# Patient Record
Sex: FEMALE | Race: WHITE | NOT HISPANIC OR LATINO | Employment: OTHER | URBAN - METROPOLITAN AREA
[De-identification: names, ages, dates, MRNs, and addresses within clinical notes are randomized per-mention and may not be internally consistent; named-entity substitution may affect disease eponyms.]

---

## 2017-01-02 ENCOUNTER — ANTICOAGULATION THERAPY VISIT (OUTPATIENT)
Dept: ANTICOAGULATION | Facility: CLINIC | Age: 77
End: 2017-01-02
Payer: COMMERCIAL

## 2017-01-02 ENCOUNTER — ALLIED HEALTH/NURSE VISIT (OUTPATIENT)
Dept: FAMILY MEDICINE | Facility: CLINIC | Age: 77
End: 2017-01-02
Payer: COMMERCIAL

## 2017-01-02 VITALS — SYSTOLIC BLOOD PRESSURE: 118 MMHG | RESPIRATION RATE: 14 BRPM | DIASTOLIC BLOOD PRESSURE: 66 MMHG | HEART RATE: 76 BPM

## 2017-01-02 DIAGNOSIS — Z01.30 ENCOUNTER FOR BLOOD PRESSURE EXAMINATION: Primary | ICD-10-CM

## 2017-01-02 DIAGNOSIS — Z79.01 LONG TERM CURRENT USE OF ANTICOAGULANT THERAPY: Primary | ICD-10-CM

## 2017-01-02 LAB — INR POINT OF CARE: 2.5 (ref 0.86–1.14)

## 2017-01-02 PROCEDURE — 36416 COLLJ CAPILLARY BLOOD SPEC: CPT

## 2017-01-02 PROCEDURE — 99207 ZZC NO CHARGE NURSE ONLY: CPT

## 2017-01-02 PROCEDURE — 85610 PROTHROMBIN TIME: CPT | Mod: QW

## 2017-01-02 NOTE — PROGRESS NOTES
ANTICOAGULATION FOLLOW-UP CLINIC VISIT    Patient Name:  Nayely Kay  Date:  1/2/2017  Contact Type:  Face to Face    SUBJECTIVE:        OBJECTIVE    INR PROTIME   Date Value Ref Range Status   01/02/2017 2.5* 0.86 - 1.14 Final       ASSESSMENT / PLAN  INR assessment THER    Recheck INR In: 4 WEEKS    INR Location Clinic      Anticoagulation Summary as of 1/2/2017     INR goal 2.0-3.0   Selected INR 2.5 (1/2/2017)   Maintenance plan 3.75 mg (2.5 mg x 1.5) on Mon; 2.5 mg (2.5 mg x 1) all other days   Full instructions 3.75 mg on Mon; 2.5 mg all other days   Weekly total 18.75 mg   No change documented Giselle Santillan RN   Plan last modified Yasmeen Anthony RN (3/9/2015)   Next INR check 1/30/2017   Priority INR   Target end date Indefinite    Indications   Atrial fibrillation (H) [I48.91]  Long term current use of anticoagulant therapy [Z79.01]         Anticoagulation Episode Summary     INR check location     Preferred lab     Send INR reminders to [facility] CLINIC POOL    Comments * only wants dosing card. Pt uses 5mg tablets.        Anticoagulation Care Providers     Provider Role Specialty Phone number    Ulysses Baker MD [facility] [phone]            See the Encounter Report to view Anticoagulation Flowsheet and Dosing Calendar (Go to Encounters tab in chart review, and find the Anticoagulation Therapy Visit)    Giselle Santillan, SEKOU

## 2017-01-02 NOTE — PROGRESS NOTES
Patient here for her monthly BP check after her anticoagulation appointment  Nicolette Granger RN

## 2017-01-06 ENCOUNTER — TELEPHONE (OUTPATIENT)
Dept: FAMILY MEDICINE | Facility: CLINIC | Age: 77
End: 2017-01-06

## 2017-01-06 NOTE — TELEPHONE ENCOUNTER
Reason for Call:  Other note    Detailed comments: Patient lives in an apartment with a garage that has a door that is very heavy and she just cannot lift the door any more.  If she has a note from her doctor the manager will install a garage . Please mail note to her if approved.    Phone Number Patient can be reached at: Home number on file [phone] (home)    Best Time: any    Can we leave a detailed message on this number? YES    Call taken on 1/6/2017 at 7:58 AM by Abigail Hayden

## 2017-01-06 NOTE — TELEPHONE ENCOUNTER
Would you be willing to write a letter for patient regarding the garage door?    Routing to provider.  Zulema LAWTON RN

## 2017-01-06 NOTE — Clinical Note
[facility]  [address] [address]  Phone: [phone]    January 6, 2017      Nayely Kay  [address] [address]      Dear Ms. Kay    For medical reasons you should have a garage  on your garage door.     Sincerely,    / Ulysses Baker MD

## 2017-01-06 NOTE — TELEPHONE ENCOUNTER
Pt has asked that the note be mailed to her.  Await  to mail to pt.  I notified pt.  Diane Mederos RN

## 2017-01-30 ENCOUNTER — ALLIED HEALTH/NURSE VISIT (OUTPATIENT)
Dept: FAMILY MEDICINE | Facility: CLINIC | Age: 77
End: 2017-01-30
Payer: COMMERCIAL

## 2017-01-30 ENCOUNTER — ANTICOAGULATION THERAPY VISIT (OUTPATIENT)
Dept: ANTICOAGULATION | Facility: CLINIC | Age: 77
End: 2017-01-30
Payer: COMMERCIAL

## 2017-01-30 VITALS — RESPIRATION RATE: 14 BRPM | HEART RATE: 64 BPM | DIASTOLIC BLOOD PRESSURE: 74 MMHG | SYSTOLIC BLOOD PRESSURE: 118 MMHG

## 2017-01-30 DIAGNOSIS — Z79.01 LONG TERM CURRENT USE OF ANTICOAGULANT THERAPY: Primary | ICD-10-CM

## 2017-01-30 DIAGNOSIS — Z01.30 BLOOD PRESSURE CHECK: Primary | ICD-10-CM

## 2017-01-30 DIAGNOSIS — I48.91 ATRIAL FIBRILLATION (H): ICD-10-CM

## 2017-01-30 LAB — INR POINT OF CARE: 1.7 (ref 0.86–1.14)

## 2017-01-30 PROCEDURE — 85610 PROTHROMBIN TIME: CPT | Mod: QW

## 2017-01-30 PROCEDURE — 99207 ZZC NO CHARGE NURSE ONLY: CPT

## 2017-01-30 PROCEDURE — 36416 COLLJ CAPILLARY BLOOD SPEC: CPT

## 2017-01-30 NOTE — PROGRESS NOTES
ANTICOAGULATION FOLLOW-UP CLINIC VISIT    Patient Name:  Nayely Kay  Date:  1/30/2017  Contact Type:  Face to Face    SUBJECTIVE:     Patient Findings     Positives Diet Changes (had more salad in the past few days, some at home then went out for dinner last night and had a salad with her meal, something she rarely gets to do), No Problem Findings           OBJECTIVE    INR PROTIME   Date Value Ref Range Status   01/30/2017 1.7* 0.86 - 1.14 Final       ASSESSMENT / PLAN  INR assessment SUB    Recheck INR In: 4 WEEKS    INR Location Clinic      Anticoagulation Summary as of 1/30/2017     INR goal 2.0-3.0   Selected INR 1.7! (1/30/2017)   Maintenance plan 3.75 mg (2.5 mg x 1.5) on Mon; 2.5 mg (2.5 mg x 1) all other days   Full instructions 1/30: 5 mg; Otherwise 3.75 mg on Mon; 2.5 mg all other days   Weekly total 18.75 mg   Plan last modified Yasmeen Anthony RN (3/9/2015)   Next INR check 2/27/2017   Priority INR   Target end date Indefinite    Indications   Atrial fibrillation (H) [I48.91]  Long term current use of anticoagulant therapy [Z79.01]         Anticoagulation Episode Summary     INR check location     Preferred lab     Send INR reminders to [facility] POOL    Comments * only wants dosing card. Pt uses 5mg tablets.        Anticoagulation Care Providers     Provider Role Specialty Phone number    Ulysses Baker MD [facility] [phone]            See the Encounter Report to view Anticoagulation Flowsheet and Dosing Calendar (Go to Encounters tab in chart review, and find the Anticoagulation Therapy Visit)    Giselle Santillan RN

## 2017-01-30 NOTE — PROGRESS NOTES
S-(situation): patient is here for INR check and always gets her blood pressure rafaela    B-(background): history hypertension    A-(assessment): blood pressure today is 118/74    R-(recommendations): see you next month Nayely Granger RN

## 2017-02-27 ENCOUNTER — ALLIED HEALTH/NURSE VISIT (OUTPATIENT)
Dept: FAMILY MEDICINE | Facility: CLINIC | Age: 77
End: 2017-02-27
Payer: COMMERCIAL

## 2017-02-27 ENCOUNTER — ANTICOAGULATION THERAPY VISIT (OUTPATIENT)
Dept: ANTICOAGULATION | Facility: CLINIC | Age: 77
End: 2017-02-27
Payer: COMMERCIAL

## 2017-02-27 VITALS — DIASTOLIC BLOOD PRESSURE: 60 MMHG | HEART RATE: 68 BPM | SYSTOLIC BLOOD PRESSURE: 108 MMHG | RESPIRATION RATE: 14 BRPM

## 2017-02-27 DIAGNOSIS — Z01.30 ENCOUNTER FOR BLOOD PRESSURE EXAMINATION: Primary | ICD-10-CM

## 2017-02-27 DIAGNOSIS — Z79.01 LONG TERM CURRENT USE OF ANTICOAGULANT THERAPY: ICD-10-CM

## 2017-02-27 LAB — INR POINT OF CARE: 1.8 (ref 0.86–1.14)

## 2017-02-27 PROCEDURE — 99207 ZZC NO CHARGE NURSE ONLY: CPT

## 2017-02-27 PROCEDURE — 36416 COLLJ CAPILLARY BLOOD SPEC: CPT

## 2017-02-27 PROCEDURE — 85610 PROTHROMBIN TIME: CPT | Mod: QW

## 2017-02-27 RX ORDER — WARFARIN SODIUM 5 MG/1
TABLET ORAL
Qty: 90 TABLET | Refills: 3 | COMMUNITY
Start: 2017-02-27 | End: 2017-05-16

## 2017-02-27 NOTE — PROGRESS NOTES
S-(situation): patient is here for a blood pressure check    B-(background): gets blood pressure checked when here for the anticoagulation clinic.    States is taking medications as directed.  Blood pressure has been going down last few months or so.  States feeling fatigued and little energy lately.    Nayely does not seem like her self today.    A-(assessment): blood pressure 108/80    R-(recommendations): advised to see her doctor and go over medications and discuss fatigue.  Nicolette Granger RN

## 2017-02-27 NOTE — MR AVS SNAPSHOT
Nayely Kay   2/27/2017 2:30 PM   Anticoagulation Therapy Visit    Description:  76 year old female   Provider:  NB ANTI CAROLAG   Department:  Nb Anticoag           INR as of 2/27/2017     Today's INR 1.8!      Anticoagulation Summary as of 2/27/2017     INR goal 2.0-3.0   Today's INR 1.8!   Full instructions 5 mg on Mon; 2.5 mg all other days   Next INR check 3/27/2017    Indications   Atrial fibrillation (H) [I48.91]  Long term current use of anticoagulant therapy [Z79.01]         Description     Increase dose to 5mg every Monday and 2.5mg all other days.  Recheck INR in 4 weeks.      February 2017 Details    Sun Mon Tue Wed Thu Fri Sat        1               2               3               4                 5               6               7               8               9               10               11                 12               13               14               15               16               17               18                 19               20               21               22               23               24               25                 26               27      5 mg   See details      28      2.5 mg              Date Details   02/27 This INR check               How to take your warfarin dose     To take:  2.5 mg Take 0.5 of a 5 mg tablet.    To take:  5 mg Take 1 of the 5 mg tablets.           March 2017 Details    Sun Mon Tue Wed Thu Fri Sat        1      2.5 mg         2      2.5 mg         3      2.5 mg         4      2.5 mg           5      2.5 mg         6      5 mg         7      2.5 mg         8      2.5 mg         9      2.5 mg         10      2.5 mg         11      2.5 mg           12      2.5 mg         13      5 mg         14      2.5 mg         15      2.5 mg         16      2.5 mg         17      2.5 mg         18      2.5 mg           19      2.5 mg         20      5 mg         21      2.5 mg         22      2.5 mg         23      2.5 mg         24      2.5 mg         25       2.5 mg           26      2.5 mg         27            28               29               30               31                 Date Details   No additional details    Date of next INR:  3/27/2017         How to take your warfarin dose     To take:  2.5 mg Take 0.5 of a 5 mg tablet.    To take:  5 mg Take 1 of the 5 mg tablets.

## 2017-02-27 NOTE — PROGRESS NOTES
ANTICOAGULATION FOLLOW-UP CLINIC VISIT    Patient Name:  Nayely Kay  Date:  2/27/2017  Contact Type:  Face to Face    SUBJECTIVE:     Patient Findings     Positives Unexplained INR or factor level change           OBJECTIVE    INR Protime   Date Value Ref Range Status   02/27/2017 1.8 (A) 0.86 - 1.14 Final       ASSESSMENT / PLAN  INR assessment SUB increase maintenance dose 6.7%   Recheck INR In: 4 WEEKS    INR Location Clinic      Anticoagulation Summary as of 2/27/2017     INR goal 2.0-3.0   Today's INR 1.8!   Maintenance plan 5 mg (5 mg x 1) on Mon; 2.5 mg (5 mg x 0.5) all other days   Full instructions 5 mg on Mon; 2.5 mg all other days   Weekly total 20 mg   Plan last modified Giselle Santillan RN (2/27/2017)   Next INR check 3/27/2017   Priority INR   Target end date Indefinite    Indications   Atrial fibrillation (H) [I48.91]  Long term current use of anticoagulant therapy [Z79.01]         Anticoagulation Episode Summary     INR check location     Preferred lab     Send INR reminders to [facility] CLINIC POOL    Comments * only wants dosing card. Pt uses 5mg tablets.        Anticoagulation Care Providers     Provider Role Specialty Phone number    Ulysses Baker MD [facility] [phone]            See the Encounter Report to view Anticoagulation Flowsheet and Dosing Calendar (Go to Encounters tab in chart review, and find the Anticoagulation Therapy Visit)    Giselle Santillan RN

## 2017-02-27 NOTE — MR AVS SNAPSHOT
After Visit Summary   2/27/2017    Nayely Kay    MRN: [id number]           Patient Information     Date Of Birth          1940        Visit Information        Provider Department      2/27/2017 2:45 PM [place] NB RN [facility]        Today's Diagnoses     Encounter for blood pressure examination    -  1       Follow-ups after your visit        Your next 10 appointments already scheduled     Mar 03, 2017  2:00 PM CST   SHORT with Oswaldo Cedillo MD   [facility]    [address] [place] [id number] [phone]            Mar 27, 2017  2:30 PM CDT   Anticoagulation Visit with NB ANTI COAG   [facility]    [address] [place] [id number] [phone]            Mar 27, 2017  2:45 PM CDT   Nurse Only with [place] NB RN   [facility]    [address] [place] [id number] [phone]              Who to contact     If you have questions or need follow up information about today's clinic visit or your schedule please contact [facility] directly at [phone].  Normal or non-critical lab and imaging results will be communicated to you by MyChart, letter or phone within 4 business days after the clinic has received the results. If you do not hear from us within 7 days, please contact the clinic through MyChart or phone. If you have a critical or abnormal lab result, we will notify you by phone as soon as possible.  Submit refill requests through [employer] or call your pharmacy and they will forward the refill request to us. Please allow 3 business days for your refill to be completed.          Additional Information About Your Visit        [facility] Information     [employer] lets you send messages to your doctor, view your test results, renew your prescriptions, schedule appointments  "and more. To sign up, go to [email or web address] . Click on \"Log in\" on the left side of the screen, which will take you to the Welcome page. Then click on \"Sign up Now\" on the right side of the page.     You will be asked to enter the access code listed below, as well as some personal information. Please follow the directions to create your username and password.     Your access code is: [email or web address]  Expires: 2017  3:18 PM     Your access code will  in 90 days. If you need help or a new code, please call your [place] clinic or [phone].        Care EveryWhere ID     This is your Care EveryWhere ID. This could be used by other organizations to access your [place] medical records  [id number]        Your Vitals Were     Pulse Respirations                68 14           Blood Pressure from Last 3 Encounters:   17 108/60   17 118/74   17 118/66    Weight from Last 3 Encounters:   16 120 lb (54.4 kg)   10/26/16 120 lb (54.4 kg)   16 122 lb 9.6 oz (55.6 kg)              Today, you had the following     No orders found for display         Today's Medication Changes          These changes are accurate as of: 17  3:18 PM.  If you have any questions, ask your nurse or doctor.               These medicines have changed or have updated prescriptions.        Dose/Directions    warfarin 5 MG tablet   Commonly known as:  COUMADIN   This may have changed:  additional instructions   Used for:  Long term current use of anticoagulant therapy        Take 5mg by mouth Monday and 2.5mg all other days or as directed by Anticoagulation Clinic   Quantity:  90 tablet   Refills:  3                Primary Care Provider Office Phone # Fax #    Ulysses Baker [phone]       [facility] [address] [address]        Thank you!     Thank you for choosing [facility]  for your care. Our goal is always to provide you with " excellent care. Hearing back from our patients is one way we can continue to improve our services. Please take a few minutes to complete the written survey that you may receive in the mail after your visit with us. Thank you!             Your Updated Medication List - Protect others around you: Learn how to safely use, store and throw away your medicines at www.disposemymeds.org.          This list is accurate as of: 2/27/17  3:18 PM.  Always use your most recent med list.                   Brand Name Dispense Instructions for use    ALLEGRA 60 MG tablet   Generic drug:  fexofenadine      Take 1 tablet (60 mg) by mouth daily as needed for allergies       ascorbic acid 250 MG Chew chewable tablet    vitamin C     Take 1 tablet by mouth daily.       ASPIRIN NOT PRESCRIBED    INTENTIONAL    0    due to coumadin       calcium carb 1250 mg (500 mg [place])/vitamin D 200 units 500-200 MG-UNIT per tablet    OSCAL with D     Take 1 tablet by mouth 2 times daily (with meals).       iron 325 (65 FE) MG tablet      Take  by mouth. Taking two per week.       metoprolol 50 MG tablet    LOPRESSOR    93 tablet    75mg twice daily       multivitamin per tablet      Take 1 tablet by mouth daily.       triamterene-hydrochlorothiazide 37.5-25 MG per tablet    MAXZIDE-25    62 tablet    Take 1 tablet by mouth 2 times daily       warfarin 5 MG tablet    COUMADIN    90 tablet    Take 5mg by mouth Monday and 2.5mg all other days or as directed by Anticoagulation Clinic

## 2017-03-03 ENCOUNTER — OFFICE VISIT (OUTPATIENT)
Dept: FAMILY MEDICINE | Facility: CLINIC | Age: 77
End: 2017-03-03
Payer: COMMERCIAL

## 2017-03-03 VITALS
WEIGHT: 123 LBS | DIASTOLIC BLOOD PRESSURE: 60 MMHG | BODY MASS INDEX: 24.15 KG/M2 | HEIGHT: 60 IN | TEMPERATURE: 97.2 F | HEART RATE: 70 BPM | SYSTOLIC BLOOD PRESSURE: 110 MMHG

## 2017-03-03 DIAGNOSIS — L98.9 SKIN LESION: ICD-10-CM

## 2017-03-03 DIAGNOSIS — I10 ESSENTIAL HYPERTENSION WITH GOAL BLOOD PRESSURE LESS THAN 140/90: Primary | ICD-10-CM

## 2017-03-03 PROCEDURE — 99213 OFFICE O/P EST LOW 20 MIN: CPT | Performed by: FAMILY MEDICINE

## 2017-03-03 RX ORDER — TRIAMTERENE/HYDROCHLOROTHIAZID 37.5-25 MG
TABLET ORAL
Qty: 30 TABLET | Refills: 11
Start: 2017-03-03 | End: 2017-05-16

## 2017-03-03 RX ORDER — PREDNISONE 5 MG/1
2.5 TABLET ORAL DAILY PRN
COMMUNITY
End: 2017-05-04

## 2017-03-03 ASSESSMENT — ANXIETY QUESTIONNAIRES
6. BECOMING EASILY ANNOYED OR IRRITABLE: NOT AT ALL
GAD7 TOTAL SCORE: 4
2. NOT BEING ABLE TO STOP OR CONTROL WORRYING: SEVERAL DAYS
5. BEING SO RESTLESS THAT IT IS HARD TO SIT STILL: NOT AT ALL
3. WORRYING TOO MUCH ABOUT DIFFERENT THINGS: SEVERAL DAYS
7. FEELING AFRAID AS IF SOMETHING AWFUL MIGHT HAPPEN: NOT AT ALL
1. FEELING NERVOUS, ANXIOUS, OR ON EDGE: SEVERAL DAYS

## 2017-03-03 ASSESSMENT — PATIENT HEALTH QUESTIONNAIRE - PHQ9: 5. POOR APPETITE OR OVEREATING: SEVERAL DAYS

## 2017-03-03 ASSESSMENT — PAIN SCALES - GENERAL: PAINLEVEL: NO PAIN (0)

## 2017-03-03 NOTE — PROGRESS NOTES
SUBJECTIVE:                                                    Nayely Kay is a 76 year old female who presents to clinic today for the following health issues:  Chief Complaint   Patient presents with     Hypertension     ? if her blood pressures are running too low.     Fatigue     Increase tiredness over the last 2-3 months.        Hypertension Follow-up      Outpatient blood pressures are being checked at clinic.  Results are Last bp was a little low- 108/60 and has felt more fatigues lately.    Low Salt Diet: no added salt   In the past 8 months, BP has been 104-138/60-74  She has been on Macide 37.5-25 twice daily and metoprolol 75mg twice daily.    Some fatigue lately.   NO orthostatic symptoms.       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     Patient Active Problem List   Diagnosis     Hypertension, goal below 140/90     Headache     Atrial fibrillation (H)     Polymyalgia rheumatica (H)     Iron deficiency anemia     HYPERLIPIDEMIA LDL GOAL <130     Osteopenia     Family history of breast cancer     Eczema     Advanced directives, counseling/discussion     AK (actinic keratosis)     Ganglion cyst     Long term current use of anticoagulant therapy      ROS:General: No change in weight, sleep or appetite.  No fever or chills.  A little more tired.   Resp: POSITIVE for:, dyspnea on exertion, mild carrrying groceries up stairs.   CV: No chest pains or palpitations  GI: No nausea, vomiting,  heartburn, abdominal pain, diarrhea, constipation or change in bowel habits.  Takes some fiber every day.   : No urinary frequency or dysuria, bladder or kidney problems  Musculoskeletal: POSITIVE  for:, polymyalgia rheumatica pains.  Can get pains in back, hips or knees at times.   Psychiatric:  No problems with anxiety, depression or mental health    OBJECTIVE:Blood pressure 110/60, pulse 70, temperature 97.2  F (36.2  C), height 5' (1.524 m), weight 123 lb (55.8 kg). BMI=Body mass index is 24.02 kg/(m^2).  GENERAL APPEARANCE ADULT: Alert, no acute distress, healthy appearance  NECK: No adenopathy,masses or thyromegaly  RESP: lungs clear to auscultation   CV: normal rate, regular rhythm, no murmur or gallop  ABDOMEN: soft, no organomegaly, masses or tenderness  MS: extremities normal, no peripheral edema     ASSESSMENT:   (I10) Essential hypertension with goal blood pressure less than 140/90  Comment: BP has been OK.  potassium has been a little low.   Plan: triamterene-hydrochlorothiazide (MAXZIDE-25)         37.5-25 MG per tablet        Cut back a little on triamterene/hydrochlorothiazide to one pill daily.  You prefer to take 1/2 pill twice daily which is oK.   Continue the metoprolol at 75mg (1 and 1/2 pills) twice daily.   Monitor BP periodically.  This can be done at home, in clinic, at our pharmacy, at a store or by neighbor or relative with a blood pressure cuff.  You should be sitting and relaxed for several minutes when taking blood pressure.   Goal BP (most readings) should be less than 140/90    Normal blood pressure is 120/80.    If your blood pressure is consistently at or above the goal, some changes should be made with lifestyle or medication to keep blood pressure under good control.    High blood pressure over time can lead to eye and kidney damage and increase risk for heart attacks and strokes.   Let us know if readings are often high, so we can help get your blood pressure under good control.     If you have increased swelling or blood pressure increases, you could go back to previous blood pressure medication dose.     Schedule an appointment with Dermatology to check skin spots on nose and alongside left nose.

## 2017-03-03 NOTE — PATIENT INSTRUCTIONS
ASSESSMENT:   (I10) Essential hypertension with goal blood pressure less than 140/90  Comment: BP has been OK.  potassium has been a little low.   Plan: triamterene-hydrochlorothiazide (MAXZIDE-25)         37.5-25 MG per tablet        Cut back a little on triamterene/hydrochlorothiazide to one pill daily.  You prefer to take 1/2 pill twice daily which is oK.   Continue the metoprolol at 75mg (1 and 1/2 pills) twice daily.   Monitor BP periodically.  This can be done at home, in clinic, at our pharmacy, at a store or by neighbor or relative with a blood pressure cuff.  You should be sitting and relaxed for several minutes when taking blood pressure.   Goal BP (most readings) should be less than 140/90    Normal blood pressure is 120/80.    If your blood pressure is consistently at or above the goal, some changes should be made with lifestyle or medication to keep blood pressure under good control.    High blood pressure over time can lead to eye and kidney damage and increase risk for heart attacks and strokes.   Let us know if readings are often high, so we can help get your blood pressure under good control.     If you have increased swelling or blood pressure increases, you could go back to previous blood pressure medication dose.     Schedule an appointment with Dermatology to check skin spots on nose and alongside left nose.

## 2017-03-03 NOTE — MR AVS SNAPSHOT
After Visit Summary   3/3/2017    Nayely Kay    MRN: [id number]           Patient Information     Date Of Birth          1940        Visit Information        Provider Department      3/3/2017 2:00 PM Oswaldo Cedillo MD [facility]        Today's Diagnoses     Essential hypertension with goal blood pressure less than 140/90    -  1    Skin lesion          Care Instructions    ASSESSMENT:   (I10) Essential hypertension with goal blood pressure less than 140/90  Comment: BP has been OK.  potassium has been a little low.   Plan: triamterene-hydrochlorothiazide (MAXZIDE-25)         37.5-25 MG per tablet        Cut back a little on triamterene/hydrochlorothiazide to one pill daily.  You prefer to take 1/2 pill twice daily which is oK.   Continue the metoprolol at 75mg (1 and 1/2 pills) twice daily.   Monitor BP periodically.  This can be done at home, in clinic, at our pharmacy, at a store or by neighbor or relative with a blood pressure cuff.  You should be sitting and relaxed for several minutes when taking blood pressure.   Goal BP (most readings) should be less than 140/90    Normal blood pressure is 120/80.    If your blood pressure is consistently at or above the goal, some changes should be made with lifestyle or medication to keep blood pressure under good control.    High blood pressure over time can lead to eye and kidney damage and increase risk for heart attacks and strokes.   Let us know if readings are often high, so we can help get your blood pressure under good control.     If you have increased swelling or blood pressure increases, you could go back to previous blood pressure medication dose.     Schedule an appointment with Dermatology to check skin spots on nose and alongside left nose.         Follow-ups after your visit        Additional Services     DERMATOLOGY REFERRAL       Your provider has referred you to: [facility]: [facility]  [phone]   [email or web address]    Possible AK or skin cancer nose and along left nose.     Please be aware that coverage of these services is subject to the terms and limitations of your health insurance plan.  Call member services at your health plan with any benefit or coverage questions.      Please bring the following with you to your appointment:    (1) Any X-Rays, CTs or MRIs which have been performed.  Contact the facility where they were done to arrange for  prior to your scheduled appointment.    (2) List of current medications  (3) This referral request   (4) Any documents/labs given to you for this referral                  Your next 10 appointments already scheduled     Mar 27, 2017  2:30 PM CDT   Anticoagulation Visit with NB ANTI COAG   [facility] ([facility])    [address] [place] [address] [phone]            Mar 27, 2017  2:45 PM CDT   Nurse Only with [place] NB RN   [facility] ([facility])    [address] [place] [address] [phone]              Who to contact     If you have questions or need follow up information about today's clinic visit or your schedule please contact [facility] directly at [phone].  Normal or non-critical lab and imaging results will be communicated to you by MyChart, letter or phone within 4 business days after the clinic has received the results. If you do not hear from us within 7 days, please contact the clinic through MyChart or phone. If you have a critical or abnormal lab result, we will notify you by phone as soon as possible.  Submit refill requests through [employer] or call your pharmacy and they will forward the refill request to us. Please allow 3 business days for your refill to be completed.          Additional Information About Your Visit        [employer] Information     [employer] lets you send  "messages to your doctor, view your test results, renew your prescriptions, schedule appointments and more. To sign up, go to [email or web address] . Click on \"Log in\" on the left side of the screen, which will take you to the Welcome page. Then click on \"Sign up Now\" on the right side of the page.     You will be asked to enter the access code listed below, as well as some personal information. Please follow the directions to create your username and password.     Your access code is: [email or web address]  Expires: 2017  3:18 PM     Your access code will  in 90 days. If you need help or a new code, please call your [facility] clinic or [phone].        Care EveryWhere ID     This is your [facility] EveryWhere ID. This could be used by other organizations to access your [facility] medical records  [id number]        Your Vitals Were     Pulse Temperature Height BMI (Body Mass Index)          70 97.2  F (36.2  C) 5' (1.524 m) 24.02 kg/m2         Blood Pressure from Last 3 Encounters:   17 110/60   17 108/60   17 118/74    Weight from Last 3 Encounters:   17 123 lb (55.8 kg)   16 120 lb (54.4 kg)   10/26/16 120 lb (54.4 kg)              We Performed the Following     DERMATOLOGY REFERRAL          Today's Medication Changes          These changes are accurate as of: 3/3/17  3:07 PM.  If you have any questions, ask your nurse or doctor.               These medicines have changed or have updated prescriptions.        Dose/Directions    triamterene-hydrochlorothiazide 37.5-25 MG per tablet   Commonly known as:  MAXZIDE-25   This may have changed:    - how much to take  - how to take this  - when to take this  - additional instructions   Used for:  Essential hypertension with goal blood pressure less than 140/90   Changed by:  Oswaldo Cedillo MD        Take 1/2 pill twice daily   Quantity:  30 tablet   Refills:  11            Where to get your medicines      Some of these will need a paper " prescription and others can be bought over the counter.  Ask your nurse if you have questions.     You don't need a prescription for these medications     triamterene-hydrochlorothiazide 37.5-25 MG per tablet                Primary Care Provider Office Phone # Fax #    Ulysses Baker [phone]       [facility] [address] [facility] [address]        Thank you!     Thank you for choosing [facility]  for your care. Our goal is always to provide you with excellent care. Hearing back from our patients is one way we can continue to improve our services. Please take a few minutes to complete the written survey that you may receive in the mail after your visit with us. Thank you!             Your Updated Medication List - Protect others around you: Learn how to safely use, store and throw away your medicines at www.disposemymeds.org.          This list is accurate as of: 3/3/17  3:07 PM.  Always use your most recent med list.                   Brand Name Dispense Instructions for use    ascorbic acid 250 MG Chew chewable tablet    vitamin C     Take 1 tablet by mouth daily.       ASPIRIN NOT PRESCRIBED    INTENTIONAL    0    due to coumadin       calcium carb 1250 mg (500 mg [place])/vitamin D 200 units 500-200 MG-UNIT per tablet    OSCAL with D     Take 1 tablet by mouth 2 times daily (with meals).       iron 325 (65 FE) MG tablet      Take  by mouth. Taking two per week.       metoprolol 50 MG tablet    LOPRESSOR    93 tablet    75mg twice daily       multivitamin per tablet      Take 1 tablet by mouth daily.       predniSONE 5 MG tablet    DELTASONE     Take 2.5 mg by mouth daily as needed States takes Prednisone 2.5 mg x 4 days in a row and then skips a day. [facility] has been on this schedule for at least 2 months.       triamterene-hydrochlorothiazide 37.5-25 MG per tablet    MAXZIDE-25    30 tablet    Take 1/2 pill twice daily       warfarin 5 MG tablet     COUMADIN    90 tablet    Take 5mg by mouth Monday and 2.5mg all other days or as directed by Anticoagulation Clinic

## 2017-03-04 ASSESSMENT — ANXIETY QUESTIONNAIRES: GAD7 TOTAL SCORE: 4

## 2017-03-04 ASSESSMENT — PATIENT HEALTH QUESTIONNAIRE - PHQ9: SUM OF ALL RESPONSES TO PHQ QUESTIONS 1-9: 6

## 2017-03-20 ENCOUNTER — ANTICOAGULATION THERAPY VISIT (OUTPATIENT)
Dept: ANTICOAGULATION | Facility: CLINIC | Age: 77
End: 2017-03-20
Payer: COMMERCIAL

## 2017-03-20 ENCOUNTER — ALLIED HEALTH/NURSE VISIT (OUTPATIENT)
Dept: FAMILY MEDICINE | Facility: CLINIC | Age: 77
End: 2017-03-20
Payer: COMMERCIAL

## 2017-03-20 VITALS — HEART RATE: 68 BPM | SYSTOLIC BLOOD PRESSURE: 132 MMHG | DIASTOLIC BLOOD PRESSURE: 64 MMHG | RESPIRATION RATE: 14 BRPM

## 2017-03-20 DIAGNOSIS — Z79.01 LONG TERM CURRENT USE OF ANTICOAGULANT THERAPY: ICD-10-CM

## 2017-03-20 DIAGNOSIS — Z01.30 ENCOUNTER FOR BLOOD PRESSURE EXAMINATION: Primary | ICD-10-CM

## 2017-03-20 LAB — INR POINT OF CARE: 1.9 (ref 0.86–1.14)

## 2017-03-20 PROCEDURE — 99207 ZZC NO CHARGE NURSE ONLY: CPT

## 2017-03-20 PROCEDURE — 36416 COLLJ CAPILLARY BLOOD SPEC: CPT

## 2017-03-20 PROCEDURE — 85610 PROTHROMBIN TIME: CPT | Mod: QW

## 2017-03-20 NOTE — MR AVS SNAPSHOT
After Visit Summary   3/20/2017    Nayely Kay    MRN: [id number]           Patient Information     Date Of Birth          1940        Visit Information        Provider Department      3/20/2017 2:45 PM [place] NB RN [facility]        Today's Diagnoses     Encounter for blood pressure examination    -  1       Follow-ups after your visit        Your next 10 appointments already scheduled     Apr 03, 2017 10:30 AM CDT   New Visit with Jareth Pedroza MD   [facility] ([facility]    [address] [facility] [address]   [phone]            Apr 17, 2017  2:30 PM CDT   Anticoagulation Visit with NB ANTI COAG   [facility] ([facility])    [address] [place] [address] [phone]            Apr 17, 2017  2:45 PM CDT   Nurse Only with [place] NB RN   [facility] ([facility])    [address] [place] [address] [phone]              Who to contact     If you have questions or need follow up information about today's clinic visit or your schedule please contact [facility] directly at [phone].  Normal or non-critical lab and imaging results will be communicated to you by [employer], letter or phone within 4 business days after the clinic has received the results. If you do not hear from us within 7 days, please contact the clinic through [employer] or phone. If you have a critical or abnormal lab result, we will notify you by phone as soon as possible.  Submit refill requests through [employer] or call your pharmacy and they will forward the refill request to us. Please allow 3 business days for your refill to be completed.          Additional Information About Your Visit        [employer] Information     [employer] lets you send messages to your doctor, view your test results, renew your prescriptions, schedule appointments  "and more. To sign up, go to [email or web address] . Click on \"Log in\" on the left side of the screen, which will take you to the Welcome page. Then click on \"Sign up Now\" on the right side of the page.     You will be asked to enter the access code listed below, as well as some personal information. Please follow the directions to create your username and password.     Your access code is: [email or web address]  Expires: 2017  4:18 PM     Your access code will  in 90 days. If you need help or a new code, please call your [facility] clinic or [phone].        Care EveryWhere ID     This is your Care EveryWhere ID. This could be used by other organizations to access your [facility] medical records  [id number]        Your Vitals Were     Pulse Respirations                68 14           Blood Pressure from Last 3 Encounters:   17 132/64   17 110/60   17 108/60    Weight from Last 3 Encounters:   17 123 lb (55.8 kg)   16 120 lb (54.4 kg)   10/26/16 120 lb (54.4 kg)              Today, you had the following     No orders found for display       Primary Care Provider Office Phone # Fax #    Ulysses Baker [phone]       [facility] [address] [facility] [address]        Thank you!     Thank you for choosing [facility]  for your care. Our goal is always to provide you with excellent care. Hearing back from our patients is one way we can continue to improve our services. Please take a few minutes to complete the written survey that you may receive in the mail after your visit with us. Thank you!             Your Updated Medication List - Protect others around you: Learn how to safely use, store and throw away your medicines at www.disposemymeds.org.          This list is accurate as of: 3/20/17  3:17 PM.  Always use your most recent med list.                   Brand Name Dispense Instructions for use    ascorbic acid 250 MG " Chew chewable tablet    vitamin C     Take 1 tablet by mouth daily.       ASPIRIN NOT PRESCRIBED    INTENTIONAL    0    due to coumadin       calcium carb 1250 mg (500 mg [place])/vitamin D 200 units 500-200 MG-UNIT per tablet    OSCAL with D     Take 1 tablet by mouth 2 times daily (with meals).       iron 325 (65 FE) MG tablet      Take  by mouth. Taking two per week.       metoprolol 50 MG tablet    LOPRESSOR    93 tablet    75mg twice daily       multivitamin per tablet      Take 1 tablet by mouth daily.       predniSONE 5 MG tablet    DELTASONE     Take 2.5 mg by mouth daily as needed States takes Prednisone 2.5 mg x 4 days in a row and then skips a day. [facility] has been on this schedule for at least 2 months.       triamterene-hydrochlorothiazide 37.5-25 MG per tablet    MAXZIDE-25    30 tablet    Take 1/2 pill twice daily       warfarin 5 MG tablet    COUMADIN    90 tablet    Take 5mg by mouth Monday and 2.5mg all other days or as directed by Anticoagulation Clinic

## 2017-03-20 NOTE — PROGRESS NOTES
ANTICOAGULATION FOLLOW-UP CLINIC VISIT    Patient Name:  Nayely Kay  Date:  3/20/2017  Contact Type:  Face to Face    SUBJECTIVE:     Patient Findings     Positives Change in medications (triamterene/hydrochlorothiazide dose decreased), Other complaints (pt reports her ankles are somewhat swollen since the decrease in dose)    Comments Pt will have BP check today.           OBJECTIVE    INR Protime   Date Value Ref Range Status   03/20/2017 1.9 (A) 0.86 - 1.14 Final       ASSESSMENT / PLAN  INR assessment THER    Recheck INR In: 4 WEEKS    INR Location Clinic      Anticoagulation Summary as of 3/20/2017     INR goal 2.0-3.0   Today's INR 1.9!   Maintenance plan 5 mg (5 mg x 1) on Mon; 2.5 mg (5 mg x 0.5) all other days   Full instructions 5 mg on Mon; 2.5 mg all other days   Weekly total 20 mg   No change documented Giselle Santillan RN   Plan last modified Giselle Santillan RN (2/27/2017)   Next INR check 4/17/2017   Priority INR   Target end date Indefinite    Indications   Atrial fibrillation (H) [I48.91]  Long term current use of anticoagulant therapy [Z79.01]         Anticoagulation Episode Summary     INR check location     Preferred lab     Send INR reminders to [facility] CLINIC POOL    Comments * only wants dosing card. Pt uses 5mg tablets.        Anticoagulation Care Providers     Provider Role Specialty Phone number    Ulysses Baker MD [facility] [phone]            See the Encounter Report to view Anticoagulation Flowsheet and Dosing Calendar (Go to Encounters tab in chart review, and find the Anticoagulation Therapy Visit)      Giselle Santillan RN

## 2017-03-20 NOTE — PROGRESS NOTES
S-(situation): patient here for her monthly blood pressure check    B-(background): gets INR checked monthly.  Feels good.  States has some ankle swelling, which I do not see today    A-(assessment): /80 today    R-(recommendations): return as needed  Nicolette Granger RN

## 2017-03-20 NOTE — MR AVS SNAPSHOT
Nayely Kay   3/20/2017 2:30 PM   Anticoagulation Therapy Visit    Description:  77 year old female   Provider:  NB ANTI COAG   Department:  Nb Anticoag           INR as of 3/20/2017     Today's INR 1.9!      Anticoagulation Summary as of 3/20/2017     INR goal 2.0-3.0   Today's INR 1.9!   Full instructions 5 mg on Mon; 2.5 mg all other days   Next INR check 4/17/2017    Indications   Atrial fibrillation (H) [I48.91]  Long term current use of anticoagulant therapy [Z79.01]         Description     No change, recheck INR in 4 weeks.      March 2017 Details    Sun Mon Tue Wed Thu Fri Sat        1               2               3               4                 5               6               7               8               9               10               11                 12               13               14               15               16               17               18                 19               20      5 mg   See details      21      2.5 mg         22      2.5 mg         23      2.5 mg         24      2.5 mg         25      2.5 mg           26      2.5 mg         27      5 mg         28      2.5 mg         29      2.5 mg         30      2.5 mg         31      2.5 mg           Date Details   03/20 This INR check               How to take your warfarin dose     To take:  2.5 mg Take 0.5 of a 5 mg tablet.    To take:  5 mg Take 1 of the 5 mg tablets.           April 2017 Details    Sun Mon Tue Wed Thu Fri Sat           1      2.5 mg           2      2.5 mg         3      5 mg         4      2.5 mg         5      2.5 mg         6      2.5 mg         7      2.5 mg         8      2.5 mg           9      2.5 mg         10      5 mg         11      2.5 mg         12      2.5 mg         13      2.5 mg         14      2.5 mg         15      2.5 mg           16      2.5 mg         17            18               19               20               21               22                 23               24                25               26               27               28               29                 30                      Date Details   No additional details    Date of next INR:  4/17/2017         How to take your warfarin dose     To take:  2.5 mg Take 0.5 of a 5 mg tablet.    To take:  5 mg Take 1 of the 5 mg tablets.

## 2017-04-03 ENCOUNTER — OFFICE VISIT (OUTPATIENT)
Dept: DERMATOLOGY | Facility: CLINIC | Age: 77
End: 2017-04-03
Payer: COMMERCIAL

## 2017-04-03 ENCOUNTER — TELEPHONE (OUTPATIENT)
Dept: FAMILY MEDICINE | Facility: CLINIC | Age: 77
End: 2017-04-03

## 2017-04-03 ENCOUNTER — TELEPHONE (OUTPATIENT)
Dept: DERMATOLOGY | Facility: CLINIC | Age: 77
End: 2017-04-03

## 2017-04-03 VITALS — HEART RATE: 65 BPM | SYSTOLIC BLOOD PRESSURE: 154 MMHG | DIASTOLIC BLOOD PRESSURE: 83 MMHG | OXYGEN SATURATION: 97 %

## 2017-04-03 DIAGNOSIS — D04.39 SQUAMOUS CELL CARCINOMA IN SITU OF SKIN OF NOSE: Primary | ICD-10-CM

## 2017-04-03 DIAGNOSIS — L82.1 SK (SEBORRHEIC KERATOSIS): ICD-10-CM

## 2017-04-03 DIAGNOSIS — D04.39 SQUAMOUS CELL CARCINOMA IN SITU OF SKIN OF CHEEK: ICD-10-CM

## 2017-04-03 DIAGNOSIS — L81.4 LENTIGO: ICD-10-CM

## 2017-04-03 PROCEDURE — 99203 OFFICE O/P NEW LOW 30 MIN: CPT | Mod: 25 | Performed by: DERMATOLOGY

## 2017-04-03 PROCEDURE — 88331 PATH CONSLTJ SURG 1 BLK 1SPC: CPT | Performed by: DERMATOLOGY

## 2017-04-03 PROCEDURE — 11100 HC BIOPSY SKIN/SUBQ/MUC MEM, SINGLE LESION: CPT | Performed by: DERMATOLOGY

## 2017-04-03 PROCEDURE — 11101 HC BIOPSY SKIN/SUBQ/MUC MEM, EACH ADDTL LESION: CPT | Performed by: DERMATOLOGY

## 2017-04-03 NOTE — NURSING NOTE
Chief Complaint   Patient presents with     Derm Problem     spot on nose       Initial /87  Pulse 65  SpO2 97% Estimated body mass index is 24.02 kg/(m^2) as calculated from the following:    Height as of 3/3/17: 1.524 m (5').    Weight as of 3/3/17: 55.8 kg (123 lb).  BP completed using cuff size: inge Villalobos LPN

## 2017-04-03 NOTE — TELEPHONE ENCOUNTER
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

## 2017-04-03 NOTE — PROGRESS NOTES
Nayely Kay is a 77 year old year old female patient here today in consultation for spot on nose and cheek by Dr. Cedillo.  Patient states this has been present for years.  Location nose and cheek.  Patient reports the following symptoms:  cruting and bleeding .  Patient reports the following previous treatments cryo.  Patient reports the following modifying factors none.  Associated symptoms: none.  Patient has no other skin complaints today.  Remainder of the HPI, Meds, PMH, Allergies, FH, and SH was reviewed in chart.      Past Medical History:   Diagnosis Date     Atrial fibrillation (H)      Migraine, unspecified, without mention of intractable migraine without mention of status migrainosus     Migraine HA     Unspecified essential hypertension      Unspecified tinnitus        Past Surgical History:   Procedure Laterality Date     COLONOSCOPY  2009     FLEXIBLE SIGMOIDOSCOPY  9/2004     SURGICAL HISTORY OF -       tubal     SURGICAL HISTORY OF -       oral teeth        Family History   Problem Relation Age of Onset     HEART DISEASE Mother      Valvular disease- age 55.     CEREBROVASCULAR DISEASE Father      age 75.     Neurologic Disorder Daughter      MS     Circulatory Brother      joe hanna-cleaned out     Breast Cancer Maternal Aunt        Social History     Social History     Marital status:      Spouse name: N/A     Number of children: N/A     Years of education: N/A     Occupational History     Not on file.     Social History Main Topics     Smoking status: Never Smoker     Smokeless tobacco: Never Used     Alcohol use No     Drug use: No     Sexual activity: Not Currently     Other Topics Concern     Parent/Sibling W/ Cabg, Mi Or Angioplasty Before 65f 55m? No     Social History Narrative       Outpatient Encounter Prescriptions as of 4/3/2017   Medication Sig Dispense Refill     predniSONE (DELTASONE) 5 MG tablet Take 2.5 mg by mouth daily as needed States takes Prednisone 2.5 mg x 4  days in a row and then skips a day. [facility] has been on this schedule for at least 2 months.       triamterene-hydrochlorothiazide (MAXZIDE-25) 37.5-25 MG per tablet Take 1/2 pill twice daily 30 tablet 11     warfarin (COUMADIN) 5 MG tablet Take 5mg by mouth Monday and 2.5mg all other days or as directed by Anticoagulation Clinic 90 tablet 3     metoprolol (LOPRESSOR) 50 MG tablet 75mg twice daily 93 tablet 11     Ferrous Sulfate (IRON) 325 (65 FE) MG tablet Take  by mouth. Taking two per week.       Multiple Vitamin (MULTIVITAMIN) per tablet Take 1 tablet by mouth daily.       ascorbic acid (VITAMIN C) 250 MG CHEW Take 1 tablet by mouth daily.  0     calcium carb 1250 mg, 500 mg [place],/vitamin D 200 units (OSCAL WITH D) 500-200 MG-UNIT per tablet Take 1 tablet by mouth 2 times daily (with meals).       ASPIRIN NOT PRESCRIBED (INTENTIONAL) due to coumadin 0 0     No facility-administered encounter medications on file as of 4/3/2017.              Review Of Systems  Skin: As above  Eyes: negative  Ears/Nose/Throat: negative  Respiratory: No shortness of breath, dyspnea on exertion, cough, or hemoptysis  Cardiovascular: negative  Gastrointestinal: negative  Genitourinary: negative  Musculoskeletal: negative  Neurologic: negative  Psychiatric: negative  Hematologic/Lymphatic/Immunologic: negative  Endocrine: negative      O:   NAD, WDWN, Alert & Oriented, Mood & Affect wnl, Vitals stable   Here today alone   /83  Pulse 65  SpO2 97%   General appearance melo ii   Vitals stable   Alert, oriented and in no acute distress      Following lymph nodes palpated: Occipital, Cervical, Supraclavicular no lad   Seborrheic keratosis l   L medial cheek 1cm ill-defined crusted red scaly papule    Nasal bridge 2.1cm crusted red plaque         The remainder of expanded problem focused exam was unremarkable; the following areas were examined:  scalp/hair, conjunctiva/lids, face, neck, lips/teeth, chest, digits/nails, RUE,  SHOSHANA.      Eyes: Conjunctivae/lids:Normal     ENT: Lips, buccal mucosa, tongue: normal    MSK:Normal    Cardiovascular: peripheral edema none    Pulm: Breathing Normal    Lymph Nodes: No Head and Neck Lymphadenopathy     Neuro/Psych: Orientation:Normal; Mood/Affect:Normal      MICRO:     L medial cheek:There is hyperkeratosis & parakeratosis of the epidermis, with full thickness epidermal involvement by atypical keratinocytes with rare pale vacuolated cells.  Unremarkable dermis.   Nasal bridge :There is hyperkeratosis & parakeratosis of the epidermis, with full thickness epidermal involvement by atypical keratinocytes with rare pale vacuolated cells.  Unremarkable dermis.    A/P:  1. Seborrheic keratosis, lentigo  2. R/o squamous cell carcinoma   TANGENTIAL BIOPSY IN HOUSE:  After consent, anesthesia with LEC and prep, tangential excision performed and dx above confirmed with frozen section histology.  No complications and routine wound care.  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  I explained that treatment of these lesions would be purely cosmetic and not medically neccessary.  I discussed with patient different removal options including excision, cautery and /or laser.      Nature and genetics of benign skin lesions dicussed with patient.  Signs and Symptoms of skin cancer discussed with patient.  Patient encouraged to perform monthly skin exams.  UV precautions reviewed with patient.  Skin care regimen reviewed with patient: Eliminate harsh soaps, i.e. Dial, zest, irsih spring; Mild soaps such as Cetaphil or Dove sensitive skin, avoid hot or cold showers, aggressive use of emollients including vanicream, cetaphil or cerave discussed with patient.    Risks of non-melanoma skin cancer discussed with  patient   Return to clinic next appt

## 2017-04-03 NOTE — TELEPHONE ENCOUNTER
Reason for Call:  Other call back    Detailed comments: She was told to cut her BP meds in half.  Now her BP is running high.  She is wondering if she can increase her meds. But not the full dose.  Please advise.    Phone Number Patient can be reached at: Home number on file [phone] (home)    Best Time: any    Can we leave a detailed message on this number? YES    Call taken on 4/3/2017 at 2:31 PM by Marianna Carmona

## 2017-04-03 NOTE — TELEPHONE ENCOUNTER
Patient notified. Patient verbalized understanding. Scheduled for MOHS Surgery. Mohs brochure/Pre-op letter sent.   Esme Escobar RN

## 2017-04-03 NOTE — LETTER
[facility]  [address] [facility] [address]  Phone: [phone]    April 3, 2017    Nayely Kay  [address] [address]              Dear Ms. Kay,    You are scheduled for Mohs Surgery on:     Tuesday April 11 th at 8:00 am.   Tuesday April 18 th at 7:45 am.     Please check in at Dermatology Clinic.   (2nd Floor, last  Clinic on right up staircase or elevator -past OB/GYN clinic)    You don't need to arrive more than 5-10 minutes prior to your appointment time.     Be sure to eat a good breakfast and bathe and wash your hair prior to Surgery.    If you are taking any anti-coagulants that are prescribed by your Doctor (such as Coumadin/warfarin, Plavix, Aspirin, Ibuprofen), please continue taking them.     However, If you are taking anti-coagulants over the counter without  a Doctor's order for a Medical condition, please discontinue them 10 days prior to Surgery.      Please wear loose comfortable clothing as it could possibly be 4-6 hours until your surgery is completed depending upon how many layers of tissue need to be removed.     Wi-fi access is available.     Sincerely,      Jareth Pedroza MD/ Esme Escobar RN

## 2017-04-03 NOTE — TELEPHONE ENCOUNTER
Nayely will come into clinic tomorrow and I will check her blood pressure.  Nicolette Granger RN

## 2017-04-03 NOTE — MR AVS SNAPSHOT
After Visit Summary   4/3/2017    Nayely Kay    MRN: [id number]           Patient Information     Date Of Birth          1940        Visit Information        Provider Department      4/3/2017 10:30 AM Jareth Pedroza MD [facility]        Care Instructions          Wound Care Instructions     FOR SUPERFICIAL WOUNDS     [facility] [phone]    [facility] [phone]                       AFTER 24 HOURS YOU SHOULD REMOVE THE BANDAGE AND BEGIN DAILY DRESSING CHANGES AS FOLLOWS:     1) Remove Dressing.     2) Clean and dry the area with tap water using a Q-tip or sterile gauze pad.     3) Apply Vaseline, Aquaphor, Polysporin ointment or Bacitracin ointment over entire wound.  Do NOT use Neosporin ointment.     4) Cover the wound with a band-aid, or a sterile non-stick gauze pad and micropore paper tape      REPEAT THESE INSTRUCTIONS AT LEAST ONCE A DAY UNTIL THE WOUND HAS COMPLETELY HEALED.    It is an old wives tale that a wound heals better when it is exposed to air and allowed to dry out. The wound will heal faster with a better cosmetic result if it is kept moist with ointment and covered with a bandage.    **Do not let the wound dry out.**      Supplies Needed:      *Cotton tipped applicators (Q-tips)    *Polysporin Ointment or Bacitracin Ointment (NOT NEOSPORIN)    *Band-aids or non-stick gauze pads and micropore paper tape.      PATIENT INFORMATION:    During the healing process you will notice a number of changes. All wounds develop a small halo of redness surrounding the wound.  This means healing is occurring. Severe itching with extensive redness usually indicates sensitivity to the ointment or bandage tape used to dress the wound.  You should call our office if this develops.      Swelling  and/or discoloration around your surgical site is common, particularly when performed around the eye.    All wounds normally drain.  The larger  the wound the more drainage there will be.  After 7-10 days, you will notice the wound beginning to shrink and new skin will begin to grow.  The wound is healed when you can see skin has formed over the entire area.  A healed wound has a healthy, shiny look to the surface and is red to dark pink in color to normalize.  Wounds may take approximately 4-6 weeks to heal.  Larger wounds may take 6-8 weeks.  After the wound is healed you may discontinue dressing changes.    You may experience a sensation of tightness as your wound heals. This is normal and will gradually subside.    Your healed wound may be sensitive to temperature changes. This sensitivity improves with time, but if you re having a lot of discomfort, try to avoid temperature extremes.    Patients frequently experience itching after their wound appears to have healed because of the continue healing under the skin.  Plain Vaseline will help relieve the itching.        POSSIBLE COMPLICATIONS    BLEEDIN. Leave the bandage in place.  2. Use tightly rolled up gauze or a cloth to apply direct pressure over the bandage for 30  minutes.  3. Reapply pressure for an additional 30 minutes if necessary  4. Use additional gauze and tape to maintain pressure once the bleeding has stopped.          Follow-ups after your visit        Your next 10 appointments already scheduled     2017  2:30 PM CDT   Anticoagulation Visit with NB ANTI COAG   [facility]    [address] [place] [address] [phone]            2017  2:45 PM CDT   Nurse Only with [place] NB RN   [facility]    [address] [place] [address] [phone]              Who to contact     If you have questions or need follow up information about today's clinic visit or your schedule please contact [facility] directly at [phone].  Normal or  "non-critical lab and imaging results will be communicated to you by MyChart, letter or phone within 4 business days after the clinic has received the results. If you do not hear from us within 7 days, please contact the clinic through [employer] or phone. If you have a critical or abnormal lab result, we will notify you by phone as soon as possible.  Submit refill requests through [employer] or call your pharmacy and they will forward the refill request to us. Please allow 3 business days for your refill to be completed.          Additional Information About Your Visit        [employer] Information     [employer] lets you send messages to your doctor, view your test results, renew your prescriptions, schedule appointments and more. To sign up, go to [email or web address] . Click on \"Log in\" on the left side of the screen, which will take you to the Welcome page. Then click on \"Sign up Now\" on the right side of the page.     You will be asked to enter the access code listed below, as well as some personal information. Please follow the directions to create your username and password.     Your access code is: [email or web address]  Expires: 2017  4:18 PM     Your access code will  in 90 days. If you need help or a new code, please call your [place] clinic or [phone].        Care EveryWhere ID     This is your Care EveryWhere ID. This could be used by other organizations to access your [place] medical records  [id number]        Your Vitals Were     Pulse Pulse Oximetry                65 97%           Blood Pressure from Last 3 Encounters:   17 164/87   17 132/64   17 110/60    Weight from Last 3 Encounters:   17 55.8 kg (123 lb)   16 54.4 kg (120 lb)   10/26/16 54.4 kg (120 lb)              Today, you had the following     No orders found for display       Primary Care Provider Office Phone # Fax #    Ulysses Baker [phone]       [facility] [address] [facility] [address]        Thank you!     Thank you for choosing [facility]  for your care. Our goal is always to provide you with excellent care. Hearing back from our patients is one way we can continue to improve our services. Please take a few minutes to complete the written survey that you may receive in the mail after your visit with us. Thank you!             Your Updated Medication List - Protect others around you: Learn how to safely use, store and throw away your medicines at www.disposemymeds.org.          This list is accurate as of: 4/3/17 10:41 AM.  Always use your most recent med list.                   Brand Name Dispense Instructions for use    ascorbic acid 250 MG Chew chewable tablet    vitamin C     Take 1 tablet by mouth daily.       ASPIRIN NOT PRESCRIBED    INTENTIONAL    0    due to coumadin       calcium carb 1250 mg (500 mg [facility])/vitamin D 200 units 500-200 MG-UNIT per tablet    OSCAL with D     Take 1 tablet by mouth 2 times daily (with meals).       iron 325 (65 FE) MG tablet      Take  by mouth. Taking two per week.       metoprolol 50 MG tablet    LOPRESSOR    93 tablet    75mg twice daily       multivitamin per tablet      Take 1 tablet by mouth daily.       predniSONE 5 MG tablet    DELTASONE     Take 2.5 mg by mouth daily as needed States takes Prednisone 2.5 mg x 4 days in a row and then skips a day. [facility] has been on this schedule for at least 2 months.       triamterene-hydrochlorothiazide 37.5-25 MG per tablet    MAXZIDE-25    30 tablet    Take 1/2 pill twice daily       warfarin 5 MG tablet    COUMADIN    90 tablet    Take 5mg by mouth Monday and 2.5mg all other days or as directed by Anticoagulation Clinic

## 2017-04-04 ENCOUNTER — ALLIED HEALTH/NURSE VISIT (OUTPATIENT)
Dept: FAMILY MEDICINE | Facility: CLINIC | Age: 77
End: 2017-04-04
Payer: COMMERCIAL

## 2017-04-04 VITALS — DIASTOLIC BLOOD PRESSURE: 80 MMHG | HEART RATE: 80 BPM | RESPIRATION RATE: 14 BRPM | SYSTOLIC BLOOD PRESSURE: 142 MMHG

## 2017-04-04 DIAGNOSIS — I10 ESSENTIAL HYPERTENSION: ICD-10-CM

## 2017-04-04 PROCEDURE — 99207 ZZC NO CHARGE NURSE ONLY: CPT

## 2017-04-04 RX ORDER — METOPROLOL TARTRATE 50 MG
75 TABLET ORAL 2 TIMES DAILY
Qty: 93 TABLET | Refills: 11 | COMMUNITY
Start: 2017-04-04 | End: 2017-05-16

## 2017-04-04 NOTE — MR AVS SNAPSHOT
After Visit Summary   4/4/2017    Nayely Kay    MRN: [id number]           Patient Information     Date Of Birth          1940        Visit Information        Provider Department      4/4/2017 1:30 PM [place] NB RN [facility]        Today's Diagnoses     Essential hypertension           Follow-ups after your visit        Your next 10 appointments already scheduled     Apr 11, 2017  8:00 AM CDT   MOHS with Jareth Pedroza MD   [facility] ([facility]    [address] [facility] [address]   [phone]            Apr 17, 2017  2:30 PM CDT   Anticoagulation Visit with NB ANTI COAG   [facility] ([facility]    [address] [place] [address]   [phone]            Apr 17, 2017  2:45 PM CDT   Nurse Only with [place] NB RN   [facility] ([facility])    [address] [place] [address]   [phone]            Apr 18, 2017  7:45 AM CDT   MOHS with Jareth Pedroza MD   [facility] ([facility])    [address] [address]   [phone]              Who to contact     If you have questions or need follow up information about today's clinic visit or your schedule please contact [facility] directly at [phone].  Normal or non-critical lab and imaging results will be communicated to you by MyChart, letter or phone within 4 business days after the clinic has received the results. If you do not hear from us within 7 days, please contact the clinic through MyChart or phone. If you have a critical or abnormal lab result, we will notify you by phone as soon as possible.  Submit refill requests through [employer] or call your pharmacy and they will forward the refill request to us. Please allow 3 business days for your refill to be completed.          Additional Information  "About Your Visit        [employer] Information     [employer] lets you send messages to your doctor, view your test results, renew your prescriptions, schedule appointments and more. To sign up, go to [email or web address] . Click on \"Log in\" on the left side of the screen, which will take you to the Welcome page. Then click on \"Sign up Now\" on the right side of the page.     You will be asked to enter the access code listed below, as well as some personal information. Please follow the directions to create your username and password.     Your access code is: [email or web address]  Expires: 2017  4:18 PM     Your access code will  in 90 days. If you need help or a new code, please call your [place] clinic or [phone].        Care EveryWhere ID     This is your Care EveryWhere ID. This could be used by other organizations to access your [place] medical records  [id number]        Your Vitals Were     Pulse Respirations                80 14           Blood Pressure from Last 3 Encounters:   17 142/80   17 154/83   17 132/64    Weight from Last 3 Encounters:   17 123 lb (55.8 kg)   16 120 lb (54.4 kg)   10/26/16 120 lb (54.4 kg)              Today, you had the following     No orders found for display         Today's Medication Changes          These changes are accurate as of: 17  2:35 PM.  If you have any questions, ask your nurse or doctor.               These medicines have changed or have updated prescriptions.        Dose/Directions    metoprolol 50 MG tablet   Commonly known as:  LOPRESSOR   This may have changed:    - how much to take  - how to take this  - when to take this  - additional instructions   Used for:  Essential hypertension        Dose:  75 mg   Take 1.5 tablets (75 mg) by mouth 2 times daily   Quantity:  93 tablet   Refills:  11                Primary Care Provider Office Phone # Fax #    Ulysses Baker [phone]       [facility]" [facility] [address] [facility] [address]        Thank you!     Thank you for choosing [facility]  for your care. Our goal is always to provide you with excellent care. Hearing back from our patients is one way we can continue to improve our services. Please take a few minutes to complete the written survey that you may receive in the mail after your visit with us. Thank you!             Your Updated Medication List - Protect others around you: Learn how to safely use, store and throw away your medicines at www.disposemymeds.org.          This list is accurate as of: 4/4/17  2:35 PM.  Always use your most recent med list.                   Brand Name Dispense Instructions for use    ascorbic acid 250 MG Chew chewable tablet    vitamin C     Take 1 tablet by mouth daily.       ASPIRIN NOT PRESCRIBED    INTENTIONAL    0    due to coumadin       calcium carb 1250 mg (500 mg [place])/vitamin D 200 units 500-200 MG-UNIT per tablet    OSCAL with D     Take 1 tablet by mouth 2 times daily (with meals).       iron 325 (65 FE) MG tablet      Take  by mouth. Taking two per week.       metoprolol 50 MG tablet    LOPRESSOR    93 tablet    Take 1.5 tablets (75 mg) by mouth 2 times daily       multivitamin per tablet      Take 1 tablet by mouth daily.       predniSONE 5 MG tablet    DELTASONE     Take 2.5 mg by mouth daily as needed States takes Prednisone 2.5 mg x 4 days in a row and then skips a day. [facility] has been on this schedule for at least 2 months.       triamterene-hydrochlorothiazide 37.5-25 MG per tablet    MAXZIDE-25    30 tablet    Take 1/2 pill twice daily       warfarin 5 MG tablet    COUMADIN    90 tablet    Take 5mg by mouth Monday and 2.5mg all other days or as directed by Anticoagulation Clinic

## 2017-04-04 NOTE — PROGRESS NOTES
S-(situation): blood pressure was high when at the derm clinic.  She is here for a recheck    B-(background): first blood pressure 154/62 and recheck is 142/70.  She feels good.  Expresses some concern about getting MOHS done on nose.    A-(assessment): blood pressure stable    R-(recommendations): will continue to monitor her blood pressure.  I did her dressing change on her nose and face.    Nicolette Granger RN

## 2017-04-06 ENCOUNTER — TELEPHONE (OUTPATIENT)
Dept: DERMATOLOGY | Facility: CLINIC | Age: 77
End: 2017-04-06

## 2017-04-06 NOTE — TELEPHONE ENCOUNTER
Reason for Call:  Other     Detailed comments: Pt needs codes for surgery - Skin cancer (nose) / Need codes to check on Medicare coverage - [employer]. Would like to hear back today if possible.     Phone Number Patient can be reached at: Home number on file [phone] (home)    Best Time: Any    Can we leave a detailed message on this number? YES    Call taken on 4/6/2017 at 2:19 PM by Denise Behrendt

## 2017-04-06 NOTE — TELEPHONE ENCOUNTER
Spoke to patient and given codes for Mohs surgery.     CPT Codes given: 89456 1st layer, 54692 2nd layer or additional layers, 05093 suturing wound.    Patient verbalized understanding. Esme Escobar RN

## 2017-04-10 ENCOUNTER — ANTICOAGULATION THERAPY VISIT (OUTPATIENT)
Dept: ANTICOAGULATION | Facility: CLINIC | Age: 77
End: 2017-04-10
Payer: COMMERCIAL

## 2017-04-10 ENCOUNTER — ALLIED HEALTH/NURSE VISIT (OUTPATIENT)
Dept: FAMILY MEDICINE | Facility: CLINIC | Age: 77
End: 2017-04-10
Payer: COMMERCIAL

## 2017-04-10 VITALS — RESPIRATION RATE: 14 BRPM | SYSTOLIC BLOOD PRESSURE: 136 MMHG | DIASTOLIC BLOOD PRESSURE: 72 MMHG | HEART RATE: 76 BPM

## 2017-04-10 DIAGNOSIS — Z79.01 LONG TERM CURRENT USE OF ANTICOAGULANT THERAPY: ICD-10-CM

## 2017-04-10 DIAGNOSIS — Z01.30 BLOOD PRESSURE CHECK: Primary | ICD-10-CM

## 2017-04-10 DIAGNOSIS — I48.91 ATRIAL FIBRILLATION (H): ICD-10-CM

## 2017-04-10 LAB — INR POINT OF CARE: 1.8 (ref 0.86–1.14)

## 2017-04-10 PROCEDURE — 36416 COLLJ CAPILLARY BLOOD SPEC: CPT

## 2017-04-10 PROCEDURE — 85610 PROTHROMBIN TIME: CPT | Mod: QW

## 2017-04-10 PROCEDURE — 99207 ZZC NO CHARGE NURSE ONLY: CPT

## 2017-04-10 NOTE — PROGRESS NOTES
ANTICOAGULATION FOLLOW-UP CLINIC VISIT    Patient Name:  Nayely Kay  Date:  4/10/2017  Contact Type:  Face to Face    SUBJECTIVE:     Patient Findings     Comments Patient having nose lesion removed tomorrow           OBJECTIVE    INR Protime   Date Value Ref Range Status   04/10/2017 1.8 (A) 0.86 - 1.14 Final       ASSESSMENT / PLAN  INR assessment THER    Recheck INR In: 3 WEEKS    INR Location Clinic      Anticoagulation Summary as of 4/10/2017     INR goal 2.0-3.0   Today's INR 1.8!   Maintenance plan 5 mg (5 mg x 1) on Mon; 2.5 mg (5 mg x 0.5) all other days   Full instructions 5 mg on Mon; 2.5 mg all other days   Weekly total 20 mg   Plan last modified Giselle Santillan RN (2/27/2017)   Next INR check 5/1/2017   Priority INR   Target end date Indefinite    Indications   Atrial fibrillation (H) [I48.91]  Long term current use of anticoagulant therapy [Z79.01]         Anticoagulation Episode Summary     INR check location     Preferred lab     Send INR reminders to [facility] CLINIC POOL    Comments * only wants dosing card. Pt uses 5mg tablets.        Anticoagulation Care Providers     Provider Role Specialty Phone number    Ulysses Baker MD [facility] [phone]            See the Encounter Report to view Anticoagulation Flowsheet and Dosing Calendar (Go to Encounters tab in chart review, and find the Anticoagulation Therapy Visit)        Oly Mcwilliams RN

## 2017-04-10 NOTE — PROGRESS NOTES
Patient here for a blood pressure check after her INR appointment  Blood pressure stable  Nicolette Granger RN

## 2017-04-10 NOTE — MR AVS SNAPSHOT
After Visit Summary   4/10/2017    Nayely Kay    MRN: [id number]           Patient Information     Date Of Birth          1940        Visit Information        Provider Department      4/10/2017 2:45 PM [place] NB RN [facility]        Today's Diagnoses     Blood pressure check    -  1       Follow-ups after your visit        Your next 10 appointments already scheduled     Apr 11, 2017  8:00 AM CDT   [facility]    [address] [facility] [address]   [phone]            Apr 18, 2017  7:45 AM CDT   MOHS with Jareth Pedroza MD   [facility]    [address] [facility] [address]   [phone]            May 01, 2017  2:30 PM CDT   Anticoagulation Visit with NB ANTI COAG   [facility] ([facility])    [address] [place] [address] [phone]            May 01, 2017  2:45 PM CDT   Nurse Only with [place] NB RN   [facility] ([facility])    [address] [place] [address] [phone]              Who to contact     If you have questions or need follow up information about today's clinic visit or your schedule please contact [facility] directly at [phone].  Normal or non-critical lab and imaging results will be communicated to you by MyChart, letter or phone within 4 business days after the clinic has received the results. If you do not hear from us within 7 days, please contact the clinic through MyChart or phone. If you have a critical or abnormal lab result, we will notify you by phone as soon as possible.  Submit refill requests through [employer] or call your pharmacy and they will forward the refill request to us. Please allow 3 business days for your refill to be completed.          Additional  "Information About Your Visit        MyChart Information     [employer] lets you send messages to your doctor, view your test results, renew your prescriptions, schedule appointments and more. To sign up, go to [email or web address] . Click on \"Log in\" on the left side of the screen, which will take you to the Welcome page. Then click on \"Sign up Now\" on the right side of the page.     You will be asked to enter the access code listed below, as well as some personal information. Please follow the directions to create your username and password.     Your access code is: [email or web address]  Expires: 2017  4:18 PM     Your access code will  in 90 days. If you need help or a new code, please call your [place] clinic or [phone].        Care EveryWhere ID     This is your Care EveryWhere ID. This could be used by other organizations to access your [place] medical records  [id number]        Your Vitals Were     Pulse Respirations                76 14           Blood Pressure from Last 3 Encounters:   04/10/17 136/72   17 142/80   17 154/83    Weight from Last 3 Encounters:   17 123 lb (55.8 kg)   16 120 lb (54.4 kg)   10/26/16 120 lb (54.4 kg)              Today, you had the following     No orders found for display       Primary Care Provider Office Phone # Fax #    Ulysses Baker [phone]       [facility] [address] [facility] [address]        Thank you!     Thank you for choosing [facility]  for your care. Our goal is always to provide you with excellent care. Hearing back from our patients is one way we can continue to improve our services. Please take a few minutes to complete the written survey that you may receive in the mail after your visit with us. Thank you!             Your Updated Medication List - Protect others around you: Learn how to safely use, store and throw away your medicines at www.disposemymeds.org. "          This list is accurate as of: 4/10/17  3:27 PM.  Always use your most recent med list.                   Brand Name Dispense Instructions for use    ascorbic acid 250 MG Chew chewable tablet    vitamin C     Take 1 tablet by mouth daily.       ASPIRIN NOT PRESCRIBED    INTENTIONAL    0    due to coumadin       calcium carb 1250 mg (500 mg [place])/vitamin D 200 units 500-200 MG-UNIT per tablet    OSCAL with D     Take 1 tablet by mouth 2 times daily (with meals).       iron 325 (65 FE) MG tablet      Take  by mouth. Taking two per week.       metoprolol 50 MG tablet    LOPRESSOR    93 tablet    Take 1.5 tablets (75 mg) by mouth 2 times daily       multivitamin per tablet      Take 1 tablet by mouth daily.       predniSONE 5 MG tablet    DELTASONE     Take 2.5 mg by mouth daily as needed States takes Prednisone 2.5 mg x 4 days in a row and then skips a day. [facility] has been on this schedule for at least 2 months.       triamterene-hydrochlorothiazide 37.5-25 MG per tablet    MAXZIDE-25    30 tablet    Take 1/2 pill twice daily       warfarin 5 MG tablet    COUMADIN    90 tablet    Take 5mg by mouth Monday and 2.5mg all other days or as directed by Anticoagulation Clinic

## 2017-04-10 NOTE — MR AVS SNAPSHOT
Nayely Kay   4/10/2017 2:30 PM   Anticoagulation Therapy Visit    Description:  77 year old female   Provider:  NB ANTI COAG   Department:  [facility] Antico[facility]           INR as of 4/10/2017     Today's INR 1.8!      Anticoagulation Summary as of 4/10/2017     INR goal 2.0-3.0   Today's INR 1.8!   Full instructions 5 mg on Mon; 2.5 mg all other days   Next INR check 5/1/2017    Indications   Atrial fibrillation (H) [I48.91]  Long term current use of anticoagulant therapy [Z79.01]         Description     Warfarin dose: 5mg M and 2.5mg the rest of the days of the week.        Your next Anticoagulation Clinic appointment(s)     May 01, 2017  2:30 PM CDT   [facility]agulation Visit with NB ANTI COAG   [facility]    [address] [place] [address]   [phone]              April 2017 Details    Sun Mon Tue Wed Thu Fri Sat           1                 2               3               4               5               6               7               8                 9               10      5 mg   See details      11      2.5 mg         12      2.5 mg         13      2.5 mg         14      2.5 mg         15      2.5 mg           16      2.5 mg         17      5 mg         18      2.5 mg         19      2.5 mg         20      2.5 mg         21      2.5 mg         22      2.5 mg           23      2.5 mg         24      5 mg         25      2.5 mg         26      2.5 mg         27      2.5 mg         28      2.5 mg         29      2.5 mg           30      2.5 mg                Date Details   04/10 This INR check               How to take your warfarin dose     To take:  2.5 mg Take 0.5 of a 5 mg tablet.    To take:  5 mg Take 1 of the 5 mg tablets.           May 2017 Details    Sun Mon Tue Wed Thu Fri Sat      1            2               3               4               5               6                 7               8               9               10                11               12               13                 14               15               16               17               18               19               20                 21               22               23               24               25               26               27                 28               29               30               31                   Date Details   No additional details    Date of next INR:  5/1/2017         How to take your warfarin dose     To take:  5 mg Take 1 of the 5 mg tablets.

## 2017-04-11 ENCOUNTER — OFFICE VISIT (OUTPATIENT)
Dept: DERMATOLOGY | Facility: CLINIC | Age: 77
End: 2017-04-11
Payer: COMMERCIAL

## 2017-04-11 VITALS — OXYGEN SATURATION: 99 % | HEART RATE: 74 BPM | DIASTOLIC BLOOD PRESSURE: 83 MMHG | SYSTOLIC BLOOD PRESSURE: 132 MMHG

## 2017-04-11 DIAGNOSIS — D04.39 SQUAMOUS CELL CARCINOMA IN SITU OF SKIN OF CHEEK: Primary | ICD-10-CM

## 2017-04-11 PROCEDURE — 17312 MOHS ADDL STAGE: CPT | Performed by: DERMATOLOGY

## 2017-04-11 PROCEDURE — 13132 CMPLX RPR F/C/C/M/N/AX/G/H/F: CPT | Performed by: DERMATOLOGY

## 2017-04-11 PROCEDURE — 17311 MOHS 1 STAGE H/N/HF/G: CPT | Performed by: DERMATOLOGY

## 2017-04-11 NOTE — PATIENT INSTRUCTIONS
Sutured Wound Care     [facility]: [phone]    [facility]: [phone]          ? No strenuous activity for 48 hours. Resume moderate activity in 48 hours. No heavy exercising until you are seen for follow up in one week.     ? Take Tylenol as needed for discomfort.                         ? Do not drink alcoholic beverages for 48 hours.     ? Keep the pressure bandage in place for 24 hours. If the bandage becomes blood tinged or loose, reinforce it with gauze and tape.        (Refer to the reverse side of this page for management of bleeding).    ? Remove pressure bandage in 24 hours     ? Leave the flat bandage in place until your follow up appointment.    ? Keep the bandage dry. Wash around it carefully.    ? If the tape becomes soiled or starts to come off, reinforce it with additional paper tape.    ? Do not smoke for 3 weeks; smoking is detrimental to wound healing.    ? It is normal to have swelling and bruising around the surgical site. The bruising will fade in approximately 10-14 days. Elevate the area to reduce swelling.    ? Numbness, itchiness and sensitivity to temperature changes can occur after surgery and may take up to 18 months to normalize.      POSSIBLE COMPLICATIONS    BLEEDIN. Leave the bandage in place.  2. Use tightly rolled up gauze or a cloth to apply direct pressure over the bandage for 20   minutes.  3. Reapply pressure for an additional 20 minutes if necessary  4. Call the office or go to the nearest emergency room if pressure fails to stop the bleeding.  5. Use additional gauze and tape to maintain pressure once the bleeding has stopped.        PAIN:    1. Post operative pain should slowly get better, never worse.  2. A severe increase in pain may indicate a problem. Call the office if this occurs.    In case of emergency phone:Dr Pedroza [phone]

## 2017-04-11 NOTE — MR AVS SNAPSHOT
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 No strenuous activity for 48 hours. Resume moderate activity in 48 hours. No heavy exercising until you are seen for follow up in one week.     ? Take Tylenol as needed for discomfort.                         ? Do not drink alcoholic beverages for 48 hours.     ? Keep the pressure bandage in place for 24 hours. If the bandage becomes blood tinged or loose, reinforce it with gauze and tape.        (Refer to the reverse side of this page for management of bleeding).    ? Remove pressure bandage in 24 hours     ? Leave the flat bandage in place until your follow up appointment.    ? Keep the bandage dry. Wash around it carefully.    ? If the tape becomes soiled or starts to come off, reinforce it with additional paper tape.    ? Do not smoke for 3 weeks; smoking is detrimental to wound healing.    ? It is normal to have swelling and bruising around the surgical site. The bruising will fade in approximately 10-14 days. Elevate the area to reduce swelling.    ? Numbness, itchiness and sensitivity to temperature changes can occur after surgery and may take up to 18 months to normalize.      POSSIBLE COMPLICATIONS    BLEEDIN. Leave the bandage in place.  2. Use tightly rolled up gauze or a cloth to apply direct pressure over the bandage for 20   minutes.  3. Reapply pressure for an additional 20 minutes if necessary  4. Call the office or go to the nearest emergency room if pressure fails to stop the bleeding.  5. Use  additional gauze and tape to maintain pressure once the bleeding has stopped.        PAIN:    1. Post operative pain should slowly get better, never worse.  2. A severe increase in pain may indicate a problem. Call the office if this occurs.    In case of emergency phone:Dr Pedroza [phone]            Follow-ups after your visit        Your next 10 appointments already scheduled     Apr 18, 2017  7:45 AM CDT   MOHS with Jareth Pedroza MD   [facility] ([facility])    [address] [facility] [id number] [phone]            May 01, 2017  2:30 PM CDT   Anticoagulation Visit with NB ANTI COAG   [facility]    [address] [place] [id number] [phone]            May 01, 2017  2:45 PM CDT   Nurse Only with JUWAN SHIN RN   [facility] ([facility]    [address] [place] [address] [phone]              Who to contact     If you have questions or need follow up information about today's clinic visit or your schedule please contact [facility] directly at [phone].  Normal or non-critical lab and imaging results will be communicated to you by [employer], letter or phone within 4 business days after the clinic has received the results. If you do not hear from us within 7 days, please contact the clinic through [employer] or phone. If you have a critical or abnormal lab result, we will notify you by phone as soon as possible.  Submit refill requests through [employer] or call your pharmacy and they will forward the refill request to us. Please allow 3 business days for your refill to be completed.          Additional Information About Your Visit        [employer] Information     [employer] lets you send messages to your doctor, view your test results, renew your prescriptions, schedule appointments and more. To sign up, go to  [email or web address] . Click on \"Log in\" on the left side of the screen, which will take you to the Welcome page. Then click on \"Sign up Now\" on the right side of the page.     You will be asked to enter the access code listed below, as well as some personal information. Please follow the directions to create your username and password.     Your access code is: [email or web address]  Expires: 2017  4:18 PM     Your access code will  in 90 days. If you need help or a new code, please call your [place] clinic or [phone].        Care EveryWhere ID     This is your Care EveryWhere ID. This could be used by other organizations to access your [place] medical records  [id number]        Your Vitals Were     Pulse Pulse Oximetry                74 99%           Blood Pressure from Last 3 Encounters:   17 132/83   04/10/17 136/72   17 142/80    Weight from Last 3 Encounters:   17 55.8 kg (123 lb)   16 54.4 kg (120 lb)   10/26/16 54.4 kg (120 lb)              Today, you had the following     No orders found for display       Primary Care Provider Office Phone # Fax #    Ulysses Baker [phone]       [facility] [address] [facility] [address]        Thank you!     Thank you for choosing [facility]  for your care. Our goal is always to provide you with excellent care. Hearing back from our patients is one way we can continue to improve our services. Please take a few minutes to complete the written survey that you may receive in the mail after your visit with us. Thank you!             Your Updated Medication List - Protect others around you: Learn how to safely use, store and throw away your medicines at www.disposemymeds.org.          This list is accurate as of: 17 10:26 AM.  Always use your most recent med list.                   Brand Name Dispense Instructions for use    ascorbic acid 250 MG Chew chewable tablet    vitamin " C     Take 1 tablet by mouth daily.       ASPIRIN NOT PRESCRIBED    INTENTIONAL    0    due to coumadin       calcium carb 1250 mg (500 mg [place])/vitamin D 200 units 500-200 MG-UNIT per tablet    OSCAL with D     Take 1 tablet by mouth 2 times daily (with meals).       iron 325 (65 FE) MG tablet      Take  by mouth. Taking two per week.       metoprolol 50 MG tablet    LOPRESSOR    93 tablet    Take 1.5 tablets (75 mg) by mouth 2 times daily       multivitamin per tablet      Take 1 tablet by mouth daily.       predniSONE 5 MG tablet    DELTASONE     Take 2.5 mg by mouth daily as needed States takes Prednisone 2.5 mg x 4 days in a row and then skips a day. \[facility] [place] [facility]\"" has been on this schedule for at least 2 months.       triamterene-hydrochlorothiazide 37.5-25 MG per tablet    MAXZIDE-25    30 tablet    Take 1/2 pill twice daily       warfarin 5 MG tablet    COUMADIN    90 tablet    Take 5mg by mouth Monday and 2.5mg all other days or as directed by Anticoagulation Clinic

## 2017-04-11 NOTE — PROGRESS NOTES
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 This has been treated with cryo. Nose healing well.  Patient reports the following modifying factors none.  Associated symptoms: none.  Patient has no other skin complaints today.  Remainder of the HPI, Meds, PMH, Allergies, FH, and SH was reviewed in chart.    Pertinent Hx:   Non-melanoma skin cancer   Past Medical History:   Diagnosis Date     Atrial fibrillation (H)      Migraine, unspecified, without mention of intractable migraine without mention of status migrainosus     Migraine HA     Unspecified essential hypertension      Unspecified tinnitus        Past Surgical History:   Procedure Laterality Date     COLONOSCOPY  2009     FLEXIBLE SIGMOIDOSCOPY  9/2004     SURGICAL HISTORY OF -       tubal     SURGICAL HISTORY OF -       oral teeth        Family History   Problem Relation Age of Onset     HEART DISEASE Mother      Valvular disease- age 55.     CEREBROVASCULAR DISEASE Father      age 75.     Neurologic Disorder Daughter      MS     Circulatory Brother      joe hanna-cleaned out     Breast Cancer Maternal Aunt        Social History     Social History     Marital status:      Spouse name: N/A     Number of children: N/A     Years of education: N/A     Occupational History     Not on file.     Social History Main Topics     Smoking status: Never Smoker     Smokeless tobacco: Never Used     Alcohol use No     Drug use: No     Sexual activity: Not Currently     Other Topics Concern     Parent/Sibling W/ Cabg, Mi Or Angioplasty Before 65f 55m? No     Social History Narrative       Outpatient Encounter Prescriptions as of 4/11/2017   Medication Sig Dispense Refill     metoprolol (LOPRESSOR) 50 MG tablet Take 1.5 tablets (75 mg) by mouth 2 times daily 93 tablet 11     predniSONE (DELTASONE) 5 MG tablet Take 2.5 mg by mouth daily as needed States  takes Prednisone 2.5 mg x 4 days in a row and then skips a day. States has been on this schedule for at least 2 months.       triamterene-hydrochlorothiazide (MAXZIDE-25) 37.5-25 MG per tablet Take 1/2 pill twice daily 30 tablet 11     warfarin (COUMADIN) 5 MG tablet Take 5mg by mouth Monday and 2.5mg all other days or as directed by Anticoagulation Clinic 90 tablet 3     Ferrous Sulfate (IRON) 325 (65 FE) MG tablet Take  by mouth. Taking two per week.       Multiple Vitamin (MULTIVITAMIN) per tablet Take 1 tablet by mouth daily.       ascorbic acid (VITAMIN C) 250 MG CHEW Take 1 tablet by mouth daily.  0     calcium carb 1250 mg, 500 mg [place],/vitamin D 200 units (OSCAL WITH D) 500-200 MG-UNIT per tablet Take 1 tablet by mouth 2 times daily (with meals).       ASPIRIN NOT PRESCRIBED (INTENTIONAL) due to coumadin 0 0     No facility-administered encounter medications on file as of 4/11/2017.              Review Of Systems  Skin: As above  Eyes: negative  Ears/Nose/Throat: negative  Respiratory: No shortness of breath, dyspnea on exertion, cough, or hemoptysis  Cardiovascular: negative  Gastrointestinal: negative  Genitourinary: negative  Musculoskeletal: negative  Neurologic: negative  Psychiatric: negative  Hematologic/Lymphatic/Immunologic: negative  Endocrine: negative      O:   NAD, WDWN, Alert & Oriented, Mood & Affect wnl, Vitals stable   Here today alone   /83  Pulse 74  SpO2 99%   General appearance normal   Vitals stable   Alert, oriented and in no acute distress      Following lymph nodes palpated: Occipital, Cervical, Supraclavicular no lad   L medial cheek/lower lid 1cm red ill-defined scaly papule         Eyes: Conjunctivae/lids:Normal     ENT: Lips, buccal mucosa, tongue: normal    MSK:Normal    Cardiovascular: peripheral edema none    Pulm: Breathing Normal    Lymph Nodes: No Head and Neck Lymphadenopathy     Neuro/Psych: Orientation:Normal; Mood/Affect:Normal      A/P:  1. L medial cheek  squamous cell carcinoma in situ  MOHS:   Ill-defined margins    After PGACAC discussed with patient, decision for Mohs surgery was made. Indication for Mohs was Ill-defined margins. Patient confirmed the site with Dr. Pedroza.  After anesthesia with LEC, the tumor was excised using standard Mohs technique in 2 stages(s).  CLEAR MARGINS OBTAINED and Final defect size was 1.6 cm.       REPAIR COMPLEX: Because of the tightness of the surrounding skin and Because of the size and full thickness nature of the defect, a complex closure was planned. After LEC anesthesia and prep, Burow's triangles were excised in the relaxed skin tension lines. The wound edges were widely undermined by dissection in the subcutaneous plane until adequate tissue mobility was obtained. Hemostasis was obtained. The wound edges were closed in a layered fashion using Vicryl and Fast Absorbing Plain Gut sutures. Postoperative length was 4.4 cm.   EBL minimal; complications none; wound care routine.  The patient was discharged in good condition and will return in one week for wound evaluation.    BENIGN LESIONS DISCUSSED WITH PATIENT:  I discussed the specifics of tumor, prognosis, and genetics of benign lesions.  I explained that treatment of these lesions would be purely cosmetic and not medically neccessary.  I discussed with patient different removal options including excision, cautery and /or laser.      Nature and genetics of benign skin lesions dicussed with patient.  Signs and Symptoms of skin cancer discussed with patient.  Patient encouraged to perform monthly skin exams.  UV precautions reviewed with patient.  Skin care regimen reviewed with patient: Eliminate harsh soaps, i.e. Dial, zest, irsih spring; Mild soaps such as Cetaphil or Dove sensitive skin, avoid hot or cold showers, aggressive use of emollients including vanicream, cetaphil or cerave discussed with patient.    Risks of non-melanoma skin cancer discussed with patient    Return to clinic 6 months

## 2017-04-11 NOTE — NURSING NOTE
Chief Complaint   Patient presents with     Derm Problem     Mohs       Initial /83  Pulse 74  SpO2 99% Estimated body mass index is 24.02 kg/(m^2) as calculated from the following:    Height as of 3/3/17: 1.524 m (5').    Weight as of 3/3/17: 55.8 kg (123 lb).  BP completed using cuff size: inge Villalobos LPN

## 2017-04-12 ENCOUNTER — TELEPHONE (OUTPATIENT)
Dept: NURSING | Facility: CLINIC | Age: 77
End: 2017-04-12

## 2017-04-12 NOTE — TELEPHONE ENCOUNTER
"Call Type: Triage Call    Presenting Problem: \"I had skin cancer surgery yesterday, and my  eyelid is very red and the skin on the side of my nose.\" Pt. says  that it was not like this yesterday, and is concerned. RN then  checked EPIC and read pt's Sutured wound care instructions, to pt.  Pt. voiced understanding. Pt. says that she is still concerned about  the redness and wants to speak to Dr. Pedroza. Dr. Pedroza-[facility]-was then paged, to call pt., at: [phone], at:  2447, via smart web.  Triage Note:  Guideline Title: Postoperative Problems ; Postoperative Wound Care  Recommended Disposition: See Provider within 4 hours  Original Inclination: Wanted to speak with a nurse  Override Disposition:  Intended Action: Call PCP/HCP  Physician Contacted: No  Questions or concerns about postoperative wound ?  YES  Unconscious now ? NO  Severe breathing problems ? NO  Wound separation AND internal organs protrude through wound or surgical incision  (evisceration) ? NO  New or worsening signs and symptoms that may indicate shock ? NO  Coughing up large amount of obvious blood (not blood-streaked sputum) ? NO  New seizure now or within last 6 hours ? NO  Continuous or heavy bleeding from operative site and NOT controlled with 10  minutes of steady pressure ? NO  Passing red, black or tarry material from rectum AND onset of new signs and  symptoms of hypovolemia ? NO  Vomiting red, bloody or coffee-ground material, more than streaks of blood or  scant amount (not following nosebleed within past day) ? NO  New onset severe pain and pale, discolored or cool below the surgical site  compared to the other extremity ? NO  Chest discomfort associated with shortness of breath, sweating, odd heartbeats or  different heart rate, nausea, vomiting, lightheadedness, or fainting lasting 5 or  more minutes now or within the last hour ? NO  Chest pain spreading to the shoulders, neck, jaw, in one or both arms, stomach " or  back lasting 5 or more minutes now or within the last hour. Pain is NOT  associated with taking a deep breath or a productive cough, movement, or touch to  a localized area. ? NO  Signs and symptoms of anaphylaxis ? NO  Pressure, fullness, squeezing sensation or pain anywhere in the chest lasting 5 or  more minutes now or within the last hour. Pain is NOT associated with taking a  deep breath or a productive cough, movement, or touch to a localized area on the  chest. ? NO  Sudden onset of focal neurological changes (difficulty speaking, numbness,  weakness, paralysis, loss of coordination, or change in vision such as double  vision or loss of visual field) ? NO  Sudden onset of shortness of breath, chest pain and cough with blood tinged sputum  ? NO  New onset OR worsening bleeding from incision requiring pressure to control ? NO  Wound separation AND internal organs protrude through wound or surgical incision  (evisceration) ? NO  New or worsening signs and symptoms that may indicate shock ? NO  Continuous or heavy bleeding from operative site and NOT controlled with 10  minutes of steady pressure ? NO  Separation of wound edges without protrusion of tissue (dehiscence) ? NO  Medical device (pump, infusion catheter) damaged or not functioning as expected  (leaking, not infusing, swelling at insertion site) ? NO  More bleeding from site of instrumentation or procedure OR bleeding lasting longer  than defined in provider specified discharge information ? NO  Physician Instructions:  Care Advice: Another adult should drive.  Call provider if symptoms worsen or new symptoms develop.  CAUTIONS  SYMPTOM / CONDITION MANAGEMENT  List, or take, all current prescription(s), nonprescription or alternative  medication(s) to provider for evaluation.  Do not use NSAID's, aspirin or aspirin-containing medicines after any  surgery or procedure without the permission of provider.  Post-Op Dressing Change:   - Follow provider  directions for dressing change  and incision care.   - Wash hands with warm water and soap for at least 15  seconds or use a hand  before and after touching the wound area.  - Remove the dressing and dispose in a plastic bag  throw out with regular trash.  - Gently wash the wound with warm water and  soap  do not vigorously rub or scrub the wound. Avoid the use of alcohol,  hydrogen peroxide or iodine unless directed to do so. These products may  irritate the new tissue and delay healing. Rinse with clean water and using  a soft towel, pat the skin dry.  - Check the wound every day for any signs  of infection (increased redness, swelling, warmth, tenderness,  yellowish-white drainage or a bad odor).  - Do not use topical powder,  sprays, lotions or ointments on your wound unless approved by your  provider.  After the first week post-op, your provider may tell you to  apply Vitamin E oil to incision line to promote healing and help minimize  scarring.  - Apply a new dressing as instructed.  Some incisions may not  need a dressing.  Apply a gauze pad to incision if clothing irritates the  area.  Call provider immediately if temperature is 101.5 F (38.6 C) or greater, or  any temperature elevation if patient is immunocompromised (such as  diabetes, HIV/AIDS, chemotherapy, organ transplant, or chronic steroid  use).  Analgesic/Antipyretic Advice - Acetaminophen: Consider acetaminophen as  directed on label or by pharmacist/provider for pain or fever. PRECAUTIONS:  - Use if there is no history of liver disease, alcoholism, or intake of  three or more alcohol drinks per day - Only if approved by provider during  pregnancy or when breastfeeding - Do not exceed recommended dose or  frequency. Do not take more than 3000 milligrams (mg) in 24 hours. Do not  take this medicine for more than 10 days unless recommended by your  provider. - During pregnancy, acetaminophen should not be taken more than 3  consecutive days  without telling provider - To make sure you don't take too  much, check other medicines you take to see if they also contain  acetaminophen.

## 2017-04-13 ENCOUNTER — TELEPHONE (OUTPATIENT)
Dept: DERMATOLOGY | Facility: CLINIC | Age: 77
End: 2017-04-13

## 2017-04-13 NOTE — TELEPHONE ENCOUNTER
Reason for Call:  Patient is calling in states that she is trying to remove pressure bandage however the whole bandage seems to be coming off and she is unable to separate them    Detailed comments: please advise    Phone Number Patient can be reached at: Home number on file [phone] (home)    Best Time: any    Can we leave a detailed message on this number? YES    Call taken on 4/13/2017 at 4:00 PM by Aleta Arguello

## 2017-04-13 NOTE — TELEPHONE ENCOUNTER
"Spoke to patient.     \"I called Dr Pedroza last night hayley I had so much redness and bruising and he told me to leave the pressure bandage on until today... Apparently the white tape is stuck to the brown paper tape...\"     \"I am just not sure how much of this is supposed to come off and it is really hard to cover it..\"     She will stop into Dermatology clinic tomorrow to have bandage looked at. Esme Escobar RN    "

## 2017-04-14 ENCOUNTER — ALLIED HEALTH/NURSE VISIT (OUTPATIENT)
Dept: DERMATOLOGY | Facility: CLINIC | Age: 77
End: 2017-04-14
Payer: COMMERCIAL

## 2017-04-14 DIAGNOSIS — Z48.01 ENCOUNTER FOR CHANGE OR REMOVAL OF SURGICAL WOUND DRESSING: Primary | ICD-10-CM

## 2017-04-14 PROCEDURE — 99207 ZZC NO CHARGE NURSE ONLY: CPT

## 2017-04-14 NOTE — NURSING NOTE
"Patient here for bandage removal, she was having problems getting the pressure bandage off herself. Pressure bandage removed and new \"tan\" tape put over her steri-strips. Pt will be back Tuesday for another Moh's procedure and she will have the bandaged changed.    Advised to watch for signs/sx of infection; spreading redness, drainage, odor, fever. Call or report promptly to clinic. Pt given written instructions and informed to rtc as needed. Patient verbalized understanding.     Dania Be LPN      "

## 2017-04-14 NOTE — MR AVS SNAPSHOT
After Visit Summary   4/14/2017    Nayely Kay    MRN: [id number]           Patient Information     Date Of Birth          1940        Visit Information        Provider Department      4/14/2017 9:00 AM Nurse, Eve Artis [facility]        Today's Diagnoses     Encounter for change or removal of surgical wound dressing    -  1       Follow-ups after your visit        Your next 10 appointments already scheduled     Apr 14, 2017  9:00 AM CDT   Nurse Only with Wy Steff Nurse   [facility] ([facility])    [address] [address]   [phone]            Apr 18, 2017  7:45 AM CDT   MOHS with Jareth Pedroza MD   [facility] ([facility])    [address] [address]   [phone]            May 01, 2017  2:30 PM CDT   [facility]agulation [facility] with NB ANTI COAG   [facility]    [address] [place] [id number] [phone]            May 01, 2017  2:45 PM CDT   Nurse Only with [place] NB RN   [facility]    [address] [place] [address]   [phone]              Who to contact     If you have questions or need follow up information about today's clinic visit or your schedule please contact [facility] directly at [phone].  Normal or non-critical lab and imaging results will be communicated to you by MyChart, letter or phone within 4 business days after the clinic has received the results. If you do not hear from us within 7 days, please contact the clinic through MyChart or phone. If you have a critical or abnormal lab result, we will notify you by phone as soon as possible.  Submit refill requests through [employer] or call your pharmacy and they will forward the refill request to us. Please allow 3 business days for your refill to be completed.  "         Additional Information About Your Visit        MyChart Information     [employer] lets you send messages to your doctor, view your test results, renew your prescriptions, schedule appointments and more. To sign up, go to [email or web address] . Click on \"Log in\" on the left side of the screen, which will take you to the Welcome page. Then click on \"Sign up Now\" on the right side of the page.     You will be asked to enter the access code listed below, as well as some personal information. Please follow the directions to create your username and password.     Your access code is: [email or web address]  Expires: 2017  4:18 PM     Your access code will  in 90 days. If you need help or a new code, please call your [place] clinic or [phone].        Care EveryWhere ID     This is your Care EveryWhere ID. This could be used by other organizations to access your [place] medical records  [id number]         Blood Pressure from Last 3 Encounters:   17 132/83   04/10/17 136/72   17 142/80    Weight from Last 3 Encounters:   17 55.8 kg (123 lb)   16 54.4 kg (120 lb)   10/26/16 54.4 kg (120 lb)              Today, you had the following     No orders found for display       Primary Care Provider Office Phone # Fax #    Ulysses Baker [phone]       [facility] [address] [facility] [address] [address]        Thank you!     Thank you for choosing [facility]  for your care. Our goal is always to provide you with excellent care. Hearing back from our patients is one way we can continue to improve our services. Please take a few minutes to complete the written survey that you may receive in the mail after your visit with us. Thank you!             Your Updated Medication List - Protect others around you: Learn how to safely use, store and throw away your medicines at www.disposemymeds.org.          This list is accurate as of: 17  8:19 AM. "  Always use your most recent med list.                   Brand Name Dispense Instructions for use    ascorbic acid 250 MG Chew chewable tablet    vitamin C     Take 1 tablet by mouth daily.       ASPIRIN NOT PRESCRIBED    INTENTIONAL    0    due to coumadin       calcium carb 1250 mg (500 mg [place])/vitamin D 200 units 500-200 MG-UNIT per tablet    OSCAL with D     Take 1 tablet by mouth 2 times daily (with meals).       iron 325 (65 FE) MG tablet      Take  by mouth. Taking two per week.       metoprolol 50 MG tablet    LOPRESSOR    93 tablet    Take 1.5 tablets (75 mg) by mouth 2 times daily       multivitamin per tablet      Take 1 tablet by mouth daily.       predniSONE 5 MG tablet    DELTASONE     Take 2.5 mg by mouth daily as needed States takes Prednisone 2.5 mg x 4 days in a row and then skips a day. [facility] has been on this schedule for at least 2 months.       triamterene-hydrochlorothiazide 37.5-25 MG per tablet    MAXZIDE-25    30 tablet    Take 1/2 pill twice daily       warfarin 5 MG tablet    COUMADIN    90 tablet    Take 5mg by mouth Monday and 2.5mg all other days or as directed by Anticoagulation Clinic

## 2017-04-17 ENCOUNTER — ALLIED HEALTH/NURSE VISIT (OUTPATIENT)
Dept: FAMILY MEDICINE | Facility: CLINIC | Age: 77
End: 2017-04-17
Payer: COMMERCIAL

## 2017-04-17 VITALS — SYSTOLIC BLOOD PRESSURE: 132 MMHG | DIASTOLIC BLOOD PRESSURE: 82 MMHG | HEART RATE: 80 BPM

## 2017-04-17 DIAGNOSIS — I10 HYPERTENSION, GOAL BELOW 140/90: Primary | ICD-10-CM

## 2017-04-17 PROCEDURE — 99207 ZZC NO CHARGE NURSE ONLY: CPT

## 2017-04-17 NOTE — MR AVS SNAPSHOT
After Visit Summary   4/17/2017    Nayely Kay    MRN: [id number]           Patient Information     Date Of Birth          1940        Visit Information        Provider Department      4/17/2017 1:45 PM [place] NB RN [facility]        Today's Diagnoses     Hypertension, goal below 140/90    -  1       Follow-ups after your visit        Your next 10 appointments already scheduled     Apr 18, 2017  7:45 AM CDT   MOHS with Jareth Pedroza MD   [facility]    [address] [facility] [address]   [phone]            May 01, 2017  2:30 PM CDT   Anticoagulation Visit with NB ANTI COAG   [facility] ([facility])    [address] [place] [address] [phone]            May 01, 2017  2:45 PM CDT   Nurse Only with [place] NB RN   [facility] ([facility])    [address] [place] [id number] [phone]              Who to contact     If you have questions or need follow up information about today's clinic visit or your schedule please contact [facility] directly at [phone].  Normal or non-critical lab and imaging results will be communicated to you by MyChart, letter or phone within 4 business days after the clinic has received the results. If you do not hear from us within 7 days, please contact the clinic through [employer] or phone. If you have a critical or abnormal lab result, we will notify you by phone as soon as possible.  Submit refill requests through [employer] or call your pharmacy and they will forward the refill request to us. Please allow 3 business days for your refill to be completed.          Additional Information About Your Visit        [employer] Information     [employer] lets you send messages to your doctor, view your test results, renew your prescriptions, schedule appointments and more. To  "sign up, go to [email or web address] . Click on \"Log in\" on the left side of the screen, which will take you to the Welcome page. Then click on \"Sign up Now\" on the right side of the page.     You will be asked to enter the access code listed below, as well as some personal information. Please follow the directions to create your username and password.     Your access code is: [email or web address]  Expires: 2017  4:18 PM     Your access code will  in 90 days. If you need help or a new code, please call your [place] clinic or [phone].        Care EveryWhere ID     This is your Care EveryWhere ID. This could be used by other organizations to access your [place] medical records  [id number]        Your Vitals Were     Pulse                   80            Blood Pressure from Last 3 Encounters:   17 132/82   17 132/83   04/10/17 136/72    Weight from Last 3 Encounters:   17 123 lb (55.8 kg)   16 120 lb (54.4 kg)   10/26/16 120 lb (54.4 kg)              Today, you had the following     No orders found for display       Primary Care Provider Office Phone # Fax #    Ulysses Baker [phone]       [facility] [address] [facility] [address] [address]        Thank you!     Thank you for choosing [facility]  for your care. Our goal is always to provide you with excellent care. Hearing back from our patients is one way we can continue to improve our services. Please take a few minutes to complete the written survey that you may receive in the mail after your visit with us. Thank you!             Your Updated Medication List - Protect others around you: Learn how to safely use, store and throw away your medicines at www.disposemymeds.org.          This list is accurate as of: 17  1:55 PM.  Always use your most recent med list.                   Brand Name Dispense Instructions for use    ascorbic acid 250 MG Chew chewable tablet    " vitamin C     Take 1 tablet by mouth daily.       ASPIRIN NOT PRESCRIBED    INTENTIONAL    0    due to coumadin       calcium carb 1250 mg (500 mg [place])/vitamin D 200 units 500-200 MG-UNIT per tablet    OSCAL with D     Take 1 tablet by mouth 2 times daily (with meals).       iron 325 (65 FE) MG tablet      Take  by mouth. Taking two per week.       metoprolol 50 MG tablet    LOPRESSOR    93 tablet    Take 1.5 tablets (75 mg) by mouth 2 times daily       multivitamin per tablet      Take 1 tablet by mouth daily.       predniSONE 5 MG tablet    DELTASONE     Take 2.5 mg by mouth daily as needed States takes Prednisone 2.5 mg x 4 days in a row and then skips a day. [facility] has been on this schedule for at least 2 months.       triamterene-hydrochlorothiazide 37.5-25 MG per tablet    MAXZIDE-25    30 tablet    Take 1/2 pill twice daily       warfarin 5 MG tablet    COUMADIN    90 tablet    Take 5mg by mouth Monday and 2.5mg all other days or as directed by Anticoagulation Clinic

## 2017-04-17 NOTE — NURSING NOTE
Nayely Kay is a 77 year old female who comes in today for a Blood Pressure check because of ongoing blood pressure monitoring.        Vitals as recorded, a regular cuff was used.    Patient is taking medication as prescribed  Patient is tolerating medications well.  Patient is not monitoring Blood Pressure at home    Current complaints: none    Disposition: follow-up as indicated by MD/AP

## 2017-04-18 ENCOUNTER — OFFICE VISIT (OUTPATIENT)
Dept: DERMATOLOGY | Facility: CLINIC | Age: 77
End: 2017-04-18
Payer: COMMERCIAL

## 2017-04-18 VITALS — OXYGEN SATURATION: 98 % | HEART RATE: 63 BPM | DIASTOLIC BLOOD PRESSURE: 90 MMHG | SYSTOLIC BLOOD PRESSURE: 152 MMHG

## 2017-04-18 DIAGNOSIS — D04.39 SQUAMOUS CELL CARCINOMA IN SITU OF SKIN OF NOSE: Primary | ICD-10-CM

## 2017-04-18 PROCEDURE — 14061 TIS TRNFR E/N/E/L10.1-30SQCM: CPT | Performed by: DERMATOLOGY

## 2017-04-18 PROCEDURE — 17311 MOHS 1 STAGE H/N/HF/G: CPT | Performed by: DERMATOLOGY

## 2017-04-18 NOTE — PATIENT INSTRUCTIONS
Sutured Wound Care     [facility]: [phone]    [facility]: [phone]          ? No strenuous activity for 48 hours. Resume moderate activity in 48 hours. No heavy exercising until you are seen for follow up in one week.     ? Take Tylenol as needed for discomfort.                         ? Do not drink alcoholic beverages for 48 hours.     ? Keep the pressure bandage in place for 24 hours. If the bandage becomes blood tinged or loose, reinforce it with gauze and tape.        (Refer to the reverse side of this page for management of bleeding).    ? Remove pressure bandage in 24 hours     ? Leave the flat bandage in place until your follow up appointment.    ? Keep the bandage dry. Wash around it carefully.    ? If the tape becomes soiled or starts to come off, reinforce it with additional paper tape.    ? Do not smoke for 3 weeks; smoking is detrimental to wound healing.    ? It is normal to have swelling and bruising around the surgical site. The bruising will fade in approximately 10-14 days. Elevate the area to reduce swelling.    ? Numbness, itchiness and sensitivity to temperature changes can occur after surgery and may take up to 18 months to normalize.      POSSIBLE COMPLICATIONS    BLEEDIN. Leave the bandage in place.  2. Use tightly rolled up gauze or a cloth to apply direct pressure over the bandage for 20   minutes.  3. Reapply pressure for an additional 20 minutes if necessary  4. Call the office or go to the nearest emergency room if pressure fails to stop the bleeding.  5. Use additional gauze and tape to maintain pressure once the bleeding has stopped.        PAIN:    1. Post operative pain should slowly get better, never worse.  2. A severe increase in pain may indicate a problem. Call the office if this occurs.    In case of emergency phone:Dr Pedroza [phone]

## 2017-04-18 NOTE — PROGRESS NOTES
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  Treated with cryo.  Patient has no other skin complaints today.  Remainder of the HPI, Meds, PMH, Allergies, FH, and SH was reviewed in chart.    Pertinent Hx:   Non-melanoma skin cancer   Past Medical History:   Diagnosis Date     Atrial fibrillation (H)      Migraine, unspecified, without mention of intractable migraine without mention of status migrainosus     Migraine HA     Squamous cell carcinoma (H)      Unspecified essential hypertension      Unspecified tinnitus        Past Surgical History:   Procedure Laterality Date     COLONOSCOPY  2009     FLEXIBLE SIGMOIDOSCOPY  9/2004     SURGICAL HISTORY OF -       tubal     SURGICAL HISTORY OF -       oral teeth        Family History   Problem Relation Age of Onset     HEART DISEASE Mother      Valvular disease- age 55.     CEREBROVASCULAR DISEASE Father      age 75.     Neurologic Disorder Daughter      MS Maciel Brother      joe hanna-cleaned out     Breast Cancer Maternal Aunt        Social History     Social History     Marital status:      Spouse name: N/A     Number of children: N/A     Years of education: N/A     Occupational History     Not on file.     Social History Main Topics     Smoking status: Never Smoker     Smokeless tobacco: Never Used     Alcohol use No     Drug use: No     Sexual activity: Not Currently     Other Topics Concern     Parent/Sibling W/ Cabg, Mi Or Angioplasty Before 65f 55m? No     Social History Narrative       Outpatient Encounter Prescriptions as of 4/18/2017   Medication Sig Dispense Refill     metoprolol (LOPRESSOR) 50 MG tablet Take 1.5 tablets (75 mg) by mouth 2 times daily 93 tablet 11     predniSONE (DELTASONE) 5 MG tablet Take 2.5 mg by mouth daily as needed States takes Prednisone 2.5 mg x 4 days in a row and then skips a day. [facility] has been on this  schedule for at least 2 months.       triamterene-hydrochlorothiazide (MAXZIDE-25) 37.5-25 MG per tablet Take 1/2 pill twice daily 30 tablet 11     warfarin (COUMADIN) 5 MG tablet Take 5mg by mouth Monday and 2.5mg all other days or as directed by Anticoagulation Clinic 90 tablet 3     Ferrous Sulfate (IRON) 325 (65 FE) MG tablet Take  by mouth. Taking two per week.       Multiple Vitamin (MULTIVITAMIN) per tablet Take 1 tablet by mouth daily.       ascorbic acid (VITAMIN C) 250 MG CHEW Take 1 tablet by mouth daily.  0     calcium carb 1250 mg, 500 mg [place],/vitamin D 200 units (OSCAL WITH D) 500-200 MG-UNIT per tablet Take 1 tablet by mouth 2 times daily (with meals).       ASPIRIN NOT PRESCRIBED (INTENTIONAL) due to coumadin 0 0     No facility-administered encounter medications on file as of 4/18/2017.              Review Of Systems  Skin: As above  Eyes: negative  Ears/Nose/Throat: negative  Respiratory: No shortness of breath, dyspnea on exertion, cough, or hemoptysis  Cardiovascular: negative  Gastrointestinal: negative  Genitourinary: negative  Musculoskeletal: negative  Neurologic: negative  Psychiatric: negative  Hematologic/Lymphatic/Immunologic: negative  Endocrine: negative      O:   NAD, WDWN, Alert & Oriented, Mood & Affect wnl, Vitals stable   Here today alone   /90  Pulse 63  SpO2 98%   General appearance normal   Vitals stable   Alert, oriented and in no acute distress      Following lymph nodes palpated: Occipital, Cervical, Supraclavicular no lad   Nasalbridge 2.1cm red scaly plaque       Eyes: Conjunctivae/lids:Normal     ENT: Lips, buccal mucosa, tongue: normal    MSK:Normal    Cardiovascular: peripheral edema none    Pulm: Breathing Normal    Lymph Nodes: No Head and Neck Lymphadenopathy     Neuro/Psych: Orientation:Normal; Mood/Affect:Normal      A/P:  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 Indication for Mohs was Recurrent and Location. Patient confirmed the site with Dr. Pedroza.  After anesthesia with LEC, the tumor was excised using standard Mohs technique in 1 stages(s).  CLEAR MARGINS OBTAINED and Final defect size was 2.6 cm.       REPAIR SLAF ON NOSE:  Because of size and full thickness nature of the defect, and to maintain form and function of the nose, and the proximity to the nasal tip and ala, a bilateral advancement flap was carefully planned. After anesthesia and prep, Burow's triangles were excised above and below the defect and two laterally based flaps were created by undermining in the subcutaneous plane and skeletonizing the nasal skin.  After hemostasis, the flaps were advanced into the defect with a single tension reduction stitch at the level of the distal nasal bone. The flap edges were then sutured into place in a layered fashion using Vicryl and Fast Absorbing Plain Gut sutures. Postoperative size was 4.2 x 3.3 cm.  EBL minimal; complications none; wound care routine.  The patient was discharged in good condition and will return in one week for wound evaluation.    BENIGN LESIONS DISCUSSED WITH PATIENT:  I discussed the specifics of tumor, prognosis, and genetics of benign lesions.  I explained that treatment of these lesions would be purely cosmetic and not medically neccessary.  I discussed with patient different removal options including excision, cautery and /or laser.      Nature and genetics of benign skin lesions dicussed with patient.  Signs and Symptoms of skin cancer discussed with patient.  Patient encouraged to perform monthly skin exams.  UV precautions reviewed with patient.  Skin care regimen reviewed with patient: Eliminate harsh soaps, i.e. Dial, zest, irsih spring; Mild soaps such as Cetaphil or Dove sensitive skin, avoid hot or cold showers, aggressive use of emollients including vanicream, cetaphil or cerave discussed with patient.     Risks of non-melanoma skin cancer discussed with patient   Return to clinic 6 months

## 2017-04-18 NOTE — NURSING NOTE
Initial /90  Pulse 63  SpO2 98% Estimated body mass index is 24.02 kg/(m^2) as calculated from the following:    Height as of 3/3/17: 1.524 m (5').    Weight as of 3/3/17: 55.8 kg (123 lb). .

## 2017-04-18 NOTE — MR AVS SNAPSHOT
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 No strenuous activity for 48 hours. Resume moderate activity in 48 hours. No heavy exercising until you are seen for follow up in one week.     ? Take Tylenol as needed for discomfort.                         ? Do not drink alcoholic beverages for 48 hours.     ? Keep the pressure bandage in place for 24 hours. If the bandage becomes blood tinged or loose, reinforce it with gauze and tape.        (Refer to the reverse side of this page for management of bleeding).    ? Remove pressure bandage in 24 hours     ? Leave the flat bandage in place until your follow up appointment.    ? Keep the bandage dry. Wash around it carefully.    ? If the tape becomes soiled or starts to come off, reinforce it with additional paper tape.    ? Do not smoke for 3 weeks; smoking is detrimental to wound healing.    ? It is normal to have swelling and bruising around the surgical site. The bruising will fade in approximately 10-14 days. Elevate the area to reduce swelling.    ? Numbness, itchiness and sensitivity to temperature changes can occur after surgery and may take up to 18 months to normalize.      POSSIBLE COMPLICATIONS    BLEEDIN. Leave the bandage in place.  2. Use tightly rolled up gauze or a cloth to apply direct pressure over the bandage for 20   minutes.  3. Reapply pressure for an additional 20 minutes if necessary  4. Call the office or go to the nearest emergency room if pressure fails to stop the bleeding.  5. Use  "additional gauze and tape to maintain pressure once the bleeding has stopped.        PAIN:    1. Post operative pain should slowly get better, never worse.  2. A severe increase in pain may indicate a problem. Call the office if this occurs.    In case of emergency phone:Dr Pedroza [phone]            Follow-ups after your visit        Your next 10 appointments already scheduled     May 01, 2017  2:30 PM CDT   Anticoagulation Visit with NB ANTI COAG   [facility] ([facility])    [address] [place] [address] [phone]            May 01, 2017  2:45 PM CDT   Nurse Only with [place] NB RN   [facility] ([facility])    [address] [place] [address] [phone]              Who to contact     If you have questions or need follow up information about today's clinic visit or your schedule please contact [facility] directly at [phone].  Normal or non-critical lab and imaging results will be communicated to you by TeeBeeDeehart, letter or phone within 4 business days after the clinic has received the results. If you do not hear from us within 7 days, please contact the clinic through Rennoviat or phone. If you have a critical or abnormal lab result, we will notify you by phone as soon as possible.  Submit refill requests through Warwick Warp or call your pharmacy and they will forward the refill request to us. Please allow 3 business days for your refill to be completed.          Additional Information About Your Visit        Warwick Warp Information     Warwick Warp lets you send messages to your doctor, view your test results, renew your prescriptions, schedule appointments and more. To sign up, go to [email or web address] . Click on \"Log in\" on the left side of the screen, which will take you to the Welcome page. Then click on \"Sign up Now\" on the right side of the page.     You will be asked to enter the access " code listed below, as well as some personal information. Please follow the directions to create your username and password.     Your access code is: [email or web address]  Expires: 2017  4:18 PM     Your access code will  in 90 days. If you need help or a new code, please call your [facility] clinic or [phone].        Care EveryWhere ID     This is your Care EveryWhere ID. This could be used by other organizations to access your [facility] medical records  [id number]        Your Vitals Were     Pulse Pulse Oximetry                63 98%           Blood Pressure from Last 3 Encounters:   17 152/90   17 132/82   17 132/83    Weight from Last 3 Encounters:   17 55.8 kg (123 lb)   16 54.4 kg (120 lb)   10/26/16 54.4 kg (120 lb)              We Performed the Following     ADJ TISSUE XFER LID/NOS/EAR/LIP 10.1-30 CM     MOHS HEAD/NCK/HND/FT/GEN 1ST STAGE UP T0 5 BLOCKS        Primary Care Provider Office Phone # Fax #    Ulysses Baker [phone]       [facility] [address] [facility] [address]        Thank you!     Thank you for choosing [facility]  for your care. Our goal is always to provide you with excellent care. Hearing back from our patients is one way we can continue to improve our services. Please take a few minutes to complete the written survey that you may receive in the mail after your visit with us. Thank you!             Your Updated Medication List - Protect others around you: Learn how to safely use, store and throw away your medicines at www.disposemymeds.org.          This list is accurate as of: 17 10:34 AM.  Always use your most recent med list.                   Brand Name Dispense Instructions for use    ascorbic acid 250 MG Chew chewable tablet    vitamin C     Take 1 tablet by mouth daily.       ASPIRIN NOT PRESCRIBED    INTENTIONAL    0    due to coumadin       calcium carb 1250 mg (500 mg  [place])/vitamin D 200 units 500-200 MG-UNIT per tablet    OSCAL with D     Take 1 tablet by mouth 2 times daily (with meals).       iron 325 (65 FE) MG tablet      Take  by mouth. Taking two per week.       metoprolol 50 MG tablet    LOPRESSOR    93 tablet    Take 1.5 tablets (75 mg) by mouth 2 times daily       multivitamin per tablet      Take 1 tablet by mouth daily.       predniSONE 5 MG tablet    DELTASONE     Take 2.5 mg by mouth daily as needed States takes Prednisone 2.5 mg x 4 days in a row and then skips a day. [facility] has been on this schedule for at least 2 months.       triamterene-hydrochlorothiazide 37.5-25 MG per tablet    MAXZIDE-25    30 tablet    Take 1/2 pill twice daily       warfarin 5 MG tablet    COUMADIN    90 tablet    Take 5mg by mouth Monday and 2.5mg all other days or as directed by Anticoagulation Clinic

## 2017-04-20 ENCOUNTER — ALLIED HEALTH/NURSE VISIT (OUTPATIENT)
Dept: FAMILY MEDICINE | Facility: CLINIC | Age: 77
End: 2017-04-20
Payer: COMMERCIAL

## 2017-04-20 DIAGNOSIS — L98.9 SKIN LESION: Primary | ICD-10-CM

## 2017-04-20 PROCEDURE — 99207 ZZC NO CHARGE NURSE ONLY: CPT

## 2017-04-20 NOTE — MR AVS SNAPSHOT
After Visit Summary   4/20/2017    Nayely Kay    MRN: [id number]           Patient Information     Date Of Birth          1940        Visit Information        Provider Department      4/20/2017 11:00 AM [place] NB RN [facility]        Today's Diagnoses     Skin lesion    -  1       Follow-ups after your visit        Your next 10 appointments already scheduled     Apr 25, 2017  1:45 PM CDT   Nurse Only with [place] Derm Nurse   [facility]    [address]  [facility] [address]   [phone]            May 01, 2017  2:30 PM CDT   Anticoagulation Visit with NB ANTI COAG   [facility] ([facility]    [address] [place] [address] [phone]            May 01, 2017  2:45 PM CDT   Nurse Only with [place] NB RN   [facility] ([facility])    [address] [place] [address] [phone]              Who to contact     If you have questions or need follow up information about today's clinic visit or your schedule please contact [facility] directly at [phone].  Normal or non-critical lab and imaging results will be communicated to you by MyChart, letter or phone within 4 business days after the clinic has received the results. If you do not hear from us within 7 days, please contact the clinic through MyChart or phone. If you have a critical or abnormal lab result, we will notify you by phone as soon as possible.  Submit refill requests through Binary Thumb or call your pharmacy and they will forward the refill request to us. Please allow 3 business days for your refill to be completed.          Additional Information About Your Visit        EvoTronixhart Information     Binary Thumb lets you send messages to your doctor, view your test results, renew your prescriptions, schedule appointments and more. To sign up, go to  "[email or web address] . Click on \"Log in\" on the left side of the screen, which will take you to the Welcome page. Then click on \"Sign up Now\" on the right side of the page.     You will be asked to enter the access code listed below, as well as some personal information. Please follow the directions to create your username and password.     Your access code is: [email or web address]  Expires: 2017  4:18 PM     Your access code will  in 90 days. If you need help or a new code, please call your [facility] clinic or [phone].        Care EveryWhere ID     This is your Care EveryWhere ID. This could be used by other organizations to access your [facility] medical records  [id number]         Blood Pressure from Last 3 Encounters:   17 152/90   17 132/82   17 132/83    Weight from Last 3 Encounters:   17 123 lb (55.8 kg)   16 120 lb (54.4 kg)   10/26/16 120 lb (54.4 kg)              Today, you had the following     No orders found for display       Primary Care Provider Office Phone # Fax #    Ulysses Baker [phone]       [facility] [address] [facility] [address]        Thank you!     Thank you for choosing [facility]  for your care. Our goal is always to provide you with excellent care. Hearing back from our patients is one way we can continue to improve our services. Please take a few minutes to complete the written survey that you may receive in the mail after your visit with us. Thank you!             Your Updated Medication List - Protect others around you: Learn how to safely use, store and throw away your medicines at www.disposemymeds.org.          This list is accurate as of: 17 11:59 PM.  Always use your most recent med list.                   Brand Name Dispense Instructions for use    ascorbic acid 250 MG Chew chewable tablet    vitamin C     Take 1 tablet by mouth daily.       ASPIRIN NOT PRESCRIBED    " INTENTIONAL    0    due to coumadin       calcium carb 1250 mg (500 mg [place])/vitamin D 200 units 500-200 MG-UNIT per tablet    OSCAL with D     Take 1 tablet by mouth 2 times daily (with meals).       iron 325 (65 FE) MG tablet      Take  by mouth. Taking two per week.       metoprolol 50 MG tablet    LOPRESSOR    93 tablet    Take 1.5 tablets (75 mg) by mouth 2 times daily       multivitamin per tablet      Take 1 tablet by mouth daily.       predniSONE 5 MG tablet    DELTASONE     Take 2.5 mg by mouth daily as needed States takes Prednisone 2.5 mg x 4 days in a row and then skips a day. [facility] has been on this schedule for at least 2 months.       triamterene-hydrochlorothiazide 37.5-25 MG per tablet    MAXZIDE-25    30 tablet    Take 1/2 pill twice daily       warfarin 5 MG tablet    COUMADIN    90 tablet    Take 5mg by mouth Monday and 2.5mg all other days or as directed by Anticoagulation Clinic

## 2017-04-21 NOTE — PROGRESS NOTES
Patient came into clinic today to have her pressure bandage taken off.  Patient did not feel comfortable taking the bandage off herself.  The pressure bandage was taken off with no difficulty.  The tan bandages were in place and remained intact.  Patient tolerated the procedure well.    Bobbi MARTINES RN

## 2017-04-25 ENCOUNTER — ALLIED HEALTH/NURSE VISIT (OUTPATIENT)
Dept: DERMATOLOGY | Facility: CLINIC | Age: 77
End: 2017-04-25
Payer: COMMERCIAL

## 2017-04-25 DIAGNOSIS — Z48.01 ENCOUNTER FOR CHANGE OR REMOVAL OF SURGICAL WOUND DRESSING: Primary | ICD-10-CM

## 2017-04-25 PROCEDURE — 99207 ZZC NO CHARGE NURSE ONLY: CPT

## 2017-04-25 NOTE — PROGRESS NOTES
Pt returned to clinic for post surgery 1 week follow up bandage change. Pt has no complaints, denies pain. Bandage removed from nose, area cleansed with normal saline. Site is healing and wound edges approximating well. Reapplied new steri strips and paper tape.    Advised to watch for signs/sx of infection; spreading redness, drainage, odor, fever. Call or report promptly to clinic. Pt given written instructions and informed to rtc as needed. Patient verbalized understanding.

## 2017-04-25 NOTE — MR AVS SNAPSHOT
After Visit Summary   4/25/2017    Nayely Kay    MRN: [id number]           Patient Information     Date Of Birth          1940        Visit Information        Provider Department      4/25/2017 1:45 PM [facility]        Care Instructions    WOUND CARE INSTRUCTIONS  for  ONE WEEK AFTER SURGERY          1) Leave flat bandage on your skin for one week after today s bandage change.  2) In one week when you remove the bandage, you may resume your regular skin care routine, including washing with mild soap and water, applying moisturizer, make-up and sunscreen.    3) If there are any open or bleeding areas at the incision/graft site you should begin to cover the area with a bandage daily as follows:    1) Clean and dry the area with plain tap water using a Q-tip or sterile gauze pad.  2) Apply Polysporin or Bacitracin ointment to the open area.  3) Cover the wound with a band-aid or a sterile non-stick gauze pad and micropore paper tape.             *Once the bandages are removed, the scar will be red and firm (especially in the lip/chin area). This is normal and will fade in time. It might take 6-12 months for this to happen.     *Massaging the area will help the scar soften and fade quicker. Begin to massage the area one month after the bandages have been removed. To massage apply pressure directly and firmly over the scar with the fingertips and move in a circular motion. Massage the area for a few minutes several times a day. Continue to massage the site for several months.    *Approximately 6-8 weeks after surgery it is not uncommon to see the formation of  tender pimple-like  bump along the scar. This is normal. As the scar continues to mature and the stitches underneath the skin begin to dissolve, this might occur. Do not pick or squeeze, this will resolve on it s own. Should one break open producing a small amount of drainage, apply Polysporin or Bacitracin  ointment a few times a day until the wound is completely healed.    *Numbness in the surgical area is expected. It might take 12-18 months for the feeling to return to normal. During this time sensations of itchiness, tingling and occasional sharp pains might be noted. These feelings are normal and will subside once the nerves have completely healed.         IN CASE OF EMERGENCY: Dr Pedroza [phone]     If you were seen in [place] call: [phone]    If you were seen in [place] call: [phone]            Follow-ups after your visit        Your next 10 appointments already scheduled     May 01, 2017  2:30 PM CDT   Anticoagulation Visit with NB ANTI COAG   [facility]    [address] [place] [address] [phone]            May 01, 2017  2:45 PM CDT   Nurse Only with FL NB RN   [facility] ([facility])    [address] [place] [address] [phone]              Who to contact     If you have questions or need follow up information about today's clinic visit or your schedule please contact [facility] directly at [phone].  Normal or non-critical lab and imaging results will be communicated to you by IPWirelesshart, letter or phone within 4 business days after the clinic has received the results. If you do not hear from us within 7 days, please contact the clinic through IPWirelesshart or phone. If you have a critical or abnormal lab result, we will notify you by phone as soon as possible.  Submit refill requests through Radionomy or call your pharmacy and they will forward the refill request to us. Please allow 3 business days for your refill to be completed.          Additional Information About Your Visit        Radionomy Information     Radionomy lets you send messages to your doctor, view your test results, renew your prescriptions, schedule appointments and more. To sign up, go to  "[email or web address] . Click on \"Log in\" on the left side of the screen, which will take you to the Welcome page. Then click on \"Sign up Now\" on the right side of the page.     You will be asked to enter the access code listed below, as well as some personal information. Please follow the directions to create your username and password.     Your access code is: [email or web address]  Expires: 2017  4:18 PM     Your access code will  in 90 days. If you need help or a new code, please call your [facility] clinic or [phone].        Care EveryWhere ID     This is your Care EveryWhere ID. This could be used by other organizations to access your [facility] medical records  [id number]         Blood Pressure from Last 3 Encounters:   17 152/90   17 132/82   17 132/83    Weight from Last 3 Encounters:   17 55.8 kg (123 lb)   16 54.4 kg (120 lb)   10/26/16 54.4 kg (120 lb)              Today, you had the following     No orders found for display       Primary Care Provider Office Phone # Fax #    Ulysses Baker [phone]       [facility] [address] [facility] [address]        Thank you!     Thank you for choosing [facility]  for your care. Our goal is always to provide you with excellent care. Hearing back from our patients is one way we can continue to improve our services. Please take a few minutes to complete the written survey that you may receive in the mail after your visit with us. Thank you!             Your Updated Medication List - Protect others around you: Learn how to safely use, store and throw away your medicines at www.disposemymeds.org.          This list is accurate as of: 17  1:52 PM.  Always use your most recent med list.                   Brand Name Dispense Instructions for use    ascorbic acid 250 MG Chew chewable tablet    vitamin C     Take 1 tablet by mouth daily.       ASPIRIN NOT PRESCRIBED    " INTENTIONAL    0    due to coumadin       calcium carb 1250 mg (500 mg [place])/vitamin D 200 units 500-200 MG-UNIT per tablet    OSCAL with D     Take 1 tablet by mouth 2 times daily (with meals).       iron 325 (65 FE) MG tablet      Take  by mouth. Taking two per week.       metoprolol 50 MG tablet    LOPRESSOR    93 tablet    Take 1.5 tablets (75 mg) by mouth 2 times daily       multivitamin per tablet      Take 1 tablet by mouth daily.       predniSONE 5 MG tablet    DELTASONE     Take 2.5 mg by mouth daily as needed States takes Prednisone 2.5 mg x 4 days in a row and then skips a day. [facility] has been on this schedule for at least 2 months.       triamterene-hydrochlorothiazide 37.5-25 MG per tablet    MAXZIDE-25    30 tablet    Take 1/2 pill twice daily       warfarin 5 MG tablet    COUMADIN    90 tablet    Take 5mg by mouth Monday and 2.5mg all other days or as directed by Anticoagulation Clinic

## 2017-04-25 NOTE — PATIENT INSTRUCTIONS
WOUND CARE INSTRUCTIONS  for  ONE WEEK AFTER SURGERY          1) Leave flat bandage on your skin for one week after today s bandage change.  2) In one week when you remove the bandage, you may resume your regular skin care routine, including washing with mild soap and water, applying moisturizer, make-up and sunscreen.    3) If there are any open or bleeding areas at the incision/graft site you should begin to cover the area with a bandage daily as follows:    1) Clean and dry the area with plain tap water using a Q-tip or sterile gauze pad.  2) Apply Polysporin or Bacitracin ointment to the open area.  3) Cover the wound with a band-aid or a sterile non-stick gauze pad and micropore paper tape.             *Once the bandages are removed, the scar will be red and firm (especially in the lip/chin area). This is normal and will fade in time. It might take 6-12 months for this to happen.     *Massaging the area will help the scar soften and fade quicker. Begin to massage the area one month after the bandages have been removed. To massage apply pressure directly and firmly over the scar with the fingertips and move in a circular motion. Massage the area for a few minutes several times a day. Continue to massage the site for several months.    *Approximately 6-8 weeks after surgery it is not uncommon to see the formation of  tender pimple-like  bump along the scar. This is normal. As the scar continues to mature and the stitches underneath the skin begin to dissolve, this might occur. Do not pick or squeeze, this will resolve on it s own. Should one break open producing a small amount of drainage, apply Polysporin or Bacitracin ointment a few times a day until the wound is completely healed.    *Numbness in the surgical area is expected. It might take 12-18 months for the feeling to return to normal. During this time sensations of itchiness, tingling and occasional sharp pains might be noted. These feelings are normal  and will subside once the nerves have completely healed.         IN CASE OF EMERGENCY: Dr Pedroza [phone]     If you were seen in [place] call: [phone]    If you were seen in [place] call: [phone]

## 2017-05-01 ENCOUNTER — ANTICOAGULATION THERAPY VISIT (OUTPATIENT)
Dept: ANTICOAGULATION | Facility: CLINIC | Age: 77
End: 2017-05-01
Payer: COMMERCIAL

## 2017-05-01 ENCOUNTER — ALLIED HEALTH/NURSE VISIT (OUTPATIENT)
Dept: FAMILY MEDICINE | Facility: CLINIC | Age: 77
End: 2017-05-01
Payer: COMMERCIAL

## 2017-05-01 VITALS — DIASTOLIC BLOOD PRESSURE: 70 MMHG | SYSTOLIC BLOOD PRESSURE: 136 MMHG | RESPIRATION RATE: 14 BRPM | HEART RATE: 76 BPM

## 2017-05-01 DIAGNOSIS — Z01.30 BLOOD PRESSURE CHECK: Primary | ICD-10-CM

## 2017-05-01 DIAGNOSIS — Z79.01 LONG TERM CURRENT USE OF ANTICOAGULANT THERAPY: ICD-10-CM

## 2017-05-01 LAB — INR POINT OF CARE: 2.1 (ref 0.86–1.14)

## 2017-05-01 PROCEDURE — 36416 COLLJ CAPILLARY BLOOD SPEC: CPT

## 2017-05-01 PROCEDURE — 85610 PROTHROMBIN TIME: CPT | Mod: QW

## 2017-05-01 PROCEDURE — 99207 ZZC NO CHARGE NURSE ONLY: CPT

## 2017-05-01 NOTE — PROGRESS NOTES
S-(situation): patient is here for a blood pressure check    B-(background): taking medications as directed.  Feels fine.  Some stress she says with family stuff and nose surgery.  Nayely usually has blood pressure checked when finished seeing the INR nurse.    A-(assessment): blood pressure today is 136/70    R-(recommendations): return to clinic as needed  Nicolette Granger RN

## 2017-05-01 NOTE — PROGRESS NOTES
ANTICOAGULATION FOLLOW-UP CLINIC VISIT    Patient Name:  Nayely Kay  Date:  5/1/2017  Contact Type:  Face to Face    SUBJECTIVE:     Patient Findings     Positives No Problem Findings    Comments 4/11 and 4/18 pt had Moh's surgery done.           OBJECTIVE    INR Protime   Date Value Ref Range Status   05/01/2017 2.1 (A) 0.86 - 1.14 Final       ASSESSMENT / PLAN  INR assessment THER    Recheck INR In: 3 WEEKS due to holiday   INR Location Clinic      Anticoagulation Summary as of 5/1/2017     INR goal 2.0-3.0   Today's INR 2.1   Maintenance plan 5 mg (5 mg x 1) on Mon; 2.5 mg (5 mg x 0.5) all other days   Full instructions 5 mg on Mon; 2.5 mg all other days   Weekly total 20 mg   No change documented Giselle Santillan RN   Plan last modified Giselle Santillan RN (2/27/2017)   Next INR check 5/22/2017   Priority INR   Target end date Indefinite    Indications   Atrial fibrillation (H) [I48.91]  Long term current use of anticoagulant therapy [Z79.01]         Anticoagulation Episode Summary     INR check location     Preferred lab     Send INR reminders to [facility] CLINIC POOL    Comments * only wants dosing card. Pt uses 5mg tablets.        Anticoagulation Care Providers     Provider Role Specialty Phone number    Ulysses Baker MD [facility] [phone]            See the Encounter Report to view Anticoagulation Flowsheet and Dosing Calendar (Go to Encounters tab in chart review, and find the Anticoagulation Therapy Visit)    Giselle Santillan RN

## 2017-05-01 NOTE — MR AVS SNAPSHOT
Nayely Kay   5/1/2017 2:30 PM   Anticoagulation Therapy Visit    Description:  77 year old female   Provider:  NB ANTI COAG   Department:  [facility] Anticoag           INR as of 5/1/2017     Today's INR 2.1      Anticoagulation Summary as of 5/1/2017     INR goal 2.0-3.0   Today's INR 2.1   Full instructions 5 mg on Mon; 2.5 mg all other days   Next INR check 5/22/2017    Indications   Atrial fibrillation (H) [I48.91]  Long term current use of anticoagulant therapy [Z79.01]         Description     No change, recheck INR in three weeks.      May 2017 Details    Sun Mon Tue Wed Thu Fri Sat      1      5 mg   See details      2      2.5 mg         3      2.5 mg         4      2.5 mg         5      2.5 mg         6      2.5 mg           7      2.5 mg         8      5 mg         9      2.5 mg         10      2.5 mg         11      2.5 mg         12      2.5 mg         13      2.5 mg           14      2.5 mg         15      5 mg         16      2.5 mg         17      2.5 mg         18      2.5 mg         19      2.5 mg         20      2.5 mg           21      2.5 mg         22            23               24               25               26               27                 28               29               30               31                   Date Details   05/01 This INR check       Date of next INR:  5/22/2017         How to take your warfarin dose     To take:  2.5 mg Take 0.5 of a 5 mg tablet.    To take:  5 mg Take 1 of the 5 mg tablets.

## 2017-05-01 NOTE — MR AVS SNAPSHOT
"              After Visit Summary   5/1/2017    Nayely Kay    MRN: [id number]           Patient Information     Date Of Birth          1940        Visit Information        Provider Department      5/1/2017 2:45 PM [place] NB RN [facility]        Today's Diagnoses     Blood pressure check    -  1       Follow-ups after your visit        Your next 10 appointments already scheduled     May 04, 2017  8:20 AM CDT   SHORT with Ulysses Baker MD   [facility]    [address] [facility] [address] [phone]            May 22, 2017  2:30 PM CDT   Anticoagulation Visit with NB ANTI COAG   [facility]    [address] [place] [address] [phone]              Who to contact     If you have questions or need follow up information about today's clinic visit or your schedule please contact [facility] directly at [phone].  Normal or non-critical lab and imaging results will be communicated to you by [employer], letter or phone within 4 business days after the clinic has received the results. If you do not hear from us within 7 days, please contact the clinic through [employer] or phone. If you have a critical or abnormal lab result, we will notify you by phone as soon as possible.  Submit refill requests through [employer] or call your pharmacy and they will forward the refill request to us. Please allow 3 business days for your refill to be completed.          Additional Information About Your Visit        Advanced Brain Monitoring[employer] Information     [employer] lets you send messages to your doctor, view your test results, renew your prescriptions, schedule appointments and more. To sign up, go to [email or web address] . Click on \"Log in\" on the left side of the screen, which will take you to the Welcome page. Then click on \"Sign up Now\" on the right side of the page.     You will be " asked to enter the access code listed below, as well as some personal information. Please follow the directions to create your username and password.     Your access code is: [email or web address]  Expires: 2017  4:18 PM     Your access code will  in 90 days. If you need help or a new code, please call your [place] clinic or [phone].        Care EveryWhere ID     This is your Care EveryWhere ID. This could be used by other organizations to access your [place] medical records  [id number]        Your Vitals Were     Pulse Respirations                76 14           Blood Pressure from Last 3 Encounters:   17 136/70   17 152/90   17 132/82    Weight from Last 3 Encounters:   17 123 lb (55.8 kg)   16 120 lb (54.4 kg)   10/26/16 120 lb (54.4 kg)              Today, you had the following     No orders found for display       Primary Care Provider Office Phone # Fax #    Ulysses Sarabjit Baker [phone]       [facility] [address] [facility] [address]        Thank you!     Thank you for choosing [facility]  for your care. Our goal is always to provide you with excellent care. Hearing back from our patients is one way we can continue to improve our services. Please take a few minutes to complete the written survey that you may receive in the mail after your visit with us. Thank you!             Your Updated Medication List - Protect others around you: Learn how to safely use, store and throw away your medicines at www.disposemymeds.org.          This list is accurate as of: 17  2:52 PM.  Always use your most recent med list.                   Brand Name Dispense Instructions for use    ascorbic acid 250 MG Chew chewable tablet    vitamin C     Take 1 tablet by mouth daily.       ASPIRIN NOT PRESCRIBED    INTENTIONAL    0    due to coumadin       calcium carb 1250 mg (500 mg [place])/vitamin D 200 units 500-200 MG-UNIT per tablet     OSCAL with D     Take 1 tablet by mouth 2 times daily (with meals).       iron 325 (65 FE) MG tablet      Take  by mouth. Taking two per week.       metoprolol 50 MG tablet    LOPRESSOR    93 tablet    Take 1.5 tablets (75 mg) by mouth 2 times daily       multivitamin per tablet      Take 1 tablet by mouth daily.       predniSONE 5 MG tablet    DELTASONE     Take 2.5 mg by mouth daily as needed States takes Prednisone 2.5 mg x 4 days in a row and then skips a day. \[facility] [place] [facility]\"" has been on this schedule for at least 2 months.       triamterene-hydrochlorothiazide 37.5-25 MG per tablet    MAXZIDE-25    30 tablet    Take 1/2 pill twice daily       warfarin 5 MG tablet    COUMADIN    90 tablet    Take 5mg by mouth Monday and 2.5mg all other days or as directed by Anticoagulation Clinic

## 2017-05-04 ENCOUNTER — OFFICE VISIT (OUTPATIENT)
Dept: FAMILY MEDICINE | Facility: CLINIC | Age: 77
End: 2017-05-04
Payer: COMMERCIAL

## 2017-05-04 VITALS
HEIGHT: 60 IN | OXYGEN SATURATION: 99 % | SYSTOLIC BLOOD PRESSURE: 146 MMHG | TEMPERATURE: 97.7 F | WEIGHT: 124 LBS | BODY MASS INDEX: 24.35 KG/M2 | HEART RATE: 67 BPM | DIASTOLIC BLOOD PRESSURE: 76 MMHG

## 2017-05-04 DIAGNOSIS — Z12.31 ENCOUNTER FOR SCREENING MAMMOGRAM FOR BREAST CANCER: ICD-10-CM

## 2017-05-04 DIAGNOSIS — M35.3 POLYMYALGIA RHEUMATICA (H): ICD-10-CM

## 2017-05-04 DIAGNOSIS — Z12.12 SCREENING FOR MALIGNANT NEOPLASM OF THE RECTUM: Primary | ICD-10-CM

## 2017-05-04 DIAGNOSIS — I10 ESSENTIAL HYPERTENSION: ICD-10-CM

## 2017-05-04 DIAGNOSIS — Z23 NEED FOR VACCINATION: ICD-10-CM

## 2017-05-04 PROCEDURE — 90471 IMMUNIZATION ADMIN: CPT | Performed by: FAMILY MEDICINE

## 2017-05-04 PROCEDURE — 99214 OFFICE O/P EST MOD 30 MIN: CPT | Mod: 25 | Performed by: FAMILY MEDICINE

## 2017-05-04 PROCEDURE — 90715 TDAP VACCINE 7 YRS/> IM: CPT | Performed by: FAMILY MEDICINE

## 2017-05-04 RX ORDER — PREDNISONE 1 MG/1
TABLET ORAL
Qty: 100 TABLET | Refills: 3 | Status: SHIPPED | OUTPATIENT
Start: 2017-05-04 | End: 2017-10-16

## 2017-05-04 NOTE — PATIENT INSTRUCTIONS
Thank you for choosing [facility].  You may be receiving a survey in the mail from [facility] regarding your visit today.  Please take a few minutes to complete and return the survey to let us know how we are doing.      If you have questions or concerns, please contact us via Giferent or you can contact your care team at [phone].    Our Clinic hours are:  Monday 6:40 am  to 7:00 pm  Tuesday -Friday 6:40 am to 5:00 pm    The [place] outpatient lab hours are:  Monday - Friday 6:10 am to 4:45 pm  Saturdays 7:00 am to 11:00 am  Appointments are required, call [phone]    If you have clinical questions after hours or would like to schedule an appointment,  call the clinic at [phone].    (M35.3) Polymyalgia rheumatica (H)  Comment:   Plan: predniSONE (DELTASONE) 1 MG tablet        We discussed the dose of the Prednisone taken for treatment of the Polymyalgia Rheumatica symptoms. You are going between 5 and 2.5 and 0 mg. These steps may be too large and the variability of the symptoms may require smaller dosing steps. So we will give the 1 mg size Prednisone pills. Start at 3 mg daily and if feeling well for 2 weeks, then try to reduce the dose to 2 mg daily. Find the lowest effective dose and take this with non spicy food. Take no aspirin or advil or aleve on this med. Tylenol is OK to add to Prednisone. The dose needed may vary as the condition could change over time.     (I10) Essential hypertension  Comment:   Plan: monitor and record the BP readings and the goal is under 130/80. Use the meds and the non drug therapies. Refill and recheck annually if doing well.     (Z12.12) Screening for malignant neoplasm of the rectum    Comment:   Plan: Fecal colorectal cancer screen (FIT)        This was given with instructions and she will do this and mail it. We will call notify of the results.

## 2017-05-04 NOTE — MR AVS SNAPSHOT
After Visit Summary   5/4/2017    Nayely Kay    MRN: [id number]           Patient Information     Date Of Birth          1940        Visit Information        Provider Department      5/4/2017 8:20 AM Ulysses Baker MD [facility]        Today's Diagnoses     Screening for malignant neoplasm of the rectum    -  1    Polymyalgia rheumatica (H)        Essential hypertension          Care Instructions          Thank you for choosing [facility].  You may be receiving a survey in the mail from [facility] regarding your visit today.  Please take a few minutes to complete and return the survey to let us know how we are doing.      If you have questions or concerns, please contact us via Lasso or you can contact your care team at [phone].    Our Clinic hours are:  Monday 6:40 am  to 7:00 pm  Tuesday -Friday 6:40 am to 5:00 pm    The [place] outpatient lab hours are:  Monday - Friday 6:10 am to 4:45 pm  Saturdays 7:00 am to 11:00 am  Appointments are required, call [phone]    If you have clinical questions after hours or would like to schedule an appointment,  call the clinic at [phone].    (M35.3) Polymyalgia rheumatica (H)  Comment:   Plan: predniSONE (DELTASONE) 1 MG tablet        We discussed the dose of the Prednisone taken for treatment of the Polymyalgia Rheumatica symptoms. You are going between 5 and 2.5 and 0 mg. These steps may be too large and the variability of the symptoms may require smaller dosing steps. So we will give the 1 mg size Prednisone pills. Start at 3 mg daily and if feeling well for 2 weeks, then try to reduce the dose to 2 mg daily. Find the lowest effective dose and take this with non spicy food. Take no aspirin or advil or aleve on this med. Tylenol is OK to add to Prednisone. The dose needed may vary as the condition could change over time.     (I10) Essential hypertension  Comment:   Plan: monitor and record the BP readings  and the goal is under 130/80. Use the meds and the non drug therapies. Refill and recheck annually if doing well.     (Z12.12) Screening for malignant neoplasm of the rectum    Comment:   Plan: Fecal colorectal cancer screen (FIT)        This was given with instructions and she will do this and mail it. We will call notify of the results.         Follow-ups after your visit        Your next 10 appointments already scheduled     May 22, 2017  2:30 PM CDT   Anticoagulation Visit with NB ANTI COAG   [facility]    [address] [place] [address]   [phone]            May 22, 2017  2:45 PM CDT   Nurse Only with [place] NB RN   [facility] ([facility])    [address] [place] [address]   [phone]              Future tests that were ordered for you today     Open Future Orders        Priority Expected Expires Ordered    Fecal colorectal cancer screen (FIT) Routine 5/25/2017 7/27/2017 5/4/2017            Who to contact     If you have questions or need follow up information about today's clinic visit or your schedule please contact [facility] directly at [phone].  Normal or non-critical lab and imaging results will be communicated to you by [email or web address], letter or phone within 4 business days after the clinic has received the results. If you do not hear from us within 7 days, please contact the clinic through [email or web address] or phone. If you have a critical or abnormal lab result, we will notify you by phone as soon as possible.  Submit refill requests through enercast or call your pharmacy and they will forward the refill request to us. Please allow 3 business days for your refill to be completed.          Additional Information About Your Visit        enercast Information     [employer]ast lets you send messages to your doctor, view your test results, renew your prescriptions, schedule appointments and  "more. To sign up, go to [email or web address] . Click on \"Log in\" on the left side of the screen, which will take you to the Welcome page. Then click on \"Sign up Now\" on the right side of the page.     You will be asked to enter the access code listed below, as well as some personal information. Please follow the directions to create your username and password.     Your access code is: [email or web address]  Expires: 2017  4:18 PM     Your access code will  in 90 days. If you need help or a new code, please call your [place] clinic or [phone].        Care EveryWhere ID     This is your Care EveryWhere ID. This could be used by other organizations to access your [place] medical records  [id number]        Your Vitals Were     Pulse Temperature Height Pulse Oximetry BMI (Body Mass Index)       67 97.7  F (36.5  C) (Tympanic) 5' (1.524 m) 99% 24.22 kg/m2        Blood Pressure from Last 3 Encounters:   17 146/76   17 136/70   17 152/90    Weight from Last 3 Encounters:   17 124 lb (56.2 kg)   17 123 lb (55.8 kg)   16 120 lb (54.4 kg)                 Today's Medication Changes          These changes are accurate as of: 17  9:03 AM.  If you have any questions, ask your nurse or doctor.               These medicines have changed or have updated prescriptions.        Dose/Directions    predniSONE 1 MG tablet   Commonly known as:  DELTASONE   This may have changed:    - medication strength  - how much to take  - how to take this  - when to take this  - reasons to take this  - additional instructions   Used for:  Polymyalgia rheumatica (H)   Changed by:  Ulysses Baker MD        Start at 3 mg daily and adjust as directed.   Quantity:  100 tablet   Refills:  3            Where to get your medicines      These medications were sent to [id number] - [place], [place] - [address]  [address] [facility] ([place], [place]) [address]    Hours:  Closed 10-16-08 business to " [facility] Phone:  [phone]     predniSONE 1 MG tablet                Primary Care Provider Office Phone # Fax #    Ulysses Baker [phone]       [facility] [address] [facility] [address]        Thank you!     Thank you for choosing [facility]  for your care. Our goal is always to provide you with excellent care. Hearing back from our patients is one way we can continue to improve our services. Please take a few minutes to complete the written survey that you may receive in the mail after your visit with us. Thank you!             Your Updated Medication List - Protect others around you: Learn how to safely use, store and throw away your medicines at www.disposemymeds.org.          This list is accurate as of: 5/4/17  9:03 AM.  Always use your most recent med list.                   Brand Name Dispense Instructions for use    ascorbic acid 250 MG Chew chewable tablet    vitamin C     Take 1 tablet by mouth daily.       ASPIRIN NOT PRESCRIBED    INTENTIONAL    0    due to coumadin       calcium carb 1250 mg (500 mg [place])/vitamin D 200 units 500-200 MG-UNIT per tablet    OSCAL with D     Take 1 tablet by mouth 2 times daily (with meals).       iron 325 (65 FE) MG tablet      Take  by mouth. Taking two per week.       metoprolol 50 MG tablet    LOPRESSOR    93 tablet    Take 1.5 tablets (75 mg) by mouth 2 times daily       multivitamin per tablet      Take 1 tablet by mouth daily.       NEW MED      Fiber supplement daily.       predniSONE 1 MG tablet    DELTASONE    100 tablet    Start at 3 mg daily and adjust as directed.       triamterene-hydrochlorothiazide 37.5-25 MG per tablet    MAXZIDE-25    30 tablet    Take 1/2 pill twice daily       warfarin 5 MG tablet    COUMADIN    90 tablet    Take 5mg by mouth Monday and 2.5mg all other days or as directed by Anticoagulation Clinic

## 2017-05-04 NOTE — NURSING NOTE
Chief Complaint   Patient presents with     Hypertension     Recheck on blood pressure.       Initial /76  Pulse 67  Temp 97.7  F (36.5  C) (Tympanic)  Ht 5' (1.524 m)  Wt 124 lb (56.2 kg)  SpO2 99%  BMI 24.22 kg/m2 Estimated body mass index is 24.22 kg/(m^2) as calculated from the following:    Height as of this encounter: 5' (1.524 m).    Weight as of this encounter: 124 lb (56.2 kg).  Medication Reconciliation: complete

## 2017-05-04 NOTE — PROGRESS NOTES
SUBJECTIVE:                                                    Nayely Kay is a 77 year old female who presents to clinic today for the following health issues:      Hypertension Follow-up    The Triamterene-HCTZ was decreased to 1/2 twice daily.  They felt her blood pressure was getting lower at the [facility].  She has noticed more swelling in the ankles with the decrease.      Outpatient blood pressures are being checked at clinic visits.  Results are 128/62, 118/74, 118/66, 108/60, 132/64, 136/72.. Her average BP is 125/68.     Low Salt Diet: low salt     Medication Followup of Prednisone    Taking Medication as prescribed for Polymyalgia rheumatica: yes, will take for 4 days and skip a day.  Has been trying to get off of the medication.  When she doesn't take it she will notice her Polymyalgia symptoms more.    Side Effects:  None    Medication Helping Symptoms:  yes     ADACEL:  Discuss if the Adacel injection is needed.  She is not due for her tetanus injection until 9-2020.  She is wanting to know if the Adacel is necessary.       Amount of exercise or physical activity: She is still working with cleaning houses.  Walking.    Problems taking medications regularly: No    Medication side effects: none    Diet: low salt      Current Outpatient Prescriptions:      NEW MED, Fiber supplement daily., Disp: , Rfl:      metoprolol (LOPRESSOR) 50 MG tablet, Take 1.5 tablets (75 mg) by mouth 2 times daily, Disp: 93 tablet, Rfl: 11     predniSONE (DELTASONE) 5 MG tablet, Take 2.5 mg by mouth daily as needed States takes Prednisone 2.5 mg x 4 days in a row and then skips a day. States has been on this schedule for at least 2 months., Disp: , Rfl:      triamterene-hydrochlorothiazide (MAXZIDE-25) 37.5-25 MG per tablet, Take 1/2 pill twice daily, Disp: 30 tablet, Rfl: 11     warfarin (COUMADIN) 5 MG tablet, Take 5mg by mouth Monday and 2.5mg all other days or as directed by Anticoagulation Clinic, Disp: 90  tablet, Rfl: 3     Ferrous Sulfate (IRON) 325 (65 FE) MG tablet, Take  by mouth. Taking two per week., Disp: , Rfl:      Multiple Vitamin (MULTIVITAMIN) per tablet, Take 1 tablet by mouth daily., Disp: , Rfl:      ascorbic acid (VITAMIN C) 250 MG CHEW, Take 1 tablet by mouth daily., Disp: , Rfl: 0     calcium carb 1250 mg, 500 mg [place],/vitamin D 200 units (OSCAL WITH D) 500-200 MG-UNIT per tablet, Take 1 tablet by mouth 2 times daily (with meals)., Disp: , Rfl:      ASPIRIN NOT PRESCRIBED (INTENTIONAL), due to coumadin, Disp: 0, Rfl: 0    Patient Active Problem List   Diagnosis     Hypertension, goal below 140/90     Headache     Atrial fibrillation (H)     Polymyalgia rheumatica (H)     Iron deficiency anemia     HYPERLIPIDEMIA LDL GOAL <130     Osteopenia     Family history of breast cancer     Eczema     Advanced directives, counseling/discussion     AK (actinic keratosis)     Ganglion cyst     Long term current use of anticoagulant therapy       Blood pressure 146/76, pulse 67, temperature 97.7  F (36.5  C), temperature source Tympanic, height 5' (1.524 m), weight 124 lb (56.2 kg), SpO2 99 %.    Exam:  GENERAL APPEARANCE: healthy, alert and no distress  EYES: EOMI,  PERRL  NECK: no adenopathy, no asymmetry, masses, or scars and thyroid normal to palpation  RESP: lungs clear to auscultation - no rales, rhonchi or wheezes  CV: regular rates and rhythm  SKIN: she has bandages on the nose.   PSYCH: mentation appears normal and affect normal/bright      (M35.3) Polymyalgia rheumatica (H)  Comment:   Plan: predniSONE (DELTASONE) 1 MG tablet        We discussed the dose of the Prednisone taken for treatment of the Polymyalgia Rheumatica symptoms. You are going between 5 and 2.5 and 0 mg. These steps may be too large and the variability of the symptoms may require smaller dosing steps. So we will give the 1 mg size Prednisone pills. Start at 3 mg daily and if feeling well for 2 weeks, then try to reduce the dose to 2 mg  daily. Find the lowest effective dose and take this with non spicy food. Take no aspirin or advil or aleve on this med. Tylenol is OK to add to Prednisone. The dose needed may vary as the condition could change over time.     (I10) Essential hypertension  Comment:   Plan: monitor and record the BP readings and the goal is under 130/80. Use the meds and the non drug therapies. Refill and recheck annually if doing well.     (Z12.12) Screening for malignant neoplasm of the rectum    Comment:   Plan: Fecal colorectal cancer screen (FIT)        This was given with instructions and she will do this and mail it. We will call notify of the results.       Ulysses Baker

## 2017-05-16 DIAGNOSIS — Z79.01 LONG TERM CURRENT USE OF ANTICOAGULANT THERAPY: ICD-10-CM

## 2017-05-16 DIAGNOSIS — I10 ESSENTIAL HYPERTENSION WITH GOAL BLOOD PRESSURE LESS THAN 140/90: ICD-10-CM

## 2017-05-16 DIAGNOSIS — I10 ESSENTIAL HYPERTENSION: ICD-10-CM

## 2017-05-16 NOTE — TELEPHONE ENCOUNTER
Warfarin    Last Written Prescription Date: 02/27/2017  Last Fill Qty: 90, # refills: 3  Last Office Visit with [facility], [facility] or [facility] prescribing provider: 05/04/2017  Next 5 appointments (look out 90 days)     May 22, 2017  2:45 PM CDT   Nurse Only with [place] NB RN   [facility]    [address] [place] [address]   [phone]                   Date and Result of Last PT/INR:   Lab Results   Component Value Date    INR 2.1 05/01/2017    INR 1.8 04/10/2017    INR 2.52 06/03/2010    INR 1.40 10/08/2009          Maxzide      Last Written Prescription Date: 03/03/2017  Last Fill Quantity: 30, # refills: 11  Last Office Visit with [facility], [facility] or [facility] prescribing provider: 05/04/2017  Next 5 appointments (look out 90 days)     May 22, 2017  2:45 PM CDT   Nurse Only with [place] NB RN   [facility]    [address] [place] [address]   [phone]                   Potassium   Date Value Ref Range Status   10/18/2016 3.3 (L) 3.4 - 5.3 mmol/L Final     Creatinine   Date Value Ref Range Status   10/18/2016 0.97 0.52 - 1.04 mg/dL Final     BP Readings from Last 3 Encounters:   05/04/17 146/76   05/01/17 136/70   04/18/17 152/90       Metoprol      Last Written Prescription Date: 04/04/2017  Last Fill Quantity: 93, # refills: 11    Last Office Visit with [facility], [facility] or [facility] prescribing provider:  05/04/2017   Future Office Visit:    Next 5 appointments (look out 90 days)     May 22, 2017  2:45 PM CDT   Nurse Only with [place] NB RN   [facility]    [address] [place] [address]   [phone]                    BP Readings from Last 3 Encounters:   05/04/17 146/76   05/01/17 136/70   04/18/17 152/90

## 2017-05-16 NOTE — TELEPHONE ENCOUNTER
Routing refill request to provider for review/approval because:  Medication is reported/historical    Gunjan Serrano RN

## 2017-05-17 RX ORDER — METOPROLOL TARTRATE 50 MG
75 TABLET ORAL 2 TIMES DAILY
Qty: 93 TABLET | Refills: 11 | Status: SHIPPED | OUTPATIENT
Start: 2017-05-17 | End: 2018-03-14

## 2017-05-17 RX ORDER — WARFARIN SODIUM 5 MG/1
TABLET ORAL
Qty: 90 TABLET | Refills: 3 | Status: SHIPPED | OUTPATIENT
Start: 2017-05-17 | End: 2018-06-15

## 2017-05-17 RX ORDER — TRIAMTERENE/HYDROCHLOROTHIAZID 37.5-25 MG
TABLET ORAL
Qty: 30 TABLET | Refills: 11 | Status: SHIPPED | OUTPATIENT
Start: 2017-05-17 | End: 2018-03-14

## 2017-05-22 ENCOUNTER — ALLIED HEALTH/NURSE VISIT (OUTPATIENT)
Dept: FAMILY MEDICINE | Facility: CLINIC | Age: 77
End: 2017-05-22
Payer: COMMERCIAL

## 2017-05-22 ENCOUNTER — ANTICOAGULATION THERAPY VISIT (OUTPATIENT)
Dept: ANTICOAGULATION | Facility: CLINIC | Age: 77
End: 2017-05-22
Payer: COMMERCIAL

## 2017-05-22 VITALS — SYSTOLIC BLOOD PRESSURE: 118 MMHG | RESPIRATION RATE: 14 BRPM | DIASTOLIC BLOOD PRESSURE: 60 MMHG | HEART RATE: 68 BPM

## 2017-05-22 DIAGNOSIS — Z01.30 BLOOD PRESSURE CHECK: Primary | ICD-10-CM

## 2017-05-22 DIAGNOSIS — Z79.01 LONG TERM CURRENT USE OF ANTICOAGULANT THERAPY: ICD-10-CM

## 2017-05-22 LAB — INR POINT OF CARE: 2.2 (ref 0.86–1.14)

## 2017-05-22 PROCEDURE — 36416 COLLJ CAPILLARY BLOOD SPEC: CPT

## 2017-05-22 PROCEDURE — 85610 PROTHROMBIN TIME: CPT | Mod: QW

## 2017-05-22 PROCEDURE — 99207 ZZC NO CHARGE NURSE ONLY: CPT

## 2017-05-22 NOTE — PROGRESS NOTES
ANTICOAGULATION FOLLOW-UP CLINIC VISIT    Patient Name:  Nayely Kay  Date:  5/22/2017  Contact Type:  Face to Face    SUBJECTIVE:     Patient Findings     Positives No Problem Findings           OBJECTIVE    INR Protime   Date Value Ref Range Status   05/22/2017 2.2 (A) 0.86 - 1.14 Final       ASSESSMENT / PLAN  INR assessment THER    Recheck INR In: 4 WEEKS    INR Location Clinic      Anticoagulation Summary as of 5/22/2017     INR goal 2.0-3.0   Today's INR 2.2   Maintenance plan 5 mg (5 mg x 1) on Mon; 2.5 mg (5 mg x 0.5) all other days   Full instructions 5 mg on Mon; 2.5 mg all other days   Weekly total 20 mg   No change documented Giselle Santillan RN   Plan last modified Giselle Santillan RN (2/27/2017)   Next INR check 6/19/2017   Priority INR   Target end date Indefinite    Indications   Atrial fibrillation (H) [I48.91]  Long term current use of anticoagulant therapy [Z79.01]         Anticoagulation Episode Summary     INR check location     Preferred lab     Send INR reminders to [facility] CLINIC POOL    Comments * only wants dosing card. Pt uses 5mg tablets.        Anticoagulation Care Providers     Provider Role Specialty Phone number    Ulysses Baker MD [facility] [phone]            See the Encounter Report to view Anticoagulation Flowsheet and Dosing Calendar (Go to Encounters tab in chart review, and find the Anticoagulation Therapy Visit)    Giselle Santillan RN

## 2017-05-22 NOTE — MR AVS SNAPSHOT
Nayely WEST [place]huong   5/22/2017 2:30 PM   Anticoagulation Therapy Visit    Description:  77 year old female   Provider:  NB ANTI COAG   Department:  Nb Anticoag           INR as of 5/22/2017     Today's INR 2.2      Anticoagulation Summary as of 5/22/2017     INR goal 2.0-3.0   Today's INR 2.2   Full instructions 5 mg on Mon; 2.5 mg all other days   Next INR check 6/19/2017    Indications   Atrial fibrillation (H) [I48.91]  Long term current use of anticoagulant therapy [Z79.01]         Description     No change, recheck INR in 4 weeks.      May 2017 Details    Sun Mon Tue Wed Thu Fri Sat      1               2               3               4               5               6                 7               8               9               10               11               12               13                 14               15               16               17               18               19               20                 21               22      5 mg   See details      23      2.5 mg         24      2.5 mg         25      2.5 mg         26      2.5 mg         27      2.5 mg           28      2.5 mg         29      5 mg         30      2.5 mg         31      2.5 mg             Date Details   05/22 This INR check               How to take your warfarin dose     To take:  2.5 mg Take 0.5 of a 5 mg tablet.    To take:  5 mg Take 1 of the 5 mg tablets.           June 2017 Details    Sun Mon Tue Wed Thu Fri Sat         1      2.5 mg         2      2.5 mg         3      2.5 mg           4      2.5 mg         5      5 mg         6      2.5 mg         7      2.5 mg         8      2.5 mg         9      2.5 mg         10      2.5 mg           11      2.5 mg         12      5 mg         13      2.5 mg         14      2.5 mg         15      2.5 mg         16      2.5 mg         17      2.5 mg           18      2.5 mg         19            20               21               22               23               24                  25               26               27               28               29               30                 Date Details   No additional details    Date of next INR:  6/19/2017         How to take your warfarin dose     To take:  2.5 mg Take 0.5 of a 5 mg tablet.    To take:  5 mg Take 1 of the 5 mg tablets.

## 2017-05-22 NOTE — MR AVS SNAPSHOT
After Visit Summary   5/22/2017    Nayely Kay    MRN: [id number]           Patient Information     Date Of Birth          1940        Visit Information        Provider Department      5/22/2017 2:45 PM [place] NB RN [facility]        Today's Diagnoses     Blood pressure check    -  1       Follow-ups after your visit        Your next 10 appointments already scheduled     Jun 07, 2017  8:45 AM CDT   MA SCREENING DIGITAL BILATERAL with WYMA2   [facility] ([facility])    [address] [facility] [address] [phone]           Do not use any powder, lotion or deodorant under your arms or on your breast. If you do, we will ask you to remove it before your exam.  Wear comfortable, two-piece clothing.  If you have any allergies, tell your care team.  Bring any previous mammograms from other facilities or have them mailed to the breast center.            Jun 19, 2017  2:30 PM CDT   [facility]ticoagulation Visit with NB ANTI COAG   [facility]    [address] [place] [address] [phone]            Jun 19, 2017  2:45 PM CDT   Nurse Only with [place] NB RN   [facility]    [address] [place] [address] [phone]              Who to contact     If you have questions or need follow up information about today's clinic visit or your schedule please contact [facility] directly at [phone].  Normal or non-critical lab and imaging results will be communicated to you by MyChart, letter or phone within 4 business days after the clinic has received the results. If you do not hear from us within 7 days, please contact the clinic through [employer] or phone. If you have a critical or abnormal lab result, we will notify you by phone as soon as possible.  Submit refill requests through [email or web address] or call your pharmacy  "and they will forward the refill request to us. Please allow 3 business days for your refill to be completed.          Additional Information About Your Visit        MyChart Information     [employer] lets you send messages to your doctor, view your test results, renew your prescriptions, schedule appointments and more. To sign up, go to [email or web address] . Click on \"Log in\" on the left side of the screen, which will take you to the Welcome page. Then click on \"Sign up Now\" on the right side of the page.     You will be asked to enter the access code listed below, as well as some personal information. Please follow the directions to create your username and password.     Your access code is: [email or web address]  Expires: 2017  4:18 PM     Your access code will  in 90 days. If you need help or a new code, please call your [place] clinic or [phone].        Care EveryWhere ID     This is your Care EveryWhere ID. This could be used by other organizations to access your [place] medical records  [id number]        Your Vitals Were     Pulse Respirations                68 14           Blood Pressure from Last 3 Encounters:   17 118/60   17 146/76   17 136/70    Weight from Last 3 Encounters:   17 124 lb (56.2 kg)   17 123 lb (55.8 kg)   16 120 lb (54.4 kg)              Today, you had the following     No orders found for display       Primary Care Provider Office Phone # Fax #    Ulysses Baker [phone]       [facility] [address] [facility] [address]        Thank you!     Thank you for choosing [facility]  for your care. Our goal is always to provide you with excellent care. Hearing back from our patients is one way we can continue to improve our services. Please take a few minutes to complete the written survey that you may receive in the mail after your visit with us. Thank you!             Your Updated " Medication List - Protect others around you: Learn how to safely use, store and throw away your medicines at www.disposemymeds.org.          This list is accurate as of: 5/22/17  2:52 PM.  Always use your most recent med list.                   Brand Name Dispense Instructions for use    ascorbic acid 250 MG Chew chewable tablet    vitamin C     Take 1 tablet by mouth daily.       ASPIRIN NOT PRESCRIBED    INTENTIONAL    0    due to coumadin       calcium carb 1250 mg (500 mg [place])/vitamin D 200 units 500-200 MG-UNIT per tablet    OSCAL with D     Take 1 tablet by mouth 2 times daily (with meals).       iron 325 (65 FE) MG tablet      Take  by mouth. Taking two per week.       metoprolol 50 MG tablet    LOPRESSOR    93 tablet    Take 1.5 tablets (75 mg) by mouth 2 times daily       multivitamin per tablet      Take 1 tablet by mouth daily.       NEW MED      Fiber supplement daily.       predniSONE 1 MG tablet    DELTASONE    100 tablet    Start at 3 mg daily and adjust as directed.       triamterene-hydrochlorothiazide 37.5-25 MG per tablet    MAXZIDE-25    30 tablet    Take 1/2 pill twice daily       warfarin 5 MG tablet    COUMADIN    90 tablet    Take 5mg by mouth Monday and 2.5mg all other days or as directed by Anticoagulation Clinic

## 2017-05-22 NOTE — PROGRESS NOTES
Nayely Kay is a 77 year old female who comes in today for a Blood Pressure check because of ongoing blood pressure monitoring.      Vitals as recorded, a regular cuff was used.    Patient is taking medication as prescribed  Patient is tolerating medications well.  Patient is not monitoring Blood Pressure at home.      Current complaints: none    Disposition: patient to continue with the same medication  Nicolette Granger RN

## 2017-06-19 ENCOUNTER — ANTICOAGULATION THERAPY VISIT (OUTPATIENT)
Dept: ANTICOAGULATION | Facility: CLINIC | Age: 77
End: 2017-06-19
Payer: COMMERCIAL

## 2017-06-19 ENCOUNTER — ALLIED HEALTH/NURSE VISIT (OUTPATIENT)
Dept: FAMILY MEDICINE | Facility: CLINIC | Age: 77
End: 2017-06-19
Payer: COMMERCIAL

## 2017-06-19 VITALS — DIASTOLIC BLOOD PRESSURE: 68 MMHG | HEART RATE: 72 BPM | SYSTOLIC BLOOD PRESSURE: 134 MMHG

## 2017-06-19 DIAGNOSIS — I48.91 ATRIAL FIBRILLATION (H): ICD-10-CM

## 2017-06-19 DIAGNOSIS — Z79.01 LONG TERM CURRENT USE OF ANTICOAGULANT THERAPY: ICD-10-CM

## 2017-06-19 DIAGNOSIS — Z01.30 BLOOD PRESSURE CHECK: Primary | ICD-10-CM

## 2017-06-19 LAB — INR POINT OF CARE: 2.4 (ref 0.86–1.14)

## 2017-06-19 PROCEDURE — 99207 ZZC NO CHARGE NURSE ONLY: CPT

## 2017-06-19 PROCEDURE — 85610 PROTHROMBIN TIME: CPT | Mod: QW

## 2017-06-19 PROCEDURE — 36416 COLLJ CAPILLARY BLOOD SPEC: CPT

## 2017-06-19 NOTE — PROGRESS NOTES
ANTICOAGULATION FOLLOW-UP CLINIC VISIT    Patient Name:  Nayely Kay  Date:  6/19/2017  Contact Type:  Face to Face    SUBJECTIVE:     Patient Findings     Positives No Problem Findings           OBJECTIVE    INR Protime   Date Value Ref Range Status   06/19/2017 2.4 (A) 0.86 - 1.14 Final       ASSESSMENT / PLAN  No question data found.  Anticoagulation Summary as of 6/19/2017     INR goal 2.0-3.0   Today's INR 2.4   Maintenance plan 5 mg (5 mg x 1) on Mon; 2.5 mg (5 mg x 0.5) all other days   Full instructions 5 mg on Mon; 2.5 mg all other days   Weekly total 20 mg   Plan last modified Giselle Santillan RN (2/27/2017)   Next INR check 7/17/2017   Priority INR   Target end date Indefinite    Indications   Atrial fibrillation (H) [I48.91]  Long term current use of anticoagulant therapy [Z79.01]         Anticoagulation Episode Summary     INR check location     Preferred lab     Send INR reminders to [facility] CLINIC POOL    Comments * only wants dosing card. Pt uses 5mg tablets.        Anticoagulation Care Providers     Provider Role Specialty Phone number    Ulysses Baker MD [facility] [phone]            See the Encounter Report to view Anticoagulation Flowsheet and Dosing Calendar (Go to Encounters tab in chart review, and find the Anticoagulation Therapy Visit)        Oly Mcwilliams RN

## 2017-06-19 NOTE — MR AVS SNAPSHOT
Nayely Kay   6/19/2017 2:30 PM   Anticoagulation Therapy Visit    Description:  77 year old female   Provider:  NB ANTI NAY   Department:  Nb Anticoag           INR as of 6/19/2017     Today's INR 2.4      Anticoagulation Summary as of 6/19/2017     INR goal 2.0-3.0   Today's INR 2.4   Full instructions 5 mg on Mon; 2.5 mg all other days   Next INR check 7/17/2017    Indications   Atrial fibrillation (H) [I48.91]  Long term current use of anticoagulant therapy [Z79.01]         Description     Warfarin dose 5mg Mon and 2.5mg the rest of the days of the week.        Your next Anticoagulation Clinic appointment(s)     Jul 17, 2017  3:15 PM CDT   Anticoagulation Visit with NB ANTI COAG   [facility] ([facility]    [address] [place] [address]   [phone]              June 2017 Details    Sun Mon Tue Wed Thu Fri Sat         1               2               3                 4               5               6               7               8               9               10                 11               12               13               14               15               16               17                 18               19      5 mg   See details      20      2.5 mg         21      2.5 mg         22      2.5 mg         23      2.5 mg         24      2.5 mg           25      2.5 mg         26      5 mg         27      2.5 mg         28      2.5 mg         29      2.5 mg         30      2.5 mg           Date Details   06/19 This INR check               How to take your warfarin dose     To take:  2.5 mg Take 0.5 of a 5 mg tablet.    To take:  5 mg Take 1 of the 5 mg tablets.           July 2017 Details    Sun Mon Tue Wed Thu Fri Sat           1      2.5 mg           2      2.5 mg         3      5 mg         4      2.5 mg         5      2.5 mg         6      2.5 mg         7      2.5 mg         8      2.5 mg           9      2.5 mg         10      5 mg          11      2.5 mg         12      2.5 mg         13      2.5 mg         14      2.5 mg         15      2.5 mg           16      2.5 mg         17            18               19               20               21               22                 23               24               25               26               27               28               29                 30               31                     Date Details   No additional details    Date of next INR:  7/17/2017         How to take your warfarin dose     To take:  2.5 mg Take 0.5 of a 5 mg tablet.    To take:  5 mg Take 1 of the 5 mg tablets.

## 2017-06-24 PROCEDURE — G0328 FECAL BLOOD SCRN IMMUNOASSAY: HCPCS | Performed by: FAMILY MEDICINE

## 2017-06-27 DIAGNOSIS — Z12.12 SCREENING FOR MALIGNANT NEOPLASM OF THE RECTUM: ICD-10-CM

## 2017-06-27 LAB — HEMOCCULT STL QL IA: POSITIVE

## 2017-06-28 ENCOUNTER — TELEPHONE (OUTPATIENT)
Dept: FAMILY MEDICINE | Facility: CLINIC | Age: 77
End: 2017-06-28

## 2017-06-29 ENCOUNTER — HOSPITAL ENCOUNTER (OUTPATIENT)
Dept: MAMMOGRAPHY | Facility: CLINIC | Age: 77
Discharge: HOME | End: 2017-06-29
Attending: FAMILY MEDICINE | Admitting: FAMILY MEDICINE
Payer: MEDICARE

## 2017-06-29 DIAGNOSIS — Z12.31 ENCOUNTER FOR SCREENING MAMMOGRAM FOR BREAST CANCER: ICD-10-CM

## 2017-06-29 PROCEDURE — G0202 SCR MAMMO BI INCL CAD: HCPCS

## 2017-06-30 ENCOUNTER — TELEPHONE (OUTPATIENT)
Dept: FAMILY MEDICINE | Facility: CLINIC | Age: 77
End: 2017-06-30

## 2017-06-30 NOTE — TELEPHONE ENCOUNTER
I called pt back.  She has already scheduled the colonoscopy, screening, for 7/13/17 with Dr Brush.    Prep instructions are being sent to pt in the mail; Cecilia talked to pt yesterday.  Pt will stop warfarin 5 days prior as advised.  I routed pt anticoagulation clinic to arrange INR the day before procedure.  Pt knows to restart warfarin later the same day as her scope.    Pt withdraws her question about needing the order changed for insurance purposes.  She says she believes she meant to say she needed an order for colonoscopy so that it will be covered by insurance.    I called SDS regarding positive FIT test.  Pt does not have any symptoms.  Does colonoscopy need to be changed to diagnostic since she has positive FIT?  Informed no, since pt is asymptomatic, and only FIT positive, can still be ordered as screening.    Diane Mederos RN

## 2017-06-30 NOTE — TELEPHONE ENCOUNTER
Reason for Call: Request for an order or referral:    Order or referral being requested: Pt has a + FIT test and wants to schedule a colonoscopy and she was told that the order needs to be changed in order for her insurance to cover the test.      Date needed: as soon as possible    Has the patient been seen by the PCP for this problem? YES    Additional comments:     Phone number Patient can be reached at:  Home number on file [phone] (home)    Best Time:  any    Can we leave a detailed message on this number?  YES    Call taken on 6/30/2017 at 9:04 AM by Stephy Santoro

## 2017-07-10 ENCOUNTER — ANESTHESIA EVENT (OUTPATIENT)
Dept: GASTROENTEROLOGY | Facility: CLINIC | Age: 77
End: 2017-07-10
Payer: MEDICARE

## 2017-07-10 ASSESSMENT — ENCOUNTER SYMPTOMS: DYSRHYTHMIAS: 1

## 2017-07-10 NOTE — ANESTHESIA PREPROCEDURE EVALUATION
Anesthesia Evaluation     .             ROS/MED HX    ENT/Pulmonary:       Neurologic:     (+)migraines, other neuro tinnitus     Cardiovascular:     (+) hypertension----. Taking blood thinners : . . . :. dysrhythmias a-fib, . Previous cardiac testing Echodate:6/2006results:Order     REGULAR ECHO ([place]) (33011.004) (Order [id number])      Exam Information     Exam Date Exam Time Accession # Results     6/28/06 12:00 AM [id number] Jorge Carrizalesia Interactive Report and InfoRx     View the interactive report     Component Results     Component    TRANSCRIPTION RESULTS    REFERRING PHYSICIAN:  MD Aida Naqvi MD       INDICATIONS:  Nayely Kay is here for assessment of atrial   fibrillation.      M-MODE:  (Normal values in centimeters) TAPE:  1412.      LA 4.4 (1.9-4.0) lVSd 1.1 (<1.2)   Ao 3.1 (2.0-3.7) LVPWd 0.5 (<1.2)   LVed 5.2 (3.5-5.7) Rved   LVes 3.4 (variable)   Height:  61 inches Weight:  127 pounds      RESULTS:     1. Technically adequate study.    2. Left and right atrium are mild to moderately dilated.  Aortic root   normal in size.    3. Left ventricle is normal in size with normal wall thickness on 3D   imaging.  Left ventricular systolic function is normal at 55-60%.     4. Right ventricle is normal in size with normal motion.    5. Mitral valve is mildly thickened.  Mild mitral regurgitation   noted.   6. Aortic valve is tricuspid and opens well.   7. Trace tricuspid regurgitation noted.  Right ventricular systolic   pressure by TR jet is normal at 25 plus estimated right atrial   pressure.    8. IVC was normal in size and collapsed well with respiration.    9. Pulmonic valve is grossly normal.   10. Interatrial septum was not aneurysmal.  A small left/right shunt   is noted across the entire atrial septum on color Doppler suggesting   a small patent foramen ovale.     11. Pulse wave Doppler of mitral inflow  reveals normal septal pattern   consistent with normal diastolic function.    12. No pericardial effusion seen.    13. Patient appears to be in sinus rhythm.       CONCLUSION:    1. Biatrial enlargement, mild to moderate.    2. Normal left and right ventricular systolic function.    3. Evidence of small patent foramen ovale with left/right shunt on   color Doppler.    4. Compared to previous study done in , there is no appreciable   change, except patient appears to be in sinus rhythm at this time.      SHELBIE MAURICIO MD             D: 2006   T: 2006   MT: celena      Name:     IZA SIMONS   MRN:      [id number]        Order #:      [id number]   :      1940           Visit Date:   2006   Sex:      F                    Age:          66          Document: [id number]       cc: Aida Crowe MD, FACC       Ulysses Baker MD     date: results:ECG reviewed date:2010 results:Afib, non-specific st and t wave abnormality, may be digitalis effect date: results:          METS/Exercise Tolerance:     Hematologic:     (+) Anemia, -      Musculoskeletal:   (+) arthritis, , , other musculoskeletal- osteopenia, polymyalgia rheumatica      GI/Hepatic:         Renal/Genitourinary:         Endo:         Psychiatric:         Infectious Disease:         Malignancy:   (+) Malignancy History of Skin          Other: Comment: AK, eczema                     Physical Exam  Normal systems: cardiovascular, pulmonary and dental    Airway   Mallampati: II  TM distance: >3 FB  Neck ROM: full    Dental     Cardiovascular       Pulmonary                     Anesthesia Plan      History & Physical Review  History and physical reviewed and following examination; no interval change.    ASA Status:  2 .    NPO Status:  > 6 hours    Plan for MAC          Postoperative Care      Consents  Anesthetic plan, risks, benefits and alternatives discussed with:  Patient..                          .

## 2017-07-13 ENCOUNTER — ANESTHESIA (OUTPATIENT)
Dept: GASTROENTEROLOGY | Facility: CLINIC | Age: 77
End: 2017-07-13
Payer: MEDICARE

## 2017-07-13 ENCOUNTER — HOSPITAL ENCOUNTER (OUTPATIENT)
Facility: CLINIC | Age: 77
Discharge: HOME | End: 2017-07-13
Attending: SURGERY | Admitting: SURGERY
Payer: MEDICARE

## 2017-07-13 ENCOUNTER — SURGERY (OUTPATIENT)
Age: 77
End: 2017-07-13

## 2017-07-13 VITALS
RESPIRATION RATE: 16 BRPM | OXYGEN SATURATION: 100 % | TEMPERATURE: 97.8 F | HEART RATE: 64 BPM | WEIGHT: 123 LBS | HEIGHT: 61 IN | BODY MASS INDEX: 23.22 KG/M2 | SYSTOLIC BLOOD PRESSURE: 151 MMHG | DIASTOLIC BLOOD PRESSURE: 67 MMHG

## 2017-07-13 LAB
COLONOSCOPY: NORMAL
INR PPP: 1.38 (ref 0.86–1.14)

## 2017-07-13 PROCEDURE — 25000128 H RX IP 250 OP 636: Performed by: SURGERY

## 2017-07-13 PROCEDURE — 85610 PROTHROMBIN TIME: CPT | Performed by: NURSE ANESTHETIST, CERTIFIED REGISTERED

## 2017-07-13 PROCEDURE — 25000125 ZZHC RX 250: Performed by: SURGERY

## 2017-07-13 PROCEDURE — 37000008 ZZH ANESTHESIA TECHNICAL FEE, 1ST 30 MIN: Performed by: SURGERY

## 2017-07-13 PROCEDURE — 88305 TISSUE EXAM BY PATHOLOGIST: CPT | Mod: 26 | Performed by: SURGERY

## 2017-07-13 PROCEDURE — 45384 COLONOSCOPY W/LESION REMOVAL: CPT | Performed by: SURGERY

## 2017-07-13 PROCEDURE — 88305 TISSUE EXAM BY PATHOLOGIST: CPT | Performed by: SURGERY

## 2017-07-13 PROCEDURE — 25000125 ZZHC RX 250: Performed by: NURSE ANESTHETIST, CERTIFIED REGISTERED

## 2017-07-13 RX ORDER — LIDOCAINE 40 MG/G
CREAM TOPICAL
Status: DISCONTINUED | OUTPATIENT
Start: 2017-07-13 | End: 2017-07-13 | Stop reason: HOSPADM

## 2017-07-13 RX ORDER — PROPOFOL 10 MG/ML
INJECTION, EMULSION INTRAVENOUS PRN
Status: DISCONTINUED | OUTPATIENT
Start: 2017-07-13 | End: 2017-07-13

## 2017-07-13 RX ORDER — PROPOFOL 10 MG/ML
INJECTION, EMULSION INTRAVENOUS CONTINUOUS PRN
Status: DISCONTINUED | OUTPATIENT
Start: 2017-07-13 | End: 2017-07-13

## 2017-07-13 RX ORDER — SODIUM CHLORIDE, SODIUM LACTATE, POTASSIUM CHLORIDE, CALCIUM CHLORIDE 600; 310; 30; 20 MG/100ML; MG/100ML; MG/100ML; MG/100ML
INJECTION, SOLUTION INTRAVENOUS CONTINUOUS
Status: DISCONTINUED | OUTPATIENT
Start: 2017-07-13 | End: 2017-07-13 | Stop reason: HOSPADM

## 2017-07-13 RX ORDER — ONDANSETRON 2 MG/ML
4 INJECTION INTRAMUSCULAR; INTRAVENOUS
Status: DISCONTINUED | OUTPATIENT
Start: 2017-07-13 | End: 2017-07-13 | Stop reason: HOSPADM

## 2017-07-13 RX ADMIN — PROPOFOL 50 MG: 10 INJECTION, EMULSION INTRAVENOUS at 08:29

## 2017-07-13 RX ADMIN — PROPOFOL 50 MG: 10 INJECTION, EMULSION INTRAVENOUS at 08:30

## 2017-07-13 RX ADMIN — SODIUM CHLORIDE, POTASSIUM CHLORIDE, SODIUM LACTATE AND CALCIUM CHLORIDE: 600; 310; 30; 20 INJECTION, SOLUTION INTRAVENOUS at 07:52

## 2017-07-13 RX ADMIN — PROPOFOL 125 MCG/KG/MIN: 10 INJECTION, EMULSION INTRAVENOUS at 08:30

## 2017-07-13 RX ADMIN — LIDOCAINE HYDROCHLORIDE 1 ML: 10 INJECTION, SOLUTION EPIDURAL; INFILTRATION; INTRACAUDAL; PERINEURAL at 07:52

## 2017-07-13 NOTE — ANESTHESIA CARE TRANSFER NOTE
Patient: Nayely Kay    Procedure(s):  Colonoscopy - Wound Class: II-Clean Contaminated    Diagnosis: screening for malignant neoplasm of the rectum  Diagnosis Additional Information: No value filed.    Anesthesia Type:   MAC     Note:    Patient transferred to:Phase II        Vitals: (Last set prior to Anesthesia Care Transfer)    CRNA VITALS  7/13/2017 0815 - 7/13/2017 0845      7/13/2017             Pulse: 71    Ht Rate: 74    SpO2: 100 %                Electronically Signed By: Dennis Hanley CRNA, APRN CRNA  July 13, 2017  8:45 AM

## 2017-07-13 NOTE — H&P
"77 year old year old female here for colonoscopy for screening.    Patient Active Problem List   Diagnosis     Hypertension, goal below 140/90     Headache     Atrial fibrillation (H)     Polymyalgia rheumatica (H)     Iron deficiency anemia     HYPERLIPIDEMIA LDL GOAL <130     Osteopenia     Family history of breast cancer     Eczema     Advanced directives, counseling/discussion     AK (actinic keratosis)     Ganglion cyst     Long term current use of anticoagulant therapy     Essential hypertension       Past Medical History:   Diagnosis Date     Atrial fibrillation (H)      Migraine, unspecified, without mention of intractable migraine without mention of status migrainosus     Migraine HA     Squamous cell carcinoma      Unspecified essential hypertension      Unspecified tinnitus        Past Surgical History:   Procedure Laterality Date     COLONOSCOPY  2009     FLEXIBLE SIGMOIDOSCOPY  9/2004     SURGICAL HISTORY OF -       tubal     SURGICAL HISTORY OF -       oral teeth       [unfilled]    No current outpatient prescriptions on file.       Allergies   Allergen Reactions     Cardizem Cd Rash       Pt reports that she has never smoked. She has never used smokeless tobacco. She reports that she does not drink alcohol or use illicit drugs.    Exam:  BP (!) 183/91 (BP Location: Right arm)  Pulse 64  Temp 97.8  F (36.6  C) (Oral)  Resp 16  Ht 1.549 m (5' 1\")  Wt 55.8 kg (123 lb)  SpO2 100%  BMI 23.24 kg/m2    Awake, Alert OX3  Lungs - CTA bilaterally  CV - RRR, no murmurs, distal pulses intact  Abd - soft, non-distended, non-tender, +BS  Extr - No cyanosis or edema    A/P 77 year old year old female in need of colonoscopy for screening. Risks, benefits, alternatives, and complications were discussed including the possibility of perforation and the patient agreed to proceed    Bravo Brush MD     "

## 2017-07-13 NOTE — ANESTHESIA POSTPROCEDURE EVALUATION
Patient: Nayely Kay    Procedure(s):  Colonoscopy - Wound Class: II-Clean Contaminated    Diagnosis:screening for malignant neoplasm of the rectum  Diagnosis Additional Information: No value filed.    Anesthesia Type:  MAC    Note:  Anesthesia Post Evaluation    Patient location during evaluation: Bedside  Patient participation: Able to fully participate in evaluation  Level of consciousness: awake and alert  Pain management: adequate  Airway patency: patent  Cardiovascular status: acceptable  Respiratory status: acceptable  Hydration status: acceptable  PONV: none     Anesthetic complications: None          Last vitals:  Vitals:    07/13/17 0725   BP: (!) 183/91   Pulse: 64   Resp: 16   Temp: 36.6  C (97.8  F)   SpO2: 100%         Electronically Signed By: Dennis Hanley CRNA, APRN CRNA  July 13, 2017  8:46 AM

## 2017-07-17 ENCOUNTER — ALLIED HEALTH/NURSE VISIT (OUTPATIENT)
Dept: FAMILY MEDICINE | Facility: CLINIC | Age: 77
End: 2017-07-17
Payer: COMMERCIAL

## 2017-07-17 ENCOUNTER — ANTICOAGULATION THERAPY VISIT (OUTPATIENT)
Dept: ANTICOAGULATION | Facility: CLINIC | Age: 77
End: 2017-07-17
Payer: COMMERCIAL

## 2017-07-17 VITALS — DIASTOLIC BLOOD PRESSURE: 64 MMHG | SYSTOLIC BLOOD PRESSURE: 130 MMHG | HEART RATE: 66 BPM

## 2017-07-17 DIAGNOSIS — I10 HYPERTENSION, GOAL BELOW 140/90: Primary | ICD-10-CM

## 2017-07-17 DIAGNOSIS — Z79.01 LONG TERM CURRENT USE OF ANTICOAGULANT THERAPY: ICD-10-CM

## 2017-07-17 LAB
COPATH REPORT: NORMAL
INR POINT OF CARE: 1.7 (ref 0.86–1.14)

## 2017-07-17 PROCEDURE — 99207 ZZC NO CHARGE NURSE ONLY: CPT

## 2017-07-17 PROCEDURE — 36416 COLLJ CAPILLARY BLOOD SPEC: CPT

## 2017-07-17 PROCEDURE — 85610 PROTHROMBIN TIME: CPT | Mod: QW

## 2017-07-17 NOTE — PROGRESS NOTES
ANTICOAGULATION FOLLOW-UP CLINIC VISIT    Patient Name:  Nayely Kay  Date:  7/17/2017  Contact Type:  Face to Face    SUBJECTIVE:     Patient Findings     Positives Dental/Other procedures (Colonoscopy 7/13/17), Bruising (lower right arm ), Intentional hold of therapy (7/8 - 7/12)    Comments Pt independently took 3/4 of a 5mg tablet, 7/13, 7/14 and 7/15.           OBJECTIVE    INR Protime   Date Value Ref Range Status   07/17/2017 1.7 (A) 0.86 - 1.14 Final       ASSESSMENT / PLAN  INR assessment SUB intentional hold   Recheck INR In: 4 WEEKS    INR Location Clinic      Anticoagulation Summary as of 7/17/2017     INR goal 2.0-3.0   Today's INR 1.7!   Maintenance plan 5 mg (5 mg x 1) on Mon; 2.5 mg (5 mg x 0.5) all other days   Full instructions 5 mg on Mon; 2.5 mg all other days   Weekly total 20 mg   No change documented Giselle Santillan RN   Plan last modified Giselle Santillan RN (2/27/2017)   Next INR check 8/14/2017   Priority INR   Target end date Indefinite    Indications   Atrial fibrillation (H) [I48.91]  Long term current use of anticoagulant therapy [Z79.01]         Anticoagulation Episode Summary     INR check location     Preferred lab     Send INR reminders to [facility] CLINIC POOL    Comments * only wants dosing card. Pt uses 5mg tablets.        Anticoagulation Care Providers     Provider Role Specialty Phone number    Ulysses Baker MD [facility] [phone]            See the Encounter Report to view Anticoagulation Flowsheet and Dosing Calendar (Go to Encounters tab in chart review, and find the Anticoagulation Therapy Visit)    Giselle Santillan RN

## 2017-07-17 NOTE — MR AVS SNAPSHOT
Nayely Kay   7/17/2017 3:15 PM   Anticoagulation Therapy Visit    Description:  77 year old female   Provider:  NB ANTI COAG   Department:  Nb Anticoag           INR as of 7/17/2017     Today's INR 1.7!      Anticoagulation Summary as of 7/17/2017     INR goal 2.0-3.0   Today's INR 1.7!   Full instructions 5 mg on Mon; 2.5 mg all other days   Next INR check 8/14/2017    Indications   Atrial fibrillation (H) [I48.91]  Long term current use of anticoagulant therapy [Z79.01]         Description     No change, recheck INR in 4 weeks      July 2017 Details    Sun Mon Tue Wed Thu Fri Sat           1                 2               3               4               5               6               7               8                 9               10               11               12               13               14               15                 16               17      5 mg   See details      18      2.5 mg         19      2.5 mg         20      2.5 mg         21      2.5 mg         22      2.5 mg           23      2.5 mg         24      5 mg         25      2.5 mg         26      2.5 mg         27      2.5 mg         28      2.5 mg         29      2.5 mg           30      2.5 mg         31      5 mg               Date Details   07/17 This INR check               How to take your warfarin dose     To take:  2.5 mg Take 0.5 of a 5 mg tablet.    To take:  5 mg Take 1 of the 5 mg tablets.           August 2017 Details    Sun Mon Tue Wed Thu Fri Sat       1      2.5 mg         2      2.5 mg         3      2.5 mg         4      2.5 mg         5      2.5 mg           6      2.5 mg         7      5 mg         8      2.5 mg         9      2.5 mg         10      2.5 mg         11      2.5 mg         12      2.5 mg           13      2.5 mg         14            15               16               17               18               19                 20               21               22               23               24                25               26                 27               28               29               30               31                  Date Details   No additional details    Date of next INR:  8/14/2017         How to take your warfarin dose     To take:  2.5 mg Take 0.5 of a 5 mg tablet.    To take:  5 mg Take 1 of the 5 mg tablets.

## 2017-08-03 ENCOUNTER — TELEPHONE (OUTPATIENT)
Dept: FAMILY MEDICINE | Facility: CLINIC | Age: 77
End: 2017-08-03

## 2017-08-03 NOTE — TELEPHONE ENCOUNTER
Dr. Baker,    S-(situation): leg swelling at the ankles both legs    B-(background): triamterene 1/2 pill twice a day, metoprolol 75 mg twice a day.  Does not check her BP at home but once a month with her INR.  + ankle swelling bilat.  Per the patient some times she has some SOA.  Per the  Patient she is on her feet a lot, not one to sit down a lot as she is always on the go.  When she sleeps at night is the only elevation she gets.  Per the patient she still has swelling in the morning.  Patient is watching her salt intake.  Patient is wearing compression stockings every day.    A-(assessment): ankle edema    R-(recommendations): Patient would like to increase her triamterene to 1 1/2 tabs daily.  Stephanie ODELL RN

## 2017-08-03 NOTE — TELEPHONE ENCOUNTER
Reason for Call:  Other increase medication by 1/2 a tab    Detailed comments: pt calling to let us know that Dr Mamadou changed this medication because they thought it was affecting her blood pressure, but now she said that her ankles are swelling. She was wondering if she could go up a 1/2 a tab to see if that helps.    Phone Number Patient can be reached at: Home number on file [phone] (home)    Best Time: any    Can we leave a detailed message on this number? YES    Call taken on 8/3/2017 at 1:46 PM by Luecro Nobles

## 2017-08-03 NOTE — TELEPHONE ENCOUNTER
I would have to see her to evaluate this. It is too complex to do by phone.   There is the issue of total body fluid, and local issues in the legs.   She could be having CHF if there is dyspnea.   Bottom line is she needs an appt for evaluation.   Ulysses Baker

## 2017-08-17 ENCOUNTER — ANTICOAGULATION THERAPY VISIT (OUTPATIENT)
Dept: ANTICOAGULATION | Facility: CLINIC | Age: 77
End: 2017-08-17
Payer: COMMERCIAL

## 2017-08-17 ENCOUNTER — ALLIED HEALTH/NURSE VISIT (OUTPATIENT)
Dept: FAMILY MEDICINE | Facility: CLINIC | Age: 77
End: 2017-08-17
Payer: COMMERCIAL

## 2017-08-17 VITALS — DIASTOLIC BLOOD PRESSURE: 68 MMHG | HEART RATE: 70 BPM | SYSTOLIC BLOOD PRESSURE: 130 MMHG

## 2017-08-17 DIAGNOSIS — I48.91 ATRIAL FIBRILLATION (H): ICD-10-CM

## 2017-08-17 DIAGNOSIS — Z79.01 LONG TERM CURRENT USE OF ANTICOAGULANT THERAPY: ICD-10-CM

## 2017-08-17 DIAGNOSIS — Z01.30 BLOOD PRESSURE CHECK: Primary | ICD-10-CM

## 2017-08-17 LAB — INR POINT OF CARE: 3.1 (ref 0.86–1.14)

## 2017-08-17 PROCEDURE — 99207 ZZC NO CHARGE NURSE ONLY: CPT

## 2017-08-17 PROCEDURE — 85610 PROTHROMBIN TIME: CPT | Mod: QW

## 2017-08-17 PROCEDURE — 36416 COLLJ CAPILLARY BLOOD SPEC: CPT

## 2017-08-17 NOTE — NURSING NOTE
Patient here today for B/P. Patient feels fine, is asymptomatic and currently walking twice weekly and cleans houses for a living, following a healthy diet, and avoiding adding extra salt. No pain, occasionally short of breath with lifting groceries but not today. Routing to PCP for review.   SEKOU Jack

## 2017-08-17 NOTE — Clinical Note
Dr. Baker, patient here for B/P check today, please see NN and VS for today's encounter.  SEKOU Jack

## 2017-08-17 NOTE — MR AVS SNAPSHOT
After Visit Summary   8/17/2017    Nayely Kay    MRN: [id number]           Patient Information     Date Of Birth          1940        Visit Information        Provider Department      8/17/2017 3:30 PM [place] NB RN [facility]        Today's Diagnoses     Blood pressure check    -  1       Follow-ups after your visit        Your next 10 appointments already scheduled     Aug 24, 2017  9:00 AM CDT   SHORT with Ulysses Baker MD   [facility]    [address] [address]   [phone]            Sep 18, 2017  1:45 PM CDT   Anticoagulation Visit with NB ANTI COAG   [facility] ([facility])    [address] [place] [address] [phone]            Sep 18, 2017  2:00 PM CDT   Nurse Only with [place] NB RN   [facility] ([facility])    [address] [place] [id number] [phone]              Who to contact     If you have questions or need follow up information about today's clinic visit or your schedule please contact [facility] directly at [phone].  Normal or non-critical lab and imaging results will be communicated to you by MyChart, letter or phone within 4 business days after the clinic has received the results. If you do not hear from us within 7 days, please contact the clinic through [employer] or phone. If you have a critical or abnormal lab result, we will notify you by phone as soon as possible.  Submit refill requests through Etohum or call your pharmacy and they will forward the refill request to us. Please allow 3 business days for your refill to be completed.          Additional Information About Your Visit        Kadmonhart Information     Etohum lets you send messages to your doctor, view your test results, renew your prescriptions, schedule appointments and more. To sign up, go  "to [email or web address] . Click on \"Log in\" on the left side of the screen, which will take you to the Welcome page. Then click on \"Sign up Now\" on the right side of the page.     You will be asked to enter the access code listed below, as well as some personal information. Please follow the directions to create your username and password.     Your access code is: [email or web address]  Expires: 2017  3:03 PM     Your access code will  in 90 days. If you need help or a new code, please call your [facility] clinic or [phone].        Care EveryWhere ID     This is your Care EveryWhere ID. This could be used by other organizations to access your [facility] medical records  [id number]        Your Vitals Were     Pulse                   70            Blood Pressure from Last 3 Encounters:   17 130/68   17 130/64   17 151/67    Weight from Last 3 Encounters:   17 123 lb (55.8 kg)   17 124 lb (56.2 kg)   17 123 lb (55.8 kg)              Today, you had the following     No orders found for display       Primary Care Provider Office Phone # Fax #    Ulysses Baker [phone] [address] [facility] [address]        Equal Access to Services     DERIAN DUNN AH: Hadii alejandro dawn Sonalini, waaxda luqadaha, qaybta kaalalisa catherine, leighann de oliveira . So [facility] [phone].    ATENCIÓN: Si habla español, tiene a cueva disposición servicios gratuitos de asistencia lingüística. Jordan al [phone].    We comply with applicable federal civil rights laws and [place] laws. We do not discriminate on the basis of race, color, national origin, age, disability sex, sexual orientation or gender identity.            Thank you!     Thank you for choosing [facility]  for your care. Our goal is always to provide you with excellent care. Hearing back from our patients is one way we can continue to improve our services. " Please take a few minutes to complete the written survey that you may receive in the mail after your visit with us. Thank you!             Your Updated Medication List - Protect others around you: Learn how to safely use, store and throw away your medicines at www.disposemymeds.org.          This list is accurate as of: 8/17/17  3:51 PM.  Always use your most recent med list.                   Brand Name Dispense Instructions for use Diagnosis    ascorbic acid 250 MG Chew chewable tablet    vitamin C     Take 1 tablet by mouth daily.        ASPIRIN NOT PRESCRIBED    INTENTIONAL    0    due to coumadin    Myalgias       calcium carb 1250 mg (500 mg [place])/vitamin D 200 units 500-200 MG-UNIT per tablet    OSCAL with D     Take 1 tablet by mouth 2 times daily (with meals).        iron 325 (65 FE) MG tablet      Take  by mouth. Taking two per week.        metoprolol 50 MG tablet    LOPRESSOR    93 tablet    Take 1.5 tablets (75 mg) by mouth 2 times daily    Essential hypertension       multivitamin per tablet      Take 1 tablet by mouth daily.        NEW MED      Fiber supplement daily.        predniSONE 1 MG tablet    DELTASONE    100 tablet    Start at 3 mg daily and adjust as directed.    Polymyalgia rheumatica (H)       triamterene-hydrochlorothiazide 37.5-25 MG per tablet    MAXZIDE-25    30 tablet    Take 1/2 pill twice daily    Essential hypertension with goal blood pressure less than 140/90       warfarin 5 MG tablet    COUMADIN    90 tablet    Take 5mg by mouth Monday and 2.5mg all other days or as directed by Anticoagulation Clinic    Long term current use of anticoagulant therapy

## 2017-08-17 NOTE — PROGRESS NOTES
ANTICOAGULATION FOLLOW-UP CLINIC VISIT    Patient Name:  Nayely Kay  Date:  8/17/2017  Contact Type:  Face to Face    SUBJECTIVE:     Patient Findings     Positives No Problem Findings           OBJECTIVE    INR Protime   Date Value Ref Range Status   08/17/2017 3.1 (A) 0.86 - 1.14 Final       ASSESSMENT / PLAN  INR assessment THER    Recheck INR In: 4 WEEKS    INR Location Clinic      Anticoagulation Summary as of 8/17/2017     INR goal 2.0-3.0   Today's INR 3.1!   Maintenance plan 5 mg (5 mg x 1) on Mon; 2.5 mg (5 mg x 0.5) all other days   Full instructions 5 mg on Mon; 2.5 mg all other days   Weekly total 20 mg   Plan last modified Giselle Santillan RN (2/27/2017)   Next INR check 9/18/2017   Priority INR   Target end date Indefinite    Indications   Atrial fibrillation (H) [I48.91]  Long term current use of anticoagulant therapy [Z79.01]         Anticoagulation Episode Summary     INR check location     Preferred lab     Send INR reminders to [facility]    Comments * only wants dosing card. Pt uses 5mg tablets. wants to check every 4 weeks        Anticoagulation Care Providers     Provider Role Specialty Phone number    Ulysses Baker MD [facility] [phone]            See the Encounter Report to view Anticoagulation Flowsheet and Dosing Calendar (Go to Encounters tab in chart review, and find the Anticoagulation Therapy Visit)        Oly Mcwilliams RN

## 2017-08-17 NOTE — MR AVS SNAPSHOT
Nayely Kay   8/17/2017 3:15 PM   Anticoagulation Therapy Visit    Description:  77 year old female   Provider:  NB ANTI COAG   Department:  Nb Anticoag           INR as of 8/17/2017     Today's INR 3.1!      Anticoagulation Summary as of 8/17/2017     INR goal 2.0-3.0   Today's INR 3.1!   Full instructions 5 mg on Mon; 2.5 mg all other days   Next INR check 9/18/2017    Indications   Atrial fibrillation (H) [I48.91]  Long term current use of anticoagulant therapy [Z79.01]         Description     Warfarin dose: 5mg Mon and 2.5mg the rest of the days of the week.        Your next Anticoagulation Clinic appointment(s)     Sep 18, 2017  1:45 PM CDT   Anticoagulation Visit with NB ANTI COAG   [facility]    [address] [place] [address]   [phone]              August 2017 Details    Sun Mon Tue Wed Thu Fri Sat       1               2               3               4               5                 6               7               8               9               10               11               12                 13               14               15               16               17      2.5 mg   See details      18      2.5 mg         19      2.5 mg           20      2.5 mg         21      5 mg         22      2.5 mg         23      2.5 mg         24      2.5 mg         25      2.5 mg         26      2.5 mg           27      2.5 mg         28      5 mg         29      2.5 mg         30      2.5 mg         31      2.5 mg            Date Details   08/17 This INR check               How to take your warfarin dose     To take:  2.5 mg Take 0.5 of a 5 mg tablet.    To take:  5 mg Take 1 of the 5 mg tablets.           September 2017 Details    Sun Mon Tue Wed Thu Fri Sat          1      2.5 mg         2      2.5 mg           3      2.5 mg         4      5 mg         5      2.5 mg         6      2.5 mg         7      2.5 mg         8      2.5 mg          9      2.5 mg           10      2.5 mg         11      5 mg         12      2.5 mg         13      2.5 mg         14      2.5 mg         15      2.5 mg         16      2.5 mg           17      2.5 mg         18            19               20               21               22               23                 24               25               26               27               28               29               30                Date Details   No additional details    Date of next INR:  9/18/2017         How to take your warfarin dose     To take:  2.5 mg Take 0.5 of a 5 mg tablet.    To take:  5 mg Take 1 of the 5 mg tablets.

## 2017-09-18 ENCOUNTER — ALLIED HEALTH/NURSE VISIT (OUTPATIENT)
Dept: FAMILY MEDICINE | Facility: CLINIC | Age: 77
End: 2017-09-18
Payer: COMMERCIAL

## 2017-09-18 ENCOUNTER — ANTICOAGULATION THERAPY VISIT (OUTPATIENT)
Dept: ANTICOAGULATION | Facility: CLINIC | Age: 77
End: 2017-09-18
Payer: COMMERCIAL

## 2017-09-18 VITALS — RESPIRATION RATE: 16 BRPM | DIASTOLIC BLOOD PRESSURE: 64 MMHG | SYSTOLIC BLOOD PRESSURE: 132 MMHG | HEART RATE: 72 BPM

## 2017-09-18 DIAGNOSIS — Z01.30 BLOOD PRESSURE CHECK: Primary | ICD-10-CM

## 2017-09-18 DIAGNOSIS — Z79.01 LONG TERM CURRENT USE OF ANTICOAGULANT THERAPY: ICD-10-CM

## 2017-09-18 DIAGNOSIS — Z23 NEED FOR PROPHYLACTIC VACCINATION AND INOCULATION AGAINST INFLUENZA: Primary | ICD-10-CM

## 2017-09-18 LAB — INR POINT OF CARE: 2.1 (ref 0.86–1.14)

## 2017-09-18 PROCEDURE — 85610 PROTHROMBIN TIME: CPT | Mod: QW

## 2017-09-18 PROCEDURE — 90662 IIV NO PRSV INCREASED AG IM: CPT

## 2017-09-18 PROCEDURE — 36416 COLLJ CAPILLARY BLOOD SPEC: CPT

## 2017-09-18 PROCEDURE — 99207 ZZC NO CHARGE NURSE ONLY: CPT

## 2017-09-18 PROCEDURE — G0008 ADMIN INFLUENZA VIRUS VAC: HCPCS

## 2017-09-18 NOTE — PROGRESS NOTES
Injectable Influenza Immunization Documentation    1.  Is the person to be vaccinated sick today? no    2. Does the person to be vaccinated have an allergy to a component   of the vaccine? no    3. Has the person to be vaccinated ever had a serious reaction   to influenza vaccine in the past? no    4. Has the person to be vaccinated ever had Guillain-Barré syndrome? no    Form completed by Daylin Andrade MA

## 2017-09-18 NOTE — MR AVS SNAPSHOT
"              After Visit Summary   9/18/2017    Nayely Kay    MRN: [id number]           Patient Information     Date Of Birth          1940        Visit Information        Provider Department      9/18/2017 2:15 PM [place] KIP LI/LPN [facility]        Today's Diagnoses     Need for prophylactic vaccination and inoculation against influenza    -  1       Follow-ups after your visit        Your next 10 appointments already scheduled     Sep 18, 2017  2:15 PM CDT   Nurse Only with [place] KIP LI/LPN   [facility] ([facility])    [address] [place] [address] [phone]              Who to contact     If you have questions or need follow up information about today's clinic visit or your schedule please contact [facility] directly at [phone].  Normal or non-critical lab and imaging results will be communicated to you by [employer], letter or phone within 4 business days after the clinic has received the results. If you do not hear from us within 7 days, please contact the clinic through [employer] or phone. If you have a critical or abnormal lab result, we will notify you by phone as soon as possible.  Submit refill requests through Flare Code or call your pharmacy and they will forward the refill request to us. Please allow 3 business days for your refill to be completed.          Additional Information About Your Visit        MyChart Information     Flare Code lets you send messages to your doctor, view your test results, renew your prescriptions, schedule appointments and more. To sign up, go to [email or web address] . Click on \"Log in\" on the left side of the screen, which will take you to the Welcome page. Then click on \"Sign up Now\" on the right side of the page.     You will be asked to enter the access code listed below, as well as some personal information. Please follow the directions to create your username and " password.     Your access code is: [email or web address]  Expires: 2017  2:10 PM     Your access code will  in 90 days. If you need help or a new code, please call your [facility] clinic or [phone].        Care EveryWhere ID     This is your Care EveryWhere ID. This could be used by other organizations to access your [facility] medical records  [id number]         Blood Pressure from Last 3 Encounters:   17 130/68   17 130/64   17 151/67    Weight from Last 3 Encounters:   17 123 lb (55.8 kg)   17 124 lb (56.2 kg)   17 123 lb (55.8 kg)              We Performed the Following     FLU VACCINE, INCREASED ANTIGEN, PRESV FREE, AGE 65+ [15775]     Vaccine Administration, Initial [33424]        Primary Care Provider Office Phone # Fax #    Ulysses Baker [phone] [address] [facility] [address]        Equal Access to Services     [facility] Hospital[facility] : Hadii aad ku hadasho Soomaali, waaxda luqadaha, qaybta kaalmada adeegyawillam, leighann de oliveiar . So [facility] [phone].    ATENCIÓN: Si habla español, tiene a cueva disposición servicios gratuitos de asistencia lingüística. [facility] [phone].    We comply with applicable federal civil rights laws and [place] laws. We do not discriminate on the basis of race, color, national origin, age, disability sex, sexual orientation or gender identity.            Thank you!     Thank you for choosing [facility]  for your care. Our goal is always to provide you with excellent care. Hearing back from our patients is one way we can continue to improve our services. Please take a few minutes to complete the written survey that you may receive in the mail after your visit with us. Thank you!             Your Updated Medication List - Protect others around you: Learn how to safely use, store and throw away your medicines at www.disposemymeds.org.          This list is accurate as of:  9/18/17  2:10 PM.  Always use your most recent med list.                   Brand Name Dispense Instructions for use Diagnosis    ascorbic acid 250 MG Chew chewable tablet    vitamin C     Take 1 tablet by mouth daily.        ASPIRIN NOT PRESCRIBED    INTENTIONAL    0    due to coumadin    Myalgias       calcium carb 1250 mg (500 mg [place])/vitamin D 200 units 500-200 MG-UNIT per tablet    OSCAL with D     Take 1 tablet by mouth 2 times daily (with meals).        iron 325 (65 FE) MG tablet      Take  by mouth. Taking two per week.        metoprolol 50 MG tablet    LOPRESSOR    93 tablet    Take 1.5 tablets (75 mg) by mouth 2 times daily    Essential hypertension       multivitamin per tablet      Take 1 tablet by mouth daily.        NEW MED      Fiber supplement daily.        predniSONE 1 MG tablet    DELTASONE    100 tablet    Start at 3 mg daily and adjust as directed.    Polymyalgia rheumatica (H)       triamterene-hydrochlorothiazide 37.5-25 MG per tablet    MAXZIDE-25    30 tablet    Take 1/2 pill twice daily    Essential hypertension with goal blood pressure less than 140/90       warfarin 5 MG tablet    COUMADIN    90 tablet    Take 5mg by mouth Monday and 2.5mg all other days or as directed by Anticoagulation Clinic    Long term current use of anticoagulant therapy

## 2017-09-18 NOTE — MR AVS SNAPSHOT
"              After Visit Summary   9/18/2017    Nayely Kay    MRN: [id number]           Patient Information     Date Of Birth          1940        Visit Information        Provider Department      9/18/2017 2:00 PM FL NB RN [facility]        Today's Diagnoses     Blood pressure check    -  1       Follow-ups after your visit        Your next 10 appointments already scheduled     Oct 19, 2017  2:00 PM CDT   Anticoagulation Visit with NB ANTI COAG   [facility] ([facility]    [address] [place] [address] [phone]            Oct 19, 2017  2:15 PM CDT   Nurse Only with [place] NB RN   [facility] ([facility]    [address] [place] [address] [phone]              Who to contact     If you have questions or need follow up information about today's clinic visit or your schedule please contact [facility] directly at [phone].  Normal or non-critical lab and imaging results will be communicated to you by [employer], letter or phone within 4 business days after the clinic has received the results. If you do not hear from us within 7 days, please contact the clinic through [employer] or phone. If you have a critical or abnormal lab result, we will notify you by phone as soon as possible.  Submit refill requests through [employer] or call your pharmacy and they will forward the refill request to us. Please allow 3 business days for your refill to be completed.          Additional Information About Your Visit        [employer] Information     [employer] lets you send messages to your doctor, view your test results, renew your prescriptions, schedule appointments and more. To sign up, go to [email or web address] . Click on \"Log in\" on the left side of the screen, which will take you to the Welcome page. Then click on \"Sign up Now\" on the right side of the page.     You will " be asked to enter the access code listed below, as well as some personal information. Please follow the directions to create your username and password.     Your access code is: [email or web address]  Expires: 2017  2:10 PM     Your access code will  in 90 days. If you need help or a new code, please call your [place] clinic or [phone].        Care EveryWhere ID     This is your Care EveryWhere ID. This could be used by other organizations to access your [place] medical records  [id number]        Your Vitals Were     Pulse Respirations                72 16           Blood Pressure from Last 3 Encounters:   17 132/64   17 130/68   17 130/64    Weight from Last 3 Encounters:   17 123 lb (55.8 kg)   17 124 lb (56.2 kg)   17 123 lb (55.8 kg)              Today, you had the following     No orders found for display       Primary Care Provider Office Phone # Fax #    Ulysses Baker [phone] [address] [facility] [address]        Equal Access to Services     GENO [facility] : Hadii aad ku hadasho Sonisreenali, waaxda luqadaha, qaybta kaalmada adeegyada, leighann de oliveira . So [facility] [phone].    ATENCIÓN: Si habla español, tiene a cueva disposición servicios gratuitos de asistencia lingüística. Llame al [phone].    We comply with applicable federal civil rights laws and [place] laws. We do not discriminate on the basis of race, color, national origin, age, disability sex, sexual orientation or gender identity.            Thank you!     Thank you for choosing [facility]  for your care. Our goal is always to provide you with excellent care. Hearing back from our patients is one way we can continue to improve our services. Please take a few minutes to complete the written survey that you may receive in the mail after your visit with us. Thank you!             Your Updated Medication List - Protect  others around you: Learn how to safely use, store and throw away your medicines at www.disposemymeds.org.          This list is accurate as of: 9/18/17  2:23 PM.  Always use your most recent med list.                   Brand Name Dispense Instructions for use Diagnosis    ascorbic acid 250 MG Chew chewable tablet    vitamin C     Take 1 tablet by mouth daily.        ASPIRIN NOT PRESCRIBED    INTENTIONAL    0    due to coumadin    Myalgias       calcium carb 1250 mg (500 mg [place])/vitamin D 200 units 500-200 MG-UNIT per tablet    OSCAL with D     Take 1 tablet by mouth 2 times daily (with meals).        iron 325 (65 FE) MG tablet      Take  by mouth. Taking two per week.        metoprolol 50 MG tablet    LOPRESSOR    93 tablet    Take 1.5 tablets (75 mg) by mouth 2 times daily    Essential hypertension       multivitamin per tablet      Take 1 tablet by mouth daily.        NEW MED      Fiber supplement daily.        predniSONE 1 MG tablet    DELTASONE    100 tablet    Start at 3 mg daily and adjust as directed.    Polymyalgia rheumatica (H)       triamterene-hydrochlorothiazide 37.5-25 MG per tablet    MAXZIDE-25    30 tablet    Take 1/2 pill twice daily    Essential hypertension with goal blood pressure less than 140/90       warfarin 5 MG tablet    COUMADIN    90 tablet    Take 5mg by mouth Monday and 2.5mg all other days or as directed by Anticoagulation Clinic    Long term current use of anticoagulant therapy

## 2017-09-18 NOTE — MR AVS SNAPSHOT
Nayely Kay   9/18/2017 1:45 PM   Anticoagulation Therapy Visit    Description:  77 year old female   Provider:  NB ANTI COAG   Department:  Nb Anticoag           INR as of 9/18/2017     Today's INR 2.1      Anticoagulation Summary as of 9/18/2017     INR goal 2.0-3.0   Today's INR 2.1   Full instructions 5 mg on Mon; 2.5 mg all other days   Next INR check 10/19/2017    Indications   Atrial fibrillation (H) [I48.91]  Long term current use of anticoagulant therapy [Z79.01]         Description     No change, recheck INR in 4 weeks.      September 2017 Details    Sun Mon Tue Wed Thu Fri Sat          1               2                 3               4               5               6               7               8               9                 10               11               12               13               14               15               16                 17               18      5 mg   See details      19      2.5 mg         20      2.5 mg         21      2.5 mg         22      2.5 mg         23      2.5 mg           24      2.5 mg         25      5 mg         26      2.5 mg         27      2.5 mg         28      2.5 mg         29      2.5 mg         30      2.5 mg          Date Details   09/18 This INR check               How to take your warfarin dose     To take:  2.5 mg Take 0.5 of a 5 mg tablet.    To take:  5 mg Take 1 of the 5 mg tablets.           October 2017 Details    Sun Mon Tue Wed Thu Fri Sat     1      2.5 mg         2      5 mg         3      2.5 mg         4      2.5 mg         5      2.5 mg         6      2.5 mg         7      2.5 mg           8      2.5 mg         9      5 mg         10      2.5 mg         11      2.5 mg         12      2.5 mg         13      2.5 mg         14      2.5 mg           15      2.5 mg         16      5 mg         17      2.5 mg         18      2.5 mg         19            20               21                 22               23               24                25               26               27               28                 29               30               31                    Date Details   No additional details    Date of next INR:  10/19/2017         How to take your warfarin dose     To take:  2.5 mg Take 0.5 of a 5 mg tablet.    To take:  5 mg Take 1 of the 5 mg tablets.

## 2017-09-18 NOTE — PROGRESS NOTES
Nayely Kay is a 77 year old female who comes in today for a Blood Pressure check because of ongoing blood pressure monitoring.    Vitals as recorded, a regular cuff was used.    Patient is taking medication as prescribed  Patient is tolerating medications well.  Patient is not monitoring Blood Pressure at home.        Current complaints: none    Disposition: patient to continue with the same medication and recheck BP as needed.  Nayely usually has BP checked after her INR appointment once a month.  Nicolette Granger RN

## 2017-09-18 NOTE — PROGRESS NOTES
ANTICOAGULATION FOLLOW-UP CLINIC VISIT    Patient Name:  Nayely Kay  Date:  9/18/2017  Contact Type:  Face to Face    SUBJECTIVE:        OBJECTIVE    INR Protime   Date Value Ref Range Status   09/18/2017 2.1 (A) 0.86 - 1.14 Final       ASSESSMENT / PLAN  INR assessment THER    Recheck INR In: 4 WEEKS    INR Location Clinic      Anticoagulation Summary as of 9/18/2017     INR goal 2.0-3.0   Today's INR 2.1   Maintenance plan 5 mg (5 mg x 1) on Mon; 2.5 mg (5 mg x 0.5) all other days   Full instructions 5 mg on Mon; 2.5 mg all other days   Weekly total 20 mg   Plan last modified Giselle Santillan RN (2/27/2017)   Next INR check 10/19/2017   Priority INR   Target end date Indefinite    Indications   Atrial fibrillation (H) [I48.91]  Long term current use of anticoagulant therapy [Z79.01]         Anticoagulation Episode Summary     INR check location     Preferred lab     Send INR reminders to [facility] POOL    Comments * only wants dosing card. Pt uses 5mg tablets. wants to check every 4 weeks        Anticoagulation Care Providers     Provider Role Specialty Phone number    Ulysses Baker MD [facility] [phone]            See the Encounter Report to view Anticoagulation Flowsheet and Dosing Calendar (Go to Encounters tab in chart review, and find the Anticoagulation Therapy Visit)      Giselle Santillan, SEKOU

## 2017-10-16 ENCOUNTER — ANTICOAGULATION THERAPY VISIT (OUTPATIENT)
Dept: ANTICOAGULATION | Facility: CLINIC | Age: 77
End: 2017-10-16
Payer: COMMERCIAL

## 2017-10-16 ENCOUNTER — TELEPHONE (OUTPATIENT)
Dept: FAMILY MEDICINE | Facility: CLINIC | Age: 77
End: 2017-10-16

## 2017-10-16 ENCOUNTER — ALLIED HEALTH/NURSE VISIT (OUTPATIENT)
Dept: FAMILY MEDICINE | Facility: CLINIC | Age: 77
End: 2017-10-16
Payer: COMMERCIAL

## 2017-10-16 VITALS — RESPIRATION RATE: 16 BRPM | HEART RATE: 68 BPM | DIASTOLIC BLOOD PRESSURE: 64 MMHG | SYSTOLIC BLOOD PRESSURE: 136 MMHG

## 2017-10-16 DIAGNOSIS — I48.91 ATRIAL FIBRILLATION (H): ICD-10-CM

## 2017-10-16 DIAGNOSIS — Z79.01 LONG TERM CURRENT USE OF ANTICOAGULANT THERAPY: ICD-10-CM

## 2017-10-16 DIAGNOSIS — M35.3 POLYMYALGIA RHEUMATICA (H): ICD-10-CM

## 2017-10-16 LAB — INR POINT OF CARE: 1.8 (ref 0.86–1.14)

## 2017-10-16 PROCEDURE — 99207 ZZC NO CHARGE NURSE ONLY: CPT

## 2017-10-16 PROCEDURE — 85610 PROTHROMBIN TIME: CPT | Mod: QW

## 2017-10-16 PROCEDURE — 36416 COLLJ CAPILLARY BLOOD SPEC: CPT

## 2017-10-16 RX ORDER — PREDNISONE 1 MG/1
1.5 TABLET ORAL DAILY
Qty: 100 TABLET | Refills: 3 | COMMUNITY
Start: 2017-10-16 | End: 2017-12-13

## 2017-10-16 NOTE — PROGRESS NOTES
ANTICOAGULATION FOLLOW-UP CLINIC VISIT    Patient Name:  Nayely Kay  Date:  10/16/2017  Contact Type:  Face to Face    SUBJECTIVE:     Patient Findings     Positives Change in diet/appetite (pt ate more greens this last week)           OBJECTIVE    INR Protime   Date Value Ref Range Status   10/16/2017 1.8 (A) 0.86 - 1.14 Final       ASSESSMENT / PLAN  No question data found.  Anticoagulation Summary as of 10/16/2017     INR goal 2.0-3.0   Today's INR 1.8!   Maintenance plan 5 mg (5 mg x 1) on Mon; 2.5 mg (5 mg x 0.5) all other days   Full instructions 10/16: 7.5 mg; Otherwise 5 mg on Mon; 2.5 mg all other days   Weekly total 20 mg   Plan last modified Giselle Santillan RN (2/27/2017)   Next INR check 11/20/2017   Priority INR   Target end date Indefinite    Indications   Atrial fibrillation (H) [I48.91]  Long term current use of anticoagulant therapy [Z79.01]         Anticoagulation Episode Summary     INR check location     Preferred lab     Send INR reminders to [facility] CLINIC POOL    Comments * only wants dosing card. Pt uses 5mg tablets. wants to check every 4 weeks        Anticoagulation Care Providers     Provider Role Specialty Phone number    Ulysses Baker MD [facility] [phone]            See the Encounter Report to view Anticoagulation Flowsheet and Dosing Calendar (Go to Encounters tab in chart review, and find the Anticoagulation Therapy Visit)        Oly Mcwilliams RN

## 2017-10-16 NOTE — TELEPHONE ENCOUNTER
She will be due in November to see me for the BP recheck.   Prednisone at 1.5 mg daily is fine. .Ulysses Baker

## 2017-10-16 NOTE — MR AVS SNAPSHOT
"              After Visit Summary   10/16/2017    Nayely Kay    MRN: [id number]           Patient Information     Date Of Birth          1940        Visit Information        Provider Department      10/16/2017 2:15 PM [place] NB RN [facility]        Today's Diagnoses     Polymyalgia rheumatica (H)           Follow-ups after your visit        Your next 10 appointments already scheduled     Nov 13, 2017  2:15 PM CST   Nurse Only with [place] NB RN   [facility] ([facility])    [address] [place] [address] [phone]            Nov 20, 2017  2:15 PM CST   Anticoagulation Visit with NB ANTI COAG   [facility] ([facility])    [address] [place] [address] [phone]              Who to contact     If you have questions or need follow up information about today's clinic visit or your schedule please contact [facility] directly at [phone].  Normal or non-critical lab and imaging results will be communicated to you by [employer], letter or phone within 4 business days after the clinic has received the results. If you do not hear from us within 7 days, please contact the clinic through [email or web address] or phone. If you have a critical or abnormal lab result, we will notify you by phone as soon as possible.  Submit refill requests through [employer] or call your pharmacy and they will forward the refill request to us. Please allow 3 business days for your refill to be completed.          Additional Information About Your Visit        [employer] Information     [employer] lets you send messages to your doctor, view your test results, renew your prescriptions, schedule appointments and more. To sign up, go to [email or web address] . Click on \"Log in\" on the left side of the screen, which will take you to the Welcome page. Then click on \"Sign up Now\" on the right side of the page.     You " will be asked to enter the access code listed below, as well as some personal information. Please follow the directions to create your username and password.     Your access code is: [email or web address]  Expires: 2017  2:10 PM     Your access code will  in 90 days. If you need help or a new code, please call your [place] clinic or [phone].        Care EveryWhere ID     This is your Care EveryWhere ID. This could be used by other organizations to access your [place] medical records  [id number]        Your Vitals Were     Pulse Respirations                68 16           Blood Pressure from Last 3 Encounters:   10/16/17 136/64   17 132/64   17 130/68    Weight from Last 3 Encounters:   17 123 lb (55.8 kg)   17 124 lb (56.2 kg)   17 123 lb (55.8 kg)              Today, you had the following     No orders found for display         Today's Medication Changes          These changes are accurate as of: 10/16/17  2:48 PM.  If you have any questions, ask your nurse or doctor.               These medicines have changed or have updated prescriptions.        Dose/Directions    predniSONE 1 MG tablet   Commonly known as:  DELTASONE   This may have changed:    - how much to take  - how to take this  - when to take this   Used for:  Polymyalgia rheumatica (H)        Dose:  1.5 mg   Take 1.5 tablets (1.5 mg) by mouth daily Start at 3 mg daily and adjust as directed.   Quantity:  100 tablet   Refills:  3                Primary Care Provider Office Phone # Fax #    Ulysses Baker [phone] [address] [facility] [address]        Equal Access to Services     GENO DUNN : Hadii alejandro Sales, walanetteda hedy, qaybleighann salguero. So [facility] [phone].    ATENCIÓN: Si habla español, tiene a cueva disposición servicios gratuitos de asistencia lingüística. Llame al [phone].    We comply with  applicable federal civil rights laws and [place] laws. We do not discriminate on the basis of race, color, national origin, age, disability, sex, sexual orientation, or gender identity.            Thank you!     Thank you for choosing [facility]  for your care. Our goal is always to provide you with excellent care. Hearing back from our patients is one way we can continue to improve our services. Please take a few minutes to complete the written survey that you may receive in the mail after your visit with us. Thank you!             Your Updated Medication List - Protect others around you: Learn how to safely use, store and throw away your medicines at www.disposemymeds.org.          This list is accurate as of: 10/16/17  2:48 PM.  Always use your most recent med list.                   Brand Name Dispense Instructions for use Diagnosis    ascorbic acid 250 MG Chew chewable tablet    vitamin C     Take 1 tablet by mouth daily.        ASPIRIN NOT PRESCRIBED    INTENTIONAL    0    due to coumadin    Myalgias       calcium carb 1250 mg (500 mg [place])/vitamin D 200 units 500-200 MG-UNIT per tablet    OSCAL with D     Take 1 tablet by mouth 2 times daily (with meals).        iron 325 (65 FE) MG tablet      Take  by mouth. Taking two per week.        metoprolol 50 MG tablet    LOPRESSOR    93 tablet    Take 1.5 tablets (75 mg) by mouth 2 times daily    Essential hypertension       multivitamin per tablet      Take 1 tablet by mouth daily.        NEW MED      Fiber supplement daily.        predniSONE 1 MG tablet    DELTASONE    100 tablet    Take 1.5 tablets (1.5 mg) by mouth daily Start at 3 mg daily and adjust as directed.    Polymyalgia rheumatica (H)       triamterene-hydrochlorothiazide 37.5-25 MG per tablet    MAXZIDE-25    30 tablet    Take 1/2 pill twice daily    Essential hypertension with goal blood pressure less than 140/90       warfarin 5 MG tablet    COUMADIN    90 tablet    Take 5mg by  mouth Monday and 2.5mg all other days or as directed by Anticoagulation Clinic    Long term current use of anticoagulant therapy

## 2017-10-16 NOTE — MR AVS SNAPSHOT
Nayely WEST [place]   10/16/2017 2:00 PM   Anticoagulation Therapy Visit    Description:  77 year old female   Provider:  NB ANTI COAFORTINO   Department:  Nb Anticoag           INR as of 10/16/2017     Today's INR 1.8!      Anticoagulation Summary as of 10/16/2017     INR goal 2.0-3.0   Today's INR 1.8!   Full instructions 10/16: 7.5 mg; Otherwise 5 mg on Mon; 2.5 mg all other days   Next INR check 11/20/2017    Indications   Atrial fibrillation (H) [I48.91]  Long term current use of anticoagulant therapy [Z79.01]         Your next Anticoagulation Clinic appointment(s)     Nov 20, 2017  2:15 PM CST   [facility]TI [facility]AG   [facility]    [address] [place] [address] [phone]              October 2017 Details    Sun Mon Tue Wed Thu Fri Sat     1               2               3               4               5               6               7                 8               9               10               11               12               13               14                 15               16      7.5 mg   See details      17      2.5 mg         18      2.5 mg         19      2.5 mg         20      2.5 mg         21      2.5 mg           22      2.5 mg         23      5 mg         24      2.5 mg         25      2.5 mg         26      2.5 mg         27      2.5 mg         28      2.5 mg           29      2.5 mg         30      5 mg         31      2.5 mg              Date Details   10/16 This INR check               How to take your warfarin dose     To take:  2.5 mg Take 0.5 of a 5 mg tablet.    To take:  5 mg Take 1 of the 5 mg tablets.    To take:  7.5 mg Take 1.5 of the 5 mg tablets.           November 2017 Details    Sun Mon Tue Wed Thu Fri Sat        1      2.5 mg         2      2.5 mg         3      2.5 mg         4      2.5 mg           5      2.5 mg         6      5 mg         7      2.5 mg         8      2.5 mg         9       2.5 mg         10      2.5 mg         11      2.5 mg           12      2.5 mg         13      5 mg         14      2.5 mg         15      2.5 mg         16      2.5 mg         17      2.5 mg         18      2.5 mg           19      2.5 mg         20            21               22               23               24               25                 26               27               28               29               30                  Date Details   No additional details    Date of next INR:  11/20/2017         How to take your warfarin dose     To take:  2.5 mg Take 0.5 of a 5 mg tablet.    To take:  5 mg Take 1 of the 5 mg tablets.

## 2017-10-16 NOTE — TELEPHONE ENCOUNTER
Nayely Kay is a 77 year old female who comes in today for a Blood Pressure check because of ongoing blood pressure monitoring.      Vitals as recorded, a regular cuff was used.    Patient is taking medication as prescribed  Patient is tolerating medications well.  Patient is not monitoring Blood Pressure at home.     Current complaints: none    Disposition: patient to continue with the same medication and recheck BP in a month while at the anticoagulation clinic.  Needs to see Dr Baker for med questions.      BP today is 136/64.  She is asking me if can take two of the triamterene-HCTZ and I told her no.  She would need to see Dr Baker for dose adjustment.  Explained that taking two can cause BP to decrease.  Ankle swelling more of a problem in the summer she says.  She says she took two for years during the summer    Problem #2    S-(situation): patient states saw Dr Baker in May 2017 and was told to decrease prednisone to lowest dose possible    B-(background): is currently taking prednisone 1 mg daily and she says it is not helping with the aches     A-(assessment): dose adjustment    R-(recommendations): she is going to take the prednisone 1.5 mg daily.  She says this is the dose that makes her feel the best.  The is FYI for Dr Baker.  She does not need a refill  Nicolette Granger RN

## 2017-11-08 ENCOUNTER — OFFICE VISIT (OUTPATIENT)
Dept: FAMILY MEDICINE | Facility: CLINIC | Age: 77
End: 2017-11-08
Payer: COMMERCIAL

## 2017-11-08 VITALS
TEMPERATURE: 97.3 F | DIASTOLIC BLOOD PRESSURE: 78 MMHG | BODY MASS INDEX: 22.56 KG/M2 | SYSTOLIC BLOOD PRESSURE: 138 MMHG | HEART RATE: 66 BPM | WEIGHT: 119.5 LBS | HEIGHT: 61 IN | RESPIRATION RATE: 16 BRPM

## 2017-11-08 DIAGNOSIS — D50.8 OTHER IRON DEFICIENCY ANEMIA: ICD-10-CM

## 2017-11-08 DIAGNOSIS — I10 HYPERTENSION, GOAL BELOW 140/90: Primary | ICD-10-CM

## 2017-11-08 LAB
BASOPHILS # BLD AUTO: 0.1 10E9/L (ref 0–0.2)
BASOPHILS NFR BLD AUTO: 0.8 %
CREAT SERPL-MCNC: 0.9 MG/DL (ref 0.52–1.04)
DIFFERENTIAL METHOD BLD: NORMAL
EOSINOPHIL # BLD AUTO: 0.2 10E9/L (ref 0–0.7)
EOSINOPHIL NFR BLD AUTO: 2 %
ERYTHROCYTE [DISTWIDTH] IN BLOOD BY AUTOMATED COUNT: 13.7 % (ref 10–15)
GFR SERPL CREATININE-BSD FRML MDRD: 61 ML/MIN/1.7M2
HCT VFR BLD AUTO: 41.3 % (ref 35–47)
HGB BLD-MCNC: 13.6 G/DL (ref 11.7–15.7)
LYMPHOCYTES # BLD AUTO: 1.9 10E9/L (ref 0.8–5.3)
LYMPHOCYTES NFR BLD AUTO: 25 %
MCH RBC QN AUTO: 30.4 PG (ref 26.5–33)
MCHC RBC AUTO-ENTMCNC: 32.9 G/DL (ref 31.5–36.5)
MCV RBC AUTO: 92 FL (ref 78–100)
MONOCYTES # BLD AUTO: 0.7 10E9/L (ref 0–1.3)
MONOCYTES NFR BLD AUTO: 8.9 %
NEUTROPHILS # BLD AUTO: 4.8 10E9/L (ref 1.6–8.3)
NEUTROPHILS NFR BLD AUTO: 63.3 %
PLATELET # BLD AUTO: 280 10E9/L (ref 150–450)
POTASSIUM SERPL-SCNC: 3.5 MMOL/L (ref 3.4–5.3)
RBC # BLD AUTO: 4.47 10E12/L (ref 3.8–5.2)
WBC # BLD AUTO: 7.6 10E9/L (ref 4–11)

## 2017-11-08 PROCEDURE — 84132 ASSAY OF SERUM POTASSIUM: CPT | Performed by: FAMILY MEDICINE

## 2017-11-08 PROCEDURE — 85025 COMPLETE CBC W/AUTO DIFF WBC: CPT | Performed by: FAMILY MEDICINE

## 2017-11-08 PROCEDURE — 82565 ASSAY OF CREATININE: CPT | Performed by: FAMILY MEDICINE

## 2017-11-08 PROCEDURE — 99214 OFFICE O/P EST MOD 30 MIN: CPT | Performed by: FAMILY MEDICINE

## 2017-11-08 PROCEDURE — 36415 COLL VENOUS BLD VENIPUNCTURE: CPT | Performed by: FAMILY MEDICINE

## 2017-11-08 NOTE — MR AVS SNAPSHOT
After Visit Summary   11/8/2017    Nayely Kay    MRN: [id number]           Patient Information     Date Of Birth          1940        Visit Information        Provider Department      11/8/2017 8:00 AM Ulysses Baker MD [facility]        Today's Diagnoses     Hypertension, goal below 140/90    -  1    Other iron deficiency anemia          Care Instructions    (I10) Hypertension, goal below 140/90  (primary encounter diagnosis)  Comment:   Plan: Creatinine, Potassium        Your BP average is 129/65 and this is below the goal of 130/80. Use the med and the non drug therapies. If doing well then refill and recheck in six months.   Do the two labs today and we will call you.     (D50.8) Other iron deficiency anemia  Comment:   Plan: CBC with platelets differential          Follow-ups after your visit        Your next 10 appointments already scheduled     Nov 13, 2017  2:15 PM CST   Anticoagulation Visit with NB ANTI COAG   [facility] ([facility])    [address] [place] [address]   [phone]            Nov 13, 2017  2:30 PM CST   Nurse Only with [place] NB RN   [facility] ([facility])    [address] [place] [address]   [phone]            Dec 05, 2017 10:45 AM CST   Return Visit with Jareth Pedroza MD   [facility] ([facility]    [address] [facility] [address] [phone]              Who to contact     If you have questions or need follow up information about today's clinic visit or your schedule please contact [facility] directly at [phone].  Normal or non-critical lab and imaging results will be communicated to you by MyChart, letter or phone within 4 business days after the clinic has received the results. If you do not hear from us within 7 days, please contact the clinic through Tistagamest  "or phone. If you have a critical or abnormal lab result, we will notify you by phone as soon as possible.  Submit refill requests through Quantum Voyage or call your pharmacy and they will forward the refill request to us. Please allow 3 business days for your refill to be completed.          Additional Information About Your Visit        Triposohart Information     Quantum Voyage lets you send messages to your doctor, view your test results, renew your prescriptions, schedule appointments and more. To sign up, go to [email or web address] . Click on \"Log in\" on the left side of the screen, which will take you to the Welcome page. Then click on \"Sign up Now\" on the right side of the page.     You will be asked to enter the access code listed below, as well as some personal information. Please follow the directions to create your username and password.     Your access code is: [email or web address]  Expires: 2017  1:10 PM     Your access code will  in 90 days. If you need help or a new code, please call your [facility] clinic or [phone].        Care EveryWhere ID     This is your Care EveryWhere ID. This could be used by other organizations to access your [facility] medical records  [id number]        Your Vitals Were     Pulse Temperature Respirations Height BMI (Body Mass Index)       66 97.3  F (36.3  C) (Tympanic) 16 5' 1\" (1.549 m) 22.58 kg/m2        Blood Pressure from Last 3 Encounters:   17 138/78   10/16/17 136/64   17 132/64    Weight from Last 3 Encounters:   17 119 lb 8 oz (54.2 kg)   17 123 lb (55.8 kg)   17 124 lb (56.2 kg)              We Performed the Following     CBC with platelets differential     Creatinine     Potassium        Primary Care Provider Office Phone # Fax #    Ulysses Baker [phone] [address] [facility] [address]        Equal Access to Services     DERIAN DUNN AH: Hadii alejandro Sales, urban knott, jose prather " leighann catherinemonica dimas'aan ah. So [facility] [phone].    ATENCIÓN: Si taco jones, tiene a cueva disposición servicios gratuitos de asistencia lingüística. Jordan al [phone].    We comply with applicable federal civil rights laws and [place] laws. We do not discriminate on the basis of race, color, national origin, age, disability, sex, sexual orientation, or gender identity.            Thank you!     Thank you for choosing [facility]  for your care. Our goal is always to provide you with excellent care. Hearing back from our patients is one way we can continue to improve our services. Please take a few minutes to complete the written survey that you may receive in the mail after your visit with us. Thank you!             Your Updated Medication List - Protect others around you: Learn how to safely use, store and throw away your medicines at www.disposemymeds.org.          This list is accurate as of: 11/8/17  8:36 AM.  Always use your most recent med list.                   Brand Name Dispense Instructions for use Diagnosis    ascorbic acid 250 MG Chew chewable tablet    vitamin C     Take 1 tablet by mouth daily.        ASPIRIN NOT PRESCRIBED    INTENTIONAL    0    due to coumadin    Myalgias       Calcium carb-Vitamin D 500 mg [place]-200 units 500-200 MG-UNIT per tablet    OSCAL with D;Oyster Shell Calcium     Take 1 tablet by mouth 2 times daily (with meals).        iron 325 (65 FE) MG tablet      Take  by mouth. Taking two per week.        metoprolol 50 MG tablet    LOPRESSOR    93 tablet    Take 1.5 tablets (75 mg) by mouth 2 times daily    Essential hypertension       multivitamin per tablet      Take 1 tablet by mouth daily.        NEW MED      Fiber supplement daily.        predniSONE 1 MG tablet    DELTASONE    100 tablet    Take 1.5 tablets (1.5 mg) by mouth daily Start at 3 mg daily and adjust as directed.    Polymyalgia rheumatica (H)        triamterene-hydrochlorothiazide 37.5-25 MG per tablet    MAXZIDE-25    30 tablet    Take 1/2 pill twice daily    Essential hypertension with goal blood pressure less than 140/90       warfarin 5 MG tablet    COUMADIN    90 tablet    Take 5mg by mouth Monday and 2.5mg all other days or as directed by Anticoagulation Clinic    Long term current use of anticoagulant therapy

## 2017-11-08 NOTE — LETTER
November 8, 2017      Nayely Kay  [address] [address]        Dear ,    We are writing to inform you of your test results.    Your test results fall within the expected range(s).  Please continue with current treatment plan.  A copy of these results is enclosed.   If you have any questions or concerns, please call the clinic at the number listed above.       Sincerely,        Ulysses Baker MD

## 2017-11-08 NOTE — PROGRESS NOTES
SUBJECTIVE:   Nayely Kay is a 77 year old female who presents to clinic today for the following health issues:      Hypertension Follow-up      Outpatient blood pressures are being checked at coumadin clinic.  Results are 118-136/60-70.    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    Problems taking medications regularly: No    Medication side effects: none    Diet: regular (no restrictions), low salt and low fat/cholesterol        Current Outpatient Prescriptions:      predniSONE (DELTASONE) 1 MG tablet, Take 1.5 tablets (1.5 mg) by mouth daily Start at 3 mg daily and adjust as directed., Disp: 100 tablet, Rfl: 3     warfarin (COUMADIN) 5 MG tablet, Take 5mg by mouth Monday and 2.5mg all other days or as directed by Anticoagulation Clinic, Disp: 90 tablet, Rfl: 3     metoprolol (LOPRESSOR) 50 MG tablet, Take 1.5 tablets (75 mg) by mouth 2 times daily, Disp: 93 tablet, Rfl: 11     triamterene-hydrochlorothiazide (MAXZIDE-25) 37.5-25 MG per tablet, Take 1/2 pill twice daily, Disp: 30 tablet, Rfl: 11     Ferrous Sulfate (IRON) 325 (65 FE) MG tablet, Take  by mouth. Taking two per week., Disp: , Rfl:      Multiple Vitamin (MULTIVITAMIN) per tablet, Take 1 tablet by mouth daily., Disp: , Rfl:      ascorbic acid (VITAMIN C) 250 MG CHEW, Take 1 tablet by mouth daily., Disp: , Rfl: 0     ASPIRIN NOT PRESCRIBED (INTENTIONAL), due to coumadin, Disp: 0, Rfl: 0     NEW MED, Fiber supplement daily., Disp: , Rfl:      calcium carb 1250 mg, 500 mg [place],/vitamin D 200 units (OSCAL WITH D) 500-200 MG-UNIT per tablet, Take 1 tablet by mouth 2 times daily (with meals)., Disp: , Rfl:     Patient Active Problem List   Diagnosis     Hypertension, goal below 140/90     Headache     Atrial fibrillation (H)     Polymyalgia rheumatica (H)     Iron deficiency anemia     HYPERLIPIDEMIA LDL GOAL <130     Osteopenia     Family history of breast  "cancer     Eczema     Advanced directives, counseling/discussion     AK (actinic keratosis)     Ganglion cyst     Long term current use of anticoagulant therapy     Essential hypertension     Tubular adenoma       Blood pressure 138/78, pulse 66, temperature 97.3  F (36.3  C), temperature source Tympanic, resp. rate 16, height 5' 1\" (1.549 m), weight 119 lb 8 oz (54.2 kg).    Exam:  GENERAL APPEARANCE: healthy, alert and no distress  EYES: EOMI,  PERRL  CV: regular rates and rhythm, normal S1 S2, no S3 or S4 and no murmur, click or rub -  MS: arthritis of the hands noted.   SKIN: no suspicious lesions or rashes  PSYCH: mentation appears normal and affect normal/bright  PSYCH: anxious      (I10) Hypertension, goal below 140/90  (primary encounter diagnosis)  Comment:   Plan: Creatinine, Potassium        Your BP average is 129/65 and this is below the goal of 130/80. Use the med and the non drug therapies. If doing well then refill and recheck in six months.   Do the two labs today and we will call you.     (D50.8) Other iron deficiency anemia  Comment:   Plan: CBC with platelets differential        Do the labs and the goal for the Hgb is 11.7 or higher. Stay on the iron. This is done annually if normal.     Ulysses Baker    "

## 2017-11-08 NOTE — NURSING NOTE
"Chief Complaint   Patient presents with     Hypertension     Recheck on triamterene medication.         Initial /78  Pulse 66  Temp 97.3  F (36.3  C) (Tympanic)  Resp 16  Ht 5' 1\" (1.549 m)  Wt 119 lb 8 oz (54.2 kg)  BMI 22.58 kg/m2 Estimated body mass index is 22.58 kg/(m^2) as calculated from the following:    Height as of this encounter: 5' 1\" (1.549 m).    Weight as of this encounter: 119 lb 8 oz (54.2 kg).    Medication Reconciliation:  complete    Reyna James CMA (AAMA)  "

## 2017-11-08 NOTE — PATIENT INSTRUCTIONS
(I10) Hypertension, goal below 140/90  (primary encounter diagnosis)  Comment:   Plan: Creatinine, Potassium        Your BP average is 129/65 and this is below the goal of 130/80. Use the med and the non drug therapies. If doing well then refill and recheck in six months.   Do the two labs today and we will call you.     (D50.8) Other iron deficiency anemia  Comment:   Plan: CBC with platelets differential

## 2017-11-13 ENCOUNTER — ANTICOAGULATION THERAPY VISIT (OUTPATIENT)
Dept: ANTICOAGULATION | Facility: CLINIC | Age: 77
End: 2017-11-13
Payer: COMMERCIAL

## 2017-11-13 ENCOUNTER — ALLIED HEALTH/NURSE VISIT (OUTPATIENT)
Dept: FAMILY MEDICINE | Facility: CLINIC | Age: 77
End: 2017-11-13
Payer: COMMERCIAL

## 2017-11-13 VITALS — DIASTOLIC BLOOD PRESSURE: 74 MMHG | SYSTOLIC BLOOD PRESSURE: 128 MMHG | RESPIRATION RATE: 16 BRPM | HEART RATE: 64 BPM

## 2017-11-13 DIAGNOSIS — Z79.01 LONG TERM CURRENT USE OF ANTICOAGULANT THERAPY: ICD-10-CM

## 2017-11-13 DIAGNOSIS — Z01.30 BLOOD PRESSURE CHECK: Primary | ICD-10-CM

## 2017-11-13 DIAGNOSIS — I48.91 ATRIAL FIBRILLATION (H): ICD-10-CM

## 2017-11-13 LAB — INR POINT OF CARE: 2.9 (ref 0.86–1.14)

## 2017-11-13 PROCEDURE — 99207 ZZC NO CHARGE NURSE ONLY: CPT

## 2017-11-13 PROCEDURE — 36416 COLLJ CAPILLARY BLOOD SPEC: CPT

## 2017-11-13 PROCEDURE — 85610 PROTHROMBIN TIME: CPT | Mod: QW

## 2017-11-13 NOTE — PROGRESS NOTES
ANTICOAGULATION FOLLOW-UP CLINIC VISIT    Patient Name:  Nayely Kay  Date:  11/13/2017  Contact Type:  Face to Face    SUBJECTIVE:     Patient Findings     Comments Patient ate less greens this time then she normally does however she states she will go back to her regular diet plan.           OBJECTIVE    INR Protime   Date Value Ref Range Status   11/13/2017 2.9 (A) 0.86 - 1.14 Final       ASSESSMENT / PLAN  No question data found.  Anticoagulation Summary as of 11/13/2017     INR goal 2.0-3.0   Today's INR 2.9   Maintenance plan 5 mg (5 mg x 1) on Mon; 2.5 mg (5 mg x 0.5) all other days   Full instructions 5 mg on Mon; 2.5 mg all other days   Weekly total 20 mg   No change documented Oly Mcwilliams RN   Plan last modified Giselle Santillan RN (2/27/2017)   Next INR check 12/11/2017   Priority INR   Target end date Indefinite    Indications   Atrial fibrillation (H) [I48.91]  Long term current use of anticoagulant therapy [Z79.01]         Anticoagulation Episode Summary     INR check location     Preferred lab     Send INR reminders to [facility] CLINIC POOL    Comments * only wants dosing card. Pt uses 5mg tablets. wants to check every 4 weeks        Anticoagulation Care Providers     Provider Role Specialty Phone number    Ulysses Baker MD [facility] [phone]            See the Encounter Report to view Anticoagulation Flowsheet and Dosing Calendar (Go to Encounters tab in chart review, and find the Anticoagulation Therapy Visit)    Patient to continue the same warfarin maintenance dose, INR therapeutic.    Oly Mcwilliams RN

## 2017-11-13 NOTE — MR AVS SNAPSHOT
Nayely WEST Rahul   11/13/2017 2:15 PM   Anticoagulation Therapy Visit    Description:  77 year old female   Provider:  NB ANTI COAFORTINO   Department:  [facility] Anticoag           INR as of 11/13/2017     Today's INR 2.9      Anticoagulation Summary as of 11/13/2017     INR goal 2.0-3.0   Today's INR 2.9   Full instructions 5 mg on Mon; 2.5 mg all other days   Next INR check 12/11/2017    Indications   Atrial fibrillation (H) [I48.91]  Long term current use of anticoagulant therapy [Z79.01]         Your next Anticoagulation Clinic appointment(s)     Dec 11, 2017  2:15 PM CST   [facility]agulation [facility] NB ANTI COAG   [facility]    [address] [place] [address]   [phone]              November 2017 Details    Sun Mon Tue Wed Thu Fri Sat        1               2               3               4                 5               6               7               8               9               10               11                 12               13      5 mg   See details      14      2.5 mg         15      2.5 mg         16      2.5 mg         17      2.5 mg         18      2.5 mg           19      2.5 mg         20      5 mg         21      2.5 mg         22      2.5 mg         23      2.5 mg         24      2.5 mg         25      2.5 mg           26      2.5 mg         27      5 mg         28      2.5 mg         29      2.5 mg         30      2.5 mg            Date Details   11/13 This INR check               How to take your warfarin dose     To take:  2.5 mg Take 0.5 of a 5 mg tablet.    To take:  5 mg Take 1 of the 5 mg tablets.           December 2017 Details    Sun Mon Tue Wed Thu Fri Sat          1      2.5 mg         2      2.5 mg           3      2.5 mg         4      5 mg         5      2.5 mg         6      2.5 mg         7      2.5 mg         8      2.5 mg         9      2.5 mg           10      2.5 mg         11            12               13                14               15               16                 17               18               19               20               21               22               23                 24               25               26               27               28               29               30                 31                      Date Details   No additional details    Date of next INR:  12/11/2017         How to take your warfarin dose     To take:  2.5 mg Take 0.5 of a 5 mg tablet.    To take:  5 mg Take 1 of the 5 mg tablets.

## 2017-11-13 NOTE — MR AVS SNAPSHOT
"              After Visit Summary   11/13/2017    Nayely Kay    MRN: [id number]           Patient Information     Date Of Birth          1940        Visit Information        Provider Department      11/13/2017 2:30 PM [place] NB RN [facility]        Today's Diagnoses     Blood pressure check    -  1       Follow-ups after your visit        Your next 10 appointments already scheduled     Dec 05, 2017 10:45 AM CST   Return Visit with Jareth [facility]za MD   [facility]    [address] [facility] [address] [phone]            Dec 11, 2017  2:15 PM CST   Anticoagulation Visit with NB ANTI COAG   [facility] ([facility])    [address] [place] [address] [phone]              Who to contact     If you have questions or need follow up information about today's clinic visit or your schedule please contact [facility] directly at [phone].  Normal or non-critical lab and imaging results will be communicated to you by Infantiumhart, letter or phone within 4 business days after the clinic has received the results. If you do not hear from us within 7 days, please contact the clinic through Banchat or phone. If you have a critical or abnormal lab result, we will notify you by phone as soon as possible.  Submit refill requests through Agari or call your pharmacy and they will forward the refill request to us. Please allow 3 business days for your refill to be completed.          Additional Information About Your Visit        InfantiumharMeisterLabs Information     Agari lets you send messages to your doctor, view your test results, renew your prescriptions, schedule appointments and more. To sign up, go to [email or web address] . Click on \"Log in\" on the left side of the screen, which will take you to the Welcome page. Then click on \"Sign up Now\" on the right side of the page. "     You will be asked to enter the access code listed below, as well as some personal information. Please follow the directions to create your username and password.     Your access code is: [email or web address]  Expires: 2017  1:10 PM     Your access code will  in 90 days. If you need help or a new code, please call your [facility] clinic or [phone].        Care EveryWhere ID     This is your Care EveryWhere ID. This could be used by other organizations to access your [facility] medical records  [id number]        Your Vitals Were     Pulse Respirations                64 16           Blood Pressure from Last 3 Encounters:   17 128/74   17 138/78   10/16/17 136/64    Weight from Last 3 Encounters:   17 119 lb 8 oz (54.2 kg)   17 123 lb (55.8 kg)   17 124 lb (56.2 kg)              Today, you had the following     No orders found for display       Primary Care Provider Office Phone # Fax #    Ulysses Baker [phone] [address] [facility] [address]        Equal Access to Services     GENO DUNN : Hadii aad ku hadasho Soomaali, waaxda luqadaha, qaybta kaalmada adeegyada, leighann higginsn ben meraz. So [facility] [phone].    ATENCIÓN: Si habla español, tiene a cueva disposición servicios gratuitos de asistencia lingüística. Lizzame al [phone].    We comply with applicable federal civil rights laws and [place] laws. We do not discriminate on the basis of race, color, national origin, age, disability, sex, sexual orientation, or gender identity.            Thank you!     Thank you for choosing [facility]  for your care. Our goal is always to provide you with excellent care. Hearing back from our patients is one way we can continue to improve our services. Please take a few minutes to complete the written survey that you may receive in the mail after your visit with us. Thank you!             Your Updated Medication  List - Protect others around you: Learn how to safely use, store and throw away your medicines at www.disposemymeds.org.          This list is accurate as of: 11/13/17  2:52 PM.  Always use your most recent med list.                   Brand Name Dispense Instructions for use Diagnosis    ascorbic acid 250 MG Chew chewable tablet    vitamin C     Take 1 tablet by mouth daily.        ASPIRIN NOT PRESCRIBED    INTENTIONAL    0    due to coumadin    Myalgias       Calcium carb-Vitamin D 500 mg [place]-200 units 500-200 MG-UNIT per tablet    OSCAL with D;Oyster Shell Calcium     Take 1 tablet by mouth 2 times daily (with meals).        iron 325 (65 FE) MG tablet      Take  by mouth. Taking two per week.        metoprolol 50 MG tablet    LOPRESSOR    93 tablet    Take 1.5 tablets (75 mg) by mouth 2 times daily    Essential hypertension       multivitamin per tablet      Take 1 tablet by mouth daily.        NEW MED      Fiber supplement daily.        predniSONE 1 MG tablet    DELTASONE    100 tablet    Take 1.5 tablets (1.5 mg) by mouth daily Start at 3 mg daily and adjust as directed.    Polymyalgia rheumatica (H)       triamterene-hydrochlorothiazide 37.5-25 MG per tablet    MAXZIDE-25    30 tablet    Take 1/2 pill twice daily    Essential hypertension with goal blood pressure less than 140/90       warfarin 5 MG tablet    COUMADIN    90 tablet    Take 5mg by mouth Monday and 2.5mg all other days or as directed by Anticoagulation Clinic    Long term current use of anticoagulant therapy

## 2017-12-05 ENCOUNTER — OFFICE VISIT (OUTPATIENT)
Dept: DERMATOLOGY | Facility: CLINIC | Age: 77
End: 2017-12-05
Payer: COMMERCIAL

## 2017-12-05 VITALS — SYSTOLIC BLOOD PRESSURE: 161 MMHG | DIASTOLIC BLOOD PRESSURE: 93 MMHG | HEART RATE: 76 BPM | OXYGEN SATURATION: 99 %

## 2017-12-05 DIAGNOSIS — D18.00 ANGIOMA: ICD-10-CM

## 2017-12-05 DIAGNOSIS — Z85.828 HISTORY OF SKIN CANCER: Primary | ICD-10-CM

## 2017-12-05 DIAGNOSIS — L82.1 SK (SEBORRHEIC KERATOSIS): ICD-10-CM

## 2017-12-05 DIAGNOSIS — L81.4 LENTIGO: ICD-10-CM

## 2017-12-05 PROCEDURE — 99213 OFFICE O/P EST LOW 20 MIN: CPT | Performed by: DERMATOLOGY

## 2017-12-05 NOTE — LETTER
12/5/2017         RE: Nayely Kay  [address]  [address]  [place] [address]        Dear Colleague,    Thank you for referring your patient, Nayely Kay, to the [facility]. Please see a copy of my visit note below.    Nayely Kay is a 77 year old year old female patient here today for f/u hx of non-melanoma skin cancer.  She denies any new or changing skin lesions.  Associated symptoms: none.  Patient has no other skin complaints today.  Remainder of the HPI, Meds, PMH, Allergies, FH, and SH was reviewed in chart.      Past Medical History:   Diagnosis Date     Atrial fibrillation (H)      Migraine, unspecified, without mention of intractable migraine without mention of status migrainosus     Migraine HA     Squamous cell carcinoma      Unspecified essential hypertension      Unspecified tinnitus        Past Surgical History:   Procedure Laterality Date     COLONOSCOPY  2009     FLEXIBLE SIGMOIDOSCOPY  9/2004     SURGICAL HISTORY OF -       tubal     SURGICAL HISTORY OF -       oral teeth        Family History   Problem Relation Age of Onset     HEART DISEASE Mother      Valvular disease- age 55.     CEREBROVASCULAR DISEASE Father      age 75.     Neurologic Disorder Daughter      MS     Circulatory Brother      joe hanna-cleaned out     Breast Cancer Maternal Aunt        Social History     Social History     Marital status:      Spouse name: N/A     Number of children: N/A     Years of education: N/A     Occupational History     Not on file.     Social History Main Topics     Smoking status: Never Smoker     Smokeless tobacco: Never Used     Alcohol use No     Drug use: No     Sexual activity: Not Currently     Other Topics Concern     Parent/Sibling W/ Cabg, Mi Or Angioplasty Before 65f 55m? No     Social History Narrative       Outpatient Encounter Prescriptions as of 12/5/2017   Medication Sig Dispense Refill     predniSONE (DELTASONE) 1 MG tablet Take 1.5 tablets  (1.5 mg) by mouth daily Start at 3 mg daily and adjust as directed. 100 tablet 3     warfarin (COUMADIN) 5 MG tablet Take 5mg by mouth Monday and 2.5mg all other days or as directed by Anticoagulation Clinic 90 tablet 3     metoprolol (LOPRESSOR) 50 MG tablet Take 1.5 tablets (75 mg) by mouth 2 times daily 93 tablet 11     triamterene-hydrochlorothiazide (MAXZIDE-25) 37.5-25 MG per tablet Take 1/2 pill twice daily 30 tablet 11     NEW MED Fiber supplement daily.       Ferrous Sulfate (IRON) 325 (65 FE) MG tablet Take  by mouth. Taking two per week.       Multiple Vitamin (MULTIVITAMIN) per tablet Take 1 tablet by mouth daily.       ascorbic acid (VITAMIN C) 250 MG CHEW Take 1 tablet by mouth daily.  0     calcium carb 1250 mg, 500 mg [place],/vitamin D 200 units (OSCAL WITH D) 500-200 MG-UNIT per tablet Take 1 tablet by mouth 2 times daily (with meals).       ASPIRIN NOT PRESCRIBED (INTENTIONAL) due to coumadin 0 0     No facility-administered encounter medications on file as of 12/5/2017.              Review Of Systems  Skin: As above  Eyes: negative  Ears/Nose/Throat: negative  Respiratory: No shortness of breath, dyspnea on exertion, cough, or hemoptysis  Cardiovascular: negative  Gastrointestinal: negative  Genitourinary: negative  Musculoskeletal: negative  Neurologic: negative  Psychiatric: negative  Hematologic/Lymphatic/Immunologic: negative  Endocrine: negative      O:   NAD, WDWN, Alert & Oriented, Mood & Affect wnl, Vitals stable   Here today alone   BP (!) 161/93  Pulse 76  SpO2 99%   General appearance normal   Vitals stable   Alert, oriented and in no acute distress      Following lymph nodes palpated: Occipital, Cervical, Supraclavicular no lad   Stuck on papules and brown macules on trunk and ext    Red papules on trunk         The remainder of the full exam was unremarkable; the following areas were examined:  conjunctiva/lids, oral mucosa, neck, peripheral vascular system, abdomen, lymph nodes,  digits/nails, eccrine and apocrine glands, scalp/hair, face, neck, chest, abdomen, buttocks, back, RUE, LUE, RLE, LLE       Eyes: Conjunctivae/lids:Normal     ENT: Lips, buccal mucosa, tongue: normal    MSK:Normal    Cardiovascular: peripheral edema none    Pulm: Breathing Normal    Lymph Nodes: No Head and Neck Lymphadenopathy     Neuro/Psych: Orientation:Normal; Mood/Affect:Normal      A/P:  1. Hx of non-melanoma skin cancer , seborrheic keratosis, lentigo, angioma  BENIGN LESIONS DISCUSSED WITH PATIENT:  I discussed the specifics of tumor, prognosis, and genetics of benign lesions.  I explained that treatment of these lesions would be purely cosmetic and not medically neccessary.  I discussed with patient different removal options including excision, cautery and /or laser.      Nature and genetics of benign skin lesions dicussed with patient.  Signs and Symptoms of skin cancer discussed with patient.  Patient encouraged to perform monthly skin exams.  UV precautions reviewed with patient.  Skin care regimen reviewed with patient: Eliminate harsh soaps, i.e. Dial, zest, irsih spring; Mild soaps such as Cetaphil or Dove sensitive skin, avoid hot or cold showers, aggressive use of emollients including vanicream, cetaphil or cerave discussed with patient.    Risks of non-melanoma skin cancer discussed with patient   Return to clinic 12 months      Again, thank you for allowing me to participate in the care of your patient.        Sincerely,        Jareth Pedroza MD

## 2017-12-05 NOTE — NURSING NOTE
"Initial BP (!) 161/93  Pulse 76  SpO2 99% Estimated body mass index is 22.58 kg/(m^2) as calculated from the following:    Height as of 11/8/17: 1.549 m (5' 1\").    Weight as of 11/8/17: 54.2 kg (119 lb 8 oz). .      "

## 2017-12-05 NOTE — MR AVS SNAPSHOT
"              After Visit Summary   12/5/2017    Nayely Kay    MRN: [id number]           Patient Information     Date Of Birth          1940        Visit Information        Provider Department      12/5/2017 10:45 AM Jareth Pedroza MD [facility]        Today's Diagnoses     History of skin cancer    -  1    Lentigo        SK (seborrheic keratosis)        Angioma           Follow-ups after your visit        Your next 10 appointments already scheduled     Dec 11, 2017  2:15 PM CST   Anticoagulation Visit with NB ANTI COAG   [facility] ([facility]    [address] [place] [id number] [phone]              Who to contact     If you have questions or need follow up information about today's clinic visit or your schedule please contact [facility] directly at [phone].  Normal or non-critical lab and imaging results will be communicated to you by [email or web address], letter or phone within 4 business days after the clinic has received the results. If you do not hear from us within 7 days, please contact the clinic through MyChart or phone. If you have a critical or abnormal lab result, we will notify you by phone as soon as possible.  Submit refill requests through Cue or call your pharmacy and they will forward the refill request to us. Please allow 3 business days for your refill to be completed.          Additional Information About Your Visit        MyChart Information     Cue lets you send messages to your doctor, view your test results, renew your prescriptions, schedule appointments and more. To sign up, go to [email or web address] . Click on \"Log in\" on the left side of the screen, which will take you to the Welcome page. Then click on \"Sign up Now\" on the right side of the page.     You will be asked to enter the access code listed below, as well as some personal information. Please follow the directions to " create your username and password.     Your access code is: [email or web address]  Expires: 2017  1:10 PM     Your access code will  in 90 days. If you need help or a new code, please call your [facility] clinic or [phone].        Care EveryWhere ID     This is your Care EveryWhere ID. This could be used by other organizations to access your [facility] medical records  [id number]        Your Vitals Were     Pulse Pulse Oximetry                76 99%           Blood Pressure from Last 3 Encounters:   17 (!) 161/93   17 128/74   17 138/78    Weight from Last 3 Encounters:   17 54.2 kg (119 lb 8 oz)   17 55.8 kg (123 lb)   17 56.2 kg (124 lb)              Today, you had the following     No orders found for display       Primary Care Provider Office Phone # Fax #    Ulysses Baker [phone] [address] [facility] [address] [facility] [address]        Equal Access to Services     GENO DUNN : Hadii aad ku hadasho Soomaali, waaxda luqadaha, qaybta kaalmada ademakenzie, leighann de oliveira . So [facility] [phone].    ATENCIÓN: Si habla español, tiene a cueva disposición servicios gratuitos de asistencia lingüística. Jordan al [phone].    We comply with applicable federal civil rights laws and [place] laws. We do not discriminate on the basis of race, color, national origin, age, disability, sex, sexual orientation, or gender identity.            Thank you!     Thank you for choosing [facility]  for your care. Our goal is always to provide you with excellent care. Hearing back from our patients is one way we can continue to improve our services. Please take a few minutes to complete the written survey that you may receive in the mail after your visit with us. Thank you!             Your Updated Medication List - Protect others around you: Learn how to safely use, store and throw away your medicines at www.disposemymeds.org.           This list is accurate as of: 12/5/17 12:11 PM.  Always use your most recent med list.                   Brand Name Dispense Instructions for use Diagnosis    ascorbic acid 250 MG Chew chewable tablet    vitamin C     Take 1 tablet by mouth daily.        ASPIRIN NOT PRESCRIBED    INTENTIONAL    0    due to coumadin    Myalgias       Calcium carb-Vitamin D 500 mg [place]-200 units 500-200 MG-UNIT per tablet    OSCAL with D;Oyster Shell Calcium     Take 1 tablet by mouth 2 times daily (with meals).        iron 325 (65 FE) MG tablet      Take  by mouth. Taking two per week.        metoprolol 50 MG tablet    LOPRESSOR    93 tablet    Take 1.5 tablets (75 mg) by mouth 2 times daily    Essential hypertension       multivitamin per tablet      Take 1 tablet by mouth daily.        NEW MED      Fiber supplement daily.        predniSONE 1 MG tablet    DELTASONE    100 tablet    Take 1.5 tablets (1.5 mg) by mouth daily Start at 3 mg daily and adjust as directed.    Polymyalgia rheumatica (H)       triamterene-hydrochlorothiazide 37.5-25 MG per tablet    MAXZIDE-25    30 tablet    Take 1/2 pill twice daily    Essential hypertension with goal blood pressure less than 140/90       warfarin 5 MG tablet    COUMADIN    90 tablet    Take 5mg by mouth Monday and 2.5mg all other days or as directed by Anticoagulation Clinic    Long term current use of anticoagulant therapy

## 2017-12-05 NOTE — PROGRESS NOTES
Nayely Kay is a 77 year old year old female patient here today for f/u hx of non-melanoma skin cancer.  She denies any new or changing skin lesions.  Associated symptoms: none.  Patient has no other skin complaints today.  Remainder of the HPI, Meds, PMH, Allergies, FH, and SH was reviewed in chart.      Past Medical History:   Diagnosis Date     Atrial fibrillation (H)      Migraine, unspecified, without mention of intractable migraine without mention of status migrainosus     Migraine HA     Squamous cell carcinoma      Unspecified essential hypertension      Unspecified tinnitus        Past Surgical History:   Procedure Laterality Date     COLONOSCOPY  2009     FLEXIBLE SIGMOIDOSCOPY  9/2004     SURGICAL HISTORY OF -       tubal     SURGICAL HISTORY OF -       oral teeth        Family History   Problem Relation Age of Onset     HEART DISEASE Mother      Valvular disease- age 55.     CEREBROVASCULAR DISEASE Father      age 75.     Neurologic Disorder Daughter      MS Maciel Brother      joe hanna-cleaned out     Breast Cancer Maternal Aunt        Social History     Social History     Marital status:      Spouse name: N/A     Number of children: N/A     Years of education: N/A     Occupational History     Not on file.     Social History Main Topics     Smoking status: Never Smoker     Smokeless tobacco: Never Used     Alcohol use No     Drug use: No     Sexual activity: Not Currently     Other Topics Concern     Parent/Sibling W/ Cabg, Mi Or Angioplasty Before 65f 55m? No     Social History Narrative       Outpatient Encounter Prescriptions as of 12/5/2017   Medication Sig Dispense Refill     predniSONE (DELTASONE) 1 MG tablet Take 1.5 tablets (1.5 mg) by mouth daily Start at 3 mg daily and adjust as directed. 100 tablet 3     warfarin (COUMADIN) 5 MG tablet Take 5mg by mouth Monday and 2.5mg all other days or as directed by Anticoagulation Clinic 90 tablet 3     metoprolol (LOPRESSOR) 50  MG tablet Take 1.5 tablets (75 mg) by mouth 2 times daily 93 tablet 11     triamterene-hydrochlorothiazide (MAXZIDE-25) 37.5-25 MG per tablet Take 1/2 pill twice daily 30 tablet 11     NEW MED Fiber supplement daily.       Ferrous Sulfate (IRON) 325 (65 FE) MG tablet Take  by mouth. Taking two per week.       Multiple Vitamin (MULTIVITAMIN) per tablet Take 1 tablet by mouth daily.       ascorbic acid (VITAMIN C) 250 MG CHEW Take 1 tablet by mouth daily.  0     calcium carb 1250 mg, 500 mg [place],/vitamin D 200 units (OSCAL WITH D) 500-200 MG-UNIT per tablet Take 1 tablet by mouth 2 times daily (with meals).       ASPIRIN NOT PRESCRIBED (INTENTIONAL) due to coumadin 0 0     No facility-administered encounter medications on file as of 12/5/2017.              Review Of Systems  Skin: As above  Eyes: negative  Ears/Nose/Throat: negative  Respiratory: No shortness of breath, dyspnea on exertion, cough, or hemoptysis  Cardiovascular: negative  Gastrointestinal: negative  Genitourinary: negative  Musculoskeletal: negative  Neurologic: negative  Psychiatric: negative  Hematologic/Lymphatic/Immunologic: negative  Endocrine: negative      O:   NAD, WDWN, Alert & Oriented, Mood & Affect wnl, Vitals stable   Here today alone   BP (!) 161/93  Pulse 76  SpO2 99%   General appearance normal   Vitals stable   Alert, oriented and in no acute distress      Following lymph nodes palpated: Occipital, Cervical, Supraclavicular no lad   Stuck on papules and brown macules on trunk and ext    Red papules on trunk         The remainder of the full exam was unremarkable; the following areas were examined:  conjunctiva/lids, oral mucosa, neck, peripheral vascular system, abdomen, lymph nodes, digits/nails, eccrine and apocrine glands, scalp/hair, face, neck, chest, abdomen, buttocks, back, RUE, LUE, RLE, LLE       Eyes: Conjunctivae/lids:Normal     ENT: Lips, buccal mucosa, tongue: normal    MSK:Normal    Cardiovascular: peripheral edema  none    Pulm: Breathing Normal    Lymph Nodes: No Head and Neck Lymphadenopathy     Neuro/Psych: Orientation:Normal; Mood/Affect:Normal      A/P:  1. Hx of non-melanoma skin cancer , seborrheic keratosis, lentigo, angioma  BENIGN LESIONS DISCUSSED WITH PATIENT:  I discussed the specifics of tumor, prognosis, and genetics of benign lesions.  I explained that treatment of these lesions would be purely cosmetic and not medically neccessary.  I discussed with patient different removal options including excision, cautery and /or laser.      Nature and genetics of benign skin lesions dicussed with patient.  Signs and Symptoms of skin cancer discussed with patient.  Patient encouraged to perform monthly skin exams.  UV precautions reviewed with patient.  Skin care regimen reviewed with patient: Eliminate harsh soaps, i.e. Dial, zest, irsih spring; Mild soaps such as Cetaphil or Dove sensitive skin, avoid hot or cold showers, aggressive use of emollients including vanicream, cetaphil or cerave discussed with patient.    Risks of non-melanoma skin cancer discussed with patient   Return to clinic 12 months

## 2017-12-11 ENCOUNTER — ANTICOAGULATION THERAPY VISIT (OUTPATIENT)
Dept: ANTICOAGULATION | Facility: CLINIC | Age: 77
End: 2017-12-11
Payer: COMMERCIAL

## 2017-12-11 ENCOUNTER — ALLIED HEALTH/NURSE VISIT (OUTPATIENT)
Dept: FAMILY MEDICINE | Facility: CLINIC | Age: 77
End: 2017-12-11
Payer: COMMERCIAL

## 2017-12-11 VITALS — RESPIRATION RATE: 16 BRPM | HEART RATE: 76 BPM | SYSTOLIC BLOOD PRESSURE: 132 MMHG | DIASTOLIC BLOOD PRESSURE: 62 MMHG

## 2017-12-11 DIAGNOSIS — Z79.01 LONG TERM CURRENT USE OF ANTICOAGULANT THERAPY: ICD-10-CM

## 2017-12-11 DIAGNOSIS — Z01.30 BP CHECK: Primary | ICD-10-CM

## 2017-12-11 DIAGNOSIS — I48.91 ATRIAL FIBRILLATION (H): ICD-10-CM

## 2017-12-11 LAB — INR POINT OF CARE: 2.9 (ref 0.86–1.14)

## 2017-12-11 PROCEDURE — 99207 ZZC NO CHARGE NURSE ONLY: CPT

## 2017-12-11 PROCEDURE — 36416 COLLJ CAPILLARY BLOOD SPEC: CPT

## 2017-12-11 PROCEDURE — 85610 PROTHROMBIN TIME: CPT | Mod: QW

## 2017-12-11 NOTE — MR AVS SNAPSHOT
Nayely WEST Rahul   12/11/2017 2:15 PM   Anticoagulation Therapy Visit    Description:  77 year old female   Provider:  NB ANTI COAFORTINO   Department:  [facility] Anticoag           INR as of 12/11/2017     Today's INR 2.9      Anticoagulation Summary as of 12/11/2017     INR goal 2.0-3.0   Today's INR 2.9   Full instructions 5 mg on Mon; 2.5 mg all other days   Next INR check 1/8/2018    Indications   Atrial fibrillation (H) [I48.91]  Long term current use of anticoagulant therapy [Z79.01]         Your next Anticoagulation Clinic appointment(s)     Jan 08, 2018  2:15 PM CST   Anticoagulation Visit with NB ANTI COAG   [facility]    [address] [place] [address]   [phone]              December 2017 Details    Sun Mon Tue Wed Thu Fri Sat          1               2                 3               4               5               6               7               8               9                 10               11      5 mg   See details      12      2.5 mg         13      2.5 mg         14      2.5 mg         15      2.5 mg         16      2.5 mg           17      2.5 mg         18      5 mg         19      2.5 mg         20      2.5 mg         21      2.5 mg         22      2.5 mg         23      2.5 mg           24      2.5 mg         25      5 mg         26      2.5 mg         27      2.5 mg         28      2.5 mg         29      2.5 mg         30      2.5 mg           31      2.5 mg                Date Details   12/11 This INR check               How to take your warfarin dose     To take:  2.5 mg Take 0.5 of a 5 mg tablet.    To take:  5 mg Take 1 of the 5 mg tablets.           January 2018 Details    Sun Mon Tue Wed Thu Fri Sat      1      5 mg         2      2.5 mg         3      2.5 mg         4      2.5 mg         5      2.5 mg         6      2.5 mg           7      2.5 mg         8            9               10               11               12                13                 14               15               16               17               18               19               20                 21               22               23               24               25               26               27                 28               29               30               31                   Date Details   No additional details    Date of next INR:  1/8/2018         How to take your warfarin dose     To take:  2.5 mg Take 0.5 of a 5 mg tablet.    To take:  5 mg Take 1 of the 5 mg tablets.

## 2017-12-11 NOTE — PROGRESS NOTES
ANTICOAGULATION FOLLOW-UP CLINIC VISIT    Patient Name:  Nayely Kay  Date:  12/11/2017  Contact Type:  Face to Face    SUBJECTIVE:     Patient Findings     Positives No Problem Findings           OBJECTIVE    INR Protime   Date Value Ref Range Status   12/11/2017 2.9 (A) 0.86 - 1.14 Final       ASSESSMENT / PLAN  No question data found.  Anticoagulation Summary as of 12/11/2017     INR goal 2.0-3.0   Today's INR 2.9   Maintenance plan 5 mg (5 mg x 1) on Mon; 2.5 mg (5 mg x 0.5) all other days   Full instructions 5 mg on Mon; 2.5 mg all other days   Weekly total 20 mg   Plan last modified Giselle Santillan RN (2/27/2017)   Next INR check 1/8/2018   Priority INR   Target end date Indefinite    Indications   Atrial fibrillation (H) [I48.91]  Long term current use of anticoagulant therapy [Z79.01]         Anticoagulation Episode Summary     INR check location     Preferred lab     Send INR reminders to [facility] CLINIC POOL    Comments * only wants dosing card. Pt uses 5mg tablets. wants to check every 4 weeks        Anticoagulation Care Providers     Provider Role Specialty Phone number    Ulysses Baker MD [facility] [phone]            See the Encounter Report to view Anticoagulation Flowsheet and Dosing Calendar (Go to Encounters tab in chart review, and find the Anticoagulation Therapy Visit)    Patient to continue the same warfarin maintenance dose, INR therapeutic.      Oly Mcwilliams RN

## 2017-12-11 NOTE — MR AVS SNAPSHOT
"              After Visit Summary   12/11/2017    Nayely Kay    MRN: [id number]           Patient Information     Date Of Birth          1940        Visit Information        Provider Department      12/11/2017 2:30 PM [place] NB RN [facility]        Today's Diagnoses     BP check    -  1       Follow-ups after your visit        Your next 10 appointments already scheduled     Jan 08, 2018  2:15 PM CST   Anticoagulation Visit with NB ANTI COAG   [facility] ([facility])    [address] [place] [phone] [phone]            Jan 08, 2018  2:30 PM CST   Nurse Only with [place] NB RN   [facility] ([facility])    [address] [place] [phone]              Who to contact     If you have questions or need follow up information about today's clinic visit or your schedule please contact [facility] directly at [phone].  Normal or non-critical lab and imaging results will be communicated to you by Probityhart, letter or phone within 4 business days after the clinic has received the results. If you do not hear from us within 7 days, please contact the clinic through Probityhart or phone. If you have a critical or abnormal lab result, we will notify you by phone as soon as possible.  Submit refill requests through [email or web address] or call your pharmacy and they will forward the refill request to us. Please allow 3 business days for your refill to be completed.          Additional Information About Your Visit        Probityhart Information     [email or web address] lets you send messages to your doctor, view your test results, renew your prescriptions, schedule appointments and more. To sign up, go to [email or web address] . Click on \"Log in\" on the left side of the screen, which will take you to the Welcome page. Then click on \"Sign up Now\" on the right side of the page.     You will be asked to " enter the access code listed below, as well as some personal information. Please follow the directions to create your username and password.     Your access code is: [email or web address]  Expires: 2017  1:10 PM     Your access code will  in 90 days. If you need help or a new code, please call your [place] clinic or [phone].        Care EveryWhere ID     This is your Care EveryWhere ID. This could be used by other organizations to access your [place] medical records  [id number]        Your Vitals Were     Pulse Respirations                76 16           Blood Pressure from Last 3 Encounters:   17 132/62   17 (!) 161/93   17 128/74    Weight from Last 3 Encounters:   17 119 lb 8 oz (54.2 kg)   17 123 lb (55.8 kg)   17 124 lb (56.2 kg)              Today, you had the following     No orders found for display       Primary Care Provider Office Phone # Fax #    Ulysses Baker [phone] [address] [facility] [address]        Equal Access to Services     [facility] AH: Hadii aad ku hadasho Soomaali, waaxda luqadaha, qaybta kaalmada adeegyada, leighann jacobsin hayrominan ben de oliveira ah. So [facility] [phone].    ATENCIÓN: Si habla español, tiene a cueva disposición servicios gratuitos de asistencia lingüística. Llame al [phone].    We comply with applicable federal civil rights laws and [place] laws. We do not discriminate on the basis of race, color, national origin, age, disability, sex, sexual orientation, or gender identity.            Thank you!     Thank you for choosing [facility]  for your care. Our goal is always to provide you with excellent care. Hearing back from our patients is one way we can continue to improve our services. Please take a few minutes to complete the written survey that you may receive in the mail after your visit with us. Thank you!             Your Updated Medication List - Protect others  around you: Learn how to safely use, store and throw away your medicines at www.disposemymeds.org.          This list is accurate as of: 12/11/17  2:49 PM.  Always use your most recent med list.                   Brand Name Dispense Instructions for use Diagnosis    ascorbic acid 250 MG Chew chewable tablet    vitamin C     Take 1 tablet by mouth daily.        ASPIRIN NOT PRESCRIBED    INTENTIONAL    0    due to coumadin    Myalgias       Calcium carb-Vitamin D 500 mg [place]-200 units 500-200 MG-UNIT per tablet    OSCAL with D;Oyster Shell Calcium     Take 1 tablet by mouth 2 times daily (with meals).        iron 325 (65 FE) MG tablet      Take  by mouth. Taking two per week.        metoprolol 50 MG tablet    LOPRESSOR    93 tablet    Take 1.5 tablets (75 mg) by mouth 2 times daily    Essential hypertension       multivitamin per tablet      Take 1 tablet by mouth daily.        NEW MED      Fiber supplement daily.        predniSONE 1 MG tablet    DELTASONE    100 tablet    Take 1.5 tablets (1.5 mg) by mouth daily Start at 3 mg daily and adjust as directed.    Polymyalgia rheumatica (H)       triamterene-hydrochlorothiazide 37.5-25 MG per tablet    MAXZIDE-25    30 tablet    Take 1/2 pill twice daily    Essential hypertension with goal blood pressure less than 140/90       warfarin 5 MG tablet    COUMADIN    90 tablet    Take 5mg by mouth Monday and 2.5mg all other days or as directed by Anticoagulation Clinic    Long term current use of anticoagulant therapy

## 2017-12-13 DIAGNOSIS — M35.3 POLYMYALGIA RHEUMATICA (H): ICD-10-CM

## 2017-12-13 RX ORDER — PREDNISONE 1 MG/1
TABLET ORAL
Qty: 100 TABLET | Refills: 2 | Status: SHIPPED | OUTPATIENT
Start: 2017-12-13 | End: 2018-03-14

## 2017-12-13 NOTE — TELEPHONE ENCOUNTER
Prednisone      Last Written Prescription Date:  10/16/2017  Last Fill Quantity: 100,   # refills: 3  Last Office Visit: 11/08/2017  Future Office visit:    Next 5 appointments (look out 90 days)     Jan 08, 2018  2:30 PM CST   Nurse Only with JUWAN SHIN RN   [facility]    [address] [place] [phone]   [phone]                   Routing refill request to provider for review/approval because:  Drug not on the [facility], [facility] or  Health refill protocol or controlled substance      Griffin Hernandez RT (R)

## 2018-01-08 ENCOUNTER — ANTICOAGULATION THERAPY VISIT (OUTPATIENT)
Dept: ANTICOAGULATION | Facility: CLINIC | Age: 78
End: 2018-01-08
Payer: COMMERCIAL

## 2018-01-08 ENCOUNTER — ALLIED HEALTH/NURSE VISIT (OUTPATIENT)
Dept: FAMILY MEDICINE | Facility: CLINIC | Age: 78
End: 2018-01-08
Payer: COMMERCIAL

## 2018-01-08 VITALS — DIASTOLIC BLOOD PRESSURE: 78 MMHG | HEART RATE: 68 BPM | SYSTOLIC BLOOD PRESSURE: 132 MMHG | RESPIRATION RATE: 16 BRPM

## 2018-01-08 DIAGNOSIS — Z79.01 LONG TERM CURRENT USE OF ANTICOAGULANT THERAPY: ICD-10-CM

## 2018-01-08 DIAGNOSIS — I48.91 ATRIAL FIBRILLATION (H): ICD-10-CM

## 2018-01-08 DIAGNOSIS — Z01.30 BP CHECK: Primary | ICD-10-CM

## 2018-01-08 LAB — INR POINT OF CARE: 2.9 (ref 0.86–1.14)

## 2018-01-08 PROCEDURE — 36416 COLLJ CAPILLARY BLOOD SPEC: CPT

## 2018-01-08 PROCEDURE — 99207 ZZC NO CHARGE NURSE ONLY: CPT

## 2018-01-08 PROCEDURE — 85610 PROTHROMBIN TIME: CPT | Mod: QW

## 2018-01-08 NOTE — MR AVS SNAPSHOT
Nayely Kay   1/8/2018 2:15 PM   Anticoagulation Therapy Visit    Description:  77 year old female   Provider:  NB ANTI COAG   Department:  Nb Anticoag           INR as of 1/8/2018     Today's INR 2.9      Anticoagulation Summary as of 1/8/2018     INR goal 2.0-3.0   Today's INR 2.9   Full instructions 5 mg on Mon; 2.5 mg all other days   Next INR check 2/5/2018    Indications   Atrial fibrillation (H) [I48.91]  Long term current use of anticoagulant therapy [Z79.01]         January 2018 Details    Sun Mon Tue Wed Thu Fri Sat      1               2               3               4               5               6                 7               8      5 mg   See details      9      2.5 mg         10      2.5 mg         11      2.5 mg         12      2.5 mg         13      2.5 mg           14      2.5 mg         15      5 mg         16      2.5 mg         17      2.5 mg         18      2.5 mg         19      2.5 mg         20      2.5 mg           21      2.5 mg         22      5 mg         23      2.5 mg         24      2.5 mg         25      2.5 mg         26      2.5 mg         27      2.5 mg           28      2.5 mg         29      5 mg         30      2.5 mg         31      2.5 mg             Date Details   01/08 This INR check               How to take your warfarin dose     To take:  2.5 mg Take 0.5 of a 5 mg tablet.    To take:  5 mg Take 1 of the 5 mg tablets.           February 2018 Details    Sun Mon Tue Wed Thu Fri Sat         1      2.5 mg         2      2.5 mg         3      2.5 mg           4      2.5 mg         5            6               7               8               9               10                 11               12               13               14               15               16               17                 18               19               20               21               22               23               24                 25               26               27                28                   Date Details   No additional details    Date of next INR:  2/5/2018         How to take your warfarin dose     To take:  2.5 mg Take 0.5 of a 5 mg tablet.    To take:  5 mg Take 1 of the 5 mg tablets.

## 2018-01-08 NOTE — PROGRESS NOTES
ANTICOAGULATION FOLLOW-UP CLINIC VISIT    Patient Name:  Nayely Kay  Date:  1/8/2018  Contact Type:  Face to Face    SUBJECTIVE:     Patient Findings     Positives No Problem Findings           OBJECTIVE    INR Protime   Date Value Ref Range Status   01/08/2018 2.9 (A) 0.86 - 1.14 Final       ASSESSMENT / PLAN  INR assessment THER    Recheck INR In: 4 WEEKS    INR Location Clinic      Anticoagulation Summary as of 1/8/2018     INR goal 2.0-3.0   Today's INR 2.9   Maintenance plan 5 mg (5 mg x 1) on Mon; 2.5 mg (5 mg x 0.5) all other days   Full instructions 5 mg on Mon; 2.5 mg all other days   Weekly total 20 mg   Plan last modified Giselle Santillan RN (2/27/2017)   Next INR check 2/5/2018   Priority INR   Target end date Indefinite    Indications   Atrial fibrillation (H) [I48.91]  Long term current use of anticoagulant therapy [Z79.01]         Anticoagulation Episode Summary     INR check location     Preferred lab     Send INR reminders to [facility] CLINIC POOL    Comments * only wants dosing card. Pt uses 5mg tablets. wants to check every 4 weeks        Anticoagulation Care Providers     Provider Role Specialty Phone number    Ulysses Baker MD [facility] [phone]            See the Encounter Report to view Anticoagulation Flowsheet and Dosing Calendar (Go to Encounters tab in chart review, and find the Anticoagulation Therapy Visit)    Patient to continue the same warfarin maintenance dose, INR therapeutic.      Oly Mcwilliams RN

## 2018-01-08 NOTE — PROGRESS NOTES
Nayely Kay is a 77 year old female who comes in today for a Blood Pressure check because of ongoing blood pressure monitoring.    Vitals as recorded, a regular cuff was used.    Patient is taking medication as prescribed  Patient is tolerating medications well.  Patient is not monitoring Blood Pressure at home.      Current complaints: none    Disposition: patient to continue with the same medication and continue monthly BP check  Nicolette Granger RN      Toenails trimmed with out incident  Nicolette Granger RN

## 2018-01-08 NOTE — MR AVS SNAPSHOT
"              After Visit Summary   2018    Nayely Kay    MRN: [id number]           Patient Information     Date Of Birth          1940        Visit Information        Provider Department      2018 2:30 PM [place] NB RN [facility]        Today's Diagnoses     BP check    -  1       Follow-ups after your visit        Who to contact     If you have questions or need follow up information about today's clinic visit or your schedule please contact [facility] directly at [phone].  Normal or non-critical lab and imaging results will be communicated to you by MyChart, letter or phone within 4 business days after the clinic has received the results. If you do not hear from us within 7 days, please contact the clinic through Boutique Windowhart or phone. If you have a critical or abnormal lab result, we will notify you by phone as soon as possible.  Submit refill requests through Goshi or call your pharmacy and they will forward the refill request to us. Please allow 3 business days for your refill to be completed.          Additional Information About Your Visit        MyChart Information     Goshi lets you send messages to your doctor, view your test results, renew your prescriptions, schedule appointments and more. To sign up, go to [email or web address] . Click on \"Log in\" on the left side of the screen, which will take you to the Welcome page. Then click on \"Sign up Now\" on the right side of the page.     You will be asked to enter the access code listed below, as well as some personal information. Please follow the directions to create your username and password.     Your access code is: [email or web address]  Expires: 2018  3:08 PM     Your access code will  in 90 days. If you need help or a new code, please call your [facility] or [phone].        Care EveryWhere ID     This is your Care EveryWhere ID. This could be used by other organizations to access your " [place] medical records  [id number]        Your Vitals Were     Pulse Respirations                68 16           Blood Pressure from Last 3 Encounters:   01/08/18 132/78   12/11/17 132/62   12/05/17 (!) 161/93    Weight from Last 3 Encounters:   11/08/17 119 lb 8 oz (54.2 kg)   07/13/17 123 lb (55.8 kg)   05/04/17 124 lb (56.2 kg)              Today, you had the following     No orders found for display       Primary Care Provider Office Phone # Fax #    Ulysses Baker [phone] [address] [facility] [address]        Equal Access to Services     DERIAN DUNN : Srini canoo Sonalini, waaxda hedy, qaybta kaalmada ademonicayawillam, leighann meraz. So [facility] [phone].    ATENCIÓN: Si habla español, tiene a cueva disposición servicios gratuitos de asistencia lingüística. Llame al [phone].    We comply with applicable federal civil rights laws and [place] laws. We do not discriminate on the basis of race, color, national origin, age, disability, sex, sexual orientation, or gender identity.            Thank you!     Thank you for choosing [facility]  for your care. Our goal is always to provide you with excellent care. Hearing back from our patients is one way we can continue to improve our services. Please take a few minutes to complete the written survey that you may receive in the mail after your visit with us. Thank you!             Your Updated Medication List - Protect others around you: Learn how to safely use, store and throw away your medicines at www.disposemymeds.org.          This list is accurate as of: 1/8/18  3:08 PM.  Always use your most recent med list.                   Brand Name Dispense Instructions for use Diagnosis    ascorbic acid 250 MG Chew chewable tablet    vitamin C     Take 1 tablet by mouth daily.        ASPIRIN NOT PRESCRIBED    INTENTIONAL    0    due to coumadin    Myalgias       Calcium  carb-Vitamin D 500 mg [place]-200 units 500-200 MG-UNIT per tablet    OSCAL with D;Oyster Shell Calcium     Take 1 tablet by mouth 2 times daily (with meals).        iron 325 (65 FE) MG tablet      Take  by mouth. Taking two per week.        metoprolol 50 MG tablet    LOPRESSOR    93 tablet    Take 1.5 tablets (75 mg) by mouth 2 times daily    Essential hypertension       multivitamin per tablet      Take 1 tablet by mouth daily.        NEW MED      Fiber supplement daily.        predniSONE 1 MG tablet    DELTASONE    100 tablet    START AT 3 MG DAILY AND ADJUST AS DIRECTED.    Polymyalgia rheumatica (H)       triamterene-hydrochlorothiazide 37.5-25 MG per tablet    MAXZIDE-25    30 tablet    Take 1/2 pill twice daily    Essential hypertension with goal blood pressure less than 140/90       warfarin 5 MG tablet    COUMADIN    90 tablet    Take 5mg by mouth Monday and 2.5mg all other days or as directed by Anticoagulation Clinic    Long term current use of anticoagulant therapy

## 2018-02-05 ENCOUNTER — ANTICOAGULATION THERAPY VISIT (OUTPATIENT)
Dept: ANTICOAGULATION | Facility: CLINIC | Age: 78
End: 2018-02-05
Payer: COMMERCIAL

## 2018-02-05 ENCOUNTER — ALLIED HEALTH/NURSE VISIT (OUTPATIENT)
Dept: FAMILY MEDICINE | Facility: CLINIC | Age: 78
End: 2018-02-05
Payer: COMMERCIAL

## 2018-02-05 VITALS — RESPIRATION RATE: 16 BRPM | HEART RATE: 68 BPM | SYSTOLIC BLOOD PRESSURE: 160 MMHG | DIASTOLIC BLOOD PRESSURE: 82 MMHG

## 2018-02-05 DIAGNOSIS — Z01.30 BLOOD PRESSURE CHECK: Primary | ICD-10-CM

## 2018-02-05 DIAGNOSIS — Z79.01 LONG TERM CURRENT USE OF ANTICOAGULANT THERAPY: ICD-10-CM

## 2018-02-05 DIAGNOSIS — I48.91 ATRIAL FIBRILLATION (H): ICD-10-CM

## 2018-02-05 LAB — INR POINT OF CARE: 1.9 (ref 0.86–1.14)

## 2018-02-05 PROCEDURE — 36416 COLLJ CAPILLARY BLOOD SPEC: CPT

## 2018-02-05 PROCEDURE — 99207 ZZC NO CHARGE NURSE ONLY: CPT

## 2018-02-05 PROCEDURE — 85610 PROTHROMBIN TIME: CPT | Mod: QW

## 2018-02-05 NOTE — MR AVS SNAPSHOT
"              After Visit Summary   2/5/2018    Nayely Kay    MRN: [id number]           Patient Information     Date Of Birth          1940        Visit Information        Provider Department      2/5/2018 2:15 PM [place] NB RN [facility]        Care Instructions    Appointment on 2-6-18 with Dr Greenberg at 9:20          Follow-ups after your visit        Your next 10 appointments already scheduled     Feb 06, 2018  9:20 AM CST   SHORT with Oswaldo Greenberg MD   [facility] ([facility])    [address] [place] [id number] [phone]            Mar 05, 2018  2:30 PM CST   Anticoagulation Visit with NB ANTI COAG   [facility] ([facility])    [address] [place] [phone]              Who to contact     If you have questions or need follow up information about today's clinic visit or your schedule please contact [facility] directly at [phone].  Normal or non-critical lab and imaging results will be communicated to you by Impact Drivenhart, letter or phone within 4 business days after the clinic has received the results. If you do not hear from us within 7 days, please contact the clinic through Impact Drivenhart or phone. If you have a critical or abnormal lab result, we will notify you by phone as soon as possible.  Submit refill requests through Appoxee or call your pharmacy and they will forward the refill request to us. Please allow 3 business days for your refill to be completed.          Additional Information About Your Visit        Impact Drivenhart Information     Appoxee lets you send messages to your doctor, view your test results, renew your prescriptions, schedule appointments and more. To sign up, go to [email or web address] . Click on \"Log in\" on the left side of the screen, which will take you to the Welcome page. Then click on \"Sign up Now\" on the " right side of the page.     You will be asked to enter the access code listed below, as well as some personal information. Please follow the directions to create your username and password.     Your access code is: [email or web address]  Expires: 2018  3:08 PM     Your access code will  in 90 days. If you need help or a new code, please call your [facility] or [phone].        Care EveryWhere ID     This is your Care EveryWhere ID. This could be used by other organizations to access your [place] medical records  [id number]        Your Vitals Were     Pulse Respirations                68 16           Blood Pressure from Last 3 Encounters:   18 160/82   18 132/78   17 132/62    Weight from Last 3 Encounters:   17 119 lb 8 oz (54.2 kg)   17 123 lb (55.8 kg)   17 124 lb (56.2 kg)              Today, you had the following     No orders found for display       Primary Care Provider Office Phone # Fax #    Ulysses Sarabjit Baker [phone] [address] [facility] [address]        Equal Access to Services     DERIAN DUNN AH: Hadii alejandro canoo Sonisreenali, waaxda luqadaha, qaybta kaalmada adeegyada, leighann meraz. So [facility] [phone].    ATENCIÓN: Si habla español, tiene a cueva disposición servicios gratuitos de asistencia lingüística. Jordan al [phone].    We comply with applicable federal civil rights laws and [place] laws. We do not discriminate on the basis of race, color, national origin, age, disability, sex, sexual orientation, or gender identity.            Thank you!     Thank you for choosing [facility]  for your care. Our goal is always to provide you with excellent care. Hearing back from our patients is one way we can continue to improve our services. Please take a few minutes to complete the written survey that you may receive in the mail after your visit with us. Thank you!              Your Updated Medication List - Protect others around you: Learn how to safely use, store and throw away your medicines at www.disposemymeds.org.          This list is accurate as of 2/5/18  2:57 PM.  Always use your most recent med list.                   Brand Name Dispense Instructions for use Diagnosis    ascorbic acid 250 MG Chew chewable tablet    vitamin C     Take 1 tablet by mouth daily.        ASPIRIN NOT PRESCRIBED    INTENTIONAL    0    due to coumadin    Myalgias       Calcium carb-Vitamin D 500 mg [place]-200 units 500-200 MG-UNIT per tablet    OSCAL with D;Oyster Shell Calcium     Take 1 tablet by mouth 2 times daily (with meals).        iron 325 (65 FE) MG tablet      Take  by mouth. Taking two per week.        metoprolol tartrate 50 MG tablet    LOPRESSOR    93 tablet    Take 1.5 tablets (75 mg) by mouth 2 times daily    Essential hypertension       multivitamin per tablet      Take 1 tablet by mouth daily.        NEW MED      Fiber supplement daily.        predniSONE 1 MG tablet    DELTASONE    100 tablet    START AT 3 MG DAILY AND ADJUST AS DIRECTED.    Polymyalgia rheumatica (H)       triamterene-hydrochlorothiazide 37.5-25 MG per tablet    MAXZIDE-25    30 tablet    Take 1/2 pill twice daily    Essential hypertension with goal blood pressure less than 140/90       warfarin 5 MG tablet    COUMADIN    90 tablet    Take 5mg by mouth Monday and 2.5mg all other days or as directed by Anticoagulation Clinic    Long term current use of anticoagulant therapy

## 2018-02-05 NOTE — PROGRESS NOTES
ANTICOAGULATION FOLLOW-UP CLINIC VISIT    Patient Name:  Nayely Kay  Date:  2/5/2018  Contact Type:  Face to Face    SUBJECTIVE:     Patient Findings     Positives Missed doses (patient decided to only take 2.5mg last Monday due to her eating habits)           OBJECTIVE    INR Protime   Date Value Ref Range Status   02/05/2018 1.9 (A) 0.86 - 1.14 Final       ASSESSMENT / PLAN  No question data found.  Anticoagulation Summary as of 2/5/2018     INR goal 2.0-3.0   Today's INR 1.9!   Maintenance plan 5 mg (5 mg x 1) on Mon; 2.5 mg (5 mg x 0.5) all other days   Full instructions 5 mg on Mon; 2.5 mg all other days   Weekly total 20 mg   No change documented Oly Mcwilliams RN   Plan last modified Giselle Santillan RN (2/27/2017)   Next INR check 3/5/2018   Priority INR   Target end date Indefinite    Indications   Atrial fibrillation (H) [I48.91]  Long term current use of anticoagulant therapy [Z79.01]         Anticoagulation Episode Summary     INR check location     Preferred lab     Send INR reminders to [facility] CLINIC POOL    Comments * only wants dosing card. Pt uses 5mg tablets. wants to check every 4 weeks        Anticoagulation Care Providers     Provider Role Specialty Phone number    Ulysses Baker MD [facility] [phone]            See the Encounter Report to view Anticoagulation Flowsheet and Dosing Calendar (Go to Encounters tab in chart review, and find the Anticoagulation Therapy Visit)    Patient advised to continue the same warfarin maintenance dose, INR therapeutic.      Oly Mcwilliams, SEKOU

## 2018-02-05 NOTE — MR AVS SNAPSHOT
Nayely WEST Neftaliphuong   2/5/2018 2:00 PM   Anticoagulation Therapy Visit    Description:  77 year old female   Provider:  NB ANTI COAFORTINO   Department:  [facility] Anticoag           INR as of 2/5/2018     Today's INR 1.9!      Anticoagulation Summary as of 2/5/2018     INR goal 2.0-3.0   Today's INR 1.9!   Full instructions 5 mg on Mon; 2.5 mg all other days   Next INR check 3/5/2018    Indications   Atrial fibrillation (H) [I48.91]  Long term current use of anticoagulant therapy [Z79.01]         Your next Anticoagulation Clinic appointment(s)     Mar 05, 2018  2:30 PM CST   Anticoagulation Visit with NB ANTI COAG   [facility] ([facility]    [address] [place] [id number]   [phone]              February 2018 Details    Sun Mon Tue Wed Thu Fri Sat         1               2               3                 4               5      5 mg   See details      6      2.5 mg         7      2.5 mg         8      2.5 mg         9      2.5 mg         10      2.5 mg           11      2.5 mg         12      5 mg         13      2.5 mg         14      2.5 mg         15      2.5 mg         16      2.5 mg         17      2.5 mg           18      2.5 mg         19      5 mg         20      2.5 mg         21      2.5 mg         22      2.5 mg         23      2.5 mg         24      2.5 mg           25      2.5 mg         26      5 mg         27      2.5 mg         28      2.5 mg             Date Details   02/05 This INR check               How to take your warfarin dose     To take:  2.5 mg Take 0.5 of a 5 mg tablet.    To take:  5 mg Take 1 of the 5 mg tablets.           March 2018 Details    Sun Mon Tue Wed Thu Fri Sat         1      2.5 mg         2      2.5 mg         3      2.5 mg           4      2.5 mg         5            6               7               8               9               10                 11               12               13               14               15                16               17                 18               19               20               21               22               23               24                 25               26               27               28               29               30               31                Date Details   No additional details    Date of next INR:  3/5/2018         How to take your warfarin dose     To take:  2.5 mg Take 0.5 of a 5 mg tablet.    To take:  5 mg Take 1 of the 5 mg tablets.

## 2018-02-05 NOTE — PROGRESS NOTES
Nayely Kay is a 77 year old female who comes in today for a Blood Pressure check because of ongoing blood pressure monitoring.    Vitals as recorded, a regular cuff was used.    Patient is taking medication as prescribed  Patient is tolerating medications well.  Patient is not monitoring Blood Pressure at home.      Current complaints: none    Disposition: patient instructed to see Dr Greenberg tomorrow.  Nicolette Granger RN

## 2018-02-06 ENCOUNTER — OFFICE VISIT (OUTPATIENT)
Dept: FAMILY MEDICINE | Facility: CLINIC | Age: 78
End: 2018-02-06
Payer: COMMERCIAL

## 2018-02-06 VITALS
DIASTOLIC BLOOD PRESSURE: 76 MMHG | TEMPERATURE: 98.3 F | HEIGHT: 59 IN | WEIGHT: 119 LBS | BODY MASS INDEX: 23.99 KG/M2 | HEART RATE: 68 BPM | SYSTOLIC BLOOD PRESSURE: 132 MMHG | RESPIRATION RATE: 14 BRPM

## 2018-02-06 DIAGNOSIS — I10 HYPERTENSION, GOAL BELOW 140/90: Primary | ICD-10-CM

## 2018-02-06 PROCEDURE — 99213 OFFICE O/P EST LOW 20 MIN: CPT | Performed by: FAMILY MEDICINE

## 2018-02-06 NOTE — NURSING NOTE
"Chief Complaint   Patient presents with     Hypertension       Initial /80  Pulse 68  Temp 98.3  F (36.8  C) (Tympanic)  Resp 14  Ht 4' 11.25\" (1.505 m)  Wt 119 lb (54 kg)  BMI 23.83 kg/m2 Estimated body mass index is 23.83 kg/(m^2) as calculated from the following:    Height as of this encounter: 4' 11.25\" (1.505 m).    Weight as of this encounter: 119 lb (54 kg).      Health Maintenance that is potentially due pending provider review:  NONE    n/a    Is there anyone who you would like to be able to receive your results? No  If yes have patient fill out BOLA      "

## 2018-02-06 NOTE — PATIENT INSTRUCTIONS
1. Lets stay with current medications.     2. Better get this checked in two weeks.     3. Same meds for now.

## 2018-02-06 NOTE — MR AVS SNAPSHOT
"              After Visit Summary   2/6/2018    Nayely Kay    MRN: [id number]           Patient Information     Date Of Birth          1940        Visit Information        Provider Department      2/6/2018 9:20 AM Oswaldo Greenberg MD [facility]        Care Instructions    1. Lets stay with current medications.     2. Better get this checked in two weeks.     3. Same meds for now.           Follow-ups after your visit        Your next 10 appointments already scheduled     Mar 05, 2018  2:30 PM CST   Anticoagulation Visit with NB ANTI COAG   [facility] ([facility]    [address] [place] [id number] [phone]            Mar 05, 2018  2:45 PM CST   Nurse Only with [place] NB RN   [facility] ([facility])    [address] [place] [address] [phone]              Who to contact     If you have questions or need follow up information about today's clinic visit or your schedule please contact [facility] directly at [phone].  Normal or non-critical lab and imaging results will be communicated to you by Curiyohart, letter or phone within 4 business days after the clinic has received the results. If you do not hear from us within 7 days, please contact the clinic through ShareTrackert or phone. If you have a critical or abnormal lab result, we will notify you by phone as soon as possible.  Submit refill requests through Gyft or call your pharmacy and they will forward the refill request to us. Please allow 3 business days for your refill to be completed.          Additional Information About Your Visit        MyChart Information     Gyft lets you send messages to your doctor, view your test results, renew your prescriptions, schedule appointments and more. To sign up, go to [email or web address] . Click on \"Log in\" on the left side of the screen, which " "will take you to the Welcome page. Then click on \"Sign up Now\" on the right side of the page.     You will be asked to enter the access code listed below, as well as some personal information. Please follow the directions to create your username and password.     Your access code is: [email or web address]  Expires: 2018  3:08 PM     Your access code will  in 90 days. If you need help or a new code, please call your [facility] or [phone].        Care EveryWhere ID     This is your Care EveryWhere ID. This could be used by other organizations to access your [facility] medical records  [id number]        Your Vitals Were     Pulse Temperature Respirations Height BMI (Body Mass Index)       68 98.3  F (36.8  C) (Tympanic) 14 4' 11.25\" (1.505 m) 23.83 kg/m2        Blood Pressure from Last 3 Encounters:   18 132/76   18 160/82   18 132/78    Weight from Last 3 Encounters:   18 119 lb (54 kg)   17 119 lb 8 oz (54.2 kg)   17 123 lb (55.8 kg)              Today, you had the following     No orders found for display       Primary Care Provider Office Phone # Fax #    Ulysses Baker [phone] [address] [facility] [address]        Equal Access to Services     [facility]: Hadii alejandro canoo Sonalini, waaxda luqadaha, qaybta kaalmada florin, leighann meraz. So [facility] [phone].    ATENCIÓN: Si habla español, tiene a cueva disposición servicios gratuitos de asistencia lingüística. Llelmer al [phone].    We comply with applicable federal civil rights laws and [place] laws. We do not discriminate on the basis of race, color, national origin, age, disability, sex, sexual orientation, or gender identity.            Thank you!     Thank you for choosing [facility]  for your care. Our goal is always to provide you with excellent care. Hearing back from our patients is one way we can continue to " improve our services. Please take a few minutes to complete the written survey that you may receive in the mail after your visit with us. Thank you!             Your Updated Medication List - Protect others around you: Learn how to safely use, store and throw away your medicines at www.disposemymeds.org.          This list is accurate as of 2/6/18  9:48 AM.  Always use your most recent med list.                   Brand Name Dispense Instructions for use Diagnosis    ascorbic acid 250 MG Chew chewable tablet    vitamin C     Take 1 tablet by mouth daily.        ASPIRIN NOT PRESCRIBED    INTENTIONAL    0    due to coumadin    Myalgias       Calcium carb-Vitamin D 500 mg [place]-200 units 500-200 MG-UNIT per tablet    OSCAL with D;Oyster Shell Calcium     Take 1 tablet by mouth 2 times daily (with meals).        iron 325 (65 FE) MG tablet      Take  by mouth. Taking two per week.        metoprolol tartrate 50 MG tablet    LOPRESSOR    93 tablet    Take 1.5 tablets (75 mg) by mouth 2 times daily    Essential hypertension       multivitamin per tablet      Take 1 tablet by mouth daily.        NEW MED      Fiber supplement daily.        predniSONE 1 MG tablet    DELTASONE    100 tablet    START AT 3 MG DAILY AND ADJUST AS DIRECTED.    Polymyalgia rheumatica (H)       triamterene-hydrochlorothiazide 37.5-25 MG per tablet    MAXZIDE-25    30 tablet    Take 1/2 pill twice daily    Essential hypertension with goal blood pressure less than 140/90       warfarin 5 MG tablet    COUMADIN    90 tablet    Take 5mg by mouth Monday and 2.5mg all other days or as directed by Anticoagulation Clinic    Long term current use of anticoagulant therapy

## 2018-02-06 NOTE — PROGRESS NOTES
SUBJECTIVE:   Nayely Kay is a 77 year old female who presents to clinic today for the following health issues: This lady comes in today for the follow-up of her recently found elevated blood pressure.  She does not do home blood pressure readings in today when she first presents her blood pressure is up but it comes down nicely with rest.      Hypertension Follow-up      Outpatient blood pressures are not being checked.    Low Salt Diet: light salt            Problem list and histories reviewed & adjusted, as indicated.  Additional history: as documented    Patient Active Problem List   Diagnosis     Hypertension, goal below 140/90     Headache     Atrial fibrillation (H)     Polymyalgia rheumatica (H)     Iron deficiency anemia     HYPERLIPIDEMIA LDL GOAL <130     Osteopenia     Family history of breast cancer     Eczema     Advanced directives, counseling/discussion     AK (actinic keratosis)     Ganglion cyst     Long term current use of anticoagulant therapy     Essential hypertension     Tubular adenoma     Past Surgical History:   Procedure Laterality Date     COLONOSCOPY  2009     FLEXIBLE SIGMOIDOSCOPY  9/2004     SURGICAL HISTORY OF -       tubal     SURGICAL HISTORY OF -       oral teeth       Social History   Substance Use Topics     Smoking status: Never Smoker     Smokeless tobacco: Never Used     Alcohol use No     Family History   Problem Relation Age of Onset     HEART DISEASE Mother      Valvular disease- age 55.     CEREBROVASCULAR DISEASE Father      age 75.     Neurologic Disorder Daughter      MS     Janell Brother      joe hanna-cleaned out     Breast Cancer Maternal Aunt          Current Outpatient Prescriptions   Medication Sig Dispense Refill     predniSONE (DELTASONE) 1 MG tablet START AT 3 MG DAILY AND ADJUST AS DIRECTED. 100 tablet 2     warfarin (COUMADIN) 5 MG tablet Take 5mg by mouth Monday and 2.5mg all other days or as directed by Anticoagulation Clinic 90 tablet 3  "    metoprolol (LOPRESSOR) 50 MG tablet Take 1.5 tablets (75 mg) by mouth 2 times daily 93 tablet 11     triamterene-hydrochlorothiazide (MAXZIDE-25) 37.5-25 MG per tablet Take 1/2 pill twice daily 30 tablet 11     NEW MED Fiber supplement daily.       Ferrous Sulfate (IRON) 325 (65 FE) MG tablet Take  by mouth. Taking two per week.       Multiple Vitamin (MULTIVITAMIN) per tablet Take 1 tablet by mouth daily.       ascorbic acid (VITAMIN C) 250 MG CHEW Take 1 tablet by mouth daily.  0     calcium carb 1250 mg, 500 mg [place],/vitamin D 200 units (OSCAL WITH D) 500-200 MG-UNIT per tablet Take 1 tablet by mouth 2 times daily (with meals).       ASPIRIN NOT PRESCRIBED (INTENTIONAL) due to coumadin 0 0     BP Readings from Last 3 Encounters:   02/06/18 132/76   02/05/18 160/82   01/08/18 132/78    Wt Readings from Last 3 Encounters:   02/06/18 119 lb (54 kg)   11/08/17 119 lb 8 oz (54.2 kg)   07/13/17 123 lb (55.8 kg)                    Reviewed and updated as needed this visit by clinical staff  Allergies       Reviewed and updated as needed this visit by Provider         ROS:  C: NEGATIVE for fever, chills, change in weight  E/M: NEGATIVE for ear, mouth and throat problems  R: NEGATIVE for significant cough or SOB  CV: NEGATIVE for chest pain, palpitations or peripheral edema    OBJECTIVE:     /76  Pulse 68  Temp 98.3  F (36.8  C) (Tympanic)  Resp 14  Ht 4' 11.25\" (1.505 m)  Wt 119 lb (54 kg)  BMI 23.83 kg/m2  Body mass index is 23.83 kg/(m^2).  GENERAL: healthy, alert and no distress  NECK: no adenopathy, no asymmetry, masses, or scars and thyroid normal to palpation  RESP: lungs clear to auscultation - no rales, rhonchi or wheezes  CV: regular rate and rhythm, normal S1 S2, no S3 or S4, no murmur, click or rub, no peripheral edema and peripheral pulses strong  ABDOMEN: soft, nontender, no hepatosplenomegaly, no masses and bowel sounds normal  MS: no gross musculoskeletal defects noted, no " edema        ASSESSMENT/PLAN:     ASSESSMENT/PLAN:      ICD-10-CM    1. Hypertension, goal below 140/90 I10        Patient Instructions   1. Lets stay with current medications.     2. Better get this checked in two weeks.     3. Same meds for now.               There are no diagnoses linked to this encounter.        Oswaldo Greenberg MD  [facility]

## 2018-02-20 ENCOUNTER — ALLIED HEALTH/NURSE VISIT (OUTPATIENT)
Dept: FAMILY MEDICINE | Facility: CLINIC | Age: 78
End: 2018-02-20
Payer: COMMERCIAL

## 2018-02-20 VITALS — DIASTOLIC BLOOD PRESSURE: 74 MMHG | HEART RATE: 72 BPM | SYSTOLIC BLOOD PRESSURE: 138 MMHG | RESPIRATION RATE: 16 BRPM

## 2018-02-20 DIAGNOSIS — Z01.30 BLOOD PRESSURE CHECK: Primary | ICD-10-CM

## 2018-02-20 PROCEDURE — 99207 ZZC NO CHARGE NURSE ONLY: CPT

## 2018-02-20 NOTE — MR AVS SNAPSHOT
"              After Visit Summary   2/20/2018    Nayely Kay    MRN: [id number]           Patient Information     Date Of Birth          1940        Visit Information        Provider Department      2/20/2018 1:00 PM [place] NB RN [facility]        Today's Diagnoses     Blood pressure check    -  1       Follow-ups after your visit        Your next 10 appointments already scheduled     Mar 05, 2018  2:30 PM CST   Anticoagulation Visit with NB ANTI COAG   [facility] ([facility])    [address] [place] [id number] [phone]            Mar 05, 2018  2:45 PM CST   Nurse Only with [place] NB RN   [facility] ([facility])    [address] [place] [id number] [phone]              Who to contact     If you have questions or need follow up information about today's clinic visit or your schedule please contact [facility] directly at [phone].  Normal or non-critical lab and imaging results will be communicated to you by Tower Cloudhart, letter or phone within 4 business days after the clinic has received the results. If you do not hear from us within 7 days, please contact the clinic through [employer] or phone. If you have a critical or abnormal lab result, we will notify you by phone as soon as possible.  Submit refill requests through Noster Mobile or call your pharmacy and they will forward the refill request to us. Please allow 3 business days for your refill to be completed.          Additional Information About Your Visit        Tower Cloudhart Information     Noster Mobile lets you send messages to your doctor, view your test results, renew your prescriptions, schedule appointments and more. To sign up, go to [email or web address] . Click on \"Log in\" on the left side of the screen, which will take you to the Welcome page. Then click on \"Sign up Now\" on the right side of the page.     You will " be asked to enter the access code listed below, as well as some personal information. Please follow the directions to create your username and password.     Your access code is: [email or web address]  Expires: 2018  3:08 PM     Your access code will  in 90 days. If you need help or a new code, please call your [place] clinic or [phone].        Care EveryWhere ID     This is your Care EveryWhere ID. This could be used by other organizations to access your [place] medical records  [id number]        Your Vitals Were     Pulse Respirations                72 16           Blood Pressure from Last 3 Encounters:   18 138/74   18 132/76   18 160/82    Weight from Last 3 Encounters:   18 119 lb (54 kg)   17 119 lb 8 oz (54.2 kg)   17 123 lb (55.8 kg)              Today, you had the following     No orders found for display       Primary Care Provider Office Phone # Fax #    Ulysses Baker [phone] [address] [facility] [address]        Equal Access to Services     [facility]: Hadii aad ku hadasho Soomaali, waaxda luqadaha, qaybta kaalmada adeegyada, leighann de oliveira . So [facility] [phone].    ATENCIÓN: Si habla español, tiene a cueva disposición servicios gratuitos de asistencia lingüística. Llame al [phone].    We comply with applicable federal civil rights laws and [place] laws. We do not discriminate on the basis of race, color, national origin, age, disability, sex, sexual orientation, or gender identity.            Thank you!     Thank you for choosing [facility]  for your care. Our goal is always to provide you with excellent care. Hearing back from our patients is one way we can continue to improve our services. Please take a few minutes to complete the written survey that you may receive in the mail after your visit with us. Thank you!             Your Updated Medication List - Protect  others around you: Learn how to safely use, store and throw away your medicines at www.disposemymeds.org.          This list is accurate as of 2/20/18  1:37 PM.  Always use your most recent med list.                   Brand Name Dispense Instructions for use Diagnosis    ascorbic acid 250 MG Chew chewable tablet    vitamin C     Take 1 tablet by mouth daily.        ASPIRIN NOT PRESCRIBED    INTENTIONAL    0    due to coumadin    Myalgias       Calcium carb-Vitamin D 500 mg [place]-200 units 500-200 MG-UNIT per tablet    OSCAL with D;Oyster Shell Calcium     Take 1 tablet by mouth 2 times daily (with meals).        iron 325 (65 FE) MG tablet      Take  by mouth. Taking two per week.        metoprolol tartrate 50 MG tablet    LOPRESSOR    93 tablet    Take 1.5 tablets (75 mg) by mouth 2 times daily    Essential hypertension       multivitamin per tablet      Take 1 tablet by mouth daily.        NEW MED      Fiber supplement daily.        predniSONE 1 MG tablet    DELTASONE    100 tablet    START AT 3 MG DAILY AND ADJUST AS DIRECTED.    Polymyalgia rheumatica (H)       triamterene-hydrochlorothiazide 37.5-25 MG per tablet    MAXZIDE-25    30 tablet    Take 1/2 pill twice daily    Essential hypertension with goal blood pressure less than 140/90       warfarin 5 MG tablet    COUMADIN    90 tablet    Take 5mg by mouth Monday and 2.5mg all other days or as directed by Anticoagulation Clinic    Long term current use of anticoagulant therapy

## 2018-03-05 ENCOUNTER — ALLIED HEALTH/NURSE VISIT (OUTPATIENT)
Dept: FAMILY MEDICINE | Facility: CLINIC | Age: 78
End: 2018-03-05
Payer: COMMERCIAL

## 2018-03-05 ENCOUNTER — ANTICOAGULATION THERAPY VISIT (OUTPATIENT)
Dept: ANTICOAGULATION | Facility: CLINIC | Age: 78
End: 2018-03-05
Payer: COMMERCIAL

## 2018-03-05 VITALS — SYSTOLIC BLOOD PRESSURE: 146 MMHG | DIASTOLIC BLOOD PRESSURE: 62 MMHG | HEART RATE: 72 BPM | RESPIRATION RATE: 16 BRPM

## 2018-03-05 DIAGNOSIS — Z79.01 LONG TERM CURRENT USE OF ANTICOAGULANT THERAPY: ICD-10-CM

## 2018-03-05 DIAGNOSIS — I48.91 ATRIAL FIBRILLATION (H): ICD-10-CM

## 2018-03-05 DIAGNOSIS — Z01.30 BP CHECK: Primary | ICD-10-CM

## 2018-03-05 LAB — INR POINT OF CARE: 2.9 (ref 0.86–1.14)

## 2018-03-05 PROCEDURE — 85610 PROTHROMBIN TIME: CPT | Mod: QW

## 2018-03-05 PROCEDURE — 99207 ZZC NO CHARGE NURSE ONLY: CPT

## 2018-03-05 PROCEDURE — 36416 COLLJ CAPILLARY BLOOD SPEC: CPT

## 2018-03-05 NOTE — PROGRESS NOTES
Nayely Kay is a 77 year old female who comes in today for a Blood Pressure check because of ongoing blood pressure monitoring.    Vitals as recorded, a regular cuff was used.  BP today is 160/64 and 5 minutes later 146/62    Patient is taking medication as prescribed  Patient is tolerating medications well.  Patient is not monitoring Blood Pressure at home.      Current complaints: none    Disposition: patient to continue with the same medication.     Advised to check BP at home, but she does not have a BP monitor at home.  She will come into clinic for BP checks. She will try to check BP at home.   Nicolette Granger RN

## 2018-03-05 NOTE — PROGRESS NOTES
ANTICOAGULATION FOLLOW-UP CLINIC VISIT    Patient Name:  Nayely Kay  Date:  3/5/2018  Contact Type:  Face to Face    SUBJECTIVE:     Patient Findings     Positives No Problem Findings    Comments No changes in medications, activity, or diet noted. No bleeding or increased bruising noted. Took warfarin as prescribed.           OBJECTIVE    INR Protime   Date Value Ref Range Status   03/05/2018 2.9 (A) 0.86 - 1.14 Final       ASSESSMENT / PLAN  INR assessment THER    Recheck INR In: 4 WEEKS    INR Location Clinic      Anticoagulation Summary as of 3/5/2018     INR goal 2.0-3.0   Today's INR 2.9   Maintenance plan 5 mg (5 mg x 1) on Mon; 2.5 mg (5 mg x 0.5) all other days   Full instructions 5 mg on Mon; 2.5 mg all other days   Weekly total 20 mg   No change documented Luz Elena Barrios, SEKOU   Plan last modified Giselle Santillan RN (2/27/2017)   Next INR check 4/2/2018   Priority INR   Target end date Indefinite    Indications   Atrial fibrillation (H) [I48.91]  Long term current use of anticoagulant therapy [Z79.01]         Anticoagulation Episode Summary     INR check location     Preferred lab     Send INR reminders to [facility] CLINIC POOL    Comments * only wants dosing card. Pt uses 5mg tablets. wants to check every 4 weeks        Anticoagulation Care Providers     Provider Role Specialty Phone number    Ulysses Baker MD [facility] [phone]            See the Encounter Report to view Anticoagulation Flowsheet and Dosing Calendar (Go to Encounters tab in chart review, and find the Anticoagulation Therapy Visit)    Patient is to continue maintenance warfarin plan, see anticoagulation tracker.  Patient verbalizes understanding and agrees to plan. No further questions or concerns.    Luz Elena Barrios, SEKOU

## 2018-03-05 NOTE — MR AVS SNAPSHOT
"              After Visit Summary   3/5/2018    Nayely Kay    MRN: [id number]           Patient Information     Date Of Birth          1940        Visit Information        Provider Department      3/5/2018 2:45 PM FL NB RN [facility]        Today's Diagnoses     BP check    -  1       Follow-ups after your visit        Your next 10 appointments already scheduled     Apr 02, 2018  2:00 PM CDT   Anticoagulation Visit with NB ANTI COAG   [facility] ([facility]    [address] [place] [id number] [phone]              Who to contact     If you have questions or need follow up information about today's clinic visit or your schedule please contact [facility] directly at [phone].  Normal or non-critical lab and imaging results will be communicated to you by MyChart, letter or phone within 4 business days after the clinic has received the results. If you do not hear from us within 7 days, please contact the clinic through MyChart or phone. If you have a critical or abnormal lab result, we will notify you by phone as soon as possible.  Submit refill requests through QuesCom or call your pharmacy and they will forward the refill request to us. Please allow 3 business days for your refill to be completed.          Additional Information About Your Visit        MyChart Information     QuesCom lets you send messages to your doctor, view your test results, renew your prescriptions, schedule appointments and more. To sign up, go to [email or web address] . Click on \"Log in\" on the left side of the screen, which will take you to the Welcome page. Then click on \"Sign up Now\" on the right side of the page.     You will be asked to enter the access code listed below, as well as some personal information. Please follow the directions to create your username and password.     Your access code is: [email or web address]  Expires: " 2018  3:08 PM     Your access code will  in 90 days. If you need help or a new code, please call your [place] clinic or [phone].        Care EveryWhere ID     This is your Care EveryWhere ID. This could be used by other organizations to access your [place] medical records  [id number]        Your Vitals Were     Pulse Respirations                72 16           Blood Pressure from Last 3 Encounters:   18 146/62   18 138/74   18 132/76    Weight from Last 3 Encounters:   18 119 lb (54 kg)   17 119 lb 8 oz (54.2 kg)   17 123 lb (55.8 kg)              Today, you had the following     No orders found for display       Primary Care Provider Office Phone # Fax #    Ulysses Baker [phone] [address] [facility] [address]        Equal Access to Services     DERIAN DUNN AH: Hadii aad ku hadasho Soomaali, waaxda luqadaha, qaybta kaalmada adeegyada, waxay reynaldoin hayrominan ben de oliveira . So [facility] [phone].    ATENCIÓN: Si habla español, tiene a cueva disposición servicios gratuitos de asistencia lingüística. Llame al [phone].    We comply with applicable federal civil rights laws and [place] laws. We do not discriminate on the basis of race, color, national origin, age, disability, sex, sexual orientation, or gender identity.            Thank you!     Thank you for choosing [facility]  for your care. Our goal is always to provide you with excellent care. Hearing back from our patients is one way we can continue to improve our services. Please take a few minutes to complete the written survey that you may receive in the mail after your visit with us. Thank you!             Your Updated Medication List - Protect others around you: Learn how to safely use, store and throw away your medicines at www.disposemymeds.org.          This list is accurate as of 3/5/18  3:31 PM.  Always use your most recent med list.                    Brand Name Dispense Instructions for use Diagnosis    ascorbic acid 250 MG Chew chewable tablet    vitamin C     Take 1 tablet by mouth daily.        ASPIRIN NOT PRESCRIBED    INTENTIONAL    0    due to coumadin    Myalgias       Calcium carb-Vitamin D 500 mg [place]-200 units 500-200 MG-UNIT per tablet    OSCAL with D;Oyster Shell Calcium     Take 1 tablet by mouth 2 times daily (with meals).        iron 325 (65 FE) MG tablet      Take  by mouth. Taking two per week.        metoprolol tartrate 50 MG tablet    LOPRESSOR    93 tablet    Take 1.5 tablets (75 mg) by mouth 2 times daily    Essential hypertension       multivitamin per tablet      Take 1 tablet by mouth daily.        NEW MED      Fiber supplement daily.        predniSONE 1 MG tablet    DELTASONE    100 tablet    START AT 3 MG DAILY AND ADJUST AS DIRECTED.    Polymyalgia rheumatica (H)       triamterene-hydrochlorothiazide 37.5-25 MG per tablet    MAXZIDE-25    30 tablet    Take 1/2 pill twice daily    Essential hypertension with goal blood pressure less than 140/90       warfarin 5 MG tablet    COUMADIN    90 tablet    Take 5mg by mouth Monday and 2.5mg all other days or as directed by Anticoagulation Clinic    Long term current use of anticoagulant therapy

## 2018-03-05 NOTE — MR AVS SNAPSHOT
Nayely Kay   3/5/2018 2:30 PM   Anticoagulation Therapy Visit    Description:  77 year old female   Provider:  NB ANTI COAG   Department:  Nb Anticoag           INR as of 3/5/2018     Today's INR 2.9      Anticoagulation Summary as of 3/5/2018     INR goal 2.0-3.0   Today's INR 2.9   Full instructions 5 mg on Mon; 2.5 mg all other days   Next INR check 4/2/2018    Indications   Atrial fibrillation (H) [I48.91]  Long term current use of anticoagulant therapy [Z79.01]         Your next Anticoagulation Clinic appointment(s)     Apr 02, 2018  2:00 PM CDT   Anticoagulation Visit with NB ANTI COAG   [facility] ([facility]    [address] [place] [id number]   [phone]              March 2018 Details    Sun Mon Tue Wed Thu Fri Sat         1               2               3                 4               5      5 mg   See details      6      2.5 mg         7      2.5 mg         8      2.5 mg         9      2.5 mg         10      2.5 mg           11      2.5 mg         12      5 mg         13      2.5 mg         14      2.5 mg         15      2.5 mg         16      2.5 mg         17      2.5 mg           18      2.5 mg         19      5 mg         20      2.5 mg         21      2.5 mg         22      2.5 mg         23      2.5 mg         24      2.5 mg           25      2.5 mg         26      5 mg         27      2.5 mg         28      2.5 mg         29      2.5 mg         30      2.5 mg         31      2.5 mg          Date Details   03/05 This INR check               How to take your warfarin dose     To take:  2.5 mg Take 0.5 of a 5 mg tablet.    To take:  5 mg Take 1 of the 5 mg tablets.           April 2018 Details    Sun Mon Tue Wed Thu Fri Sat     1      2.5 mg         2            3               4               5               6               7                 8               9               10               11               12               13                14                 15               16               17               18               19               20               21                 22               23               24               25               26               27               28                 29               30                     Date Details   No additional details    Date of next INR:  4/2/2018         How to take your warfarin dose     To take:  2.5 mg Take 0.5 of a 5 mg tablet.    To take:  5 mg Take 1 of the 5 mg tablets.

## 2018-03-07 ENCOUNTER — TELEPHONE (OUTPATIENT)
Dept: FAMILY MEDICINE | Facility: CLINIC | Age: 78
End: 2018-03-07

## 2018-03-07 ENCOUNTER — OFFICE VISIT (OUTPATIENT)
Dept: FAMILY MEDICINE | Facility: CLINIC | Age: 78
End: 2018-03-07
Payer: COMMERCIAL

## 2018-03-07 VITALS — HEART RATE: 72 BPM | DIASTOLIC BLOOD PRESSURE: 64 MMHG | RESPIRATION RATE: 16 BRPM | SYSTOLIC BLOOD PRESSURE: 140 MMHG

## 2018-03-07 DIAGNOSIS — Z01.30 BP CHECK: Primary | ICD-10-CM

## 2018-03-07 PROCEDURE — 99207 ZZC NO CHARGE NURSE ONLY: CPT

## 2018-03-07 NOTE — TELEPHONE ENCOUNTER
Reason for Call:  Other     Detailed comments: Patient went to [facility] today and BP = 160-80 was the average - Patient wants to talk to Marianna YANG    Phone Number Patient can be reached at: Home number on file [phone] (home)    Best Time:     Can we leave a detailed message on this number? YES    Call taken on 3/7/2018 at 12:18 PM by Audra Monroe

## 2018-03-07 NOTE — MR AVS SNAPSHOT
"              After Visit Summary   3/7/2018    Nayely Kay    MRN: [id number]           Patient Information     Date Of Birth          1940        Visit Information        Provider Department      3/7/2018 1:00 PM FL NB RN [facility]        Today's Diagnoses     BP check    -  1       Follow-ups after your visit        Your next 10 appointments already scheduled     Mar 14, 2018  9:15 AM CDT   Nurse Only with [place] NB RN   [facility]    [address] [place] [id number] [phone]            Apr 02, 2018  2:00 PM CDT   Anticoagulation Visit with NB ANTI COAG   [facility]    [address] [place] [id number] [phone]              Who to contact     If you have questions or need follow up information about today's clinic visit or your schedule please contact [facility] directly at [phone].  Normal or non-critical lab and imaging results will be communicated to you by United Mobilehart, letter or phone within 4 business days after the clinic has received the results. If you do not hear from us within 7 days, please contact the clinic through [email or web address] or phone. If you have a critical or abnormal lab result, we will notify you by phone as soon as possible.  Submit refill requests through Bettymovil or call your pharmacy and they will forward the refill request to us. Please allow 3 business days for your refill to be completed.          Additional Information About Your Visit        United Mobile[employer] Information     Bettymovil lets you send messages to your doctor, view your test results, renew your prescriptions, schedule appointments and more. To sign up, go to [email or web address] . Click on \"Log in\" on the left side of the screen, which will take you to the Welcome page. Then click on \"Sign up Now\" on the right side of the page.     You will be asked to " enter the access code listed below, as well as some personal information. Please follow the directions to create your username and password.     Your access code is: [email or web address]  Expires: 2018  3:08 PM     Your access code will  in 90 days. If you need help or a new code, please call your [place] clinic or [phone].        Care EveryWhere ID     This is your Care EveryWhere ID. This could be used by other organizations to access your [place] medical records  [id number]        Your Vitals Were     Pulse Respirations                72 16           Blood Pressure from Last 3 Encounters:   18 140/64   18 146/62   18 138/74    Weight from Last 3 Encounters:   18 119 lb (54 kg)   17 119 lb 8 oz (54.2 kg)   17 123 lb (55.8 kg)              Today, you had the following     No orders found for display       Primary Care Provider Office Phone # Fax #    Ulysses Sarabjit Baker [phone] [address] [facility] [address]        Equal Access to Services     [facility] : Hadii aad ku hadasho Sonisreenali, waaxda luqadaha, qaybta kaalmada adeegyada, leighann de oliveira . So [facility] [phone].    ATENCIÓN: Si habla español, tiene a cueva disposición servicios gratuitos de asistencia lingüística. Llame al [phone].    We comply with applicable federal civil rights laws and [place] laws. We do not discriminate on the basis of race, color, national origin, age, disability, sex, sexual orientation, or gender identity.            Thank you!     Thank you for choosing [facility]  for your care. Our goal is always to provide you with excellent care. Hearing back from our patients is one way we can continue to improve our services. Please take a few minutes to complete the written survey that you may receive in the mail after your visit with us. Thank you!             Your Updated Medication List - Protect others around  you: Learn how to safely use, store and throw away your medicines at www.disposemymeds.org.          This list is accurate as of 3/7/18  2:42 PM.  Always use your most recent med list.                   Brand Name Dispense Instructions for use Diagnosis    ascorbic acid 250 MG Chew chewable tablet    vitamin C     Take 1 tablet by mouth daily.        ASPIRIN NOT PRESCRIBED    INTENTIONAL    0    due to coumadin    Myalgias       Calcium carb-Vitamin D 500 mg [place]-200 units 500-200 MG-UNIT per tablet    OSCAL with D;Oyster Shell Calcium     Take 1 tablet by mouth 2 times daily (with meals).        iron 325 (65 FE) MG tablet      Take  by mouth. Taking two per week.        metoprolol tartrate 50 MG tablet    LOPRESSOR    93 tablet    Take 1.5 tablets (75 mg) by mouth 2 times daily    Essential hypertension       multivitamin per tablet      Take 1 tablet by mouth daily.        NEW MED      Fiber supplement daily.        predniSONE 1 MG tablet    DELTASONE    100 tablet    START AT 3 MG DAILY AND ADJUST AS DIRECTED.    Polymyalgia rheumatica (H)       triamterene-hydrochlorothiazide 37.5-25 MG per tablet    MAXZIDE-25    30 tablet    Take 1/2 pill twice daily    Essential hypertension with goal blood pressure less than 140/90       warfarin 5 MG tablet    COUMADIN    90 tablet    Take 5mg by mouth Monday and 2.5mg all other days or as directed by Anticoagulation Clinic    Long term current use of anticoagulant therapy

## 2018-03-07 NOTE — PROGRESS NOTES
Nayely Kay is a 77 year old female who comes in today for a Blood Pressure check because of ongoing blood pressure monitoring.  Nayely was at [facility] and BP readings were high and she got anxious.    Vitals as recorded, a regular cuff was used.    Patient is taking medication as prescribed  Patient is tolerating medications well.  Patient is not monitoring Blood Pressure at home.      Current complaints: none    Disposition: patient to continue with the same medication and BP check as needed.  Nicolette Granger RN

## 2018-03-14 ENCOUNTER — OFFICE VISIT (OUTPATIENT)
Dept: FAMILY MEDICINE | Facility: CLINIC | Age: 78
End: 2018-03-14
Payer: COMMERCIAL

## 2018-03-14 VITALS
BODY MASS INDEX: 24.19 KG/M2 | TEMPERATURE: 98.2 F | WEIGHT: 120 LBS | SYSTOLIC BLOOD PRESSURE: 136 MMHG | HEIGHT: 59 IN | RESPIRATION RATE: 18 BRPM | DIASTOLIC BLOOD PRESSURE: 70 MMHG | HEART RATE: 60 BPM

## 2018-03-14 DIAGNOSIS — M35.3 POLYMYALGIA RHEUMATICA (H): ICD-10-CM

## 2018-03-14 DIAGNOSIS — Z79.01 LONG TERM CURRENT USE OF ANTICOAGULANT THERAPY: ICD-10-CM

## 2018-03-14 DIAGNOSIS — I10 HYPERTENSION, GOAL BELOW 140/90: Primary | ICD-10-CM

## 2018-03-14 PROCEDURE — 99214 OFFICE O/P EST MOD 30 MIN: CPT | Performed by: FAMILY MEDICINE

## 2018-03-14 RX ORDER — AMLODIPINE BESYLATE 2.5 MG/1
2.5 TABLET ORAL DAILY
Qty: 30 TABLET | Refills: 11 | Status: SHIPPED | OUTPATIENT
Start: 2018-03-14 | End: 2018-06-15

## 2018-03-14 RX ORDER — TRIAMTERENE/HYDROCHLOROTHIAZID 37.5-25 MG
TABLET ORAL
Qty: 30 TABLET | Refills: 11 | Status: SHIPPED | OUTPATIENT
Start: 2018-03-14 | End: 2018-06-15

## 2018-03-14 RX ORDER — METOPROLOL TARTRATE 50 MG
75 TABLET ORAL 2 TIMES DAILY
Qty: 93 TABLET | Refills: 11 | Status: SHIPPED | OUTPATIENT
Start: 2018-03-14 | End: 2018-06-15

## 2018-03-14 RX ORDER — PREDNISONE 1 MG/1
1 TABLET ORAL DAILY
Qty: 100 TABLET | Refills: 2 | COMMUNITY
Start: 2018-03-14 | End: 2018-06-15

## 2018-03-14 NOTE — PATIENT INSTRUCTIONS
ASSESSMENT:   (I10) Hypertension, goal below 140/90  (primary encounter diagnosis)  Comment: OK today but has been running a little high for systolic.   Plan: triamterene-hydrochlorothiazide (MAXZIDE-25)         37.5-25 MG per tablet, metoprolol tartrate         (LOPRESSOR) 50 MG tablet, amLODIPine (NORVASC)         2.5 MG tablet          Continue the metoprolol unchanged  Continue the Maxide as you have been.   Add small dose amlodipine at 2.5mg once daily.     (Z79.01) Long term current use of anticoagulant therapy  Comment: on warfarin.   Plan: No change in current treatment plan.     (M35.3) Polymyalgia rheumatica (H)  Comment: taking small dose of prednisone.  She has been gradually cutting down dose.   Plan: predniSONE (DELTASONE) 1 MG tablet        You can try cutting back further to 1mg every other day for 2-3 weeks and then stop if no pain problems.

## 2018-03-14 NOTE — NURSING NOTE
"Chief Complaint   Patient presents with     Hypertension       Initial /70  Pulse 60  Temp 98.2  F (36.8  C) (Tympanic)  Resp 18  Ht 4' 11.25\" (1.505 m)  Wt 120 lb (54.4 kg)  BMI 24.03 kg/m2 Estimated body mass index is 24.03 kg/(m^2) as calculated from the following:    Height as of this encounter: 4' 11.25\" (1.505 m).    Weight as of this encounter: 120 lb (54.4 kg).      Health Maintenance that is potentially due pending provider review:  NONE        Is there anyone who you would like to be able to receive your results? If yes have patient fill out BOLA    "

## 2018-03-14 NOTE — PROGRESS NOTES
"  SUBJECTIVE:   Nayely Kay is a 77 year old female who presents to clinic today for the following health issues:  Chief Complaint   Patient presents with     Hypertension      She has been feeling well.     Hypertension Follow-up      Outpatient blood pressures are being checked at store and clinic.     Low Salt Diet: low salt   Lite salt  Does not check at home.  Does come in to have blood pressure checked.   It has been a little high consistently this year-systolic into the 160s.        Amount of exercise or physical activity: None    Problems taking medications regularly: No    Medication side effects: none    Diet: low salt    Last clinic visit: 2/6-no med changes.     Patient Active Problem List   Diagnosis     Hypertension, goal below 140/90     Headache     Atrial fibrillation (H)     Polymyalgia rheumatica (H)     Iron deficiency anemia     HYPERLIPIDEMIA LDL GOAL <130     Osteopenia     Family history of breast cancer     Eczema     Advanced directives, counseling/discussion     AK (actinic keratosis)     Ganglion cyst     Long term current use of anticoagulant therapy     Essential hypertension     Tubular adenoma      ROS:General: No change in weight, sleep or appetite.  Normal energy.  No fever or chills  Resp: No coughing, wheezing or shortness of breath  CV: No chest pains or palpitations  GI: No nausea, vomiting,  heartburn, abdominal pain, diarrhea, constipation or change in bowel habits  : No urinary frequency or dysuria, bladder or kidney problems  Musculoskeletal: No significant muscle or joint pains  Psychiatric: POSITIVE for:, depressed mood at times.    She has a history of polymyalgia rheumatica.  She has been cutting back on prednisone.  Has been down to 1mg daily for the past 2-3 weeks.  Pain in thighs before and sore all over.     OBJECTIVE:Blood pressure 136/70, pulse 60, temperature 98.2  F (36.8  C), temperature source Tympanic, resp. rate 18, height 4' 11.25\" (1.505 m), weight " 120 lb (54.4 kg). BMI=Body mass index is 24.03 kg/(m^2).  GENERAL APPEARANCE ADULT: Alert, no acute distress  MS: extremities normal, no peripheral edema      ASSESSMENT:   (I10) Hypertension, goal below 140/90  (primary encounter diagnosis)  Comment: OK today but has been running a little high for systolic.   Plan: triamterene-hydrochlorothiazide (MAXZIDE-25)         37.5-25 MG per tablet, metoprolol tartrate         (LOPRESSOR) 50 MG tablet, amLODIPine (NORVASC)         2.5 MG tablet          Continue the metoprolol unchanged  Continue the Maxide as you have been.   Add small dose amlodipine at 2.5mg once daily.     (Z79.01) Long term current use of anticoagulant therapy  Comment: on warfarin.   Plan: No change in current treatment plan.     (M35.3) Polymyalgia rheumatica (H)  Comment: taking small dose of prednisone.  She has been gradually cutting down dose.   Plan: predniSONE (DELTASONE) 1 MG tablet        You can try cutting back further to 1mg every other day for 2-3 weeks and then stop if no pain problems.

## 2018-03-14 NOTE — MR AVS SNAPSHOT
After Visit Summary   3/14/2018    Nayely Kay    MRN: [id number]           Patient Information     Date Of Birth          1940        Visit Information        Provider Department      3/14/2018 9:40 AM Oswaldo Cedillo MD [facility]        Today's Diagnoses     Hypertension, goal below 140/90    -  1    Long term current use of anticoagulant therapy        Polymyalgia rheumatica (H)          Care Instructions    ASSESSMENT:   (I10) Hypertension, goal below 140/90  (primary encounter diagnosis)  Comment: OK today but has been running a little high for systolic.   Plan: triamterene-hydrochlorothiazide (MAXZIDE-25)         37.5-25 MG per tablet, metoprolol tartrate         (LOPRESSOR) 50 MG tablet, amLODIPine (NORVASC)         2.5 MG tablet          Continue the metoprolol unchanged  Continue the Maxide as you have been.   Add small dose amlodipine at 2.5mg once daily.     (Z79.01) Long term current use of anticoagulant therapy  Comment: on warfarin.   Plan: No change in current treatment plan.     (M35.3) Polymyalgia rheumatica (H)  Comment: taking small dose of prednisone.  She has been gradually cutting down dose.   Plan: predniSONE (DELTASONE) 1 MG tablet        You can try cutting back further to 1mg every other day for 2-3 weeks and then stop if no pain problems.           Follow-ups after your visit        Your next 10 appointments already scheduled     Apr 02, 2018  2:00 PM CDT   Anticoagulation Visit with NB ANTI COAG   [facility] ([facility])    [address] [place] [address] [phone]              Who to contact     If you have questions or need follow up information about today's clinic visit or your schedule please contact [facility] directly at [phone].  Normal or non-critical lab and imaging results will be communicated to you by MyChart, letter or phone within 4 business days  "after the clinic has received the results. If you do not hear from us within 7 days, please contact the clinic through [employer] or phone. If you have a critical or abnormal lab result, we will notify you by phone as soon as possible.  Submit refill requests through [employer] or call your pharmacy and they will forward the refill request to us. Please allow 3 business days for your refill to be completed.          Additional Information About Your Visit        Care EveryWhere ID     This is your Care EveryWhere ID. This could be used by other organizations to access your [place] medical records  [id number]        Your Vitals Were     Pulse Temperature Respirations Height BMI (Body Mass Index)       60 98.2  F (36.8  C) (Tympanic) 18 4' 11.25\" (1.505 m) 24.03 kg/m2        Blood Pressure from Last 3 Encounters:   03/14/18 136/70   03/07/18 140/64   03/05/18 146/62    Weight from Last 3 Encounters:   03/14/18 120 lb (54.4 kg)   02/06/18 119 lb (54 kg)   11/08/17 119 lb 8 oz (54.2 kg)              Today, you had the following     No orders found for display         Today's Medication Changes          These changes are accurate as of 3/14/18 10:33 AM.  If you have any questions, ask your nurse or doctor.               Start taking these medicines.        Dose/Directions    amLODIPine 2.5 MG tablet   Commonly known as:  NORVASC   Used for:  Hypertension, goal below 140/90   Started by:  Oswaldo Cedillo MD        Dose:  2.5 mg   Take 1 tablet (2.5 mg) by mouth daily   Quantity:  30 tablet   Refills:  11         These medicines have changed or have updated prescriptions.        Dose/Directions    predniSONE 1 MG tablet   Commonly known as:  DELTASONE   This may have changed:  See the new instructions.   Used for:  Polymyalgia rheumatica (H)   Changed by:  Oswaldo Cedillo MD        Dose:  1 mg   Take 1 tablet (1 mg) by mouth daily   Quantity:  100 tablet   Refills:  2            Where to get your medicines      These " medications were sent to [facility] [id number] [facility] [employer] - [address] [address]    Hours:  M-F 9-7 SAT 9-6 SUN 11-3 Phone:  [phone]     amLODIPine 2.5 MG tablet    metoprolol tartrate 50 MG tablet    triamterene-hydrochlorothiazide 37.5-25 MG per tablet                Primary Care Provider Office Phone # Fax #    Ulysses Baker [phone] [address] [facility] [address]        Equal Access to Services     [facility] : Hadii aad ku hadasho Soomaali, waaxda luqadaha, qaybta kaalmada adeegyada, leighann de oliveira . So [facility] [phone].    ATENCIÓN: Si habla español, tiene a cueva disposición servicios gratuitos de asistencia lingüística. [facility] [phone].    We comply with applicable federal civil rights laws and [place] laws. We do not discriminate on the basis of race, color, national origin, age, disability, sex, sexual orientation, or gender identity.            Thank you!     Thank you for choosing [facility]  for your care. Our goal is always to provide you with excellent care. Hearing back from our patients is one way we can continue to improve our services. Please take a few minutes to complete the written survey that you may receive in the mail after your visit with us. Thank you!             Your Updated Medication List - Protect others around you: Learn how to safely use, store and throw away your medicines at www.disposemymeds.org.          This list is accurate as of 3/14/18 10:33 AM.  Always use your most recent med list.                   Brand Name Dispense Instructions for use Diagnosis    amLODIPine 2.5 MG tablet    NORVASC    30 tablet    Take 1 tablet (2.5 mg) by mouth daily    Hypertension, goal below 140/90       ascorbic acid 250 MG Chew chewable tablet    vitamin C     Take 1 tablet by mouth daily.        ASPIRIN NOT PRESCRIBED    INTENTIONAL    0    due to  coumadin    Myalgias       Calcium carb-Vitamin D 500 mg [place]-200 units 500-200 MG-UNIT per tablet    OSCAL with D;Oyster Shell Calcium     Take 1 tablet by mouth 2 times daily (with meals).        iron 325 (65 FE) MG tablet      Take  by mouth. Taking two per week.        metoprolol tartrate 50 MG tablet    LOPRESSOR    93 tablet    Take 1.5 tablets (75 mg) by mouth 2 times daily    Hypertension, goal below 140/90       multivitamin per tablet      Take 1 tablet by mouth daily.        NEW MED      Fiber supplement daily.        predniSONE 1 MG tablet    DELTASONE    100 tablet    Take 1 tablet (1 mg) by mouth daily    Polymyalgia rheumatica (H)       triamterene-hydrochlorothiazide 37.5-25 MG per tablet    MAXZIDE-25    30 tablet    Take 1/2 pill twice daily    Hypertension, goal below 140/90       warfarin 5 MG tablet    COUMADIN    90 tablet    Take 5mg by mouth Monday and 2.5mg all other days or as directed by Anticoagulation Clinic    Long term current use of anticoagulant therapy

## 2018-03-15 ENCOUNTER — TELEPHONE (OUTPATIENT)
Dept: DERMATOLOGY | Facility: CLINIC | Age: 78
End: 2018-03-15

## 2018-03-15 NOTE — TELEPHONE ENCOUNTER
Reason for Call:  Other     Detailed comments: Pt states she faxed a claim form to Dr. Pedroza to fill out for her insurance company regarding a procedure last April - Calling to check on status of this.     Phone Number Patient can be reached at: Home number on file [phone] (home)    Best Time: Any    Can we leave a detailed message on this number? YES    Call taken on 3/15/2018 at 3:15 PM by Denise Behrendt

## 2018-03-15 NOTE — TELEPHONE ENCOUNTER
Spoke to patient who stated I faxed the form to [phone] on 3-9-18. It is on the RN desk waiting to be filled out as it was left for me to fill out. Dr. Pedroza is not in clinic again until 3-19-18, so I did advise patient it won't be before then that it would be completed. Patient verbalized understanding. Will call patient once form is completed.     Esme Escobar RN

## 2018-03-20 NOTE — TELEPHONE ENCOUNTER
I did mail the forms to the patient's home address and I did let her know that was done. Patient verbalized understanding. Esme Escobar RN

## 2018-03-20 NOTE — TELEPHONE ENCOUNTER
Spoke to patient who has not gotten any paper work back from us. I did let her know I have since located the paperwork and placed it on Dr. Pedroza's desk for review and signature. She would like it mailed to her home address once completed.     Esme Escobar RN

## 2018-03-20 NOTE — TELEPHONE ENCOUNTER
Message left to return call.     I want to find out if she got the forms back that she needs as I have been out of the office and it is not clear.    Esme Escobar RN

## 2018-03-29 ENCOUNTER — ALLIED HEALTH/NURSE VISIT (OUTPATIENT)
Dept: FAMILY MEDICINE | Facility: CLINIC | Age: 78
End: 2018-03-29
Payer: COMMERCIAL

## 2018-03-29 VITALS — HEART RATE: 60 BPM | DIASTOLIC BLOOD PRESSURE: 74 MMHG | SYSTOLIC BLOOD PRESSURE: 136 MMHG

## 2018-03-29 DIAGNOSIS — I10 HYPERTENSION, GOAL BELOW 140/90: Primary | ICD-10-CM

## 2018-03-29 PROCEDURE — 99207 ZZC NO CHARGE NURSE ONLY: CPT

## 2018-03-29 NOTE — NURSING NOTE
Nayely Kay is a 78 year old female who comes in today for a Blood Pressure check because of new medication.    *Document pulse and BP  *Use new set of vitals button for multiple readings.  *Use extended vitals for orthostatic    Vitals as recorded, a regular cuff was used.    Patient is taking medication as prescribed  Patient is tolerating medications well.  Patient is not monitoring Blood Pressure at home.      Current complaints: none    Disposition: patient to continue with the same medication

## 2018-03-29 NOTE — MR AVS SNAPSHOT
After Visit Summary   3/29/2018    Nayely Kay    MRN: [id number]           Patient Information     Date Of Birth          1940        Visit Information        Provider Department      3/29/2018 11:00 AM FL NB RN [facility]        Today's Diagnoses     Hypertension, goal below 140/90    -  1       Follow-ups after your visit        Your next 10 appointments already scheduled     Apr 02, 2018  2:00 PM CDT   Anticoagulation Visit with NB ANTI COAG   [facility] ([facility]    [address] [place] [address] [phone]              Who to contact     If you have questions or need follow up information about today's clinic visit or your schedule please contact [facility] directly at [phone].  Normal or non-critical lab and imaging results will be communicated to you by MyChart, letter or phone within 4 business days after the clinic has received the results. If you do not hear from us within 7 days, please contact the clinic through MyChart or phone. If you have a critical or abnormal lab result, we will notify you by phone as soon as possible.  Submit refill requests through [employer] or call your pharmacy and they will forward the refill request to us. Please allow 3 business days for your refill to be completed.          Additional Information About Your Visit        Care EveryWhere ID     This is your Care EveryWhere ID. This could be used by other organizations to access your [place] medical records  [id number]        Your Vitals Were     Pulse                   60            Blood Pressure from Last 3 Encounters:   03/29/18 136/74   03/14/18 136/70   03/07/18 140/64    Weight from Last 3 Encounters:   03/14/18 120 lb (54.4 kg)   02/06/18 119 lb (54 kg)   11/08/17 119 lb 8 oz (54.2 kg)              Today, you had the following     No orders found for display       Primary Care Provider Office  Phone # Fax #    Ulysses Baker [phone] [address] [facility] [address]        Equal Access to Services     DERIAN DUNN : Hadii aad ku hadcheriemelanie Sales, walanetteda felipeadaha, qatamicata kabillda florin, leighann tavares betomonica garibay alvino meraz. So [facility] [phone].    ATENCIÓN: Si habla español, tiene a cueva disposición servicios gratuitos de asistencia lingüística. Llame al [phone].    We comply with applicable federal civil rights laws and [place] laws. We do not discriminate on the basis of race, color, national origin, age, disability, sex, sexual orientation, or gender identity.            Thank you!     Thank you for choosing [facility]  for your care. Our goal is always to provide you with excellent care. Hearing back from our patients is one way we can continue to improve our services. Please take a few minutes to complete the written survey that you may receive in the mail after your visit with us. Thank you!             Your Updated Medication List - Protect others around you: Learn how to safely use, store and throw away your medicines at www.disposemymeds.org.          This list is accurate as of 3/29/18  1:36 PM.  Always use your most recent med list.                   Brand Name Dispense Instructions for use Diagnosis    amLODIPine 2.5 MG tablet    NORVASC    30 tablet    Take 1 tablet (2.5 mg) by mouth daily    Hypertension, goal below 140/90       ascorbic acid 250 MG Chew chewable tablet    vitamin C     Take 1 tablet by mouth daily.        ASPIRIN NOT PRESCRIBED    INTENTIONAL    0    due to coumadin    Myalgias       Calcium carb-Vitamin D 500 mg [place]-200 units 500-200 MG-UNIT per tablet    OSCAL with D;Oyster Shell Calcium     Take 1 tablet by mouth 2 times daily (with meals).        iron 325 (65 FE) MG tablet      Take  by mouth. Taking two per week.        metoprolol tartrate 50 MG tablet    LOPRESSOR    93 tablet    Take 1.5 tablets  (75 mg) by mouth 2 times daily    Hypertension, goal below 140/90       multivitamin per tablet      Take 1 tablet by mouth daily.        NEW MED      Fiber supplement daily.        predniSONE 1 MG tablet    DELTASONE    100 tablet    Take 1 tablet (1 mg) by mouth daily    Polymyalgia rheumatica (H)       triamterene-hydrochlorothiazide 37.5-25 MG per tablet    MAXZIDE-25    30 tablet    Take 1/2 pill twice daily    Hypertension, goal below 140/90       warfarin 5 MG tablet    COUMADIN    90 tablet    Take 5mg by mouth Monday and 2.5mg all other days or as directed by Anticoagulation Clinic    Long term current use of anticoagulant therapy

## 2018-04-05 ENCOUNTER — ANTICOAGULATION THERAPY VISIT (OUTPATIENT)
Dept: ANTICOAGULATION | Facility: CLINIC | Age: 78
End: 2018-04-05
Payer: COMMERCIAL

## 2018-04-05 ENCOUNTER — ALLIED HEALTH/NURSE VISIT (OUTPATIENT)
Dept: FAMILY MEDICINE | Facility: CLINIC | Age: 78
End: 2018-04-05
Payer: COMMERCIAL

## 2018-04-05 VITALS — HEART RATE: 72 BPM | DIASTOLIC BLOOD PRESSURE: 74 MMHG | SYSTOLIC BLOOD PRESSURE: 132 MMHG

## 2018-04-05 DIAGNOSIS — Z79.01 LONG TERM CURRENT USE OF ANTICOAGULANT THERAPY: ICD-10-CM

## 2018-04-05 DIAGNOSIS — Z01.30 BP CHECK: Primary | ICD-10-CM

## 2018-04-05 LAB — INR POINT OF CARE: 2.7 (ref 0.86–1.14)

## 2018-04-05 PROCEDURE — 36416 COLLJ CAPILLARY BLOOD SPEC: CPT

## 2018-04-05 PROCEDURE — 99207 ZZC NO CHARGE NURSE ONLY: CPT

## 2018-04-05 PROCEDURE — 85610 PROTHROMBIN TIME: CPT | Mod: QW

## 2018-04-05 NOTE — PROGRESS NOTES
ANTICOAGULATION FOLLOW-UP CLINIC VISIT    Patient Name:  Nayely Kay  Date:  4/5/2018  Contact Type:  Face to Face    SUBJECTIVE:     Patient Findings     Positives Change in medications (amlodipine added. cutting down on prednisone. Taking only 1 mg daily right now)    Comments No changes in diet, activity level, or health. No missed doses of warfarin. Patient took dosing as prescribed. No signs of clots or bleeding concerns. Patient will continue maintenance warfarin dosing.             OBJECTIVE    INR Protime   Date Value Ref Range Status   04/05/2018 2.7 (A) 0.86 - 1.14 Final       ASSESSMENT / PLAN  INR assessment THER    Recheck INR In: 4 WEEKS    INR Location Clinic      Anticoagulation Summary as of 4/5/2018     INR goal 2.0-3.0   Today's INR 2.7   Maintenance plan 5 mg (5 mg x 1) on Mon; 2.5 mg (5 mg x 0.5) all other days   Full instructions 5 mg on Mon; 2.5 mg all other days   Weekly total 20 mg   No change documented Flaquita Neal RN   Plan last modified Giselle Santillan RN (2/27/2017)   Next INR check 5/7/2018   Priority INR   Target end date Indefinite    Indications   Atrial fibrillation (H) [I48.91]  Long term current use of anticoagulant therapy [Z79.01]         Anticoagulation Episode Summary     INR check location     Preferred lab     Send INR reminders to [facility]    Comments * only wants dosing card. Pt uses 5mg tablets. wants to check every 4 weeks        Anticoagulation Care Providers     Provider Role Specialty Phone number    Ulysses Baker MD [facility] [phone]            See the Encounter Report to view Anticoagulation Flowsheet and Dosing Calendar (Go to Encounters tab in chart review, and find the Anticoagulation Therapy Visit)        Flaquita Neal RN

## 2018-04-05 NOTE — MR AVS SNAPSHOT
Nayely WEST Rahul   4/5/2018 2:00 PM   Anticoagulation Therapy Visit    Description:  78 year old female   Provider:  NB ANTI COAFORTINO   Department:  Nb Anticoag           INR as of 4/5/2018     Today's INR 2.7      Anticoagulation Summary as of 4/5/2018     INR goal 2.0-3.0   Today's INR 2.7   Full instructions 5 mg on Mon; 2.5 mg all other days   Next INR check 5/7/2018    Indications   Atrial fibrillation (H) [I48.91]  Long term current use of anticoagulant therapy [Z79.01]         Your next Anticoagulation Clinic appointment(s)     May 07, 2018  2:00 PM CDT   Anticoagulation [facility] with NB ANTI COAG   [facility]    [address] [place] [address]   [phone]              April 2018 Details    Sun Mon Tue Wed Thu Fri Sat     1               2               3               4               5      2.5 mg   See details      6      2.5 mg         7      2.5 mg           8      2.5 mg         9      5 mg         10      2.5 mg         11      2.5 mg         12      2.5 mg         13      2.5 mg         14      2.5 mg           15      2.5 mg         16      5 mg         17      2.5 mg         18      2.5 mg         19      2.5 mg         20      2.5 mg         21      2.5 mg           22      2.5 mg         23      5 mg         24      2.5 mg         25      2.5 mg         26      2.5 mg         27      2.5 mg         28      2.5 mg           29      2.5 mg         30      5 mg               Date Details   04/05 This INR check               How to take your warfarin dose     To take:  2.5 mg Take 0.5 of a 5 mg tablet.    To take:  5 mg Take 1 of the 5 mg tablets.           May 2018 Details    Sun Mon Tue Wed Thu Fri Sat       1      2.5 mg         2      2.5 mg         3      2.5 mg         4      2.5 mg         5      2.5 mg           6      2.5 mg         7            8               9               10               11               12                  13               14               15               16               17               18               19                 20               21               22               23               24               25               26                 27               28               29               30               31                  Date Details   No additional details    Date of next INR:  5/7/2018         How to take your warfarin dose     To take:  2.5 mg Take 0.5 of a 5 mg tablet.    To take:  5 mg Take 1 of the 5 mg tablets.

## 2018-04-05 NOTE — MR AVS SNAPSHOT
After Visit Summary   4/5/2018    Nayely Kay    MRN: [id number]           Patient Information     Date Of Birth          1940        Visit Information        Provider Department      4/5/2018 2:15 PM [place] NB RN [facility]        Today's Diagnoses     BP check    -  1       Follow-ups after your visit        Your next 10 appointments already scheduled     May 07, 2018  2:00 PM CDT   Anticoagulation Visit with NB ANTI COAG   [facility]    [address] [place] [id number] [phone]              Who to contact     If you have questions or need follow up information about today's clinic visit or your schedule please contact [facility] directly at [phone].  Normal or non-critical lab and imaging results will be communicated to you by MyChart, letter or phone within 4 business days after the clinic has received the results. If you do not hear from us within 7 days, please contact the clinic through MyChart or phone. If you have a critical or abnormal lab result, we will notify you by phone as soon as possible.  Submit refill requests through [employer] or call your pharmacy and they will forward the refill request to us. Please allow 3 business days for your refill to be completed.          Additional Information About Your Visit        Care EveryWhere ID     This is your Care EveryWhere ID. This could be used by other organizations to access your [place] medical records  [id number]        Your Vitals Were     Pulse                   72            Blood Pressure from Last 3 Encounters:   04/05/18 132/74   03/29/18 136/74   03/14/18 136/70    Weight from Last 3 Encounters:   03/14/18 120 lb (54.4 kg)   02/06/18 119 lb (54 kg)   11/08/17 119 lb 8 oz (54.2 kg)              Today, you had the following     No orders found for display       Primary Care Provider Office Phone # Fax #    Ulysses Whipple  MD Samuel [phone]       [address] [facility] [address]        Equal Access to Services     DERIAN DUNN : Srini Sales, wahannah knott, indiajanie meyersjet catherine, leighann reynaldoin hayaan betomonica gariaby laJahlatesha meraz. So [facility] [phone].    ATENCIÓN: Si habla español, tiene a cueva disposición servicios gratuitos de asistencia lingüística. Llame al [phone].    We comply with applicable federal civil rights laws and [place] laws. We do not discriminate on the basis of race, color, national origin, age, disability, sex, sexual orientation, or gender identity.            Thank you!     Thank you for choosing [facility]  for your care. Our goal is always to provide you with excellent care. Hearing back from our patients is one way we can continue to improve our services. Please take a few minutes to complete the written survey that you may receive in the mail after your visit with us. Thank you!             Your Updated Medication List - Protect others around you: Learn how to safely use, store and throw away your medicines at www.disposemymeds.org.          This list is accurate as of 4/5/18  2:40 PM.  Always use your most recent med list.                   Brand Name Dispense Instructions for use Diagnosis    amLODIPine 2.5 MG tablet    NORVASC    30 tablet    Take 1 tablet (2.5 mg) by mouth daily    Hypertension, goal below 140/90       ascorbic acid 250 MG Chew chewable tablet    vitamin C     Take 1 tablet by mouth daily.        ASPIRIN NOT PRESCRIBED    INTENTIONAL    0    due to coumadin    Myalgias       Calcium carb-Vitamin D 500 mg [place]-200 units 500-200 MG-UNIT per tablet    OSCAL with D;Oyster Shell Calcium     Take 1 tablet by mouth 2 times daily (with meals).        iron 325 (65 FE) MG tablet      Take  by mouth. Taking two per week.        metoprolol tartrate 50 MG tablet    LOPRESSOR    93 tablet    Take 1.5 tablets (75 mg) by mouth 2 times daily     Hypertension, goal below 140/90       multivitamin per tablet      Take 1 tablet by mouth daily.        NEW MED      Fiber supplement daily.        predniSONE 1 MG tablet    DELTASONE    100 tablet    Take 1 tablet (1 mg) by mouth daily    Polymyalgia rheumatica (H)       triamterene-hydrochlorothiazide 37.5-25 MG per tablet    MAXZIDE-25    30 tablet    Take 1/2 pill twice daily    Hypertension, goal below 140/90       warfarin 5 MG tablet    COUMADIN    90 tablet    Take 5mg by mouth Monday and 2.5mg all other days or as directed by Anticoagulation Clinic    Long term current use of anticoagulant therapy

## 2018-05-07 ENCOUNTER — ANTICOAGULATION THERAPY VISIT (OUTPATIENT)
Dept: ANTICOAGULATION | Facility: CLINIC | Age: 78
End: 2018-05-07
Payer: COMMERCIAL

## 2018-05-07 ENCOUNTER — ALLIED HEALTH/NURSE VISIT (OUTPATIENT)
Dept: FAMILY MEDICINE | Facility: CLINIC | Age: 78
End: 2018-05-07
Payer: COMMERCIAL

## 2018-05-07 VITALS — HEART RATE: 72 BPM | DIASTOLIC BLOOD PRESSURE: 70 MMHG | SYSTOLIC BLOOD PRESSURE: 144 MMHG

## 2018-05-07 DIAGNOSIS — I48.91 ATRIAL FIBRILLATION (H): ICD-10-CM

## 2018-05-07 DIAGNOSIS — Z79.01 LONG TERM CURRENT USE OF ANTICOAGULANT THERAPY: ICD-10-CM

## 2018-05-07 DIAGNOSIS — I10 HYPERTENSION, GOAL BELOW 140/90: Primary | ICD-10-CM

## 2018-05-07 LAB — INR POINT OF CARE: 2 (ref 0.9–1.14)

## 2018-05-07 PROCEDURE — 36416 COLLJ CAPILLARY BLOOD SPEC: CPT

## 2018-05-07 PROCEDURE — 99207 ZZC NO CHARGE NURSE ONLY: CPT

## 2018-05-07 PROCEDURE — 85610 PROTHROMBIN TIME: CPT | Mod: QW

## 2018-05-07 NOTE — MR AVS SNAPSHOT
After Visit Summary   5/7/2018    Nayely Kay    MRN: [id number]           Patient Information     Date Of Birth          1940        Visit Information        Provider Department      5/7/2018 2:30 PM [place] NB RN [facility]        Today's Diagnoses     Hypertension, goal below 140/90    -  1       Follow-ups after your visit        Your next 10 appointments already scheduled     Jun 04, 2018  2:15 PM CDT   Anticoagulation Visit with NB ANTI COAG   [facility] ([facility])    [address] [place] [address] [phone]            Jun 04, 2018  2:30 PM CDT   Nurse Only with [place] NB RN   [facility] ([facility])    [address] [place] [address] [phone]              Who to contact     If you have questions or need follow up information about today's clinic visit or your schedule please contact [facility] directly at [phone].  Normal or non-critical lab and imaging results will be communicated to you by Clerkyhart, letter or phone within 4 business days after the clinic has received the results. If you do not hear from us within 7 days, please contact the clinic through Clerkyhart or phone. If you have a critical or abnormal lab result, we will notify you by phone as soon as possible.  Submit refill requests through Palkion or call your pharmacy and they will forward the refill request to us. Please allow 3 business days for your refill to be completed.          Additional Information About Your Visit        Care EveryWhere ID     This is your Care EveryWhere ID. This could be used by other organizations to access your [place] medical records  [id number]        Your Vitals Were     Pulse                   72            Blood Pressure from Last 3 Encounters:   05/07/18 144/70   04/05/18 132/74   03/29/18 136/74    Weight from Last 3 Encounters:    03/14/18 120 lb (54.4 kg)   02/06/18 119 lb (54 kg)   11/08/17 119 lb 8 oz (54.2 kg)              Today, you had the following     No orders found for display       Primary Care Provider Office Phone # Fax #    Ulysses Baker [phone] [address] [facility] [address]        Equal Access to Services     DERIAN DUNN : Hadii aad ku hadasho Soomaali, waaxda luqadaha, qaybta kaalmada adeegyada, waxay idiin hayaan adeeg kharsh lafederica ah. So [facility] [phone].    ATENCIÓN: Si taco jones, tiene a cueva disposición servicios gratuitos de asistencia lingüística. Llame al [phone].    We comply with applicable federal civil rights laws and [place] laws. We do not discriminate on the basis of race, color, national origin, age, disability, sex, sexual orientation, or gender identity.            Thank you!     Thank you for choosing [facility]  for your care. Our goal is always to provide you with excellent care. Hearing back from our patients is one way we can continue to improve our services. Please take a few minutes to complete the written survey that you may receive in the mail after your visit with us. Thank you!             Your Updated Medication List - Protect others around you: Learn how to safely use, store and throw away your medicines at www.disposemymeds.org.          This list is accurate as of 5/7/18  2:33 PM.  Always use your most recent med list.                   Brand Name Dispense Instructions for use Diagnosis    amLODIPine 2.5 MG tablet    NORVASC    30 tablet    Take 1 tablet (2.5 mg) by mouth daily    Hypertension, goal below 140/90       ascorbic acid 250 MG Chew chewable tablet    vitamin C     Take 1 tablet by mouth daily.        ASPIRIN NOT PRESCRIBED    INTENTIONAL    0    due to coumadin    Myalgias       Calcium carb-Vitamin D 500 mg [place]-200 units 500-200 MG-UNIT per tablet    OSCAL with D;Oyster Shell Calcium     Take 1 tablet by  mouth 2 times daily (with meals).        iron 325 (65 Fe) MG tablet      Take  by mouth. Taking two per week.        metoprolol tartrate 50 MG tablet    LOPRESSOR    93 tablet    Take 1.5 tablets (75 mg) by mouth 2 times daily    Hypertension, goal below 140/90       multivitamin per tablet      Take 1 tablet by mouth daily.        NEW MED      Fiber supplement daily.        predniSONE 1 MG tablet    DELTASONE    100 tablet    Take 1 tablet (1 mg) by mouth daily    Polymyalgia rheumatica (H)       triamterene-hydrochlorothiazide 37.5-25 MG per tablet    MAXZIDE-25    30 tablet    Take 1/2 pill twice daily    Hypertension, goal below 140/90       warfarin 5 MG tablet    COUMADIN    90 tablet    Take 5mg by mouth Monday and 2.5mg all other days or as directed by Anticoagulation Clinic    Long term current use of anticoagulant therapy

## 2018-05-07 NOTE — NURSING NOTE
Nayely Kay is a 78 year old year old patient who comes in today for a Blood Pressure check because of ongoing blood pressure monitoring.  Vital Signs as repeated by RN   Patient is taking medication as prescribed  Patient is tolerating medications well.  Patient is not monitoring Blood Pressure at home.   Current complaints: none  Disposition:  patient reminded to call as needed

## 2018-05-07 NOTE — PROGRESS NOTES
ANTICOAGULATION FOLLOW-UP CLINIC VISIT    Patient Name:  Nayely Kay  Date:  5/7/2018  Contact Type:  Face to Face    SUBJECTIVE:     Patient Findings     Positives No Problem Findings    Comments Patient denies missed warfarin doses as well as changes to diet, activity or medications.  Patient advised to continue the same warfarin maintenance dose, INR therapeutic.   Patient verbalizes understanding and agrees to plan. No further questions or concerns.             OBJECTIVE    INR Protime   Date Value Ref Range Status   05/07/2018 2.0 (A) 1.14 Final       ASSESSMENT / PLAN  No question data found.  Anticoagulation Summary as of 5/7/2018     INR goal 2.0-3.0   Today's INR 2.0   Maintenance plan 5 mg (5 mg x 1) on Mon; 2.5 mg (5 mg x 0.5) all other days   Full instructions 5 mg on Mon; 2.5 mg all other days   Weekly total 20 mg   No change documented Oly Mcwilliams RN   Plan last modified Giselle Santillan RN (2/27/2017)   Next INR check 6/4/2018   Priority INR   Target end date Indefinite    Indications   Atrial fibrillation (H) [I48.91]  Long term current use of anticoagulant therapy [Z79.01]         Anticoagulation Episode Summary     INR check location     Preferred lab     Send INR reminders to [facility] POOL    Comments * only wants dosing card. Pt uses 5mg tablets. wants to check every 4 weeks        Anticoagulation Care Providers     Provider Role Specialty Phone number    Ulysses Baker MD [facility] [phone]            See the Encounter Report to view Anticoagulation Flowsheet and Dosing Calendar (Go to Encounters tab in chart review, and find the Anticoagulation Therapy Visit)        Oly Mcwilliams RN

## 2018-05-07 NOTE — MR AVS SNAPSHOT
Nayely WEST Rahul   5/7/2018 2:00 PM   Anticoagulation Therapy Visit    Description:  78 year old female   Provider:  NB ANTI COAFORTINO   Department:  Nb Anticoag           INR as of 5/7/2018     Today's INR 2.0      Anticoagulation Summary as of 5/7/2018     INR goal 2.0-3.0   Today's INR 2.0   Full instructions 5 mg on Mon; 2.5 mg all other days   Next INR check 6/4/2018    Indications   Atrial fibrillation (H) [I48.91]  Long term current use of anticoagulant therapy [Z79.01]         Your next Anticoagulation Clinic appointment(s)     Jun 04, 2018  2:15 PM CDT   An[facility]agulation Visit with NB ANTI COAG   [facility]    [address] [place] [address]   [phone]              May 2018 Details    Sun Mon Tue Wed Thu Fri Sat       1               2               3               4               5                 6               7      5 mg   See details      8      2.5 mg         9      2.5 mg         10      2.5 mg         11      2.5 mg         12      2.5 mg           13      2.5 mg         14      5 mg         15      2.5 mg         16      2.5 mg         17      2.5 mg         18      2.5 mg         19      2.5 mg           20      2.5 mg         21      5 mg         22      2.5 mg         23      2.5 mg         24      2.5 mg         25      2.5 mg         26      2.5 mg           27      2.5 mg         28      5 mg         29      2.5 mg         30      2.5 mg         31      2.5 mg            Date Details   05/07 This INR check               How to take your warfarin dose     To take:  2.5 mg Take 0.5 of a 5 mg tablet.    To take:  5 mg Take 1 of the 5 mg tablets.           June 2018 Details    Sun Mon Tue Wed Thu Fri Sat          1      2.5 mg         2      2.5 mg           3      2.5 mg         4            5               6               7               8               9                 10               11               12               13                14               15               16                 17               18               19               20               21               22               23                 24               25               26               27               28               29               30                Date Details   No additional details    Date of next INR:  6/4/2018         How to take your warfarin dose     To take:  2.5 mg Take 0.5 of a 5 mg tablet.    To take:  5 mg Take 1 of the 5 mg tablets.

## 2018-05-31 DIAGNOSIS — I10 HYPERTENSION, GOAL BELOW 140/90: ICD-10-CM

## 2018-05-31 NOTE — TELEPHONE ENCOUNTER
"Requested Prescriptions   Pending Prescriptions Disp Refills     metoprolol tartrate (LOPRESSOR) 50 MG tablet [Pharmacy Med Name: METOPROLOL TARTRATE 50 MG TAB] 93 tablet 4     Sig: TAKE 1 AND 1/2 TABLET BY MOUTH TWICE A DAY    Beta-Blockers Protocol Failed    5/31/2018  1:29 AM       Failed - Blood pressure under 140/90 in past 12 months    BP Readings from Last 3 Encounters:   05/07/18 144/70   04/05/18 132/74   03/29/18 136/74                Passed - Patient is age 6 or older       Passed - Recent (12 mo) or future (30 days) visit within the authorizing provider's specialty    Patient had office visit in the last 12 months or has a visit in the next 30 days with authorizing provider or within the authorizing provider's specialty.  See \"Patient Info\" tab in inbasket, or \"Choose Columns\" in Meds & Orders section of the refill encounter.            Last Written Prescription Date:  3/14/18  Last Fill Quantity: 93,  # refills: 11   Last office visit: 5/7/2018 with prescribing provider:  CLAYTON   Future Office Visit:   Next 5 appointments (look out 90 days)     Jun 04, 2018  2:30 PM CDT   Nurse Only with JUWAN SHIN RN   [facility] ([facility])    [address] [place] [address] [phone]                   "

## 2018-06-04 ENCOUNTER — ALLIED HEALTH/NURSE VISIT (OUTPATIENT)
Dept: FAMILY MEDICINE | Facility: CLINIC | Age: 78
End: 2018-06-04
Payer: COMMERCIAL

## 2018-06-04 ENCOUNTER — ANTICOAGULATION THERAPY VISIT (OUTPATIENT)
Dept: ANTICOAGULATION | Facility: CLINIC | Age: 78
End: 2018-06-04
Payer: COMMERCIAL

## 2018-06-04 VITALS — SYSTOLIC BLOOD PRESSURE: 136 MMHG | DIASTOLIC BLOOD PRESSURE: 64 MMHG

## 2018-06-04 DIAGNOSIS — I10 HYPERTENSION, GOAL BELOW 140/90: Primary | ICD-10-CM

## 2018-06-04 DIAGNOSIS — Z79.01 LONG TERM CURRENT USE OF ANTICOAGULANT THERAPY: ICD-10-CM

## 2018-06-04 DIAGNOSIS — I48.91 ATRIAL FIBRILLATION (H): ICD-10-CM

## 2018-06-04 LAB — INR POINT OF CARE: 4 (ref 0.86–1.14)

## 2018-06-04 PROCEDURE — 85610 PROTHROMBIN TIME: CPT | Mod: QW

## 2018-06-04 PROCEDURE — 99207 ZZC NO CHARGE NURSE ONLY: CPT

## 2018-06-04 PROCEDURE — 36416 COLLJ CAPILLARY BLOOD SPEC: CPT

## 2018-06-04 RX ORDER — METOPROLOL TARTRATE 50 MG
TABLET ORAL
Qty: 93 TABLET | Refills: 4 | OUTPATIENT
Start: 2018-06-04

## 2018-06-04 NOTE — PROGRESS NOTES
ANTICOAGULATION FOLLOW-UP CLINIC VISIT    Patient Name:  Nayely Kay  Date:  6/4/2018  Contact Type:  Face to Face    SUBJECTIVE:     Patient Findings     Positives Change in diet/appetite, Inflammation    Comments Patient states has only been eating broccoli once a week instead of 3 x week. She is also having increased pain and inflammation from her arthritis. I instructed patient to hold her Warfarin dose for today then to resume her maintenance dose. I also advised her to continue to eat her broccoli 3 x week as she previously had been. Patient will f/u with [facility] in 2 weeks. Patient denies signs or symptoms of bleeding. Writer educated patient regarding increased bleed risk and when to seek immediate medical attention. Patient verbalized understanding.             OBJECTIVE    INR Protime   Date Value Ref Range Status   06/04/2018 4.0 (A) 0.86 - 1.14 Final       ASSESSMENT / PLAN  INR assessment SUPRA    Recheck INR In: 2 WEEKS    INR Location Clinic      Anticoagulation Summary as of 6/4/2018     INR goal 2.0-3.0   Today's INR 4.0!   Warfarin maintenance plan 5 mg (5 mg x 1) on Mon; 2.5 mg (5 mg x 0.5) all other days   Full warfarin instructions 6/4: Hold; Otherwise 5 mg on Mon; 2.5 mg all other days   Weekly warfarin total 20 mg   Plan last modified Giselle Santillan RN (2/27/2017)   Next INR check 6/18/2018   Priority INR   Target end date Indefinite    Indications   Atrial fibrillation (H) [I48.91]  Long term current use of anticoagulant therapy [Z79.01]         Anticoagulation Episode Summary     INR check location     Preferred lab     Send INR reminders to [facility]    Comments * only wants dosing card. Pt uses 5mg tablets. wants to check every 4 weeks        Anticoagulation Care Providers     Provider Role Specialty Phone number    Ulysses Baker MD [facility] [phone]            See the Encounter Report to view Anticoagulation Flowsheet and Dosing Calendar  (Go to Encounters tab in chart review, and find the Anticoagulation Therapy Visit)        Renetta Falcon RN

## 2018-06-04 NOTE — MR AVS SNAPSHOT
Nayely Kay   6/4/2018 2:15 PM   Anticoagulation Therapy Visit    Description:  78 year old female   Provider:  NB ANTI COAG   Department:  Nb Anticoag           INR as of 6/4/2018     Today's INR 4.0!      Anticoagulation Summary as of 6/4/2018     INR goal 2.0-3.0   Today's INR 4.0!   Full warfarin instructions 6/4: Hold; Otherwise 5 mg on Mon; 2.5 mg all other days   Next INR check 6/18/2018    Indications   Atrial fibrillation (H) [I48.91]  Long term current use of anticoagulant therapy [Z79.01]         Your next Anticoagulation Clinic appointment(s)     Jun 18, 2018  2:00 PM CDT   Anticoagulation Visit with NB ANTI COAG   [facility] ([facility])    [address] [place] [address]   [phone]              June 2018 Details    Sun Mon Tue Wed Thu Fri Sat          1               2                 3               4      Hold   See details      5      2.5 mg         6      2.5 mg         7      2.5 mg         8      2.5 mg         9      2.5 mg           10      2.5 mg         11      5 mg         12      2.5 mg         13      2.5 mg         14      2.5 mg         15      2.5 mg         16      2.5 mg           17      2.5 mg         18            19               20               21               22               23                 24               25               26               27               28               29               30                Date Details   06/04 This INR check       Date of next INR:  6/18/2018         How to take your warfarin dose     To take:  2.5 mg Take 0.5 of a 5 mg tablet.    To take:  5 mg Take 1 of the 5 mg tablets.    Hold Do not take your warfarin dose. See the Details table to the right for additional instructions.

## 2018-06-06 ENCOUNTER — TELEPHONE (OUTPATIENT)
Dept: FAMILY MEDICINE | Facility: CLINIC | Age: 78
End: 2018-06-06

## 2018-06-06 NOTE — TELEPHONE ENCOUNTER
CSS-ok to deliver message below.  Left message for patient to return call to clinic.   Opal Maynard RN

## 2018-06-06 NOTE — TELEPHONE ENCOUNTER
Reason for Call:  Other patient    Detailed comments: pt calling wondering if she needs a mammogram.     Phone Number Patient can be reached at: Home number on file [phone] (home)    Best Time: any    Can we leave a detailed message on this number? YES    Call taken on 6/6/2018 at 8:11 AM by Lucero Nobles

## 2018-06-07 NOTE — TELEPHONE ENCOUNTER
Left message for patient to return call to clinic.    CSS ok to deliver message below.    She can stop screening mammograms now.   Ulysses LAWTON Rn

## 2018-06-15 ENCOUNTER — TELEPHONE (OUTPATIENT)
Dept: FAMILY MEDICINE | Facility: CLINIC | Age: 78
End: 2018-06-15

## 2018-06-15 ENCOUNTER — OFFICE VISIT (OUTPATIENT)
Dept: FAMILY MEDICINE | Facility: CLINIC | Age: 78
End: 2018-06-15
Payer: COMMERCIAL

## 2018-06-15 VITALS
DIASTOLIC BLOOD PRESSURE: 80 MMHG | WEIGHT: 122 LBS | HEIGHT: 59 IN | TEMPERATURE: 98.6 F | BODY MASS INDEX: 24.6 KG/M2 | SYSTOLIC BLOOD PRESSURE: 148 MMHG | HEART RATE: 61 BPM

## 2018-06-15 DIAGNOSIS — M35.3 POLYMYALGIA RHEUMATICA (H): Primary | ICD-10-CM

## 2018-06-15 DIAGNOSIS — D50.8 OTHER IRON DEFICIENCY ANEMIA: ICD-10-CM

## 2018-06-15 DIAGNOSIS — Z79.01 LONG TERM CURRENT USE OF ANTICOAGULANT THERAPY: ICD-10-CM

## 2018-06-15 DIAGNOSIS — I10 ESSENTIAL HYPERTENSION: ICD-10-CM

## 2018-06-15 DIAGNOSIS — I15.9 SECONDARY HYPERTENSION WITH GOAL BLOOD PRESSURE LESS THAN 130/80: Primary | ICD-10-CM

## 2018-06-15 LAB
BASOPHILS # BLD AUTO: 0.1 10E9/L (ref 0–0.2)
BASOPHILS NFR BLD AUTO: 0.6 %
CREAT SERPL-MCNC: 0.83 MG/DL (ref 0.52–1.04)
DIFFERENTIAL METHOD BLD: NORMAL
EOSINOPHIL # BLD AUTO: 0.1 10E9/L (ref 0–0.7)
EOSINOPHIL NFR BLD AUTO: 1.3 %
ERYTHROCYTE [DISTWIDTH] IN BLOOD BY AUTOMATED COUNT: 13.6 % (ref 10–15)
GFR SERPL CREATININE-BSD FRML MDRD: 66 ML/MIN/1.7M2
HCT VFR BLD AUTO: 38.9 % (ref 35–47)
HGB BLD-MCNC: 12.9 G/DL (ref 11.7–15.7)
LYMPHOCYTES # BLD AUTO: 1.7 10E9/L (ref 0.8–5.3)
LYMPHOCYTES NFR BLD AUTO: 20 %
MCH RBC QN AUTO: 30.1 PG (ref 26.5–33)
MCHC RBC AUTO-ENTMCNC: 33.2 G/DL (ref 31.5–36.5)
MCV RBC AUTO: 91 FL (ref 78–100)
MONOCYTES # BLD AUTO: 0.7 10E9/L (ref 0–1.3)
MONOCYTES NFR BLD AUTO: 8.2 %
NEUTROPHILS # BLD AUTO: 5.9 10E9/L (ref 1.6–8.3)
NEUTROPHILS NFR BLD AUTO: 69.9 %
PLATELET # BLD AUTO: 269 10E9/L (ref 150–450)
POTASSIUM SERPL-SCNC: 3.5 MMOL/L (ref 3.4–5.3)
RBC # BLD AUTO: 4.29 10E12/L (ref 3.8–5.2)
WBC # BLD AUTO: 8.5 10E9/L (ref 4–11)

## 2018-06-15 PROCEDURE — 36415 COLL VENOUS BLD VENIPUNCTURE: CPT | Performed by: FAMILY MEDICINE

## 2018-06-15 PROCEDURE — 84132 ASSAY OF SERUM POTASSIUM: CPT | Performed by: FAMILY MEDICINE

## 2018-06-15 PROCEDURE — 85025 COMPLETE CBC W/AUTO DIFF WBC: CPT | Performed by: FAMILY MEDICINE

## 2018-06-15 PROCEDURE — 99214 OFFICE O/P EST MOD 30 MIN: CPT | Performed by: FAMILY MEDICINE

## 2018-06-15 PROCEDURE — 82565 ASSAY OF CREATININE: CPT | Performed by: FAMILY MEDICINE

## 2018-06-15 RX ORDER — PREDNISONE 1 MG/1
1 TABLET ORAL DAILY
Qty: 100 TABLET | Refills: 3 | Status: SHIPPED | OUTPATIENT
Start: 2018-06-15 | End: 2019-06-12

## 2018-06-15 RX ORDER — METOPROLOL TARTRATE 50 MG
75 TABLET ORAL 2 TIMES DAILY
Qty: 270 TABLET | Refills: 3 | Status: SHIPPED | OUTPATIENT
Start: 2018-06-15 | End: 2019-06-12

## 2018-06-15 RX ORDER — TRIAMTERENE/HYDROCHLOROTHIAZID 37.5-25 MG
TABLET ORAL
Qty: 90 TABLET | Refills: 3 | Status: SHIPPED | OUTPATIENT
Start: 2018-06-15 | End: 2019-04-02

## 2018-06-15 RX ORDER — AMLODIPINE BESYLATE 2.5 MG/1
2.5 TABLET ORAL DAILY
Qty: 90 TABLET | Refills: 3 | Status: SHIPPED | OUTPATIENT
Start: 2018-06-15 | End: 2019-06-12

## 2018-06-15 RX ORDER — WARFARIN SODIUM 5 MG/1
TABLET ORAL
Qty: 90 TABLET | Refills: 3 | Status: SHIPPED | OUTPATIENT
Start: 2018-06-15 | End: 2018-07-26

## 2018-06-15 NOTE — LETTER
[facility]  [address] [facility] [place] [address]  Phone: [phone]    06/18/18    Nayely Kay  [address] [place] [address]      Nayely,         We are writing to inform you of the results from your recent lab work, included is a copy of those results.    Component      Latest Ref Rng & Units 6/15/2018   WBC      4.0 - 11.0 10e9/L 8.5   RBC Count      3.8 - 5.2 10e12/L 4.29   Hemoglobin      11.7 - 15.7 g/dL 12.9   Hematocrit      35.0 - 47.0 % 38.9   MCV      78 - 100 fl 91   MCH      26.5 - 33.0 pg 30.1   MCHC      31.5 - 36.5 g/dL 33.2   RDW      10.0 - 15.0 % 13.6   Platelet Count      150 - 450 10e9/L 269   Diff Method       Automated Method   % Neutrophils      % 69.9   % Lymphocytes      % 20.0   % Monocytes      % 8.2   % Eosinophils      % 1.3   % Basophils      % 0.6   Absolute Neutrophil      1.6 - 8.3 10e9/L 5.9   Absolute Lymphocytes      0.8 - 5.3 10e9/L 1.7   Absolute Monocytes      0.0 - 1.3 10e9/L 0.7   Absolute Eosinophils      0.0 - 0.7 10e9/L 0.1   Absolute Basophils      0.0 - 0.2 10e9/L 0.1   Creatinine      0.52 - 1.04 mg/dL 0.83   GFR Estimate      >60 mL/min/1.7m2 66   GFR Estimate If Black      >60 mL/min/1.7m2 80   Potassium      3.4 - 5.3 mmol/L 3.5     Your lab results are normal.    If you have any questions, please do not hesitate to call our office.        Sincerely,      Ulysses Baker MD

## 2018-06-15 NOTE — MR AVS SNAPSHOT
After Visit Summary   6/15/2018    Nayely Kay    MRN: [id number]           Patient Information     Date Of Birth          1940        Visit Information        Provider Department      6/15/2018 9:20 AM Ulysses Baker MD [facility]        Today's Diagnoses     Polymyalgia rheumatica (H)    -  1    Essential hypertension        Other iron deficiency anemia        Long term current use of anticoagulant therapy          Care Instructions    (M35.3) Polymyalgia rheumatica (H)  (primary encounter diagnosis)  Comment:   Plan: predniSONE (DELTASONE) 1 MG tablet        We discussed the condition and the medications. The Prednisone should be used at the lowest effective,   Which could be none. It is OK to stop the Prednisone if you are stable and on a very low dose.   This is refilled.     (I10) Essential hypertension  Comment:   Plan: Creatinine, Potassium, amLODIPine (NORVASC) 2.5        MG tablet, metoprolol tartrate (LOPRESSOR) 50         MG tablet, triamterene-hydrochlorothiazide         (MAXZIDE-25) 37.5-25 MG per tablet        Monitor and record the BP readings at rest with a calibrated machine.   Use the meds and the non drug therapies. The goal for the average is under    (D50.8) Other iron deficiency anemia  Comment:   Plan: CBC with platelets differential        Do the lab today and we will call the results. The Hgb should be 11.7 or higher.     (Z79.01) Long term current use of anticoagulant therapy  Comment:   Plan: warfarin (COUMADIN) 5 MG tablet        Follow at the INR clinic.             Follow-ups after your visit        Your next 10 appointments already scheduled     Jun 18, 2018  2:00 PM CDT   [facility] with NB ANTI COAG   [facility]    [address] [place] [id number] [phone]              Who to contact     If you have questions or need follow up information about today's clinic  "visit or your schedule please contact [facility] directly at [phone].  Normal or non-critical lab and imaging results will be communicated to you by MyChart, letter or phone within 4 business days after the clinic has received the results. If you do not hear from us within 7 days, please contact the clinic through MyChart or phone. If you have a critical or abnormal lab result, we will notify you by phone as soon as possible.  Submit refill requests through Devtap or call your pharmacy and they will forward the refill request to us. Please allow 3 business days for your refill to be completed.          Additional Information About Your Visit        Care EveryWhere ID     This is your Care EveryWhere ID. This could be used by other organizations to access your [place] medical records  [id number]        Your Vitals Were     Pulse Temperature Height BMI (Body Mass Index)          61 98.6  F (37  C) (Tympanic) 4' 11.25\" (1.505 m) 24.43 kg/m2         Blood Pressure from Last 3 Encounters:   06/15/18 142/72   06/04/18 136/64   05/07/18 144/70    Weight from Last 3 Encounters:   06/15/18 122 lb (55.3 kg)   03/14/18 120 lb (54.4 kg)   02/06/18 119 lb (54 kg)              We Performed the Following     CBC with platelets differential     Creatinine     Potassium          Where to get your medicines      These medications were sent to [facility] [address] [place] [address] [address]    Hours:  M-F 9-7 SAT 9-6 SUN 11-3 Phone:  [phone]     amLODIPine 2.5 MG tablet    metoprolol tartrate 50 MG tablet    predniSONE 1 MG tablet    triamterene-hydrochlorothiazide 37.5-25 MG per tablet    warfarin 5 MG tablet          Primary Care Provider Office Phone # Fax #    Ulysses Baker [phone] [address] [facility] [address]        Equal Access to Services     DERIAN DUNN AH: Hadii urban Cunningahm " hedy citlalitamcialeighann salguero ah. So [facility] [phone].    ATENCIÓN: Si taco jones, tiene a cueva disposición servicios gratuitos de asistencia lingüística. Jordan al [phone].    We comply with applicable federal civil rights laws and [place] laws. We do not discriminate on the basis of race, color, national origin, age, disability, sex, sexual orientation, or gender identity.            Thank you!     Thank you for choosing [facility]  for your care. Our goal is always to provide you with excellent care. Hearing back from our patients is one way we can continue to improve our services. Please take a few minutes to complete the written survey that you may receive in the mail after your visit with us. Thank you!             Your Updated Medication List - Protect others around you: Learn how to safely use, store and throw away your medicines at www.disposemymeds.org.          This list is accurate as of 6/15/18 10:25 AM.  Always use your most recent med list.                   Brand Name Dispense Instructions for use Diagnosis    amLODIPine 2.5 MG tablet    NORVASC    90 tablet    Take 1 tablet (2.5 mg) by mouth daily    Essential hypertension       ascorbic acid 250 MG Chew chewable tablet    vitamin C     Take 1 tablet by mouth daily.        ASPIRIN NOT PRESCRIBED    INTENTIONAL    0    due to coumadin    Myalgias       Calcium carb-Vitamin D 500 mg [place]-200 units 500-200 MG-UNIT per tablet    OSCAL with D;Oyster Shell Calcium     Take 1 tablet by mouth 2 times daily (with meals).        iron 325 (65 Fe) MG tablet      Take  by mouth. Taking two per week.        metoprolol tartrate 50 MG tablet    LOPRESSOR    270 tablet    Take 1.5 tablets (75 mg) by mouth 2 times daily    Essential hypertension       multivitamin per tablet      Take 1 tablet by mouth daily.        NEW MED      Fiber supplement daily.        predniSONE 1 MG tablet    DELTASONE     100 tablet    Take 1 tablet (1 mg) by mouth daily    Polymyalgia rheumatica (H)       triamterene-hydrochlorothiazide 37.5-25 MG per tablet    MAXZIDE-25    90 tablet    Take 1/2 pill twice daily    Essential hypertension       warfarin 5 MG tablet    COUMADIN    90 tablet    Take 5mg by mouth Monday and 2.5mg all other days or as directed by Anticoagulation Clinic    Long term current use of anticoagulant therapy

## 2018-06-15 NOTE — PROGRESS NOTES
SUBJECTIVE:   Nayely Kay is a 78 year old female who presents to clinic today for the following health issues:      Hypertension Follow-up      Outpatient blood pressures are being checked at Clinic when she comes for the Coumadin Clinic rechecks.  Results are copied below for 2018:  Vital Signs 1/8/2018 2/5/2018 2/5/2018 2/6/2018 2/6/2018 2/20/2018   Systolic 132 164 160 161 132 150   Diastolic 78 84 82 80 76 80   Pulse 68  68 68  72     Vital Signs 2/20/2018 3/5/2018 3/5/2018 3/7/2018 3/7/2018 3/14/2018   Systolic 138 160 146 146 140 144   Diastolic 74 64 62 64 64 80   Pulse 72  72 72  60     Vital Signs 3/14/2018 3/29/2018 4/5/2018 5/7/2018 6/4/2018   Systolic 136 136 132 144 136   Diastolic 70 74 74 70 64   Pulse  60 72 72        Low Salt Diet: no added salt and tries to watch the salt intake with the food she is eating.    Medication Followup of Polymyalgia Rheumatica     Taking Medication as prescribed: Yes, taking 1 tablet daily.    Side Effects:  None    Medication Helping Symptoms:  Yes, will take 1 1/2 tablets if she has a flare up of the symptoms and then will go back to the one tablet daily after that is better.   Most of her symptoms are from the knees up on the backs of the legs.         Amount of exercise or physical activity: She still cleans houses for other people.  Likes to go walking.    Problems taking medications regularly: No    Medication side effects: none    Diet: No added salt.        Current Outpatient Prescriptions:      amLODIPine (NORVASC) 2.5 MG tablet, Take 1 tablet (2.5 mg) by mouth daily, Disp: 30 tablet, Rfl: 11     ascorbic acid (VITAMIN C) 250 MG CHEW, Take 1 tablet by mouth daily., Disp: , Rfl: 0     ASPIRIN NOT PRESCRIBED (INTENTIONAL), due to coumadin, Disp: 0, Rfl: 0     calcium carb 1250 mg, 500 mg [place],/vitamin D 200 units (OSCAL WITH D) 500-200 MG-UNIT per tablet, Take 1 tablet by mouth 2 times daily (with meals)., Disp: , Rfl:      Ferrous Sulfate (IRON) 325 (65  "FE) MG tablet, Take  by mouth. Taking two per week., Disp: , Rfl:      metoprolol tartrate (LOPRESSOR) 50 MG tablet, Take 1.5 tablets (75 mg) by mouth 2 times daily, Disp: 93 tablet, Rfl: 11     Multiple Vitamin (MULTIVITAMIN) per tablet, Take 1 tablet by mouth daily., Disp: , Rfl:      NEW MED, Fiber supplement daily., Disp: , Rfl:      predniSONE (DELTASONE) 1 MG tablet, Take 1 tablet (1 mg) by mouth daily, Disp: 100 tablet, Rfl: 2     triamterene-hydrochlorothiazide (MAXZIDE-25) 37.5-25 MG per tablet, Take 1/2 pill twice daily, Disp: 30 tablet, Rfl: 11     warfarin (COUMADIN) 5 MG tablet, Take 5mg by mouth Monday and 2.5mg all other days or as directed by Anticoagulation Clinic, Disp: 90 tablet, Rfl: 3    Patient Active Problem List   Diagnosis     Hypertension, goal below 140/90     Headache     Atrial fibrillation (H)     Polymyalgia rheumatica (H)     Iron deficiency anemia     HYPERLIPIDEMIA LDL GOAL <130     Osteopenia     Family history of breast cancer     Eczema     Advanced directives, counseling/discussion     AK (actinic keratosis)     Ganglion cyst     Long term current use of anticoagulant therapy     Essential hypertension     Tubular adenoma       Blood pressure 142/72, pulse 61, temperature 98.6  F (37  C), temperature source Tympanic, height 4' 11.25\" (1.505 m), weight 122 lb (55.3 kg).    Exam:  GENERAL APPEARANCE: healthy, alert and no distress  EYES: EOMI,  PERRL  NECK: no adenopathy, no asymmetry, masses, or scars and thyroid normal to palpation  RESP: lungs clear to auscultation - no rales, rhonchi or wheezes  CV: regular rates and rhythm, normal S1 S2, no S3 or S4 and no murmur, click or rub -  MS: arthritis of the hands noted.   SKIN: no suspicious lesions or rashes  PSYCH: mentation appears normal and affect normal/bright      (M35.3) Polymyalgia rheumatica (H)  (primary encounter diagnosis)  Comment:   Plan: predniSONE (DELTASONE) 1 MG tablet        We discussed the condition and the " medications. The Prednisone should be used at the lowest effective,   Which could be none. It is OK to stop the Prednisone if you are stable and on a very low dose.   This is refilled.     (I10) Essential hypertension  Comment:   Plan: Creatinine, Potassium, amLODIPine (NORVASC) 2.5        MG tablet, metoprolol tartrate (LOPRESSOR) 50         MG tablet, triamterene-hydrochlorothiazide         (MAXZIDE-25) 37.5-25 MG per tablet        Monitor and record the BP readings at rest with a calibrated machine.   Use the meds and the non drug therapies. The goal for the average is under    (D50.8) Other iron deficiency anemia  Comment:   Plan: CBC with platelets differential        Do the lab today and we will call the results. The Hgb should be 11.7 or higher.     (Z79.01) Long term current use of anticoagulant therapy  Comment:   Plan: warfarin (COUMADIN) 5 MG tablet        Follow at the INR clinic.       Ulysses Baker

## 2018-06-15 NOTE — TELEPHONE ENCOUNTER
Patient would like to know what her bp goal is?  Under 140/90 or 130/80.  OV notes were possibly cut off?    OV notes 6-15-18:  Essential hypertension = Monitor and record the BP readings at rest with a calibrated machine.   Use the meds and the non drug therapies. The goal for the average is under       Routing to provider.  Zulema LAWTON RN

## 2018-06-15 NOTE — PATIENT INSTRUCTIONS
(M35.3) Polymyalgia rheumatica (H)  (primary encounter diagnosis)  Comment:   Plan: predniSONE (DELTASONE) 1 MG tablet        We discussed the condition and the medications. The Prednisone should be used at the lowest effective,   Which could be none. It is OK to stop the Prednisone if you are stable and on a very low dose.   This is refilled.     (I10) Essential hypertension  Comment:   Plan: Creatinine, Potassium, amLODIPine (NORVASC) 2.5        MG tablet, metoprolol tartrate (LOPRESSOR) 50         MG tablet, triamterene-hydrochlorothiazide         (MAXZIDE-25) 37.5-25 MG per tablet        Monitor and record the BP readings at rest with a calibrated machine.   Use the meds and the non drug therapies. The goal for the average is under    (D50.8) Other iron deficiency anemia  Comment:   Plan: CBC with platelets differential        Do the lab today and we will call the results. The Hgb should be 11.7 or higher.     (Z79.01) Long term current use of anticoagulant therapy  Comment:   Plan: warfarin (COUMADIN) 5 MG tablet        Follow at the INR clinic.

## 2018-06-15 NOTE — TELEPHONE ENCOUNTER
Reason for Call:  Other med question    Detailed comments: Question about Triamterene    Phone Number Patient can be reached at: Home number on file [phone] (home)    Best Time: any    Can we leave a detailed message on this number? YES    Call taken on 6/15/2018 at 1:48 PM by Abigail Hayden

## 2018-06-18 ENCOUNTER — OFFICE VISIT (OUTPATIENT)
Dept: FAMILY MEDICINE | Facility: CLINIC | Age: 78
End: 2018-06-18
Payer: COMMERCIAL

## 2018-06-18 ENCOUNTER — ANTICOAGULATION THERAPY VISIT (OUTPATIENT)
Dept: ANTICOAGULATION | Facility: CLINIC | Age: 78
End: 2018-06-18
Payer: COMMERCIAL

## 2018-06-18 VITALS — HEART RATE: 64 BPM | RESPIRATION RATE: 18 BRPM | DIASTOLIC BLOOD PRESSURE: 80 MMHG | SYSTOLIC BLOOD PRESSURE: 136 MMHG

## 2018-06-18 DIAGNOSIS — Z01.30 BP CHECK: Primary | ICD-10-CM

## 2018-06-18 DIAGNOSIS — I48.91 ATRIAL FIBRILLATION (H): ICD-10-CM

## 2018-06-18 DIAGNOSIS — Z79.01 LONG TERM CURRENT USE OF ANTICOAGULANT THERAPY: ICD-10-CM

## 2018-06-18 LAB — INR POINT OF CARE: 2.7 (ref 0.86–1.14)

## 2018-06-18 PROCEDURE — 36416 COLLJ CAPILLARY BLOOD SPEC: CPT

## 2018-06-18 PROCEDURE — 99207 ZZC NO CHARGE NURSE ONLY: CPT

## 2018-06-18 PROCEDURE — 85610 PROTHROMBIN TIME: CPT | Mod: QW

## 2018-06-18 NOTE — PROGRESS NOTES
Nayely Kay is a 78 year old year old patient who comes in today for a Blood Pressure check because of ongoing blood pressure monitoring.  Vital Signs as repeated by /80  Patient is taking medication as prescribed  Patient is tolerating medications well.  Patient is not monitoring Blood Pressure at home.    Current complaints: none  Disposition:  patient to continue with the same medication  Nicolette Granger RN

## 2018-06-18 NOTE — PROGRESS NOTES
ANTICOAGULATION FOLLOW-UP CLINIC VISIT    Patient Name:  Nayely Kay  Date:  6/18/2018  Contact Type:  Face to Face    SUBJECTIVE:     Patient Findings     Positives No Problem Findings    Comments INR back in range, pt reports more steady intake of Vit K rich foods. Patient advised to continue the same warfarin maintenance dose, INR therapeutic.              OBJECTIVE    INR Protime   Date Value Ref Range Status   06/18/2018 2.7 (A) 0.86 - 1.14 Final       ASSESSMENT / PLAN  No question data found.  Anticoagulation Summary as of 6/18/2018     INR goal 2.0-3.0   Today's INR 2.7   Warfarin maintenance plan 5 mg (5 mg x 1) on Mon; 2.5 mg (5 mg x 0.5) all other days   Full warfarin instructions 5 mg on Mon; 2.5 mg all other days   Weekly warfarin total 20 mg   Plan last modified Giselle Santillan RN (2/27/2017)   Next INR check 7/16/2018   Priority INR   Target end date Indefinite    Indications   Atrial fibrillation (H) [I48.91]  Long term current use of anticoagulant therapy [Z79.01]         Anticoagulation Episode Summary     INR check location     Preferred lab     Send INR reminders to [facility] CLINIC POOL    Comments * only wants dosing card. Pt uses 5mg tablets. wants to check every 4 weeks        Anticoagulation Care Providers     Provider Role Specialty Phone number    Ulysses Baker MD [facility] [phone]            See the Encounter Report to view Anticoagulation Flowsheet and Dosing Calendar (Go to Encounters tab in chart review, and find the Anticoagulation Therapy Visit)        Oly Mcwilliams RN

## 2018-06-18 NOTE — MR AVS SNAPSHOT
After Visit Summary   6/18/2018    Nayely Kay    MRN: [id number]           Patient Information     Date Of Birth          1940        Visit Information        Provider Department      6/18/2018 2:30 PM [place] NB RN [facility]        Today's Diagnoses     BP check    -  1       Follow-ups after your visit        Your next 10 appointments already scheduled     Jul 16, 2018  9:30 AM CDT   Anticoagulation Visit with NB ANTI COAG   [facility] ([facility])    [address] [place] [id number] [phone]              Who to contact     If you have questions or need follow up information about today's clinic visit or your schedule please contact [facility] directly at [phone].  Normal or non-critical lab and imaging results will be communicated to you by MyChart, letter or phone within 4 business days after the clinic has received the results. If you do not hear from us within 7 days, please contact the clinic through MyChart or phone. If you have a critical or abnormal lab result, we will notify you by phone as soon as possible.  Submit refill requests through [employer] or call your pharmacy and they will forward the refill request to us. Please allow 3 business days for your refill to be completed.          Additional Information About Your Visit        Care EveryWhere ID     This is your Care EveryWhere ID. This could be used by other organizations to access your [place] medical records  [id number]        Your Vitals Were     Pulse Respirations                64 18           Blood Pressure from Last 3 Encounters:   06/18/18 136/80   06/15/18 148/80   06/04/18 136/64    Weight from Last 3 Encounters:   06/15/18 122 lb (55.3 kg)   03/14/18 120 lb (54.4 kg)   02/06/18 119 lb (54 kg)              Today, you had the following     No orders found for display       Primary Care Provider Office Phone # Fax #     Ulysses Baker [phone]       [address] [facility] [address]        Equal Access to Services     DERIAN DUNN : Hadii aad ku hadcheriemelanie Sales, francescowillam wangdarciha, jose kabillda florin, leighann tavares betomonica garibay laJahlatesha meraz. So [facility] [phone].    ATENCIÓN: Si habla español, tiene a cueva disposición servicios gratuitos de asistencia lingüística. Llame al [phone].    We comply with applicable federal civil rights laws and [place] laws. We do not discriminate on the basis of race, color, national origin, age, disability, sex, sexual orientation, or gender identity.            Thank you!     Thank you for choosing [facility]  for your care. Our goal is always to provide you with excellent care. Hearing back from our patients is one way we can continue to improve our services. Please take a few minutes to complete the written survey that you may receive in the mail after your visit with us. Thank you!             Your Updated Medication List - Protect others around you: Learn how to safely use, store and throw away your medicines at www.disposemymeds.org.          This list is accurate as of 6/18/18  2:43 PM.  Always use your most recent med list.                   Brand Name Dispense Instructions for use Diagnosis    amLODIPine 2.5 MG tablet    NORVASC    90 tablet    Take 1 tablet (2.5 mg) by mouth daily    Essential hypertension       ascorbic acid 250 MG Chew chewable tablet    vitamin C     Take 1 tablet by mouth daily.        ASPIRIN NOT PRESCRIBED    INTENTIONAL    0    due to coumadin    Myalgias       Calcium carb-Vitamin D 500 mg [place]-200 units 500-200 MG-UNIT per tablet    OSCAL with D;Oyster Shell Calcium     Take 1 tablet by mouth 2 times daily (with meals).        iron 325 (65 Fe) MG tablet      Take  by mouth. Taking two per week.        metoprolol tartrate 50 MG tablet    LOPRESSOR    270 tablet    Take 1.5 tablets (75 mg) by mouth 2 times  daily    Essential hypertension       multivitamin per tablet      Take 1 tablet by mouth daily.        NEW MED      Fiber supplement daily.        predniSONE 1 MG tablet    DELTASONE    100 tablet    Take 1 tablet (1 mg) by mouth daily    Polymyalgia rheumatica (H)       triamterene-hydrochlorothiazide 37.5-25 MG per tablet    MAXZIDE-25    90 tablet    Take 1/2 pill twice daily    Essential hypertension       warfarin 5 MG tablet    COUMADIN    90 tablet    Take 5mg by mouth Monday and 2.5mg all other days or as directed by Anticoagulation Clinic    Long term current use of anticoagulant therapy

## 2018-06-18 NOTE — MR AVS SNAPSHOT
Nayely BRETT Kay   6/18/2018 2:00 PM   Anticoagulation Therapy Visit    Description:  78 year old female   Provider:  NB ANTI COAFORTINO   Department:  Nb Anticoag           INR as of 6/18/2018     Today's INR 2.7      Anticoagulation Summary as of 6/18/2018     INR goal 2.0-3.0   Today's INR 2.7   Full warfarin instructions 5 mg on Mon; 2.5 mg all other days   Next INR check 7/16/2018    Indications   Atrial fibrillation (H) [I48.91]  Long term current use of anticoagulant therapy [Z79.01]         Your next Anticoagulation Clinic appointment(s)     Jul 16, 2018  9:30 AM CDT   Anticoagulation Visit with NB ANTI COAG   [facility]    [address] [place] [id number]   [phone]              June 2018 Details    Sun Mon Tue Wed Thu Fri Sat          1               2                 3               4               5               6               7               8               9                 10               11               12               13               14               15               16                 17               18      5 mg   See details      19      2.5 mg         20      2.5 mg         21      2.5 mg         22      2.5 mg         23      2.5 mg           24      2.5 mg         25      5 mg         26      2.5 mg         27      2.5 mg         28      2.5 mg         29      2.5 mg         30      2.5 mg          Date Details   06/18 This INR check               How to take your warfarin dose     To take:  2.5 mg Take 0.5 of a 5 mg tablet.    To take:  5 mg Take 1 of the 5 mg tablets.           July 2018 Details    Sun Mon Tue Wed Thu Fri Sat     1      2.5 mg         2      5 mg         3      2.5 mg         4      2.5 mg         5      2.5 mg         6      2.5 mg         7      2.5 mg           8      2.5 mg         9      5 mg         10      2.5 mg         11      2.5 mg         12      2.5 mg         13      2.5 mg         14       2.5 mg           15      2.5 mg         16            17               18               19               20               21                 22               23               24               25               26               27               28                 29               30               31                    Date Details   No additional details    Date of next INR:  7/16/2018         How to take your warfarin dose     To take:  2.5 mg Take 0.5 of a 5 mg tablet.    To take:  5 mg Take 1 of the 5 mg tablets.

## 2018-07-12 ENCOUNTER — OFFICE VISIT (OUTPATIENT)
Dept: FAMILY MEDICINE | Facility: CLINIC | Age: 78
End: 2018-07-12
Payer: COMMERCIAL

## 2018-07-12 VITALS
WEIGHT: 120.4 LBS | RESPIRATION RATE: 16 BRPM | HEART RATE: 72 BPM | TEMPERATURE: 98 F | SYSTOLIC BLOOD PRESSURE: 154 MMHG | DIASTOLIC BLOOD PRESSURE: 80 MMHG | BODY MASS INDEX: 24.11 KG/M2

## 2018-07-12 DIAGNOSIS — R07.9 CHEST PAIN, UNSPECIFIED TYPE: Primary | ICD-10-CM

## 2018-07-12 DIAGNOSIS — L98.9 SKIN LESION: ICD-10-CM

## 2018-07-12 DIAGNOSIS — L82.1 SEBORRHEIC KERATOSES: ICD-10-CM

## 2018-07-12 PROCEDURE — 17110 DESTRUCTION B9 LES UP TO 14: CPT | Performed by: FAMILY MEDICINE

## 2018-07-12 PROCEDURE — 99213 OFFICE O/P EST LOW 20 MIN: CPT | Mod: 25 | Performed by: FAMILY MEDICINE

## 2018-07-12 ASSESSMENT — PAIN SCALES - GENERAL: PAINLEVEL: NO PAIN (0)

## 2018-07-12 NOTE — NURSING NOTE
"Chief Complaint   Patient presents with     Skin Spots       Initial /80 (BP Location: Right arm, Patient Position: Chair, Cuff Size: Adult Regular)  Pulse 72  Temp 98  F (36.7  C) (Tympanic)  Resp 16  Wt 120 lb 6.4 oz (54.6 kg)  BMI 24.11 kg/m2 Estimated body mass index is 24.11 kg/(m^2) as calculated from the following:    Height as of 6/15/18: 4' 11.25\" (1.505 m).    Weight as of this encounter: 120 lb 6.4 oz (54.6 kg).      Health Maintenance that is potentially due pending provider review:  NONE    Kyleigh Jimenez MA  1:35 PM 7/12/2018  .      "

## 2018-07-12 NOTE — MR AVS SNAPSHOT
After Visit Summary   7/12/2018    Nayeyl Kay    MRN: [id number]           Patient Information     Date Of Birth          1940        Visit Information        Provider Department      7/12/2018 1:20 PM Oswaldo Cedillo MD [facility]        Today's Diagnoses     Chest pain, unspecified type    -  1    Seborrheic keratoses        Skin lesion          Care Instructions    ASSESSMENT:   (R07.9) Chest pain, unspecified type  (primary encounter diagnosis)  Comment: this does not sound like heart pain.  May have been in chest wall muscles.   Plan: If you have more chest pains with exertion doing something physical-come back and we will do further testing.    (L82.1) Seborrheic keratoses  Comment: Spots on temple are benign-not cancer aging spots,.  They do not need any treatment.   Plan: I did freeze one of these on right arm.     (L98.9) Skin lesion  Comment: seborrheic keratosis vs wart or actinic keratosis.   Plan: Two spots frozen on lateral left knee and left elbow    Three spots treated today.  Treatment was begun with Liquid nitrogen, freeze/thaw/freeze.  Expect redness for a day or two, then possible blister or sometimes blood blister.  Then the wound may get raw and sore and scab over.  Skin spots should steadily heal and look pink for a few weeks.  New skin should be smooth and the rough irregular skin gone.  Recheck if abnormal skin does not disappear with the treatment.           Follow-ups after your visit        Your next 10 appointments already scheduled     Jul 16, 2018  9:30 AM CDT   Anticoagulation Visit with NB ANTI COAG   [facility]    [address] [place] [id number] [phone]            Jul 16, 2018  9:45 AM CDT   SHORT with JUWAN SHIN RN   [facility]    [address] [place] [id number] [phone]              Who to contact      If you have questions or need follow up information about today's clinic visit or your schedule please contact [facility] directly at [phone].  Normal or non-critical lab and imaging results will be communicated to you by MyChart, letter or phone within 4 business days after the clinic has received the results. If you do not hear from us within 7 days, please contact the clinic through MyChart or phone. If you have a critical or abnormal lab result, we will notify you by phone as soon as possible.  Submit refill requests through [employer] or call your pharmacy and they will forward the refill request to us. Please allow 3 business days for your refill to be completed.          Additional Information About Your Visit        Care EveryWhere ID     This is your Care EveryWhere ID. This could be used by other organizations to access your [place] medical records  [id number]        Your Vitals Were     Pulse Temperature Respirations BMI (Body Mass Index)          72 98  F (36.7  C) (Tympanic) 16 24.11 kg/m2         Blood Pressure from Last 3 Encounters:   07/12/18 154/80   06/18/18 136/80   06/15/18 148/80    Weight from Last 3 Encounters:   07/12/18 120 lb 6.4 oz (54.6 kg)   06/15/18 122 lb (55.3 kg)   03/14/18 120 lb (54.4 kg)              Today, you had the following     No orders found for display       Primary Care Provider Office Phone # Fax #    Ulysses Baker [phone] [address] [facility] [address]        Equal Access to Services     DERIAN DUNN AH: Hadii aad ku hadasho Soomaali, waaxda luqadaha, qaybta kaalmada adeegyada, wax shahriar meraz. So [facility] [phone].    ATENCIÓN: Si habla español, tiene a cueva disposición servicios gratuitos de asistencia lingüística. Llame al [phone].    We comply with applicable federal civil rights laws and [place] laws. We do not discriminate on the basis of race, color, national origin,  age, disability, sex, sexual orientation, or gender identity.            Thank you!     Thank you for choosing [facility]  for your care. Our goal is always to provide you with excellent care. Hearing back from our patients is one way we can continue to improve our services. Please take a few minutes to complete the written survey that you may receive in the mail after your visit with us. Thank you!             Your Updated Medication List - Protect others around you: Learn how to safely use, store and throw away your medicines at www.disposemymeds.org.          This list is accurate as of 7/12/18  2:07 PM.  Always use your most recent med list.                   Brand Name Dispense Instructions for use Diagnosis    amLODIPine 2.5 MG tablet    NORVASC    90 tablet    Take 1 tablet (2.5 mg) by mouth daily    Essential hypertension       ascorbic acid 250 MG Chew chewable tablet    vitamin C     Take 1 tablet by mouth daily.        ASPIRIN NOT PRESCRIBED    INTENTIONAL    0    due to coumadin    Myalgias       Calcium carb-Vitamin D 500 mg [place]-200 units 500-200 MG-UNIT per tablet    OSCAL with D;Oyster Shell Calcium     Take 1 tablet by mouth 2 times daily (with meals).        iron 325 (65 Fe) MG tablet      Take  by mouth. Taking two per week.        metoprolol tartrate 50 MG tablet    LOPRESSOR    270 tablet    Take 1.5 tablets (75 mg) by mouth 2 times daily    Essential hypertension       multivitamin per tablet      Take 1 tablet by mouth daily.        NEW MED      Fiber supplement daily.        predniSONE 1 MG tablet    DELTASONE    100 tablet    Take 1 tablet (1 mg) by mouth daily    Polymyalgia rheumatica (H)       triamterene-hydrochlorothiazide 37.5-25 MG per tablet    MAXZIDE-25    90 tablet    Take 1/2 pill twice daily    Essential hypertension       warfarin 5 MG tablet    COUMADIN    90 tablet    Take 5mg by mouth Monday and 2.5mg all other days or as directed by Anticoagulation Clinic     Long term current use of anticoagulant therapy

## 2018-07-12 NOTE — PROGRESS NOTES
SUBJECTIVE:   Nayely Kay is a 78 year old female who presents to clinic today for the following health issues:  Chief Complaint   Patient presents with     Skin Spots      1.) Had one episode of chest pain last week lasting only a couple minutes and has not had any episodes since.  Radiated to mid back.  Duration: 2 minutes.  She had been vacuuming at the time.  Sat down and it was OK.  No other exertional pains.   No shortness of breath or breathing problems.   No other chest pains.       Concern - Skin Spots   Onset: Has had a long time       Description:   Has some spots on her arm and knee that she would like looked at     Intensity: mild    Progression of Symptoms:  same    Accompanying Signs & Symptoms:  Different spots on her body    Previous history of similar problem:   Yes, has had some removed in the past    Precipitating factors:   Worsened by: NA    Alleviating factors:  Improved by: NA    Therapies Tried and outcome: none    Patient Active Problem List   Diagnosis     Headache     Atrial fibrillation (H)     Polymyalgia rheumatica (H)     Iron deficiency anemia     HYPERLIPIDEMIA LDL GOAL <130     Osteopenia     Family history of breast cancer     Eczema     Advanced directives, counseling/discussion     AK (actinic keratosis)     Ganglion cyst     Long term current use of anticoagulant therapy     Essential hypertension     Tubular adenoma      OBJECTIVE:Blood pressure 154/80, pulse 72, temperature 98  F (36.7  C), temperature source Tympanic, resp. rate 16, weight 120 lb 6.4 oz (54.6 kg). BMI=Body mass index is 24.11 kg/(m^2).  GENERAL APPEARANCE ADULT: Alert, no acute distress  Neck: No adenopathy,masses or thyromegaly   RESP: lungs clear to auscultation   CV: irregular rhythm-irregularly irregular, rate-normal, no murmur  MS: extremities normal, no peripheral edema  SKIN: She has raised brown pigmented spots left temple and right distal arm both typical seborrheic keratosis.   Two skin  lesions one lateral right knee and left elbow which are white and scaly, raised and rough.  They may be seborrheic keratosis vs wart or actinic keratosis.      ASSESSMENT:   (R07.9) Chest pain, unspecified type  (primary encounter diagnosis)  Comment: this does not sound like heart pain.  May have been in chest wall muscles.   Plan: If you have more chest pains with exertion doing something physical-come back and we will do further testing.    (L82.1) Seborrheic keratoses  Comment: Spots on temple are benign-not cancer aging spots,.  They do not need any treatment.   Plan: I did freeze one of these on right arm.     (L98.9) Skin lesion  Comment: seborrheic keratosis vs wart or actinic keratosis.   Plan: Two spots frozen on lateral left knee and left elbow    Three spots treated today.  Treatment was begun with Liquid nitrogen, freeze/thaw/freeze.  Expect redness for a day or two, then possible blister or sometimes blood blister.  Then the wound may get raw and sore and scab over.  Skin spots should steadily heal and look pink for a few weeks.  New skin should be smooth and the rough irregular skin gone.  Recheck if abnormal skin does not disappear with the treatment.

## 2018-07-12 NOTE — PATIENT INSTRUCTIONS
ASSESSMENT:   (R07.9) Chest pain, unspecified type  (primary encounter diagnosis)  Comment: this does not sound like heart pain.  May have been in chest wall muscles.   Plan: If you have more chest pains with exertion doing something physical-come back and we will do further testing.    (L82.1) Seborrheic keratoses  Comment: Spots on temple are benign-not cancer aging spots,.  They do not need any treatment.   Plan: I did freeze one of these on right arm.     (L98.9) Skin lesion  Comment: seborrheic keratosis vs wart or actinic keratosis.   Plan: Two spots frozen on lateral left knee and left elbow    Three spots treated today.  Treatment was begun with Liquid nitrogen, freeze/thaw/freeze.  Expect redness for a day or two, then possible blister or sometimes blood blister.  Then the wound may get raw and sore and scab over.  Skin spots should steadily heal and look pink for a few weeks.  New skin should be smooth and the rough irregular skin gone.  Recheck if abnormal skin does not disappear with the treatment.

## 2018-07-13 ENCOUNTER — TELEPHONE (OUTPATIENT)
Dept: FAMILY MEDICINE | Facility: CLINIC | Age: 78
End: 2018-07-13

## 2018-07-13 NOTE — TELEPHONE ENCOUNTER
Reason for Call:  Other     Detailed comments: Patient is concerned about her visit with Dr. Cedillo yesterday - He said to wait until she gets another chest pain to contact the clinic but she could be dead by then    Phone Number Patient can be reached at: Home number on file [phone] (home)    Best Time:     Can we leave a detailed message on this number? YES    Call taken on 7/13/2018 at 12:05 PM by Audra Monroe

## 2018-07-13 NOTE — TELEPHONE ENCOUNTER
Patient says she is very worried about the chest pain that she was seen for yesterday, says she had pain through chest and to the back and was seen yesterday for it. Says no chest pain and nothing is new since office visit yesterday, denies shortness of breath, not sweating or jaw pain, no tingling but says had occasional numbness on left thumb and fingers. Wants to have further testing and feels something should be done, says she is afraid of heart attach. Routing to Dr. Cedillo to advise.

## 2018-07-13 NOTE — TELEPHONE ENCOUNTER
I spoke with her.  She had no other episodes of chest pain but worries about her heart.   PLAN: She will come in on 7/16 and plan for EKG and consider stress test.   MASON BURRELL MD

## 2018-07-16 ENCOUNTER — OFFICE VISIT (OUTPATIENT)
Dept: FAMILY MEDICINE | Facility: CLINIC | Age: 78
End: 2018-07-16
Payer: COMMERCIAL

## 2018-07-16 ENCOUNTER — ANTICOAGULATION THERAPY VISIT (OUTPATIENT)
Dept: ANTICOAGULATION | Facility: CLINIC | Age: 78
End: 2018-07-16
Payer: COMMERCIAL

## 2018-07-16 VITALS
HEART RATE: 56 BPM | TEMPERATURE: 98.1 F | BODY MASS INDEX: 24.19 KG/M2 | HEIGHT: 59 IN | OXYGEN SATURATION: 98 % | SYSTOLIC BLOOD PRESSURE: 132 MMHG | RESPIRATION RATE: 18 BRPM | WEIGHT: 120 LBS | DIASTOLIC BLOOD PRESSURE: 70 MMHG

## 2018-07-16 DIAGNOSIS — I48.91 ATRIAL FIBRILLATION (H): ICD-10-CM

## 2018-07-16 DIAGNOSIS — R07.9 CHEST PAIN, UNSPECIFIED TYPE: Primary | ICD-10-CM

## 2018-07-16 DIAGNOSIS — Z79.01 LONG TERM CURRENT USE OF ANTICOAGULANT THERAPY: ICD-10-CM

## 2018-07-16 LAB — INR POINT OF CARE: 3.8 (ref 0.86–1.14)

## 2018-07-16 PROCEDURE — 85610 PROTHROMBIN TIME: CPT | Mod: QW

## 2018-07-16 PROCEDURE — 99214 OFFICE O/P EST MOD 30 MIN: CPT | Mod: 24 | Performed by: FAMILY MEDICINE

## 2018-07-16 PROCEDURE — 93000 ELECTROCARDIOGRAM COMPLETE: CPT | Performed by: FAMILY MEDICINE

## 2018-07-16 PROCEDURE — 36416 COLLJ CAPILLARY BLOOD SPEC: CPT

## 2018-07-16 PROCEDURE — 99207 ZZC NO CHARGE NURSE ONLY: CPT

## 2018-07-16 RX ORDER — NITROGLYCERIN 0.4 MG/1
0.4 TABLET SUBLINGUAL EVERY 5 MIN PRN
Qty: 25 TABLET | Refills: 1 | Status: SHIPPED | OUTPATIENT
Start: 2018-07-16

## 2018-07-16 ASSESSMENT — PAIN SCALES - GENERAL: PAINLEVEL: NO PAIN (0)

## 2018-07-16 NOTE — MR AVS SNAPSHOT
Nayely BRETT Kay   7/16/2018 11:30 AM   Anticoagulation Therapy Visit    Description:  78 year old female   Provider:  [facility]   Department:  [facility]           INR as of 7/16/2018     Today's INR 3.8!      Anticoagulation Summary as of 7/16/2018     INR goal 2.0-3.0   Today's INR 3.8!   Full warfarin instructions 2.5 mg every day   Next INR check 7/26/2018    Indications   Atrial fibrillation (H) [I48.91]  Long term current use of anticoagulant therapy [Z79.01]         Your next Anticoagulation Clinic appointment(s)     Jul 26, 2018  2:45 PM CDT   Anticoagulation Visit with [facility]   [facility]    [address] [place] [id number]   [phone]              July 2018 Details    Sun Mon Tue Wed Thu Fri Sat     1               2               3               4               5               6               7                 8               9               10               11               12               13               14                 15               16      2.5 mg   See details      17      2.5 mg         18      2.5 mg         19      2.5 mg         20      2.5 mg         21      2.5 mg           22      2.5 mg         23      2.5 mg         24      2.5 mg         25      2.5 mg         26            27               28                 29               30               31                    Date Details   07/16 This INR check       Date of next INR:  7/26/2018         How to take your warfarin dose     To take:  2.5 mg Take 0.5 of a 5 mg tablet.

## 2018-07-16 NOTE — PROGRESS NOTES
"  ANTICOAGULATION FOLLOW-UP CLINIC VISIT    Patient Name:  Nayely Kay  Date:  7/16/2018  Contact Type:  Face to Face    SUBJECTIVE:     Patient Findings     Positives Unexplained INR or factor level change    Comments She states she had a pain her in back and went to see Dr. Cedillo today, she had a normal EKG. She says \"I thought I had a heart attack\". Patient denies signs and symptoms of bleeding. Patient was educated regarding what signs and symptoms to be aware of and when seek immediate medical attention at ED versus PCP's Clinic. Patient verbalized understanding.  Patient instructed to hold warfarin today then decrease her warfarin dose to 2.5mg daily.  She will recheck her INR in ten days.           OBJECTIVE    INR Protime   Date Value Ref Range Status   07/16/2018 3.8 (A) 0.86 - 1.14 Final       ASSESSMENT / PLAN  No question data found.  Anticoagulation Summary as of 7/16/2018     INR goal 2.0-3.0   Today's INR 3.8!   Warfarin maintenance plan 2.5 mg (5 mg x 0.5) every day   Full warfarin instructions 2.5 mg every day   Weekly warfarin total 17.5 mg   Plan last modified Oly Mcwilliams RN (7/16/2018)   Next INR check 7/26/2018   Priority INR   Target end date Indefinite    Indications   Atrial fibrillation (H) [I48.91]  Long term current use of anticoagulant therapy [Z79.01]         Anticoagulation Episode Summary     INR check location     Preferred lab     Send INR reminders to [facility] CLINIC POOL    Comments * only wants dosing card. Pt uses 5mg tablets. wants to check every 4 weeks        Anticoagulation Care Providers     Provider Role Specialty Phone number    Ulysses Baker MD [facility] [phone]            See the Encounter Report to view Anticoagulation Flowsheet and Dosing Calendar (Go to Encounters tab in chart review, and find the Anticoagulation Therapy Visit)        Oly Mcwilliams RN               "

## 2018-07-16 NOTE — PROGRESS NOTES
"  SUBJECTIVE:   Nayely Kay is a 78 year old female who presents to clinic today for the following health issues:  Chief Complaint   Patient presents with     Chest Pain     When done with visit needs to see [facility].      Last clinic visit: 7/12/2018.  I saw her for chest pain episode.    History:  1.) Had one episode of chest pain last week lasting only a couple minutes and has not had any episodes since.  Radiated to mid back.  Duration: 2 minutes.  She had been vacuuming at the time.  Sat down and it was OK.  No other exertional pains.   No shortness of breath or breathing problems.   No other chest pains.     It did not sound cardiac.  I did not recommend other testing.  She is worried about her heart and wanted more testing done.  He is back today for follow-up.     Chest Pain      Onset: 07/09/2018 none since that day    Description (location/character/radiation/duration): mid chest pain and into back    Intensity:  moderate    Accompanying signs and symptoms:        Shortness of breath: no        Sweating: no        Nausea/vomitting: no        Palpitations: no        Other (fevers/chills/cough/heartburn/lightheadedness): no     History (similar episodes/previous evaluation): yes    Precipitating or alleviating factors:       Worse with exertion: no        Worse with breathing: no        Related to eating: no        Better with burping: no     Therapies tried and outcome: None      Pain was \"steady\".  Location: mid sternum radiated to mid back.  Seemed sharp or like a pressure.   No dyspnea on exertion.  Associated symptoms: no nausea or vomiting.   NO shortness of breath or breathing symptoms.  No sweating.    She had only the one episode.  No other pains.  No exertional symptoms like shortness of breath, fatigue, loss of stamina and no chest pains.   Stays active.  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    Has otherwise been feeling well.  A little more tired.    She has had stress test in the " "past perhaps 14 years ago.     Patient Active Problem List   Diagnosis     Headache     Atrial fibrillation (H)     Polymyalgia rheumatica (H)     Iron deficiency anemia     HYPERLIPIDEMIA LDL GOAL <130     Osteopenia     Family history of breast cancer     Eczema     Advanced directives, counseling/discussion     AK (actinic keratosis)     Ganglion cyst     Long term current use of anticoagulant therapy     Essential hypertension     Tubular adenoma      History   Smoking Status     Never Smoker   Smokeless Tobacco     Never Used    Never smoked.     Lab Results   Component Value Date    CHOL 201 10/07/2013     Lab Results   Component Value Date    HDL 80 10/07/2013     Lab Results   Component Value Date    LDL 97 10/07/2013     Lab Results   Component Value Date    TRIG 122 10/07/2013     Lab Results   Component Value Date    CHOLHDLRATIO 3.0 10/07/2013       Family History: no early coronary artery disease.   ROS:General: No change in weight, sleep or appetite.  Normal energy.  No fever or chills  Resp: No coughing, wheezing or shortness of breath  GI: No nausea, vomiting,  heartburn, abdominal pain, diarrhea, constipation or change in bowel habits  : No urinary frequency or dysuria, bladder or kidney problems  Musculoskeletal: POSITIVE  for:, pain posterior thighs if sitting a while.   Psychiatric: No problems with anxiety, depression or mental health    OBJECTIVE:Blood pressure 132/70, pulse 56, temperature 98.1  F (36.7  C), resp. rate 18, height 4' 11.25\" (1.505 m), weight 120 lb (54.4 kg), SpO2 98 %. BMI=Body mass index is 24.03 kg/(m^2).  GENERAL APPEARANCE ADULT: Alert, no acute distress  NECK: No adenopathy,masses or thyromegaly  RESP: lungs clear to auscultation   CV: normal rate, regular rhythm, no murmur or gallop  ABDOMEN: soft, no organomegaly, masses or tenderness  MS: extremities normal, no peripheral edema  EKG:Atrial fib with normal rate.  Unchanged from EKG in 2010.  No acute changes.  "     ASSESSMENT:   (R07.9) Chest pain, unspecified type  (primary encounter diagnosis)  Comment: No sign of heart attack on the   EKG which shows the atrial fibrillation but has not changed since 2010.  I don't think this is your heart but there could be some blockage to heart blood vessels that cannot be seen with EKG.  Plan: EKG 12-lead complete w/read - Clinics,         nitroGLYcerin (NITROSTAT) 0.4 MG sublingual         tablet        We discussed option of stress test but you have worries about doing one.   Watch for any chest pains with physical activity and let me know if you are having problems with this.   If you get a chest pain, is is OK to try the nitro pills.   Discussed use of nitroglycerin.  One tablet sublingual if chest pain occurs.  Tablets may  be repeated every 5 minutes up to three pills.  If chest pain persists, call 911 and go to the hospital.  Reviewed potential side effects.     If you decide you want to do a stress test, let me know and I can order one.  Probably a stress echo would be a good one to do.

## 2018-07-16 NOTE — NURSING NOTE
"Chief Complaint   Patient presents with     Chest Pain       Initial /70  Pulse 56  Temp 98.1  F (36.7  C)  Resp 18  Ht 4' 11.25\" (1.505 m)  Wt 120 lb (54.4 kg)  SpO2 98%  BMI 24.03 kg/m2 Estimated body mass index is 24.03 kg/(m^2) as calculated from the following:    Height as of this encounter: 4' 11.25\" (1.505 m).    Weight as of this encounter: 120 lb (54.4 kg).      Health Maintenance that is potentially due pending provider review:          Is there anyone who you would like to be able to receive your results? No  If yes have patient fill out BOLA    "

## 2018-07-16 NOTE — MR AVS SNAPSHOT
After Visit Summary   7/16/2018    Nayely Kay    MRN: [id number]           Patient Information     Date Of Birth          1940        Visit Information        Provider Department      7/16/2018 9:20 AM Oswaldo Cedillo MD [facility]        Today's Diagnoses     Chest pain, unspecified type    -  1      Care Instructions    ASSESSMENT:   (R07.9) Chest pain, unspecified type  (primary encounter diagnosis)  Comment: No sign of heart attack on the   EKG which shows the atrial fibrillation but has not changed since 2010.  I don't think this is your heart but there could be some blockage to heart blood vessels that cannot be seen with EKG.  Plan: EKG 12-lead complete w/read - Clinics,         nitroGLYcerin (NITROSTAT) 0.4 MG sublingual         tablet        We discussed option of stress test but you have worries about doing one.   Watch for any chest pains with physical activity and let me know if you are having problems with this.   If you get a chest pain, is is OK to try the nitro pills.   Discussed use of nitroglycerin.  One tablet sublingual if chest pain occurs.  Tablets may  be repeated every 5 minutes up to three pills.  If chest pain persists, call 911 and go to the hospital.  Reviewed potential side effects.     If you decide you want to do a stress test, let me know and I can order one.  Probably a stress echo would be a good one to do.          Follow-ups after your visit        Your next 10 appointments already scheduled     Jul 16, 2018 11:30 AM CDT   [facility]    [address] [place] [id number] [phone]              Who to contact     If you have questions or need follow up information about today's clinic visit or your schedule please contact [facility] directly at [phone].  Normal or non-critical lab and imaging results will  "be communicated to you by MyChart, letter or phone within 4 business days after the clinic has received the results. If you do not hear from us within 7 days, please contact the clinic through MyChart or phone. If you have a critical or abnormal lab result, we will notify you by phone as soon as possible.  Submit refill requests through [employer] or call your pharmacy and they will forward the refill request to us. Please allow 3 business days for your refill to be completed.          Additional Information About Your Visit        Care EveryWhere ID     This is your Care EveryWhere ID. This could be used by other organizations to access your [place] medical records  [id number]        Your Vitals Were     Pulse Temperature Respirations Height Pulse Oximetry BMI (Body Mass Index)    56 98.1  F (36.7  C) 18 4' 11.25\" (1.505 m) 98% 24.03 kg/m2       Blood Pressure from Last 3 Encounters:   07/16/18 132/70   07/12/18 154/80   06/18/18 136/80    Weight from Last 3 Encounters:   07/16/18 120 lb (54.4 kg)   07/12/18 120 lb 6.4 oz (54.6 kg)   06/15/18 122 lb (55.3 kg)              We Performed the Following     EKG 12-lead complete w/read - Clinics          Today's Medication Changes          These changes are accurate as of 7/16/18 11:05 AM.  If you have any questions, ask your nurse or doctor.               Start taking these medicines.        Dose/Directions    nitroGLYcerin 0.4 MG sublingual tablet   Commonly known as:  NITROSTAT   Used for:  Chest pain, unspecified type   Started by:  Oswaldo Cedillo MD        Dose:  0.4 mg   Place 1 tablet (0.4 mg) under the tongue every 5 minutes as needed for chest pain   Quantity:  25 tablet   Refills:  1            Where to get your medicines      Some of these will need a paper prescription and others can be bought over the counter.  Ask your nurse if you have questions.     Bring a paper prescription for each of these medications     nitroGLYcerin 0.4 MG sublingual tablet    "             Primary Care Provider Office Phone # Fax #    Ulysses Baker [phone] [address] [facility] [address]        Equal Access to Services     DERIAN DUNN : Hadii aad ku hadcheriemelanie Mónica, walanetteda luqramona, qaybta kaalmada florin, leighann tavares betomonica garibay alvino meraz. So [facility] [phone].    ATENCIÓN: Si habla español, tiene a cueva disposición servicios gratuitos de asistencia lingüística. Llame al [phone].    We comply with applicable federal civil rights laws and [place] laws. We do not discriminate on the basis of race, color, national origin, age, disability, sex, sexual orientation, or gender identity.            Thank you!     Thank you for choosing [facility]  for your care. Our goal is always to provide you with excellent care. Hearing back from our patients is one way we can continue to improve our services. Please take a few minutes to complete the written survey that you may receive in the mail after your visit with us. Thank you!             Your Updated Medication List - Protect others around you: Learn how to safely use, store and throw away your medicines at www.disposemymeds.org.          This list is accurate as of 7/16/18 11:05 AM.  Always use your most recent med list.                   Brand Name Dispense Instructions for use Diagnosis    amLODIPine 2.5 MG tablet    NORVASC    90 tablet    Take 1 tablet (2.5 mg) by mouth daily    Essential hypertension       ascorbic acid 250 MG Chew chewable tablet    vitamin C     Take 1 tablet by mouth daily.        ASPIRIN NOT PRESCRIBED    INTENTIONAL    0    due to coumadin    Myalgias       Calcium carb-Vitamin D 500 mg [place]-200 units 500-200 MG-UNIT per tablet    OSCAL with D;Oyster Shell Calcium     Take 1 tablet by mouth 2 times daily (with meals).        iron 325 (65 Fe) MG tablet      Take  by mouth. Taking two per week.        metoprolol tartrate 50 MG tablet    LOPRESSOR     270 tablet    Take 1.5 tablets (75 mg) by mouth 2 times daily    Essential hypertension       multivitamin per tablet      Take 1 tablet by mouth daily.        NEW MED      Fiber supplement daily.        nitroGLYcerin 0.4 MG sublingual tablet    NITROSTAT    25 tablet    Place 1 tablet (0.4 mg) under the tongue every 5 minutes as needed for chest pain    Chest pain, unspecified type       predniSONE 1 MG tablet    DELTASONE    100 tablet    Take 1 tablet (1 mg) by mouth daily    Polymyalgia rheumatica (H)       triamterene-hydrochlorothiazide 37.5-25 MG per tablet    MAXZIDE-25    90 tablet    Take 1/2 pill twice daily    Essential hypertension       warfarin 5 MG tablet    COUMADIN    90 tablet    Take 5mg by mouth Monday and 2.5mg all other days or as directed by Anticoagulation Clinic    Long term current use of anticoagulant therapy

## 2018-07-16 NOTE — PATIENT INSTRUCTIONS
ASSESSMENT:   (R07.9) Chest pain, unspecified type  (primary encounter diagnosis)  Comment: No sign of heart attack on the   EKG which shows the atrial fibrillation but has not changed since 2010.  I don't think this is your heart but there could be some blockage to heart blood vessels that cannot be seen with EKG.  Plan: EKG 12-lead complete w/read - Clinics,         nitroGLYcerin (NITROSTAT) 0.4 MG sublingual         tablet        We discussed option of stress test but you have worries about doing one.   Watch for any chest pains with physical activity and let me know if you are having problems with this.   If you get a chest pain, is is OK to try the nitro pills.   Discussed use of nitroglycerin.  One tablet sublingual if chest pain occurs.  Tablets may  be repeated every 5 minutes up to three pills.  If chest pain persists, call 911 and go to the hospital.  Reviewed potential side effects.     If you decide you want to do a stress test, let me know and I can order one.  Probably a stress echo would be a good one to do.

## 2018-07-17 ENCOUNTER — TELEPHONE (OUTPATIENT)
Dept: FAMILY MEDICINE | Facility: CLINIC | Age: 78
End: 2018-07-17

## 2018-07-17 DIAGNOSIS — R07.89 ATYPICAL CHEST PAIN: Primary | ICD-10-CM

## 2018-07-17 NOTE — TELEPHONE ENCOUNTER
Reason for Call: Request for an order or referral:    Order or referral being requested: Nayely says she saw Dr Cedillo yesterday and he talked about her doing a stress test where she would walk on a treadmill.She says she is willing to do this so will need an order.     Date needed: at your convenience    Has the patient been seen by the PCP for this problem? YES    Additional comments: Nayely is aware that Dr Cedillo is not in the office today.    Phone number Patient can be reached at:  Home number on file [phone] (home)    Best Time:  anytime    Can we leave a detailed message on this number?  YES    Call taken on 7/17/2018 at 2:08 PM by Catarina Martin

## 2018-07-18 NOTE — TELEPHONE ENCOUNTER
Please call.    Stress Echo ordered.   Patient is to contact Cardiac Services  at [phone], to schedule this appointment.  No metoprolol within 24 hours of the procedure.   MASON BURRELL MD

## 2018-07-19 ENCOUNTER — TELEPHONE (OUTPATIENT)
Dept: FAMILY MEDICINE | Facility: CLINIC | Age: 78
End: 2018-07-19

## 2018-07-19 NOTE — TELEPHONE ENCOUNTER
Reason for Call: Pt has questions about having a Stress Test. Pt is wondering if she is to old to do this test or what other tests she can do for this. Pt would like to ask Dr. Cedillo    Phone Number Patient can be reached at: Home number on file [phone] (home)    Best Time: Any Time      Can we leave a detailed message on this number? YES    Call taken on 7/19/2018 at 4:20 PM by Thais Burnette

## 2018-07-24 ENCOUNTER — HOSPITAL ENCOUNTER (OUTPATIENT)
Dept: NUCLEAR MEDICINE | Facility: CLINIC | Age: 78
Setting detail: NUCLEAR MEDICINE
Discharge: HOME | End: 2018-07-24
Attending: FAMILY MEDICINE | Admitting: FAMILY MEDICINE
Payer: MEDICARE

## 2018-07-24 DIAGNOSIS — R07.9 CHEST PAIN, UNSPECIFIED TYPE: ICD-10-CM

## 2018-07-24 PROCEDURE — 25000128 H RX IP 250 OP 636: Performed by: FAMILY MEDICINE

## 2018-07-24 PROCEDURE — 78452 HT MUSCLE IMAGE SPECT MULT: CPT | Mod: 26 | Performed by: INTERNAL MEDICINE

## 2018-07-24 PROCEDURE — 93016 CV STRESS TEST SUPVJ ONLY: CPT | Performed by: INTERNAL MEDICINE

## 2018-07-24 PROCEDURE — 78452 HT MUSCLE IMAGE SPECT MULT: CPT

## 2018-07-24 PROCEDURE — 93017 CV STRESS TEST TRACING ONLY: CPT

## 2018-07-24 PROCEDURE — 34300033 ZZH RX 343: Performed by: FAMILY MEDICINE

## 2018-07-24 PROCEDURE — A9502 TC99M TETROFOSMIN: HCPCS | Performed by: FAMILY MEDICINE

## 2018-07-24 PROCEDURE — 93018 CV STRESS TEST I&R ONLY: CPT | Performed by: INTERNAL MEDICINE

## 2018-07-24 RX ORDER — REGADENOSON 0.08 MG/ML
0.4 INJECTION, SOLUTION INTRAVENOUS ONCE
Status: COMPLETED | OUTPATIENT
Start: 2018-07-24 | End: 2018-07-24

## 2018-07-24 RX ADMIN — TETROFOSMIN 8.4 MCI.: 1.38 INJECTION, POWDER, LYOPHILIZED, FOR SOLUTION INTRAVENOUS at 12:23

## 2018-07-24 RX ADMIN — REGADENOSON 0.4 MG: 0.08 INJECTION, SOLUTION INTRAVENOUS at 13:51

## 2018-07-24 RX ADMIN — TETROFOSMIN 25.8 MCI.: 1.38 INJECTION, POWDER, LYOPHILIZED, FOR SOLUTION INTRAVENOUS at 13:55

## 2018-07-24 NOTE — PROGRESS NOTES
Please call.  Stress test looked good with good heart pumping function and no sign of low blood supply to the heart.    PLAN: No additional testing is needed.

## 2018-07-26 ENCOUNTER — ANTICOAGULATION THERAPY VISIT (OUTPATIENT)
Dept: ANTICOAGULATION | Facility: CLINIC | Age: 78
End: 2018-07-26
Payer: COMMERCIAL

## 2018-07-26 VITALS — DIASTOLIC BLOOD PRESSURE: 67 MMHG | SYSTOLIC BLOOD PRESSURE: 129 MMHG | HEART RATE: 58 BPM

## 2018-07-26 DIAGNOSIS — Z79.01 LONG TERM CURRENT USE OF ANTICOAGULANT THERAPY: ICD-10-CM

## 2018-07-26 LAB — INR POINT OF CARE: 2.5 (ref 0.86–1.14)

## 2018-07-26 PROCEDURE — 36416 COLLJ CAPILLARY BLOOD SPEC: CPT

## 2018-07-26 PROCEDURE — 99207 ZZC NO CHARGE NURSE ONLY: CPT

## 2018-07-26 PROCEDURE — 85610 PROTHROMBIN TIME: CPT | Mod: QW

## 2018-07-26 RX ORDER — WARFARIN SODIUM 5 MG/1
TABLET ORAL
Qty: 90 TABLET | Refills: 3 | COMMUNITY
Start: 2018-07-26 | End: 2019-06-12

## 2018-07-26 NOTE — MR AVS SNAPSHOT
Nayely WEST Rahul   7/26/2018 2:45 PM   Anticoagulation Therapy Visit    Description:  78 year old female   Provider:  NB ANTI COAG   Department:  Nb Anticoag           INR as of 7/26/2018     Today's INR 2.5      Anticoagulation Summary as of 7/26/2018     INR goal 2.0-3.0   Today's INR 2.5   Full warfarin instructions 2.5 mg every day   Next INR check 8/23/2018    Indications   Atrial fibrillation (H) [I48.91]  Long term current use of anticoagulant therapy [Z79.01]         Your next Anticoagulation Clinic appointment(s)     Aug 23, 2018  1:15 PM CDT   [facility]agulation [facility] with NB ANTI COAG   [facility]    [address] [place] [id number]   [phone]              July 2018 Details    Sun Mon Tue Wed Thu Fri Sat     1               2               3               4               5               6               7                 8               9               10               11               12               13               14                 15               16               17               18               19               20               21                 22               23               24               25               26      2.5 mg   See details      27      2.5 mg         28      2.5 mg           29      2.5 mg         30      2.5 mg         31      2.5 mg              Date Details   07/26 This INR check               How to take your warfarin dose     To take:  2.5 mg Take 0.5 of a 5 mg tablet.           August 2018 Details    Sun Mon Tue Wed Thu Fri Sat        1      2.5 mg         2      2.5 mg         3      2.5 mg         4      2.5 mg           5      2.5 mg         6      2.5 mg         7      2.5 mg         8      2.5 mg         9      2.5 mg         10      2.5 mg         11      2.5 mg           12      2.5 mg         13      2.5 mg         14      2.5 mg         15      2.5 mg         16      2.5 mg         17      2.5  mg         18      2.5 mg           19      2.5 mg         20      2.5 mg         21      2.5 mg         22      2.5 mg         23            24               25                 26               27               28               29               30               31                 Date Details   No additional details    Date of next INR:  8/23/2018         How to take your warfarin dose     To take:  2.5 mg Take 0.5 of a 5 mg tablet.

## 2018-07-26 NOTE — PROGRESS NOTES
ANTICOAGULATION FOLLOW-UP CLINIC VISIT    Patient Name:  Nayely Kay  Date:  7/26/2018  Contact Type:  Face to Face    SUBJECTIVE:     Patient Findings     Positives Intentional hold of therapy (7-16-18)    Comments Patient had a stress test 2 days ago that came back normal. No further cardiac testing needed. It is unclear if she had a cardiac related event or not.     No changes in diet, activity level, medications (including over the counter). No missed doses of warfarin. Patient took dosing as prescribed. No signs of clots or bleeding concerns. Patient will continue maintenance warfarin dosing.             OBJECTIVE    INR Protime   Date Value Ref Range Status   07/26/2018 2.5 (A) 0.86 - 1.14 Final       ASSESSMENT / PLAN  INR assessment THER    Recheck INR In: 4 WEEKS    INR Location Clinic      Anticoagulation Summary as of 7/26/2018     INR goal 2.0-3.0   Today's INR 2.5   Warfarin maintenance plan 2.5 mg (5 mg x 0.5) every day   Full warfarin instructions 2.5 mg every day   Weekly warfarin total 17.5 mg   No change documented Flaquita Neal RN   Plan last modified Oly Mcwilliams RN (7/16/2018)   Next INR check 8/23/2018   Priority INR   Target end date Indefinite    Indications   Atrial fibrillation (H) [I48.91]  Long term current use of anticoagulant therapy [Z79.01]         Anticoagulation Episode Summary     INR check location     Preferred lab     Send INR reminders to [facility] CLINIC POOL    Comments * only wants dosing card. Pt uses 5mg tablets. wants to check every 4 weeks        Anticoagulation Care Providers     Provider Role Specialty Phone number    Ulysses Baker MD [facility] [phone]            See the Encounter Report to view Anticoagulation Flowsheet and Dosing Calendar (Go to Encounters tab in chart review, and find the Anticoagulation Therapy Visit)        Flaquita Neal RN

## 2018-08-23 ENCOUNTER — ANTICOAGULATION THERAPY VISIT (OUTPATIENT)
Dept: ANTICOAGULATION | Facility: CLINIC | Age: 78
End: 2018-08-23
Payer: COMMERCIAL

## 2018-08-23 ENCOUNTER — ALLIED HEALTH/NURSE VISIT (OUTPATIENT)
Dept: FAMILY MEDICINE | Facility: CLINIC | Age: 78
End: 2018-08-23
Payer: COMMERCIAL

## 2018-08-23 VITALS — DIASTOLIC BLOOD PRESSURE: 76 MMHG | HEART RATE: 62 BPM | SYSTOLIC BLOOD PRESSURE: 134 MMHG

## 2018-08-23 DIAGNOSIS — Z79.01 LONG TERM CURRENT USE OF ANTICOAGULANT THERAPY: ICD-10-CM

## 2018-08-23 DIAGNOSIS — I10 ESSENTIAL HYPERTENSION: Primary | ICD-10-CM

## 2018-08-23 LAB — INR POINT OF CARE: 2.6 (ref 0.86–1.14)

## 2018-08-23 PROCEDURE — 85610 PROTHROMBIN TIME: CPT | Mod: QW

## 2018-08-23 PROCEDURE — 99207 ZZC NO CHARGE NURSE ONLY: CPT

## 2018-08-23 PROCEDURE — 36416 COLLJ CAPILLARY BLOOD SPEC: CPT

## 2018-08-23 NOTE — PROGRESS NOTES
Nayely Kay is a 78 year old year old patient who comes in today for a Blood Pressure check because of ongoing blood pressure monitoring.  Vital Signs as repeated by RN   Vitals:    08/23/18 1330 08/23/18 1335   BP: 144/72 144/80   BP Location: Right arm Right arm   Patient Position: Sitting Sitting   Cuff Size: Adult Regular Adult Regular   Pulse: 62      Patient is taking medication as prescribed  Patient is tolerating medications well.  Patient is not monitoring Blood Pressure at home.    Current complaints: none and denies  Disposition:  patient to continue with the same medication and she will recheck BP in one month.    Giselle PALACIOS RN

## 2018-08-23 NOTE — PROGRESS NOTES
ANTICOAGULATION FOLLOW-UP CLINIC VISIT    Patient Name:  Nayely Kay  Date:  8/23/2018  Contact Type:  Face to Face    SUBJECTIVE:     Patient Findings     Positives No Problem Findings    Comments No changes in diet, activity level, medications (including over the counter), or health. No missed doses of warfarin. Patient took dosing as prescribed. No signs of clots or bleeding concerns. Patient will continue maintenance warfarin dosing.             OBJECTIVE    INR Protime   Date Value Ref Range Status   08/23/2018 2.6 (A) 0.86 - 1.14 Final       ASSESSMENT / PLAN  INR assessment THER    Recheck INR In: 4 WEEKS    INR Location Clinic      Anticoagulation Summary as of 8/23/2018     INR goal 2.0-3.0   Today's INR 2.6   Warfarin maintenance plan 2.5 mg (5 mg x 0.5) every day   Full warfarin instructions 2.5 mg every day   Weekly warfarin total 17.5 mg   No change documented Flaquita Neal, RN   Plan last modified Oly Mcwilliams RN (7/16/2018)   Next INR check 9/20/2018   Priority INR   Target end date Indefinite    Indications   Atrial fibrillation (H) [I48.91]  Long term current use of anticoagulant therapy [Z79.01]         Anticoagulation Episode Summary     INR check location     Preferred lab     Send INR reminders to [facility] CLINIC POOL    Comments * only wants dosing card. Pt uses 5mg tablets. wants to check every 4 weeks        Anticoagulation Care Providers     Provider Role Specialty Phone number    Ulysses Baker MD [facility] [phone]            See the Encounter Report to view Anticoagulation Flowsheet and Dosing Calendar (Go to Encounters tab in chart review, and find the Anticoagulation Therapy Visit)        Flaquita Neal RN

## 2018-08-23 NOTE — MR AVS SNAPSHOT
Nayely WEST Rahul   8/23/2018 1:15 PM   Anticoagulation Therapy Visit    Description:  78 year old female   Provider:  NB ANTI COAG   Department:  Nb Anticoag           INR as of 8/23/2018     Today's INR 2.6      Anticoagulation Summary as of 8/23/2018     INR goal 2.0-3.0   Today's INR 2.6   Full warfarin instructions 2.5 mg every day   Next INR check 9/20/2018    Indications   Atrial fibrillation (H) [I48.91]  Long term current use of anticoagulant therapy [Z79.01]         Your next Anticoagulation Clinic appointment(s)     Sep 20, 2018  1:30 PM CDT   Anticoagulation Visit with NB ANTI COAG   [facility]    [address] [place] [id number] [phone]              August 2018 Details    Sun Mon Tue Wed Thu Fri Sat        1               2               3               4                 5               6               7               8               9               10               11                 12               13               14               15               16               17               18                 19               20               21               22               23      2.5 mg   See details      24      2.5 mg         25      2.5 mg           26      2.5 mg         27      2.5 mg         28      2.5 mg         29      2.5 mg         30      2.5 mg         31      2.5 mg           Date Details   08/23 This INR check               How to take your warfarin dose     To take:  2.5 mg Take 0.5 of a 5 mg tablet.           September 2018 Details    Sun Mon Tue Wed Thu Fri Sat           1      2.5 mg           2      2.5 mg         3      2.5 mg         4      2.5 mg         5      2.5 mg         6      2.5 mg         7      2.5 mg         8      2.5 mg           9      2.5 mg         10      2.5 mg         11      2.5 mg         12      2.5 mg         13      2.5 mg         14      2.5 mg         15      2.5 mg           16       2.5 mg         17      2.5 mg         18      2.5 mg         19      2.5 mg         20            21               22                 23               24               25               26               27               28               29                 30                      Date Details   No additional details    Date of next INR:  9/20/2018         How to take your warfarin dose     To take:  2.5 mg Take 0.5 of a 5 mg tablet.

## 2018-08-23 NOTE — MR AVS SNAPSHOT
After Visit Summary   8/23/2018    Nayely Kay    MRN: [id number]           Patient Information     Date Of Birth          1940        Visit Information        Provider Department      8/23/2018 1:30 PM [place] NB RN [facility]        Today's Diagnoses     Essential hypertension    -  1       Follow-ups after your visit        Your next 10 appointments already scheduled     Sep 20, 2018  1:30 PM CDT   [facility]agulation [facility] with NB ANTI COAG   [facility]    [address] [place] [id number] [phone]              Who to contact     If you have questions or need follow up information about today's clinic visit or your schedule please contact [facility] directly at [phone].  Normal or non-critical lab and imaging results will be communicated to you by MyChart, letter or phone within 4 business days after the clinic has received the results. If you do not hear from us within 7 days, please contact the clinic through MyChart or phone. If you have a critical or abnormal lab result, we will notify you by phone as soon as possible.  Submit refill requests through [employer] or call your pharmacy and they will forward the refill request to us. Please allow 3 business days for your refill to be completed.          Additional Information About Your Visit        Care EveryWhere ID     This is your Care EveryWhere ID. This could be used by other organizations to access your [place] medical records  [id number]        Your Vitals Were     Pulse                   62            Blood Pressure from Last 3 Encounters:   08/23/18 144/80   07/26/18 129/67   07/16/18 132/70    Weight from Last 3 Encounters:   07/16/18 120 lb (54.4 kg)   07/12/18 120 lb 6.4 oz (54.6 kg)   06/15/18 122 lb (55.3 kg)              Today, you had the following     No orders found for display       Primary Care Provider Office Phone #  Fax #    Ulysses Baker [phone] [address] [facility] [address]        Equal Access to Services     DERIAN DUNN : Hadii aad ku hadcheriemelanie Sales, walanetteda felipedarciha, qatamicata kabillda florin, leighann tavares betomonica garibay alvino meraz. So [facility] [phone].    ATENCIÓN: Si habla español, tiene a cueva disposición servicios gratuitos de asistencia lingüística. Llame al [phone].    We comply with applicable federal civil rights laws and [place] laws. We do not discriminate on the basis of race, color, national origin, age, disability, sex, sexual orientation, or gender identity.            Thank you!     Thank you for choosing [facility]  for your care. Our goal is always to provide you with excellent care. Hearing back from our patients is one way we can continue to improve our services. Please take a few minutes to complete the written survey that you may receive in the mail after your visit with us. Thank you!             Your Updated Medication List - Protect others around you: Learn how to safely use, store and throw away your medicines at www.disposemymeds.org.          This list is accurate as of 8/23/18  1:37 PM.  Always use your most recent med list.                   Brand Name Dispense Instructions for use Diagnosis    amLODIPine 2.5 MG tablet    NORVASC    90 tablet    Take 1 tablet (2.5 mg) by mouth daily    Essential hypertension       ascorbic acid 250 MG Chew chewable tablet    vitamin C     Take 1 tablet by mouth daily.        ASPIRIN NOT PRESCRIBED    INTENTIONAL    0    due to coumadin    Myalgias       Calcium carb-Vitamin D 500 mg [place]-200 units 500-200 MG-UNIT per tablet    OSCAL with D;Oyster Shell Calcium     Take 1 tablet by mouth 2 times daily (with meals).        iron 325 (65 Fe) MG tablet      Take  by mouth. Taking two per week.        metoprolol tartrate 50 MG tablet    LOPRESSOR    270 tablet    Take 1.5 tablets (75 mg) by mouth 2  times daily    Essential hypertension       multivitamin per tablet      Take 1 tablet by mouth daily.        NEW MED      Fiber supplement daily.        nitroGLYcerin 0.4 MG sublingual tablet    NITROSTAT    25 tablet    Place 1 tablet (0.4 mg) under the tongue every 5 minutes as needed for chest pain    Chest pain, unspecified type       predniSONE 1 MG tablet    DELTASONE    100 tablet    Take 1 tablet (1 mg) by mouth daily    Polymyalgia rheumatica (H)       triamterene-hydrochlorothiazide 37.5-25 MG per tablet    MAXZIDE-25    90 tablet    Take 1/2 pill twice daily    Essential hypertension       warfarin 5 MG tablet    COUMADIN    90 tablet    Take 2.5 mg daily or as directed by Anticoagulation Clinic    Long term current use of anticoagulant therapy

## 2018-09-06 ENCOUNTER — ALLIED HEALTH/NURSE VISIT (OUTPATIENT)
Dept: FAMILY MEDICINE | Facility: CLINIC | Age: 78
End: 2018-09-06
Payer: COMMERCIAL

## 2018-09-06 DIAGNOSIS — M79.676 TOE PAIN: Primary | ICD-10-CM

## 2018-09-06 PROCEDURE — 99207 ZZC NO CHARGE NURSE ONLY: CPT

## 2018-09-06 NOTE — MR AVS SNAPSHOT
After Visit Summary   9/6/2018    Nayely Kay    MRN: [id number]           Patient Information     Date Of Birth          1940        Visit Information        Provider Department      9/6/2018 1:30 PM [place] NB RN [facility]        Today's Diagnoses     Toe pain    -  1       Follow-ups after your visit        Your next 10 appointments already scheduled     Sep 17, 2018  1:45 PM CDT   Anticoagulation Visit with NB ANTI COAG   [facility] ([facility])    [address] [place] [phone] [phone]            Sep 17, 2018  2:00 PM CDT   Nurse Only with [place] NB RN   [facility] ([facility])    [address] [place] [id number] [phone]              Who to contact     If you have questions or need follow up information about today's clinic visit or your schedule please contact [facility] directly at [phone].  Normal or non-critical lab and imaging results will be communicated to you by MyChart, letter or phone within 4 business days after the clinic has received the results. If you do not hear from us within 7 days, please contact the clinic through [employer] or phone. If you have a critical or abnormal lab result, we will notify you by phone as soon as possible.  Submit refill requests through [employer] or call your pharmacy and they will forward the refill request to us. Please allow 3 business days for your refill to be completed.          Additional Information About Your Visit        Care EveryWhere ID     This is your Care EveryWhere ID. This could be used by other organizations to access your [place] medical records  [id number]         Blood Pressure from Last 3 Encounters:   08/23/18 134/76   07/26/18 129/67   07/16/18 132/70    Weight from Last 3 Encounters:   07/16/18 120 lb (54.4 kg)   07/12/18 120 lb 6.4 oz (54.6 kg)   06/15/18 122 lb (55.3  kg)              Today, you had the following     No orders found for display       Primary Care Provider Office Phone # Fax #    Ulysses Baker [phone] [address] [facility] [address]        Equal Access to Services     DERIAN DUNN : Srini garcia jeromeo Soomaali, waaxda luqadaha, qaybta kaalmada adeegyada, leighann reynaldoin hayaabrandy pérezmonica garibay alvino meraz. So [facility] [phone].    ATENCIÓN: Si habla español, tiene a cueva disposición servicios gratuitos de asistencia lingüística. Llame al [phone].    We comply with applicable federal civil rights laws and [place] laws. We do not discriminate on the basis of race, color, national origin, age, disability, sex, sexual orientation, or gender identity.            Thank you!     Thank you for choosing [facility]  for your care. Our goal is always to provide you with excellent care. Hearing back from our patients is one way we can continue to improve our services. Please take a few minutes to complete the written survey that you may receive in the mail after your visit with us. Thank you!             Your Updated Medication List - Protect others around you: Learn how to safely use, store and throw away your medicines at www.disposemymeds.org.          This list is accurate as of 9/6/18  1:49 PM.  Always use your most recent med list.                   Brand Name Dispense Instructions for use Diagnosis    amLODIPine 2.5 MG tablet    NORVASC    90 tablet    Take 1 tablet (2.5 mg) by mouth daily    Essential hypertension       ascorbic acid 250 MG Chew chewable tablet    vitamin C     Take 1 tablet by mouth daily.        ASPIRIN NOT PRESCRIBED    INTENTIONAL    0    due to coumadin    Myalgias       calcium carbonate 500 mg-vitamin D 200 units 500-200 MG-UNIT per tablet    OSCAL with D;OYSTER SHELL CALCIUM     Take 1 tablet by mouth 2 times daily (with meals).        iron 325 (65 Fe) MG tablet      Take  by mouth. Taking  two per week.        metoprolol tartrate 50 MG tablet    LOPRESSOR    270 tablet    Take 1.5 tablets (75 mg) by mouth 2 times daily    Essential hypertension       multivitamin per tablet      Take 1 tablet by mouth daily.        NEW MED      Fiber supplement daily.        nitroGLYcerin 0.4 MG sublingual tablet    NITROSTAT    25 tablet    Place 1 tablet (0.4 mg) under the tongue every 5 minutes as needed for chest pain    Chest pain, unspecified type       predniSONE 1 MG tablet    DELTASONE    100 tablet    Take 1 tablet (1 mg) by mouth daily    Polymyalgia rheumatica (H)       triamterene-hydrochlorothiazide 37.5-25 MG per tablet    MAXZIDE-25    90 tablet    Take 1/2 pill twice daily    Essential hypertension       warfarin 5 MG tablet    COUMADIN    90 tablet    Take 2.5 mg daily or as directed by Anticoagulation Clinic    Long term current use of anticoagulant therapy

## 2018-09-17 ENCOUNTER — ANTICOAGULATION THERAPY VISIT (OUTPATIENT)
Dept: ANTICOAGULATION | Facility: CLINIC | Age: 78
End: 2018-09-17
Payer: COMMERCIAL

## 2018-09-17 ENCOUNTER — ALLIED HEALTH/NURSE VISIT (OUTPATIENT)
Dept: FAMILY MEDICINE | Facility: CLINIC | Age: 78
End: 2018-09-17
Payer: COMMERCIAL

## 2018-09-17 VITALS — HEART RATE: 64 BPM | SYSTOLIC BLOOD PRESSURE: 134 MMHG | DIASTOLIC BLOOD PRESSURE: 64 MMHG

## 2018-09-17 DIAGNOSIS — Z01.30 BP CHECK: Primary | ICD-10-CM

## 2018-09-17 DIAGNOSIS — Z79.01 LONG TERM CURRENT USE OF ANTICOAGULANT THERAPY: ICD-10-CM

## 2018-09-17 DIAGNOSIS — I48.91 ATRIAL FIBRILLATION (H): ICD-10-CM

## 2018-09-17 LAB — INR POINT OF CARE: 2.5 (ref 0.86–1.14)

## 2018-09-17 PROCEDURE — 99207 ZZC NO CHARGE NURSE ONLY: CPT

## 2018-09-17 PROCEDURE — 36416 COLLJ CAPILLARY BLOOD SPEC: CPT

## 2018-09-17 PROCEDURE — 85610 PROTHROMBIN TIME: CPT | Mod: QW

## 2018-09-17 NOTE — PROGRESS NOTES
ANTICOAGULATION FOLLOW-UP CLINIC VISIT    Patient Name:  Nayely Kay  Date:  9/17/2018  Contact Type:  Face to Face    SUBJECTIVE:     Patient Findings     Positives No Problem Findings    Comments No changes in medications, diet, or activity. No concerns with bleeding or bruising. Took warfarin as prescribed.   Continue maintenance warfarin dose. Will recheck INR in 4 weeks.   Patient verbalizes understanding and agrees with plan. No further questions at this time.              OBJECTIVE    INR Protime   Date Value Ref Range Status   09/17/2018 2.5 (A) 0.86 - 1.14 Final       ASSESSMENT / PLAN  INR assessment THER    Recheck INR In: 4 WEEKS    INR Location Clinic      Anticoagulation Summary as of 9/17/2018     INR goal 2.0-3.0   Today's INR 2.5   Warfarin maintenance plan 2.5 mg (5 mg x 0.5) every day   Full warfarin instructions 2.5 mg every day   Weekly warfarin total 17.5 mg   No change documented Zandra Perez RN   Plan last modified Oly Mcwilliams RN (7/16/2018)   Next INR check 10/18/2018   Priority INR   Target end date Indefinite    Indications   Atrial fibrillation (H) [I48.91]  Long term current use of anticoagulant therapy [Z79.01]         Anticoagulation Episode Summary     INR check location     Preferred lab     Send INR reminders to [facility]    Comments * only wants dosing card. Pt uses 5mg tablets. wants to check every 4 weeks        Anticoagulation Care Providers     Provider Role Specialty Phone number    Ulysses Baker MD [facility] [phone]            See the Encounter Report to view Anticoagulation Flowsheet and Dosing Calendar (Go to Encounters tab in chart review, and find the Anticoagulation Therapy Visit)        Zandra Perez RN

## 2018-09-17 NOTE — PROGRESS NOTES
Nayely Kay is a 78 year old year old patient who comes in today for a Blood Pressure check because of ongoing blood pressure monitoring.  Vital Signs as repeated by /74  Patient is taking medication as prescribed  Patient is tolerating medications well.  Patient is not monitoring Blood Pressure at home.    Current complaints: none  Disposition:  patient to continue with the same medication  Nicolette Granger RN

## 2018-09-17 NOTE — MR AVS SNAPSHOT
Nayely Kay   9/17/2018 1:45 PM   Anticoagulation Therapy Visit    Description:  78 year old female   Provider:  NB ANTI COAG   Department:  Nb Anticoag           INR as of 9/17/2018     Today's INR 2.5      Anticoagulation Summary as of 9/17/2018     INR goal 2.0-3.0   Today's INR 2.5   Full warfarin instructions 2.5 mg every day   Next INR check 10/18/2018    Indications   Atrial fibrillation (H) [I48.91]  Long term current use of anticoagulant therapy [Z79.01]         Your next Anticoagulation Clinic appointment(s)     Oct 18, 2018  1:45 PM CDT   [facility] with NB AN[facility]AG   [facility]    [address] [place] [id number] [phone]              Contact Numbers     Please call [phone] with any problems or questions regarding your therapy.    If you need to cancel and/or reschedule your appointment please call one of the following numbers:  [facility] [phone]  [facility] [phone]  [facility] [phone]  [facility] [phone]  [place] - [phone]            September 2018 Details    Sun Mon Tue Wed Thu Fri Sat           1                 2               3               4               5               6               7               8                 9               10               11               12               13               14               15                 16               17      2.5 mg   See details      18      2.5 mg         19      2.5 mg         20      2.5 mg         21      2.5 mg         22      2.5 mg           23      2.5 mg         24      2.5 mg         25      2.5 mg         26      2.5 mg         27      2.5 mg         28      2.5 mg         29      2.5 mg           30      2.5 mg                Date Details   09/17 This INR check               How to take your warfarin dose     To take:  2.5 mg Take 0.5 of a 5 mg tablet.           October 2018 Details    Sun Mon Tue Wed Thu  Fri Sat      1      2.5 mg         2      2.5 mg         3      2.5 mg         4      2.5 mg         5      2.5 mg         6      2.5 mg           7      2.5 mg         8      2.5 mg         9      2.5 mg         10      2.5 mg         11      2.5 mg         12      2.5 mg         13      2.5 mg           14      2.5 mg         15      2.5 mg         16      2.5 mg         17      2.5 mg         18            19               20                 21               22               23               24               25               26               27                 28               29               30               31                   Date Details   No additional details    Date of next INR:  10/18/2018         How to take your warfarin dose     To take:  2.5 mg Take 0.5 of a 5 mg tablet.

## 2018-09-17 NOTE — MR AVS SNAPSHOT
After Visit Summary   9/17/2018    Nayely Kay    MRN: [id number]           Patient Information     Date Of Birth          1940        Visit Information        Provider Department      9/17/2018 2:00 PM FL NB RN [facility]        Today's Diagnoses     BP check    -  1       Follow-ups after your visit        Your next 10 appointments already scheduled     Oct 18, 2018  1:45 PM CDT   Anticoagulation Visit with NB ANTI COAG   [facility] ([facility])    [address] [place] [id number] [phone]              Who to contact     If you have questions or need follow up information about today's clinic visit or your schedule please contact [facility] directly at [phone].  Normal or non-critical lab and imaging results will be communicated to you by MyChart, letter or phone within 4 business days after the clinic has received the results. If you do not hear from us within 7 days, please contact the clinic through MyChart or phone. If you have a critical or abnormal lab result, we will notify you by phone as soon as possible.  Submit refill requests through [employer] or call your pharmacy and they will forward the refill request to us. Please allow 3 business days for your refill to be completed.          Additional Information About Your Visit        Care EveryWhere ID     This is your Care EveryWhere ID. This could be used by other organizations to access your [place] medical records  [id number]        Your Vitals Were     Pulse                   64            Blood Pressure from Last 3 Encounters:   09/17/18 134/64   08/23/18 134/76   07/26/18 129/67    Weight from Last 3 Encounters:   07/16/18 120 lb (54.4 kg)   07/12/18 120 lb 6.4 oz (54.6 kg)   06/15/18 122 lb (55.3 kg)              Today, you had the following     No orders found for display       Primary Care Provider Office Phone # Fax #     Ulysses Baker [phone]       [address] [facility] [address]        Equal Access to Services     DERIAN DUNN : Hadii aad ku hadcheriemelanie Sales, francescowillam wangdarciha, jose luigijet catherine, leighann tavares betomonica garibay laJahlatesha meraz. So [facility] [phone].    ATENCIÓN: Si habla español, tiene a cueva disposición servicios gratuitos de asistencia lingüística. Llame al [phone].    We comply with applicable federal civil rights laws and [place] laws. We do not discriminate on the basis of race, color, national origin, age, disability, sex, sexual orientation, or gender identity.            Thank you!     Thank you for choosing [facility]  for your care. Our goal is always to provide you with excellent care. Hearing back from our patients is one way we can continue to improve our services. Please take a few minutes to complete the written survey that you may receive in the mail after your visit with us. Thank you!             Your Updated Medication List - Protect others around you: Learn how to safely use, store and throw away your medicines at www.disposemymeds.org.          This list is accurate as of 9/17/18  2:21 PM.  Always use your most recent med list.                   Brand Name Dispense Instructions for use Diagnosis    amLODIPine 2.5 MG tablet    NORVASC    90 tablet    Take 1 tablet (2.5 mg) by mouth daily    Essential hypertension       ascorbic acid 250 MG Chew chewable tablet    vitamin C     Take 1 tablet by mouth daily.        ASPIRIN NOT PRESCRIBED    INTENTIONAL    0    due to coumadin    Myalgias       calcium carbonate 500 mg-vitamin D 200 units 500-200 MG-UNIT per tablet    OSCAL with D;OYSTER SHELL CALCIUM     Take 1 tablet by mouth 2 times daily (with meals).        iron 325 (65 Fe) MG tablet      Take  by mouth. Taking two per week.        metoprolol tartrate 50 MG tablet    LOPRESSOR    270 tablet    Take 1.5 tablets (75 mg) by mouth 2 times  daily    Essential hypertension       multivitamin per tablet      Take 1 tablet by mouth daily.        NEW MED      Fiber supplement daily.        nitroGLYcerin 0.4 MG sublingual tablet    NITROSTAT    25 tablet    Place 1 tablet (0.4 mg) under the tongue every 5 minutes as needed for chest pain    Chest pain, unspecified type       predniSONE 1 MG tablet    DELTASONE    100 tablet    Take 1 tablet (1 mg) by mouth daily    Polymyalgia rheumatica (H)       triamterene-hydrochlorothiazide 37.5-25 MG per tablet    MAXZIDE-25    90 tablet    Take 1/2 pill twice daily    Essential hypertension       warfarin 5 MG tablet    COUMADIN    90 tablet    Take 2.5 mg daily or as directed by Anticoagulation Clinic    Long term current use of anticoagulant therapy

## 2018-09-27 ENCOUNTER — ALLIED HEALTH/NURSE VISIT (OUTPATIENT)
Dept: FAMILY MEDICINE | Facility: CLINIC | Age: 78
End: 2018-09-27
Payer: COMMERCIAL

## 2018-09-27 DIAGNOSIS — Z23 NEED FOR PROPHYLACTIC VACCINATION AND INOCULATION AGAINST INFLUENZA: Primary | ICD-10-CM

## 2018-09-27 PROCEDURE — G0008 ADMIN INFLUENZA VIRUS VAC: HCPCS

## 2018-09-27 PROCEDURE — 99207 ZZC NO CHARGE NURSE ONLY: CPT

## 2018-09-27 PROCEDURE — 90662 IIV NO PRSV INCREASED AG IM: CPT

## 2018-09-27 NOTE — MR AVS SNAPSHOT
After Visit Summary   9/27/2018    Nayely Kay    MRN: [id number]           Patient Information     Date Of Birth          1940        Visit Information        Provider Department      9/27/2018 10:45 AM FL KIP LI/LPN [facility]        Today's Diagnoses     Need for prophylactic vaccination and inoculation against influenza    -  1       Follow-ups after your visit        Your next 10 appointments already scheduled     Oct 18, 2018  1:45 PM CDT   Anticoagulation Visit with NB ANTI COAG   [facility] ([facility]    [address] [place] [address] [phone]              Who to contact     If you have questions or need follow up information about today's clinic visit or your schedule please contact [facility] directly at [phone].  Normal or non-critical lab and imaging results will be communicated to you by MyChart, letter or phone within 4 business days after the clinic has received the results. If you do not hear from us within 7 days, please contact the clinic through MyChart or phone. If you have a critical or abnormal lab result, we will notify you by phone as soon as possible.  Submit refill requests through [employer] or call your pharmacy and they will forward the refill request to us. Please allow 3 business days for your refill to be completed.          Additional Information About Your Visit        Care EveryWhere ID     This is your Care EveryWhere ID. This could be used by other organizations to access your [place] medical records  [id number]         Blood Pressure from Last 3 Encounters:   09/17/18 134/64   08/23/18 134/76   07/26/18 129/67    Weight from Last 3 Encounters:   07/16/18 120 lb (54.4 kg)   07/12/18 120 lb 6.4 oz (54.6 kg)   06/15/18 122 lb (55.3 kg)              We Performed the Following     ADMIN INFLUENZA (For MEDICARE Patients ONLY) []     FLU VACCINE, INCREASED  ANTIGEN, PRESV FREE, AGE 65+ [[address]]        Primary Care Provider Office Phone # Fax #    Ulysses Baker [phone] [address] [facility] [address]        Equal Access to Services     DERIAN DUNN : Hadii alejandro garcia hadcherieo Soomaali, waaxda luqadaha, qaybta kaalmada adeegyada, leighann shahriar giselan betomonica garibay alvino meraz. So [facility] [phone].    ATENCIÓN: Si habla español, tiene a cueva disposición servicios gratuitos de asistencia lingüística. Llame al [phone].    We comply with applicable federal civil rights laws and [place] laws. We do not discriminate on the basis of race, color, national origin, age, disability, sex, sexual orientation, or gender identity.            Thank you!     Thank you for choosing [facility]  for your care. Our goal is always to provide you with excellent care. Hearing back from our patients is one way we can continue to improve our services. Please take a few minutes to complete the written survey that you may receive in the mail after your visit with us. Thank you!             Your Updated Medication List - Protect others around you: Learn how to safely use, store and throw away your medicines at www.disposemymeds.org.          This list is accurate as of 9/27/18 10:47 AM.  Always use your most recent med list.                   Brand Name Dispense Instructions for use Diagnosis    amLODIPine 2.5 MG tablet    NORVASC    90 tablet    Take 1 tablet (2.5 mg) by mouth daily    Essential hypertension       ascorbic acid 250 MG Chew chewable tablet    vitamin C     Take 1 tablet by mouth daily.        ASPIRIN NOT PRESCRIBED    INTENTIONAL    0    due to coumadin    Myalgias       calcium carbonate 500 mg-vitamin D 200 units 500-200 MG-UNIT per tablet    OSCAL with D;OYSTER SHELL CALCIUM     Take 1 tablet by mouth 2 times daily (with meals).        iron 325 (65 Fe) MG tablet      Take  by mouth. Taking two per week.        metoprolol  tartrate 50 MG tablet    LOPRESSOR    270 tablet    Take 1.5 tablets (75 mg) by mouth 2 times daily    Essential hypertension       multivitamin per tablet      Take 1 tablet by mouth daily.        NEW MED      Fiber supplement daily.        nitroGLYcerin 0.4 MG sublingual tablet    NITROSTAT    25 tablet    Place 1 tablet (0.4 mg) under the tongue every 5 minutes as needed for chest pain    Chest pain, unspecified type       predniSONE 1 MG tablet    DELTASONE    100 tablet    Take 1 tablet (1 mg) by mouth daily    Polymyalgia rheumatica (H)       triamterene-hydrochlorothiazide 37.5-25 MG per tablet    MAXZIDE-25    90 tablet    Take 1/2 pill twice daily    Essential hypertension       warfarin 5 MG tablet    COUMADIN    90 tablet    Take 2.5 mg daily or as directed by Anticoagulation Clinic    Long term current use of anticoagulant therapy

## 2018-10-18 ENCOUNTER — ANTICOAGULATION THERAPY VISIT (OUTPATIENT)
Dept: ANTICOAGULATION | Facility: CLINIC | Age: 78
End: 2018-10-18
Payer: COMMERCIAL

## 2018-10-18 ENCOUNTER — ALLIED HEALTH/NURSE VISIT (OUTPATIENT)
Dept: FAMILY MEDICINE | Facility: CLINIC | Age: 78
End: 2018-10-18
Payer: COMMERCIAL

## 2018-10-18 VITALS — DIASTOLIC BLOOD PRESSURE: 72 MMHG | SYSTOLIC BLOOD PRESSURE: 134 MMHG | RESPIRATION RATE: 16 BRPM | HEART RATE: 80 BPM

## 2018-10-18 DIAGNOSIS — Z01.30 BP CHECK: Primary | ICD-10-CM

## 2018-10-18 DIAGNOSIS — Z79.01 LONG TERM CURRENT USE OF ANTICOAGULANT THERAPY: ICD-10-CM

## 2018-10-18 LAB — INR POINT OF CARE: 3.1 (ref 0.86–1.14)

## 2018-10-18 PROCEDURE — 85610 PROTHROMBIN TIME: CPT | Mod: QW

## 2018-10-18 PROCEDURE — 36416 COLLJ CAPILLARY BLOOD SPEC: CPT

## 2018-10-18 PROCEDURE — 99207 ZZC NO CHARGE NURSE ONLY: CPT

## 2018-10-18 NOTE — PROGRESS NOTES
Nayely Kay is a 78 year old year old patient who comes in today for a Blood Pressure check because of ongoing blood pressure monitoring.  Vital Signs as repeated by /72  Patient is taking medication as prescribed  Patient is tolerating medications well.  Patient is monitoring Blood Pressure at home.  Current complaints: none  Disposition:  patient to continue with the same medication  Nicolette Granger RN

## 2018-10-18 NOTE — MR AVS SNAPSHOT
Nayely Kay   10/18/2018 1:45 PM   Anticoagulation Therapy Visit    Description:  78 year old female   Provider:  NB ANTI COAG   Department:  Nb Anticoag           INR as of 10/18/2018     Today's INR 3.1!      Anticoagulation Summary as of 10/18/2018     INR goal 2.0-3.0   Today's INR 3.1!   Full warfarin instructions 2.5 mg every day   Next INR check 11/15/2018    Indications   Atrial fibrillation (H) [I48.91]  Long term current use of anticoagulant therapy [Z79.01]         Your next Anticoagulation Clinic appointment(s)     Nov 15, 2018  1:45 PM CST   Anticoagulation Visit with NB ANTI COAG   [facility]    [address] [place] [id number] [phone]              October 2018 Details    Sun Mon Tue Wed Thu Fri Sat      1               2               3               4               5               6                 7               8               9               10               11               12               13                 14               15               16               17               18      2.5 mg   See details      19      2.5 mg         20      2.5 mg           21      2.5 mg         22      2.5 mg         23      2.5 mg         24      2.5 mg         25      2.5 mg         26      2.5 mg         27      2.5 mg           28      2.5 mg         29      2.5 mg         30      2.5 mg         31      2.5 mg             Date Details   10/18 This INR check               How to take your warfarin dose     To take:  2.5 mg Take 0.5 of a 5 mg tablet.           November 2018 Details    Sun Mon Tue Wed Thu Fri Sat         1      2.5 mg         2      2.5 mg         3      2.5 mg           4      2.5 mg         5      2.5 mg         6      2.5 mg         7      2.5 mg         8      2.5 mg         9      2.5 mg         10      2.5 mg           11      2.5 mg         12      2.5 mg         13      2.5 mg         14      2.5 mg          15            16               17                 18               19               20               21               22               23               24                 25               26               27               28               29               30                 Date Details   No additional details    Date of next INR:  11/15/2018         How to take your warfarin dose     To take:  2.5 mg Take 0.5 of a 5 mg tablet.

## 2018-10-18 NOTE — PROGRESS NOTES
ANTICOAGULATION FOLLOW-UP CLINIC VISIT    Patient Name:  Nayely Kay  Date:  10/18/2018  Contact Type:  Face to Face    SUBJECTIVE:     Patient Findings     Positives Inflammation (polymyalgia has been worse lately)    Comments Initiation of therapy - .nochange  No changes in diet, activity level, or medications (including over the counter). Arthritis has been worse lately but no recent illnesses or diarrhea. No missed doses of warfarin. Patient took dosing as prescribed. No signs of clots or bleeding concerns. Patient will continue maintenance warfarin dosing.             OBJECTIVE    INR Protime   Date Value Ref Range Status   10/18/2018 3.1 (A) 0.86 - 1.14 Final       ASSESSMENT / PLAN  INR assessment THER    Recheck INR In: 4 WEEKS    INR Location Clinic      Anticoagulation Summary as of 10/18/2018     INR goal 2.0-3.0   Today's INR 3.1!   Warfarin maintenance plan 2.5 mg (5 mg x 0.5) every day   Full warfarin instructions 2.5 mg every day   Weekly warfarin total 17.5 mg   No change documented Flaquita Neal RN   Plan last modified Oly Mcwilliams RN (7/16/2018)   Next INR check 11/15/2018   Priority INR   Target end date Indefinite    Indications   Atrial fibrillation (H) [I48.91]  Long term current use of anticoagulant therapy [Z79.01]         Anticoagulation Episode Summary     INR check location     Preferred lab     Send INR reminders to [facility] CLINIC POOL    Comments * only wants dosing card. Pt uses 5mg tablets. wants to check every 4 weeks. has polymyalgia arthritis        Anticoagulation Care Providers     Provider Role Specialty Phone number    Ulysses Baker MD [facility] [phone]            See the Encounter Report to view Anticoagulation Flowsheet and Dosing Calendar (Go to Encounters tab in chart review, and find the Anticoagulation Therapy Visit)        Flaquita Neal RN

## 2018-10-18 NOTE — MR AVS SNAPSHOT
After Visit Summary   10/18/2018    Nayely Kay    MRN: [id number]           Patient Information     Date Of Birth          1940        Visit Information        Provider Department      10/18/2018 2:15 PM FL NB RN [facility]        Today's Diagnoses     BP check    -  1       Follow-ups after your visit        Your next 10 appointments already scheduled     Oct 18, 2018  2:15 PM CDT   Nurse Only with FL NB RN   [facility] ([facility])    [address] [place] [id number]   [phone]            Oct 29, 2018  8:30 AM CDT   (Arrive by 8:15 AM)   MA SCREENING DIGITAL BILATERAL with NBMA1   [facility] ([facility])    [address] [place] [id number]   [phone]           How do I prepare for my exam? (Food and drink instructions) No Food and Drink Restrictions.  How do I prepare for my exam? (Other instructions) Do not use any powder, lotion or deodorant under your arms or on your breast. If you do, we will ask you to remove it before your exam.  What should I wear: Wear comfortable, two-piece clothing.  How long does the exam take: Most scans will take 15 minutes.  What should I bring: Bring any previous mammograms from other facilities or have them mailed to the breast center.  Do I need a :  No  is needed.  What do I need to tell my doctor: If you have any allergies, tell your care team.  What should I do after the exam: No restrictions, You may resume normal activities.  What is this test: This test is an x-ray of the breast to look for breast disease. The breast is pressed between two plates to flatten and spread the tissue. An X-ray is taken of the breast from different angles.  Who should I call with questions: If you have any questions, please call the Imaging Department where you will have your exam. Directions, parking instructions, and other  "information is available on our website, [email or web address].  Other information about my exam Three-dimensional (3D) mammograms are available at [facility] locations in [facility], [place], [place], [facility], [place], [facility] and [place].  Health locations include [facility] and the [facility] in [place].  Benefits of 3D mammograms include * Improved rate of cancer detection * Decreases your chance of having to go back for more tests, which means fewer: * \"False-positive\" results (This means that there is an abnormal area but it isn't cancer.) * Invasive testing procedures, such as a biopsy or surgery * Can provide clearer images of the breast if you have dense breast tissue.  *3D mammography is an optional exam that anyone can have with a 2D mammogram. It doesn't replace or take the place of a 2D mammogram. 2D mammograms remain an effective screening test for all women.  Not all insurance companies cover the cost of a 3D mammogram. Check with your insurance.            Nov 15, 2018  1:45 PM CST   Anticoagulation Visit with NB ANTI COAG   [facility] ([facility])    [address] [place] [address] [phone]              Who to contact     If you have questions or need follow up information about today's clinic visit or your schedule please contact [facility] directly at [phone].  Normal or non-critical lab and imaging results will be communicated to you by MyChart, letter or phone within 4 business days after the clinic has received the results. If you do not hear from us within 7 days, please contact the clinic through MyChart or phone. If you have a critical or abnormal lab result, we will notify you by phone as soon as possible.  Submit refill requests through [employer] or call your pharmacy and they will forward the refill request to us. Please allow 3 business days for your refill to be " completed.          Additional Information About Your Visit        Care EveryWhere ID     This is your Care EveryWhere ID. This could be used by other organizations to access your [facility] medical records  [id number]         Blood Pressure from Last 3 Encounters:   09/17/18 134/64   08/23/18 134/76   07/26/18 129/67    Weight from Last 3 Encounters:   07/16/18 120 lb (54.4 kg)   07/12/18 120 lb 6.4 oz (54.6 kg)   06/15/18 122 lb (55.3 kg)              Today, you had the following     No orders found for display       Primary Care Provider Office Phone # Fax #    Ulysses Sarabjit Baker [phone] [address] [facility] [address]        Equal Access to Services     DERIAN DUNN : Hadcathleen canoo Sonalini, waaxda luqadaha, qaybta kaalmada adeegyada, leighann de oliveira . So [facility] [phone].    ATENCIÓN: Si habla español, tiene a cueva disposición servicios gratuitos de asistencia lingüística. Llame al [phone].    We comply with applicable federal civil rights laws and [place] laws. We do not discriminate on the basis of race, color, national origin, age, disability, sex, sexual orientation, or gender identity.            Thank you!     Thank you for choosing [facility]  for your care. Our goal is always to provide you with excellent care. Hearing back from our patients is one way we can continue to improve our services. Please take a few minutes to complete the written survey that you may receive in the mail after your visit with us. Thank you!             Your Updated Medication List - Protect others around you: Learn how to safely use, store and throw away your medicines at www.disposemymeds.org.          This list is accurate as of 10/18/18  2:08 PM.  Always use your most recent med list.                   Brand Name Dispense Instructions for use Diagnosis    amLODIPine 2.5 MG tablet    NORVASC    90 tablet    Take 1 tablet (2.5 mg) by  mouth daily    Essential hypertension       ascorbic acid 250 MG Chew chewable tablet    vitamin C     Take 1 tablet by mouth daily.        ASPIRIN NOT PRESCRIBED    INTENTIONAL    0    due to coumadin    Myalgias       calcium carbonate 500 mg-vitamin D 200 units 500-200 MG-UNIT per tablet    OSCAL with D;OYSTER SHELL CALCIUM     Take 1 tablet by mouth 2 times daily (with meals).        iron 325 (65 Fe) MG tablet      Take  by mouth. Taking two per week.        metoprolol tartrate 50 MG tablet    LOPRESSOR    270 tablet    Take 1.5 tablets (75 mg) by mouth 2 times daily    Essential hypertension       multivitamin per tablet      Take 1 tablet by mouth daily.        NEW MED      Fiber supplement daily.        nitroGLYcerin 0.4 MG sublingual tablet    NITROSTAT    25 tablet    Place 1 tablet (0.4 mg) under the tongue every 5 minutes as needed for chest pain    Chest pain, unspecified type       predniSONE 1 MG tablet    DELTASONE    100 tablet    Take 1 tablet (1 mg) by mouth daily    Polymyalgia rheumatica (H)       triamterene-hydrochlorothiazide 37.5-25 MG per tablet    MAXZIDE-25    90 tablet    Take 1/2 pill twice daily    Essential hypertension       warfarin 5 MG tablet    COUMADIN    90 tablet    Take 2.5 mg daily or as directed by Anticoagulation Clinic    Long term current use of anticoagulant therapy

## 2018-10-29 ENCOUNTER — RADIANT APPOINTMENT (OUTPATIENT)
Dept: MAMMOGRAPHY | Facility: CLINIC | Age: 78
End: 2018-10-29
Attending: FAMILY MEDICINE
Payer: COMMERCIAL

## 2018-10-29 DIAGNOSIS — Z12.31 VISIT FOR SCREENING MAMMOGRAM: ICD-10-CM

## 2018-10-29 PROCEDURE — 77067 SCR MAMMO BI INCL CAD: CPT | Mod: TC

## 2018-11-15 ENCOUNTER — ALLIED HEALTH/NURSE VISIT (OUTPATIENT)
Dept: FAMILY MEDICINE | Facility: CLINIC | Age: 78
End: 2018-11-15
Payer: COMMERCIAL

## 2018-11-15 ENCOUNTER — ANTICOAGULATION THERAPY VISIT (OUTPATIENT)
Dept: ANTICOAGULATION | Facility: CLINIC | Age: 78
End: 2018-11-15
Payer: COMMERCIAL

## 2018-11-15 VITALS — RESPIRATION RATE: 16 BRPM | DIASTOLIC BLOOD PRESSURE: 60 MMHG | SYSTOLIC BLOOD PRESSURE: 118 MMHG | HEART RATE: 64 BPM

## 2018-11-15 DIAGNOSIS — Z79.01 LONG TERM CURRENT USE OF ANTICOAGULANT THERAPY: ICD-10-CM

## 2018-11-15 DIAGNOSIS — Z01.30 BP CHECK: Primary | ICD-10-CM

## 2018-11-15 DIAGNOSIS — I48.91 ATRIAL FIBRILLATION (H): Primary | ICD-10-CM

## 2018-11-15 LAB — INR POINT OF CARE: 2.7 (ref 0.86–1.14)

## 2018-11-15 PROCEDURE — 99207 ZZC NO CHARGE NURSE ONLY: CPT

## 2018-11-15 PROCEDURE — 85610 PROTHROMBIN TIME: CPT | Mod: QW

## 2018-11-15 PROCEDURE — 36416 COLLJ CAPILLARY BLOOD SPEC: CPT

## 2018-11-15 NOTE — MR AVS SNAPSHOT
Nayely WEST [place]   11/15/2018 1:45 PM   Anticoagulation Therapy Visit    Description:  78 year old female   Provider:  NB ANTI COAG   Department:  Nb Anticoag           INR as of 11/15/2018     Today's INR 2.7      Anticoagulation Summary as of 11/15/2018     INR goal 2.0-3.0   Today's INR 2.7   Full warfarin instructions 2.5 mg every day   Next INR check 12/13/2018    Indications   Atrial fibrillation (H) [I48.91]  Long term current use of anticoagulant therapy [Z79.01]         Your next Anticoagulation Clinic appointment(s)     Dec 13, 2018  1:45 PM CST   [facility] with NB ANTI COAG   [facility]    [address] [place] [address] [phone]              November 2018 Details    Sun Mon Tue Wed Thu Fri Sat         1               2               3                 4               5               6               7               8               9               10                 11               12               13               14               15      2.5 mg   See details      16      2.5 mg         17      2.5 mg           18      2.5 mg         19      2.5 mg         20      2.5 mg         21      2.5 mg         22      2.5 mg         23      2.5 mg         24      2.5 mg           25      2.5 mg         26      2.5 mg         27      2.5 mg         28      2.5 mg         29      2.5 mg         30      2.5 mg           Date Details   11/15 This INR check               How to take your warfarin dose     To take:  2.5 mg Take 0.5 of a 5 mg tablet.           December 2018 Details    Sun Mon Tue Wed Thu Fri Sat           1      2.5 mg           2      2.5 mg         3      2.5 mg         4      2.5 mg         5      2.5 mg         6      2.5 mg         7      2.5 mg         8      2.5 mg           9      2.5 mg         10      2.5 mg         11      2.5 mg         12      2.5 mg         13            14               15                  16               17               18               19               20               21               22                 23               24               25               26               27               28               29                 30               31                     Date Details   No additional details    Date of next INR:  12/13/2018         How to take your warfarin dose     To take:  2.5 mg Take 0.5 of a 5 mg tablet.

## 2018-11-15 NOTE — PROGRESS NOTES
ANTICOAGULATION FOLLOW-UP CLINIC VISIT    Patient Name:  Nayely Kay  Date:  11/15/2018  Contact Type:  Face to Face    SUBJECTIVE:     Patient Findings     Positives No Problem Findings    Comments No changes in diet, activity level, medications (including over the counter), or health. No missed doses of warfarin. Patient took dosing as prescribed. No signs of clots or bleeding concerns. Patient will continue maintenance warfarin dosing.  Polymyalgia is better.           OBJECTIVE    INR Protime   Date Value Ref Range Status   11/15/2018 2.7 (A) 0.86 - 1.14 Final       ASSESSMENT / PLAN  INR assessment THER    Recheck INR In: 4 WEEKS    INR Location Clinic      Anticoagulation Summary as of 11/15/2018     INR goal 2.0-3.0   Today's INR 2.7   Warfarin maintenance plan 2.5 mg (5 mg x 0.5) every day   Full warfarin instructions 2.5 mg every day   Weekly warfarin total 17.5 mg   No change documented Flaquita Neal RN   Plan last modified Oly Mcwilliams RN (7/16/2018)   Next INR check 12/13/2018   Priority INR   Target end date Indefinite    Indications   Atrial fibrillation (H) [I48.91]  Long term current use of anticoagulant therapy [Z79.01]         Anticoagulation Episode Summary     INR check location     Preferred lab     Send INR reminders to [facility] CLINIC POOL    Comments * only wants dosing card. Pt uses 5mg tablets. wants to check every 4 weeks. has polymyalgia arthritis        Anticoagulation Care Providers     Provider Role Specialty Phone number    Ulysses Baker MD [facility] [phone]            See the Encounter Report to view Anticoagulation Flowsheet and Dosing Calendar (Go to Encounters tab in chart review, and find the Anticoagulation Therapy Visit)        Flaquita Neal RN

## 2018-11-15 NOTE — MR AVS SNAPSHOT
After Visit Summary   11/15/2018    Nayely Kay    MRN: [id number]           Patient Information     Date Of Birth          1940        Visit Information        Provider Department      11/15/2018 2:00 PM FL NB RN [facility]        Today's Diagnoses     BP check    -  1       Follow-ups after your visit        Your next 10 appointments already scheduled     Dec 13, 2018  1:45 PM CST   Anticoagulation Visit with NB ANTI COAG   [facility]    [address] [place] [id number] [phone]            Dec 19, 2018  2:00 PM CST   Return Visit with Jareth Pedroza MD   [facility]    [address] [facility] [address] [phone]              Who to contact     If you have questions or need follow up information about today's clinic visit or your schedule please contact [facility] directly at [phone].  Normal or non-critical lab and imaging results will be communicated to you by MyChart, letter or phone within 4 business days after the clinic has received the results. If you do not hear from us within 7 days, please contact the clinic through MyChart or phone. If you have a critical or abnormal lab result, we will notify you by phone as soon as possible.  Submit refill requests through [employer] or call your pharmacy and they will forward the refill request to us. Please allow 3 business days for your refill to be completed.          Additional Information About Your Visit        Care EveryWhere ID     This is your Care EveryWhere ID. This could be used by other organizations to access your [place] medical records  [id number]        Your Vitals Were     Pulse Respirations                64 16           Blood Pressure from Last 3 Encounters:   11/15/18 118/60   10/18/18 134/72   09/17/18 134/64    Weight from Last 3 Encounters:    07/16/18 120 lb (54.4 kg)   07/12/18 120 lb 6.4 oz (54.6 kg)   06/15/18 122 lb (55.3 kg)              Today, you had the following     No orders found for display       Primary Care Provider Office Phone # Fax #    Ulysses Baker [phone] [address] [facility] [address]        Equal Access to Services     DERIAN DUNN : Hadii aad ku hadasho Soomaali, waaxda luqadaha, qaybta kaalmada adeegyada, waxay idiin hayaan ben norrisviktoriacheryle de oliveira . So [facility] [phone].    ATENCIÓN: Si habla español, tiene a cueva disposición servicios gratuitos de asistencia lingüística. Lizzame al [phone].    We comply with applicable federal civil rights laws and [place] laws. We do not discriminate on the basis of race, color, national origin, age, disability, sex, sexual orientation, or gender identity.            Thank you!     Thank you for choosing [facility]  for your care. Our goal is always to provide you with excellent care. Hearing back from our patients is one way we can continue to improve our services. Please take a few minutes to complete the written survey that you may receive in the mail after your visit with us. Thank you!             Your Updated Medication List - Protect others around you: Learn how to safely use, store and throw away your medicines at www.disposemymeds.org.          This list is accurate as of 11/15/18  2:22 PM.  Always use your most recent med list.                   Brand Name Dispense Instructions for use Diagnosis    amLODIPine 2.5 MG tablet    NORVASC    90 tablet    Take 1 tablet (2.5 mg) by mouth daily    Essential hypertension       ascorbic acid 250 MG Chew chewable tablet    vitamin C     Take 1 tablet by mouth daily.        ASPIRIN NOT PRESCRIBED    INTENTIONAL    0    due to coumadin    Myalgias       calcium carbonate 500 mg-vitamin D 200 units 500-200 MG-UNIT per tablet    OSCAL with D;OYSTER SHELL CALCIUM     Take 1 tablet by mouth  2 times daily (with meals).        iron 325 (65 Fe) MG tablet      Take  by mouth. Taking two per week.        metoprolol tartrate 50 MG tablet    LOPRESSOR    270 tablet    Take 1.5 tablets (75 mg) by mouth 2 times daily    Essential hypertension       multivitamin per tablet      Take 1 tablet by mouth daily.        NEW MED      Fiber supplement daily.        nitroGLYcerin 0.4 MG sublingual tablet    NITROSTAT    25 tablet    Place 1 tablet (0.4 mg) under the tongue every 5 minutes as needed for chest pain    Chest pain, unspecified type       predniSONE 1 MG tablet    DELTASONE    100 tablet    Take 1 tablet (1 mg) by mouth daily    Polymyalgia rheumatica (H)       triamterene-hydrochlorothiazide 37.5-25 MG per tablet    MAXZIDE-25    90 tablet    Take 1/2 pill twice daily    Essential hypertension       warfarin 5 MG tablet    COUMADIN    90 tablet    Take 2.5 mg daily or as directed by Anticoagulation Clinic    Long term current use of anticoagulant therapy

## 2018-11-15 NOTE — PROGRESS NOTES
Nayely Kay is a 78 year old year old patient who comes in today for a Blood Pressure check because of ongoing blood pressure monitoring.  Vital Signs as repeated by /76 and 118/60  Patient is taking medication as prescribed  Patient is tolerating medications well.  Patient is not monitoring Blood Pressure at home.    Current complaints: none  Disposition:  patient to continue with the same medication

## 2018-12-13 ENCOUNTER — ANTICOAGULATION THERAPY VISIT (OUTPATIENT)
Dept: ANTICOAGULATION | Facility: CLINIC | Age: 78
End: 2018-12-13
Payer: COMMERCIAL

## 2018-12-13 VITALS — HEART RATE: 63 BPM | DIASTOLIC BLOOD PRESSURE: 70 MMHG | SYSTOLIC BLOOD PRESSURE: 140 MMHG

## 2018-12-13 DIAGNOSIS — Z79.01 LONG TERM CURRENT USE OF ANTICOAGULANT THERAPY: ICD-10-CM

## 2018-12-13 LAB — INR POINT OF CARE: 2.1 (ref 0.86–1.14)

## 2018-12-13 PROCEDURE — 85610 PROTHROMBIN TIME: CPT | Mod: QW

## 2018-12-13 PROCEDURE — 99207 ZZC NO CHARGE NURSE ONLY: CPT

## 2018-12-13 PROCEDURE — 36416 COLLJ CAPILLARY BLOOD SPEC: CPT

## 2018-12-13 NOTE — PROGRESS NOTES
ANTICOAGULATION FOLLOW-UP CLINIC VISIT    Patient Name:  Nayely Kay  Date:  2018  Contact Type:  Face to Face    SUBJECTIVE:     Patient Findings     Positives:   No Problem Findings    Comments:   No changes in diet, activity level, medications (including over the counter), or health. No missed doses of warfarin. Patient took dosing as prescribed. No signs of clots or bleeding concerns. Patient will continue maintenance warfarin dosing.             OBJECTIVE    INR Protime   Date Value Ref Range Status   2018 2.1 (A) 0.86 - 1.14 Final       ASSESSMENT / PLAN  INR assessment THER    Recheck INR In: 4 WEEKS    INR Location Clinic      Anticoagulation Summary  As of 2018    INR goal:   2.0-3.0   TTR:   80.0 % (3.7 y)   INR used for dosin.1 (2018)   Warfarin maintenance plan:   2.5 mg (5 mg x 0.5) every day   Full warfarin instructions:   2.5 mg every day   Weekly warfarin total:   17.5 mg   No change documented:   Flaquita Neal RN   Plan last modified:   Oly Mcwilliams RN (2018)   Next INR check:   1/10/2019   Priority:   INR   Target end date:   Indefinite    Indications    Atrial fibrillation (H) [I48.91]  Long term current use of anticoagulant therapy [Z79.01]             Anticoagulation Episode Summary     INR check location:       Preferred lab:       Send INR reminders to:   [facility]    Comments:   * only wants dosing card. Pt uses 5mg tablets. wants to check every 4 weeks. has polymyalgia arthritis        Anticoagulation Care Providers     Provider Role Specialty Phone number    Ulysses Bkaer MD [facility] [phone]            See the Encounter Report to view Anticoagulation Flowsheet and Dosing Calendar (Go to Encounters tab in chart review, and find the Anticoagulation Therapy Visit)        Flaquita Neal RN

## 2018-12-19 ENCOUNTER — OFFICE VISIT (OUTPATIENT)
Dept: DERMATOLOGY | Facility: CLINIC | Age: 78
End: 2018-12-19
Payer: COMMERCIAL

## 2018-12-19 VITALS — SYSTOLIC BLOOD PRESSURE: 149 MMHG | HEART RATE: 67 BPM | DIASTOLIC BLOOD PRESSURE: 80 MMHG | OXYGEN SATURATION: 100 %

## 2018-12-19 DIAGNOSIS — Z85.828 HISTORY OF SKIN CANCER: ICD-10-CM

## 2018-12-19 DIAGNOSIS — D23.9 DERMAL NEVUS: ICD-10-CM

## 2018-12-19 DIAGNOSIS — D18.00 ANGIOMA: ICD-10-CM

## 2018-12-19 DIAGNOSIS — L82.1 SEBORRHEIC KERATOSIS: Primary | ICD-10-CM

## 2018-12-19 DIAGNOSIS — L81.4 LENTIGO: ICD-10-CM

## 2018-12-19 PROCEDURE — 17000 DESTRUCT PREMALG LESION: CPT | Performed by: DERMATOLOGY

## 2018-12-19 PROCEDURE — 17003 DESTRUCT PREMALG LES 2-14: CPT | Performed by: DERMATOLOGY

## 2018-12-19 PROCEDURE — 99213 OFFICE O/P EST LOW 20 MIN: CPT | Mod: 25 | Performed by: DERMATOLOGY

## 2018-12-19 NOTE — PROGRESS NOTES
Nayely Kay is a 78 year old year old female patient here today for hx of non-melanoma skin cancer.  . She notes rough spot on left temple.  Patient states this has been present for a while.  Patient reports the following symptoms:  rough.  Patient reports the following previous treatments none.  Patient reports the following modifying factors none.  Associated symptoms: none.  Patient has no other skin complaints today.  Remainder of the HPI, Meds, PMH, Allergies, FH, and SH was reviewed in chart.      Past Medical History:   Diagnosis Date     Atrial fibrillation (H)      Migraine, unspecified, without mention of intractable migraine without mention of status migrainosus     Migraine HA     Squamous cell carcinoma      Unspecified essential hypertension      Unspecified tinnitus        Past Surgical History:   Procedure Laterality Date     COLONOSCOPY  2009     FLEXIBLE SIGMOIDOSCOPY  9/2004     SURGICAL HISTORY OF -       tubal     SURGICAL HISTORY OF -       oral teeth        Family History   Problem Relation Age of Onset     Heart Disease Mother         Valvular disease- age 55.     Cerebrovascular Disease Father         age 75.     Neurologic Disorder Daughter         MS     Circulatory Brother         joe hanna-cleaned out     Breast Cancer Maternal Aunt        Social History     Socioeconomic History     Marital status:      Spouse name: Not on file     Number of children: Not on file     Years of education: Not on file     Highest education level: Not on file   Social Needs     Financial resource strain: Not on file     Food insecurity - worry: Not on file     Food insecurity - inability: Not on file     Transportation needs - medical: Not on file     Transportation needs - non-medical: Not on file   Occupational History     Not on file   Tobacco Use     Smoking status: Never Smoker     Smokeless tobacco: Never Used   Substance and Sexual Activity     Alcohol use: No     Drug use: No      Sexual activity: Not Currently   Other Topics Concern     Parent/sibling w/ CABG, MI or angioplasty before 65F 55M? No   Social History Narrative     Not on file       Outpatient Encounter Medications as of 12/19/2018   Medication Sig Dispense Refill     amLODIPine (NORVASC) 2.5 MG tablet Take 1 tablet (2.5 mg) by mouth daily 90 tablet 3     ascorbic acid (VITAMIN C) 250 MG CHEW Take 1 tablet by mouth daily.  0     ASPIRIN NOT PRESCRIBED (INTENTIONAL) due to coumadin 0 0     calcium carb 1250 mg, 500 mg [place],/vitamin D 200 units (OSCAL WITH D) 500-200 MG-UNIT per tablet Take 1 tablet by mouth 2 times daily (with meals).       Ferrous Sulfate (IRON) 325 (65 FE) MG tablet Take  by mouth. Taking two per week.       metoprolol tartrate (LOPRESSOR) 50 MG tablet Take 1.5 tablets (75 mg) by mouth 2 times daily 270 tablet 3     Multiple Vitamin (MULTIVITAMIN) per tablet Take 1 tablet by mouth daily.       NEW MED Fiber supplement daily.       nitroGLYcerin (NITROSTAT) 0.4 MG sublingual tablet Place 1 tablet (0.4 mg) under the tongue every 5 minutes as needed for chest pain 25 tablet 1     predniSONE (DELTASONE) 1 MG tablet Take 1 tablet (1 mg) by mouth daily 100 tablet 3     triamterene-hydrochlorothiazide (MAXZIDE-25) 37.5-25 MG per tablet Take 1/2 pill twice daily 90 tablet 3     warfarin (COUMADIN) 5 MG tablet Take 2.5 mg daily or as directed by Anticoagulation Clinic 90 tablet 3     No facility-administered encounter medications on file as of 12/19/2018.              Review Of Systems  Skin: As above  Eyes: negative  Ears/Nose/Throat: negative  Respiratory: No shortness of breath, dyspnea on exertion, cough, or hemoptysis  Cardiovascular: negative  Gastrointestinal: negative  Genitourinary: negative  Musculoskeletal: negative  Neurologic: negative  Psychiatric: negative  Hematologic/Lymphatic/Immunologic: negative  Endocrine: negative      O:   NAD, WDWN, Alert & Oriented, Mood & Affect wnl, Vitals stable   Here today  alone   There were no vitals taken for this visit.   General appearance normal   Vitals stable   Alert, oriented and in no acute distress      Following lymph nodes palpated: Occipital, Cervical, Supraclavicular no lad   L temple and cheek gritty paple      Stuck on papules and brown macules on trunk and ext   Red papules on trunk  Flesh colored papules on trunk     The remainder of the full exam was unremarkable; the following areas were examined:  conjunctiva/lids, oral mucosa, neck, peripheral vascular system, abdomen, lymph nodes, digits/nails, eccrine and apocrine glands, scalp/hair, face, neck, chest, abdomen, buttocks, back, RUE, LUE, RLE, LLE       Eyes: Conjunctivae/lids:Normal     ENT: Lips, buccal mucosa, tongue: normal    MSK:Normal    Cardiovascular: peripheral edema none    Pulm: Breathing Normal    Lymph Nodes: No Head and Neck Lymphadenopathy     Neuro/Psych: Orientation:Normal; Mood/Affect:Normal      A/P:  1. Seborrheic keratosis, lentigo, angioma, dermal nevus, hx of non-melanoma skin cancer   2. Actinic keratosis x2  LN2:  Treated with LN2 for 5s for 1-2 cycles. Warned risks of blistering, pain, pigment change, scarring, and incomplete resolution.  Advised patient to return if lesions do not completely resolve.  Wound care sheet given.    BENIGN LESIONS DISCUSSED WITH PATIENT:  I discussed the specifics of tumor, prognosis, and genetics of benign lesions.  I explained that treatment of these lesions would be purely cosmetic and not medically neccessary.  I discussed with patient different removal options including excision, cautery and /or laser.      Nature and genetics of benign skin lesions dicussed with patient.  Signs and Symptoms of skin cancer discussed with patient.  ABCDEs of melanoma reviewed with patient.  Patient encouraged to perform monthly skin exams.  UV precautions reviewed with patient.  Patient to follow up with Primary Care provider regarding elevated blood pressure.  Skin  care regimen reviewed with patient: Eliminate harsh soaps, i.e. Dial, zest, irsih spring; Mild soaps such as Cetaphil or Dove sensitive skin, avoid hot or cold showers, aggressive use of emollients including vanicream, cetaphil or cerave discussed with patient.    Risks of non-melanoma skin cancer discussed with patient   Return to clinic 12 months

## 2018-12-19 NOTE — LETTER
12/19/2018         RE: Nayely Kay  [address]  [address]  [facility] [address]        Dear Colleague,    Thank you for referring your patient, Nayely Kay, to the [facility]. Please see a copy of my visit note below.    Nayely Kay is a 78 year old year old female patient here today for hx of non-melanoma skin cancer.  . She notes rough spot on left temple.  Patient states this has been present for a while.  Patient reports the following symptoms:  rough.  Patient reports the following previous treatments none.  Patient reports the following modifying factors none.  Associated symptoms: none.  Patient has no other skin complaints today.  Remainder of the HPI, Meds, PMH, Allergies, FH, and SH was reviewed in chart.      Past Medical History:   Diagnosis Date     Atrial fibrillation (H)      Migraine, unspecified, without mention of intractable migraine without mention of status migrainosus     Migraine HA     Squamous cell carcinoma      Unspecified essential hypertension      Unspecified tinnitus        Past Surgical History:   Procedure Laterality Date     COLONOSCOPY  2009     FLEXIBLE SIGMOIDOSCOPY  9/2004     SURGICAL HISTORY OF -       tubal     SURGICAL HISTORY OF -       oral teeth        Family History   Problem Relation Age of Onset     Heart Disease Mother         Valvular disease- age 55.     Cerebrovascular Disease Father         age 75.     Neurologic Disorder Daughter         MS     Circulatory Brother         joe hanna-cleaned out     Breast Cancer Maternal Aunt        Social History     Socioeconomic History     Marital status:      Spouse name: Not on file     Number of children: Not on file     Years of education: Not on file     Highest education level: Not on file   Social Needs     Financial resource strain: Not on file     Food insecurity - worry: Not on file     Food insecurity - inability: Not on file     Transportation needs - medical: Not on  file     Transportation needs - non-medical: Not on file   Occupational History     Not on file   Tobacco Use     Smoking status: Never Smoker     Smokeless tobacco: Never Used   Substance and Sexual Activity     Alcohol use: No     Drug use: No     Sexual activity: Not Currently   Other Topics Concern     Parent/sibling w/ CABG, MI or angioplasty before 65F 55M? No   Social History Narrative     Not on file       Outpatient Encounter Medications as of 12/19/2018   Medication Sig Dispense Refill     amLODIPine (NORVASC) 2.5 MG tablet Take 1 tablet (2.5 mg) by mouth daily 90 tablet 3     ascorbic acid (VITAMIN C) 250 MG CHEW Take 1 tablet by mouth daily.  0     ASPIRIN NOT PRESCRIBED (INTENTIONAL) due to coumadin 0 0     calcium carb 1250 mg, 500 mg [place],/vitamin D 200 units (OSCAL WITH D) 500-200 MG-UNIT per tablet Take 1 tablet by mouth 2 times daily (with meals).       Ferrous Sulfate (IRON) 325 (65 FE) MG tablet Take  by mouth. Taking two per week.       metoprolol tartrate (LOPRESSOR) 50 MG tablet Take 1.5 tablets (75 mg) by mouth 2 times daily 270 tablet 3     Multiple Vitamin (MULTIVITAMIN) per tablet Take 1 tablet by mouth daily.       NEW MED Fiber supplement daily.       nitroGLYcerin (NITROSTAT) 0.4 MG sublingual tablet Place 1 tablet (0.4 mg) under the tongue every 5 minutes as needed for chest pain 25 tablet 1     predniSONE (DELTASONE) 1 MG tablet Take 1 tablet (1 mg) by mouth daily 100 tablet 3     triamterene-hydrochlorothiazide (MAXZIDE-25) 37.5-25 MG per tablet Take 1/2 pill twice daily 90 tablet 3     warfarin (COUMADIN) 5 MG tablet Take 2.5 mg daily or as directed by Anticoagulation Clinic 90 tablet 3     No facility-administered encounter medications on file as of 12/19/2018.              Review Of Systems  Skin: As above  Eyes: negative  Ears/Nose/Throat: negative  Respiratory: No shortness of breath, dyspnea on exertion, cough, or hemoptysis  Cardiovascular: negative  Gastrointestinal:  negative  Genitourinary: negative  Musculoskeletal: negative  Neurologic: negative  Psychiatric: negative  Hematologic/Lymphatic/Immunologic: negative  Endocrine: negative      O:   NAD, WDWN, Alert & Oriented, Mood & Affect wnl, Vitals stable   Here today alone   There were no vitals taken for this visit.   General appearance normal   Vitals stable   Alert, oriented and in no acute distress      Following lymph nodes palpated: Occipital, Cervical, Supraclavicular no lad   L temple and cheek gritty paple      Stuck on papules and brown macules on trunk and ext   Red papules on trunk  Flesh colored papules on trunk     The remainder of the full exam was unremarkable; the following areas were examined:  conjunctiva/lids, oral mucosa, neck, peripheral vascular system, abdomen, lymph nodes, digits/nails, eccrine and apocrine glands, scalp/hair, face, neck, chest, abdomen, buttocks, back, RUE, LUE, RLE, LLE       Eyes: Conjunctivae/lids:Normal     ENT: Lips, buccal mucosa, tongue: normal    MSK:Normal    Cardiovascular: peripheral edema none    Pulm: Breathing Normal    Lymph Nodes: No Head and Neck Lymphadenopathy     Neuro/Psych: Orientation:Normal; Mood/Affect:Normal      A/P:  1. Seborrheic keratosis, lentigo, angioma, dermal nevus, hx of non-melanoma skin cancer   2. Actinic keratosis x2  LN2:  Treated with LN2 for 5s for 1-2 cycles. Warned risks of blistering, pain, pigment change, scarring, and incomplete resolution.  Advised patient to return if lesions do not completely resolve.  Wound care sheet given.    BENIGN LESIONS DISCUSSED WITH PATIENT:  I discussed the specifics of tumor, prognosis, and genetics of benign lesions.  I explained that treatment of these lesions would be purely cosmetic and not medically neccessary.  I discussed with patient different removal options including excision, cautery and /or laser.      Nature and genetics of benign skin lesions dicussed with patient.  Signs and Symptoms of skin  cancer discussed with patient.  ABCDEs of melanoma reviewed with patient.  Patient encouraged to perform monthly skin exams.  UV precautions reviewed with patient.  Patient to follow up with Primary Care provider regarding elevated blood pressure.  Skin care regimen reviewed with patient: Eliminate harsh soaps, i.e. Dial, zest, irsih spring; Mild soaps such as Cetaphil or Dove sensitive skin, avoid hot or cold showers, aggressive use of emollients including vanicream, cetaphil or cerave discussed with patient.    Risks of non-melanoma skin cancer discussed with patient   Return to clinic 12 months      Again, thank you for allowing me to participate in the care of your patient.        Sincerely,        Jareth Pedroza MD

## 2018-12-19 NOTE — NURSING NOTE
.  Chief Complaint   Patient presents with     Skin Check       Vitals:    12/19/18 1354   BP: 149/80   Pulse: 67   SpO2: 100%     Wt Readings from Last 1 Encounters:   07/16/18 54.4 kg (120 lb)       Jyoti Salas LPN.................12/19/2018

## 2019-01-10 ENCOUNTER — ANTICOAGULATION THERAPY VISIT (OUTPATIENT)
Dept: ANTICOAGULATION | Facility: CLINIC | Age: 79
End: 2019-01-10
Payer: MEDICARE

## 2019-01-10 DIAGNOSIS — Z79.01 LONG TERM CURRENT USE OF ANTICOAGULANT THERAPY: ICD-10-CM

## 2019-01-10 LAB — INR POINT OF CARE: 2 (ref 0.86–1.14)

## 2019-01-10 PROCEDURE — 36416 COLLJ CAPILLARY BLOOD SPEC: CPT

## 2019-01-10 PROCEDURE — 85610 PROTHROMBIN TIME: CPT | Mod: QW

## 2019-01-10 PROCEDURE — 99207 ZZC NO CHARGE NURSE ONLY: CPT

## 2019-01-10 NOTE — PROGRESS NOTES
ANTICOAGULATION FOLLOW-UP CLINIC VISIT    Patient Name:  Nayely Kay  Date:  1/10/2019  Contact Type:  Face to Face    SUBJECTIVE:     Patient Findings     Positives:   No Problem Findings    Comments:   No changes in diet, activity level, medications (including over the counter), or health. No missed doses of warfarin. Patient took dosing as prescribed. No signs of clots or bleeding concerns. Patient will continue maintenance warfarin dosing. She does state that her PMR has improved a little and she will cut back from every other day to twice weekly with her broccoli.           OBJECTIVE    INR Protime   Date Value Ref Range Status   01/10/2019 2.0 (A) 0.86 - 1.14 Final       ASSESSMENT / PLAN  INR assessment THER    Recheck INR In: 4 WEEKS    INR Location Clinic      Anticoagulation Summary  As of 1/10/2019    INR goal:   2.0-3.0   TTR:   80.4 % (3.8 y)   INR used for dosin.0 (1/10/2019)   Warfarin maintenance plan:   2.5 mg (5 mg x 0.5) every day   Full warfarin instructions:   2.5 mg every day   Weekly warfarin total:   17.5 mg   No change documented:   Flaquita Neal RN   Plan last modified:   Oly Mcwilliams RN (2018)   Next INR check:   2019   Priority:   INR   Target end date:   Indefinite    Indications    Atrial fibrillation (H) [I48.91]  Long term current use of anticoagulant therapy [Z79.01]             Anticoagulation Episode Summary     INR check location:       Preferred lab:       Send INR reminders to:   [facility]    Comments:   * only wants dosing card. Pt uses 5mg tablets. wants to check every 4 weeks. has polymyalgia rheumatica (PMR)        Anticoagulation Care Providers     Provider Role Specialty Phone number    Ulysses Baker MD [facility] [phone]            See the Encounter Report to view Anticoagulation Flowsheet and Dosing Calendar (Go to Encounters tab in chart review, and find the Anticoagulation Therapy  Visit)        Flaquita Neal RN

## 2019-02-18 ENCOUNTER — OFFICE VISIT (OUTPATIENT)
Dept: FAMILY MEDICINE | Facility: CLINIC | Age: 79
End: 2019-02-18
Payer: MEDICARE

## 2019-02-18 ENCOUNTER — ANTICOAGULATION THERAPY VISIT (OUTPATIENT)
Dept: ANTICOAGULATION | Facility: CLINIC | Age: 79
End: 2019-02-18
Payer: MEDICARE

## 2019-02-18 VITALS
DIASTOLIC BLOOD PRESSURE: 70 MMHG | SYSTOLIC BLOOD PRESSURE: 132 MMHG | HEART RATE: 62 BPM | RESPIRATION RATE: 20 BRPM | WEIGHT: 121 LBS | BODY MASS INDEX: 24.23 KG/M2 | TEMPERATURE: 97.3 F

## 2019-02-18 DIAGNOSIS — M35.3 POLYMYALGIA RHEUMATICA (H): ICD-10-CM

## 2019-02-18 DIAGNOSIS — I48.91 ATRIAL FIBRILLATION (H): ICD-10-CM

## 2019-02-18 DIAGNOSIS — Z79.01 LONG TERM CURRENT USE OF ANTICOAGULANT THERAPY: ICD-10-CM

## 2019-02-18 DIAGNOSIS — M79.602 PAIN OF LEFT UPPER EXTREMITY: Primary | ICD-10-CM

## 2019-02-18 LAB — INR POINT OF CARE: 2.5 (ref 0.86–1.14)

## 2019-02-18 PROCEDURE — 99213 OFFICE O/P EST LOW 20 MIN: CPT | Performed by: FAMILY MEDICINE

## 2019-02-18 PROCEDURE — 36416 COLLJ CAPILLARY BLOOD SPEC: CPT

## 2019-02-18 PROCEDURE — 85610 PROTHROMBIN TIME: CPT | Mod: QW

## 2019-02-18 PROCEDURE — 99207 ZZC NO CHARGE NURSE ONLY: CPT

## 2019-02-18 NOTE — PROGRESS NOTES
SUBJECTIVE:   Nayely Kay is a 78 year old female who presents to clinic today for the following health issues:  Chief Complaint   Patient presents with     Musculoskeletal Problem      Left arm       Duration: 10 days    Description (location/character/radiation): left arm aches     Intensity:  gone    Accompanying signs and symptoms: none    History (similar episodes/previous evaluation): Arthritis     Precipitating or alleviating factors: None    Therapies tried and outcome: None     Left arm achy like a headache for 7-10 days.  Now it seems to be doing well-pain gone for quite a few days.  It was constant.   Location: shoulder to hand.   She had no numbness or pain with the symptoms.  Has occasional numbness 5th and thumb.  Aggravating factors: not with movement or activity.  She just kept on working.     Some pain posterior thighs at times.  Not bothering her much at this time.     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     Some increased fatigue.     She has a history of polymyalgia rheumatica.  Takes 1mg prednisone sometimes when she feels it is acting up.  Will take for 7-10 days.  Takes every couple months.     Patient Active Problem List   Diagnosis     Headache     Atrial fibrillation (H)     Polymyalgia rheumatica (H)     Iron deficiency anemia     HYPERLIPIDEMIA LDL GOAL <130     Osteopenia     Family history of breast cancer     Eczema     Advanced directives, counseling/discussion     AK (actinic keratosis)     Ganglion cyst     Long term current use of anticoagulant therapy     Essential hypertension     Tubular adenoma      ROS:General: No change in weight, sleep or appetite.  Normal energy.  No fever or chills  Resp: No coughing, wheezing or shortness of breath.  Sometimes shortness of breath carrying a couple bags of groceries up stairs.   CV: No chest pains or palpitations, never takes nitros.    GI: No nausea, vomiting,  heartburn, abdominal pain, diarrhea, constipation  or change in bowel habits  : No urinary frequency or dysuria, bladder or kidney problems  Psychiatric: No problems with anxiety, depression or mental health    OBJECTIVE:Blood pressure 132/70, pulse 62, temperature 97.3  F (36.3  C), temperature source Tympanic, resp. rate 20, weight 54.9 kg (121 lb). BMI=Body mass index is 24.23 kg/m .  GENERAL APPEARANCE ADULT: Alert, no acute distress  NECK: No adenopathy,masses or thyromegaly  RESP: lungs clear to auscultation   CV: irregular rhythm-irregularly irregular, rate-normal, no murmur  ABDOMEN: soft, no organomegaly, masses or tenderness  MS: extremities normal, no peripheral edema  Wearing compression stockings.   No pain to palpation in arm.   She has good range of motion left elbow and shoulder without pain.   Some decrease in wrist range of motion bilateral.    Two ganglion cysts left radial palmar wrist.   Some finger joint deformities thumb MCP and IP joints, second MCP joints and PIP joints.       ASSESSMENT:     ICD-10-CM    1. Pain of left upper extremity M79.602    2. Polymyalgia rheumatica (H) M35.3       This pain is not related to your heart.    I don't think it is the polymyalgia rheumatica.     PLAN: If pain comes back or is intermittent and related to physical activity, let me know.   No treatment needed at this time.   No new changes in treatment recommended.

## 2019-02-18 NOTE — NURSING NOTE
"Chief Complaint   Patient presents with     Musculoskeletal Problem       Initial /70   Pulse 62   Temp 97.3  F (36.3  C) (Tympanic)   Resp 20   Wt 54.9 kg (121 lb)   BMI 24.23 kg/m   Estimated body mass index is 24.23 kg/m  as calculated from the following:    Height as of 7/16/18: 1.505 m (4' 11.25\").    Weight as of this encounter: 54.9 kg (121 lb).    Patient presents to the clinic using     Health Maintenance that is potentially due pending provider review:          Is there anyone who you would like to be able to receive your results? No  If yes have patient fill out BOLA    "

## 2019-02-18 NOTE — PATIENT INSTRUCTIONS
ASSESSMENT:     ICD-10-CM    1. Pain of left upper extremity M79.602    2. Polymyalgia rheumatica (H) M35.3       This pain is not related to your heart.    I don't think it is the polymyalgia rheumatica.     PLAN: If pain comes back or is intermittent and related to physical activity, let me know.   No treatment needed at this time.   No new changes in treatment recommended.

## 2019-02-18 NOTE — PROGRESS NOTES
ANTICOAGULATION FOLLOW-UP CLINIC VISIT    Patient Name:  Nayely Kay  Date:  2019  Contact Type:  Face to Face    SUBJECTIVE:     Patient Findings     Positives:   No Problem Findings    Comments:   No changes in medications, diet, or activity. No concerns with bleeding or bruising. Took warfarin as prescribed.   Continue maintenance warfarin dose. Will recheck INR in 4 weeks per patient preference.   Patient verbalizes understanding and agrees with plan. No further questions at this time.              OBJECTIVE    INR Protime   Date Value Ref Range Status   2019 2.5 (A) 0.86 - 1.14 Final       ASSESSMENT / PLAN  INR assessment THER    Recheck INR In: 4 WEEKS    INR Location Clinic      Anticoagulation Summary  As of 2019    INR goal:   2.0-3.0   TTR:   81.0 % (3.9 y)   INR used for dosin.5 (2019)   Warfarin maintenance plan:   2.5 mg (5 mg x 0.5) every day   Full warfarin instructions:   2.5 mg every day   Weekly warfarin total:   17.5 mg   No change documented:   Zandra Perez RN   Plan last modified:   Oly Mcwilliams RN (2018)   Next INR check:   3/18/2019   Priority:   INR   Target end date:   Indefinite    Indications    Atrial fibrillation (H) [I48.91]  Long term current use of anticoagulant therapy [Z79.01]             Anticoagulation Episode Summary     INR check location:       Preferred lab:       Send INR reminders to:   [facility]    Comments:   * only wants dosing card. Pt uses 5mg tablets. wants to check every 4 weeks. has polymyalgia rheumatica (PMR)        Anticoagulation Care Providers     Provider Role Specialty Phone number    Ulysses Baker MD [facility] [phone]            See the Encounter Report to view Anticoagulation Flowsheet and Dosing Calendar (Go to Encounters tab in chart review, and find the Anticoagulation Therapy Visit)        Zandra Perez, SEKOU

## 2019-03-18 ENCOUNTER — ANTICOAGULATION THERAPY VISIT (OUTPATIENT)
Dept: ANTICOAGULATION | Facility: CLINIC | Age: 79
End: 2019-03-18
Payer: MEDICARE

## 2019-03-18 ENCOUNTER — ALLIED HEALTH/NURSE VISIT (OUTPATIENT)
Dept: FAMILY MEDICINE | Facility: CLINIC | Age: 79
End: 2019-03-18
Payer: MEDICARE

## 2019-03-18 VITALS — RESPIRATION RATE: 16 BRPM | SYSTOLIC BLOOD PRESSURE: 138 MMHG | DIASTOLIC BLOOD PRESSURE: 78 MMHG | HEART RATE: 72 BPM

## 2019-03-18 DIAGNOSIS — Z01.30 BP CHECK: Primary | ICD-10-CM

## 2019-03-18 DIAGNOSIS — Z79.01 LONG TERM CURRENT USE OF ANTICOAGULANT THERAPY: ICD-10-CM

## 2019-03-18 LAB — INR POINT OF CARE: 2 (ref 0.86–1.14)

## 2019-03-18 PROCEDURE — 99207 ZZC NO CHARGE NURSE ONLY: CPT

## 2019-03-18 PROCEDURE — 85610 PROTHROMBIN TIME: CPT | Mod: QW

## 2019-03-18 PROCEDURE — 36416 COLLJ CAPILLARY BLOOD SPEC: CPT

## 2019-03-18 NOTE — PROGRESS NOTES
Nayely Kay is a 79 year old year old patient who comes in today for a Blood Pressure check because of ongoing blood pressure monitoring.  Vital Signs as repeated by .78  Patient is taking medication as prescribed  Patient is tolerating medications well.  Patient is not monitoring Blood Pressure at home.    Current complaints: none  Disposition:  patient to continue with the same medication  Nicolette Granger RN      Toenails trimmed without difficulty  Nicolette Granger RN

## 2019-03-18 NOTE — PROGRESS NOTES
ANTICOAGULATION FOLLOW-UP CLINIC VISIT    Patient Name:  Nayely Kay  Date:  3/18/2019  Contact Type:  Face to Face    SUBJECTIVE:     Patient Findings     Comments:   No changes in diet, activity level, medications (including over the counter), or health. No missed doses of warfarin. Patient took dosing as prescribed. No signs of clots or bleeding concerns. Patient will continue maintenance warfarin dosing.             OBJECTIVE    INR Protime   Date Value Ref Range Status   2019 2.0 (A) 0.86 - 1.14 Final       ASSESSMENT / PLAN  INR assessment THER    Recheck INR In: 4 WEEKS    INR Location Clinic      Anticoagulation Summary  As of 3/18/2019    INR goal:   2.0-3.0   TTR:   81.3 % (4 y)   INR used for dosin.0 (3/18/2019)   Warfarin maintenance plan:   2.5 mg (5 mg x 0.5) every day   Full warfarin instructions:   2.5 mg every day   Weekly warfarin total:   17.5 mg   No change documented:   Flaquita Neal RN   Plan last modified:   Oly Mcwilliams RN (2018)   Next INR check:   2019   Priority:   INR   Target end date:   Indefinite    Indications    Atrial fibrillation (H) [I48.91]  Long term current use of anticoagulant therapy [Z79.01]             Anticoagulation Episode Summary     INR check location:       Preferred lab:       Send INR reminders to:   [facility]    Comments:   * only wants dosing card. Pt uses 5mg tablets. wants to check every 4 weeks. has polymyalgia rheumatica (PMR)        Anticoagulation Care Providers     Provider Role Specialty Phone number    Ulysses Baker MD [facility] [phone]            See the Encounter Report to view Anticoagulation Flowsheet and Dosing Calendar (Go to Encounters tab in chart review, and find the Anticoagulation Therapy Visit)        Flaquita Neal RN

## 2019-04-02 DIAGNOSIS — I10 ESSENTIAL HYPERTENSION: ICD-10-CM

## 2019-04-02 NOTE — TELEPHONE ENCOUNTER
"Requested Prescriptions   Pending Prescriptions Disp Refills     triamterene-HCTZ (MAXZIDE-25) 37.5-25 MG tablet [Pharmacy Med Name: TRIAMTERENE-HCTZ 37.5-25 MG TB] 90 tablet 2    Last Written Prescription Date:  6/15/18  Last Fill Quantity: 90 tab,  # refills: 3   Last office visit: 3/18/2019 with prescribing provider:  Nurse   Future Office Visit:     Sig: TAKE 1/2 TABLET BY MOUTH TWICE DAILY    Diuretics (Including Combos) Protocol Failed - 4/2/2019  1:42 AM       Failed - Normal serum sodium on file in past 12 months    No lab results found.          Passed - Blood pressure under 140/90 in past 12 months    BP Readings from Last 3 Encounters:   03/18/19 138/78   02/18/19 132/70   12/19/18 149/80                Passed - Recent (12 mo) or future (30 days) visit within the authorizing provider's specialty    Patient had office visit in the last 12 months or has a visit in the next 30 days with authorizing provider or within the authorizing provider's specialty.  See \"Patient Info\" tab in inbasket, or \"Choose Columns\" in Meds & Orders section of the refill encounter.             Passed - Medication is active on med list       Passed - Patient is age 18 or older       Passed - No active pregancy on record       Passed - Normal serum creatinine on file in past 12 months    Recent Labs   Lab Test 06/15/18  1031   CR 0.83             Passed - Normal serum potassium on file in past 12 months    Recent Labs   Lab Test 06/15/18  1031   POTASSIUM 3.5                   Passed - No positive pregnancy test in past 12 months          "

## 2019-04-03 RX ORDER — TRIAMTERENE/HYDROCHLOROTHIAZID 37.5-25 MG
TABLET ORAL
Qty: 90 TABLET | Refills: 0 | Status: SHIPPED | OUTPATIENT
Start: 2019-04-03 | End: 2019-06-12

## 2019-04-15 ENCOUNTER — ANTICOAGULATION THERAPY VISIT (OUTPATIENT)
Dept: ANTICOAGULATION | Facility: CLINIC | Age: 79
End: 2019-04-15
Payer: MEDICARE

## 2019-04-15 DIAGNOSIS — I48.91 ATRIAL FIBRILLATION (H): ICD-10-CM

## 2019-04-15 DIAGNOSIS — Z79.01 LONG TERM CURRENT USE OF ANTICOAGULANT THERAPY: ICD-10-CM

## 2019-04-15 LAB — INR POINT OF CARE: 2.7 (ref 0.86–1.14)

## 2019-04-15 PROCEDURE — 85610 PROTHROMBIN TIME: CPT | Mod: QW

## 2019-04-15 PROCEDURE — 99207 ZZC NO CHARGE NURSE ONLY: CPT

## 2019-04-15 PROCEDURE — 36416 COLLJ CAPILLARY BLOOD SPEC: CPT

## 2019-04-15 NOTE — PROGRESS NOTES
ANTICOAGULATION FOLLOW-UP CLINIC VISIT    Patient Name:  Nayely Kay  Date:  4/15/2019  Contact Type:  Face to Face    SUBJECTIVE:     Patient Findings     Comments:   No changes in medications, diet, or activity. No concerns with bleeding or bruising. Took warfarin as prescribed.   Continue maintenance warfarin dose. Will recheck INR in 4 weeks.   Patient verbalizes understanding and agrees with plan. No further questions at this time.              OBJECTIVE    INR Protime   Date Value Ref Range Status   04/15/2019 2.7 (A) 0.86 - 1.14 Final       ASSESSMENT / PLAN  INR assessment THER    Recheck INR In: 4 WEEKS    INR Location Clinic      Anticoagulation Summary  As of 4/15/2019    INR goal:   2.0-3.0   TTR:   81.7 % (4.1 y)   INR used for dosin.7 (4/15/2019)   Warfarin maintenance plan:   2.5 mg (5 mg x 0.5) every day   Full warfarin instructions:   2.5 mg every day   Weekly warfarin total:   17.5 mg   No change documented:   Zandra Perez RN   Plan last modified:   Oly Mcwilliams RN (2018)   Next INR check:   2019   Priority:   INR   Target end date:   Indefinite    Indications    Atrial fibrillation (H) [I48.91]  Long term current use of anticoagulant therapy [Z79.01]             Anticoagulation Episode Summary     INR check location:       Preferred lab:       Send INR reminders to:   [facility]    Comments:   * only wants dosing card. Pt uses 5mg tablets. wants to check every 4 weeks. has polymyalgia rheumatica (PMR)        Anticoagulation Care Providers     Provider Role Specialty Phone number    Ulysses Baker MD [facility] [phone]            See the Encounter Report to view Anticoagulation Flowsheet and Dosing Calendar (Go to Encounters tab in chart review, and find the Anticoagulation Therapy Visit)        Zandra Perez, SEKOU

## 2019-05-08 ENCOUNTER — TELEPHONE (OUTPATIENT)
Dept: FAMILY MEDICINE | Facility: CLINIC | Age: 79
End: 2019-05-08

## 2019-05-08 NOTE — TELEPHONE ENCOUNTER
Reason for Call:  Other     Detailed comments: Patient has questions regarding the measles - please call pt    Phone Number Patient can be reached at: Home number on file [phone] (home)    Best Time:     Can we leave a detailed message on this number? YES    Call taken on 5/8/2019 at 3:21 PM by Audra Monroe

## 2019-05-08 NOTE — TELEPHONE ENCOUNTER
Dr. Schneider,  Patient reports she had the measles as a child, does she need a booster? Patient is not a traveller, patient worried about this, does not wish to have a titer drawn as not sure if insurance will cover this, has not been around anyone who is ill with the measles. Please advise in Dr. Cedillo's absence.    SEKOU Jack

## 2019-05-13 ENCOUNTER — ALLIED HEALTH/NURSE VISIT (OUTPATIENT)
Dept: FAMILY MEDICINE | Facility: CLINIC | Age: 79
End: 2019-05-13
Payer: MEDICARE

## 2019-05-13 ENCOUNTER — ANTICOAGULATION THERAPY VISIT (OUTPATIENT)
Dept: ANTICOAGULATION | Facility: CLINIC | Age: 79
End: 2019-05-13
Payer: MEDICARE

## 2019-05-13 VITALS — DIASTOLIC BLOOD PRESSURE: 68 MMHG | RESPIRATION RATE: 16 BRPM | HEART RATE: 60 BPM | SYSTOLIC BLOOD PRESSURE: 144 MMHG

## 2019-05-13 DIAGNOSIS — I10 ESSENTIAL HYPERTENSION: Primary | ICD-10-CM

## 2019-05-13 DIAGNOSIS — I48.91 ATRIAL FIBRILLATION (H): ICD-10-CM

## 2019-05-13 DIAGNOSIS — Z79.01 LONG TERM CURRENT USE OF ANTICOAGULANT THERAPY: ICD-10-CM

## 2019-05-13 LAB — INR POINT OF CARE: 2.5 (ref 0.86–1.14)

## 2019-05-13 PROCEDURE — 99207 ZZC NO CHARGE NURSE ONLY: CPT | Performed by: FAMILY MEDICINE

## 2019-05-13 PROCEDURE — 85610 PROTHROMBIN TIME: CPT | Mod: QW | Performed by: FAMILY MEDICINE

## 2019-05-13 PROCEDURE — 36416 COLLJ CAPILLARY BLOOD SPEC: CPT | Performed by: FAMILY MEDICINE

## 2019-05-13 PROCEDURE — 99207 ZZC NO CHARGE NURSE ONLY: CPT

## 2019-05-13 NOTE — NURSING NOTE
BP slightly elevated today. Pt states she feels well. Pt will return next week for RN bp recheck and appointment with Dr Cedillo set up after her next INR appointment. Montserrat Green RN

## 2019-05-13 NOTE — PROGRESS NOTES
ANTICOAGULATION FOLLOW-UP CLINIC VISIT    Patient Name:  Nayely Kay  Date:  2019  Contact Type:  Face to Face    SUBJECTIVE:  Patient Findings     Comments:   No changes in medications, diet, or activity. No concerns with bleeding or bruising. Took warfarin as prescribed.   Continue maintenance warfarin dose. Will recheck INR in 4 weeks.   Patient verbalizes understanding and agrees with plan. No further questions at this time.           Clinical Outcomes     Negatives:   Major bleeding event, Thromboembolic event, Anticoagulation-related hospital admission, Anticoagulation-related ED visit, Anticoagulation-related fatality    Comments:   No changes in medications, diet, or activity. No concerns with bleeding or bruising. Took warfarin as prescribed.   Continue maintenance warfarin dose. Will recheck INR in 4 weeks.   Patient verbalizes understanding and agrees with plan. No further questions at this time.              OBJECTIVE    INR Protime   Date Value Ref Range Status   2019 2.5 (A) 0.86 - 1.14 Final       ASSESSMENT / PLAN  INR assessment THER    Recheck INR In: 4 WEEKS    INR Location Clinic      Anticoagulation Summary  As of 2019    INR goal:   2.0-3.0   TTR:   82.0 % (4.2 y)   INR used for dosin.5 (2019)   Warfarin maintenance plan:   2.5 mg (5 mg x 0.5) every day   Full warfarin instructions:   2.5 mg every day   Weekly warfarin total:   17.5 mg   No change documented:   Zandra Perez RN   Plan last modified:   Oly Mcwilliams RN (2018)   Next INR check:   6/10/2019   Priority:   INR   Target end date:   Indefinite    Indications    Atrial fibrillation (H) [I48.91]  Long term current use of anticoagulant therapy [Z79.01]             Anticoagulation Episode Summary     INR check location:       Preferred lab:       Send INR reminders to:   [facility]    Comments:   * only wants dosing card. Pt uses 5mg tablets. wants to check every 4 weeks. has  polymyalgia rheumatica (PMR)        Anticoagulation Care Providers     Provider Role Specialty Phone number    Ulysses Baker MD [facility] [phone]            See the Encounter Report to view Anticoagulation Flowsheet and Dosing Calendar (Go to Encounters tab in chart review, and find the Anticoagulation Therapy Visit)        Zandra Perez RN

## 2019-06-06 ENCOUNTER — ANTICOAGULATION THERAPY VISIT (OUTPATIENT)
Dept: ANTICOAGULATION | Facility: CLINIC | Age: 79
End: 2019-06-06

## 2019-06-06 DIAGNOSIS — I48.91 ATRIAL FIBRILLATION (H): ICD-10-CM

## 2019-06-06 DIAGNOSIS — Z79.01 LONG TERM CURRENT USE OF ANTICOAGULANT THERAPY: ICD-10-CM

## 2019-06-06 NOTE — PROGRESS NOTES
06/06/19    NS for appointment today. Order for INR placed. Patient will see PCP on 6/12/19.    Renetta Falcon RN, BSN, PHN  Anticoagulation Clinic   [phone]

## 2019-06-12 ENCOUNTER — OFFICE VISIT (OUTPATIENT)
Dept: FAMILY MEDICINE | Facility: CLINIC | Age: 79
End: 2019-06-12
Payer: MEDICARE

## 2019-06-12 VITALS
HEART RATE: 58 BPM | TEMPERATURE: 97.6 F | RESPIRATION RATE: 18 BRPM | DIASTOLIC BLOOD PRESSURE: 80 MMHG | HEIGHT: 59 IN | WEIGHT: 120 LBS | BODY MASS INDEX: 24.19 KG/M2 | SYSTOLIC BLOOD PRESSURE: 170 MMHG

## 2019-06-12 DIAGNOSIS — R16.0 ENLARGED LIVER: Primary | ICD-10-CM

## 2019-06-12 DIAGNOSIS — I48.91 ATRIAL FIBRILLATION (H): ICD-10-CM

## 2019-06-12 DIAGNOSIS — D36.9 TUBULAR ADENOMA: ICD-10-CM

## 2019-06-12 DIAGNOSIS — I10 ESSENTIAL HYPERTENSION: ICD-10-CM

## 2019-06-12 DIAGNOSIS — Z79.01 LONG TERM CURRENT USE OF ANTICOAGULANT THERAPY: ICD-10-CM

## 2019-06-12 LAB
ALBUMIN SERPL-MCNC: 4 G/DL (ref 3.4–5)
ALP SERPL-CCNC: 153 U/L (ref 40–150)
ALT SERPL W P-5'-P-CCNC: 22 U/L (ref 0–50)
ANION GAP SERPL CALCULATED.3IONS-SCNC: 6 MMOL/L (ref 3–14)
AST SERPL W P-5'-P-CCNC: 28 U/L (ref 0–45)
BILIRUB SERPL-MCNC: 1.9 MG/DL (ref 0.2–1.3)
BUN SERPL-MCNC: 12 MG/DL (ref 7–30)
CALCIUM SERPL-MCNC: 10.4 MG/DL (ref 8.5–10.1)
CHLORIDE SERPL-SCNC: 99 MMOL/L (ref 94–109)
CHOLEST SERPL-MCNC: 183 MG/DL
CO2 SERPL-SCNC: 31 MMOL/L (ref 20–32)
CREAT SERPL-MCNC: 0.72 MG/DL (ref 0.52–1.04)
GFR SERPL CREATININE-BSD FRML MDRD: 79 ML/MIN/{1.73_M2}
GLUCOSE SERPL-MCNC: 79 MG/DL (ref 70–99)
HDLC SERPL-MCNC: 79 MG/DL
INR PPP: 2.42 (ref 0.86–1.14)
LDLC SERPL CALC-MCNC: 83 MG/DL
NONHDLC SERPL-MCNC: 104 MG/DL
POTASSIUM SERPL-SCNC: 3.5 MMOL/L (ref 3.4–5.3)
PROT SERPL-MCNC: 8.2 G/DL (ref 6.8–8.8)
SODIUM SERPL-SCNC: 136 MMOL/L (ref 133–144)
TRIGL SERPL-MCNC: 104 MG/DL

## 2019-06-12 PROCEDURE — 99214 OFFICE O/P EST MOD 30 MIN: CPT | Performed by: FAMILY MEDICINE

## 2019-06-12 PROCEDURE — 80053 COMPREHEN METABOLIC PANEL: CPT | Performed by: FAMILY MEDICINE

## 2019-06-12 PROCEDURE — 36415 COLL VENOUS BLD VENIPUNCTURE: CPT | Performed by: FAMILY MEDICINE

## 2019-06-12 PROCEDURE — 80061 LIPID PANEL: CPT | Performed by: FAMILY MEDICINE

## 2019-06-12 PROCEDURE — 85610 PROTHROMBIN TIME: CPT | Performed by: FAMILY MEDICINE

## 2019-06-12 RX ORDER — WARFARIN SODIUM 5 MG/1
TABLET ORAL
Qty: 90 TABLET | Refills: 3 | Status: SHIPPED | OUTPATIENT
Start: 2019-06-12 | End: 2019-07-24

## 2019-06-12 RX ORDER — METOPROLOL TARTRATE 50 MG
75 TABLET ORAL 2 TIMES DAILY
Qty: 270 TABLET | Refills: 3 | Status: SHIPPED | OUTPATIENT
Start: 2019-06-12 | End: 2020-06-15

## 2019-06-12 RX ORDER — TRIAMTERENE/HYDROCHLOROTHIAZID 37.5-25 MG
TABLET ORAL
Qty: 90 TABLET | Refills: 3 | Status: SHIPPED | OUTPATIENT
Start: 2019-06-12 | End: 2020-06-15

## 2019-06-12 RX ORDER — PREDNISONE 1 MG/1
1 TABLET ORAL DAILY
Qty: 100 TABLET | Refills: 3 | Status: CANCELLED | OUTPATIENT
Start: 2019-06-12

## 2019-06-12 RX ORDER — AMLODIPINE BESYLATE 5 MG/1
5 TABLET ORAL DAILY
Qty: 30 TABLET | Refills: 1 | Status: SHIPPED | OUTPATIENT
Start: 2019-06-12 | End: 2019-07-12

## 2019-06-12 ASSESSMENT — MIFFLIN-ST. JEOR: SCORE: 925.18

## 2019-06-12 NOTE — NURSING NOTE
"Chief Complaint   Patient presents with     Hypertension       Initial Ht 1.507 m (4' 11.33\")   Wt 54.4 kg (120 lb)   BMI 23.97 kg/m   Estimated body mass index is 23.97 kg/m  as calculated from the following:    Height as of this encounter: 1.507 m (4' 11.33\").    Weight as of this encounter: 54.4 kg (120 lb).    Patient presents to the clinic using No DME    Health Maintenance that is potentially due pending provider review:  BP was high, used pink card, recheck manually    Possibly completing today per provider review.    Is there anyone who you would like to be able to receive your results? No  If yes have patient fill out BOLA    "

## 2019-06-12 NOTE — PATIENT INSTRUCTIONS
ASSESSMENT:   (I10) Essential hypertension  Comment: running high  Plan: amLODIPine (NORVASC) 5 MG tablet, metoprolol         tartrate (LOPRESSOR) 50 MG tablet,         triamterene-HCTZ (MAXZIDE-25) 37.5-25 MG tablet          Continue current Maxide 1/2 pill twice daily.    Continue the metoprolol 1 and 1/2 pills twice daily  Increase the amlodipine to 50mg a day .  Take two of the 2.5mg pills once daily until you run out.  New pills will be stronger (5mg) so you only have to take one per day.   Check blood pressure within a month.  IF it remains high, we can go up to 10mg daily on the amlodipine.  Add lisinopril if remains high.     (Z79.01) Long term current use of anticoagulant therapy  Comment:   Plan: warfarin (COUMADIN) 5 MG tablet        Refills.     (D36.9) Tubular adenoma  Comment:   Plan: Think about colonoscopy in 2022.  No need for stool test.    (R16.0) Enlarged liver  (primary encounter diagnosis)  Comment: uncertain cause  Plan: Lipid panel reflex to direct LDL Fasting, US         Abdomen Limited        Check blood for liver function and Schedule an appointment with diagnostics for liver ultrasound test.   Call [phone] to schedule.

## 2019-06-12 NOTE — PROGRESS NOTES
SUBJECTIVE: Nayely Kay is a 79 year old female  Chief Complaint   Patient presents with     Hypertension     Health Maintenance     Would like to know if she should do a FIT test, just for screening.   She had colonoscopy in 2017.  Two polyps.      In for blood pressure check.   BP was 144/68 at INR clinic visit on 5/13/2019.  Taking metoprolol 75mg twice daily.   Taking Maxide 1/2 pill twice daily.   Taking amlodipine 2.5mg once daily.     Arthritis in hands.   No longer taking prednisone.     Hypertension Follow-up      Do you check your blood pressure regularly outside of the clinic? No     Are you following a low salt diet? Yes    Are your blood pressures ever more than 140 on the top number (systolic) OR more   than 90 on the bottom number (diastolic), for example 140/90? Yes    Amount of exercise or physical activity: walking    Problems taking medications regularly: No    Medication side effects: none    Patient Active Problem List   Diagnosis     Headache     Atrial fibrillation (H)     Polymyalgia rheumatica (H)     Iron deficiency anemia     HYPERLIPIDEMIA LDL GOAL <130     Osteopenia     Family history of breast cancer     Eczema     Advanced directives, counseling/discussion     AK (actinic keratosis)     Ganglion cyst     Long term current use of anticoagulant therapy     Essential hypertension     Tubular adenoma      ROS:General: POSITIVE for:, not sleeping well.  Has not changed.   Resp: No coughing, wheezing or shortness of breath  CV: No chest pains or palpitations  GI: No nausea, vomiting,  heartburn, abdominal pain, diarrhea, constipation or change in bowel habits  : No urinary frequency or dysuria, bladder or kidney problems  Musculoskeletal: POSITIVE  for:, pain in hands.   Sometimes pain in posterior thighs.   Psychiatric: No problems with anxiety, depression or mental health  Recheck blood pressure 174/80    OBJECTIVE:Blood pressure 170/80, pulse 58, temperature 97.6  F (36.4  C),  "temperature source Tympanic, resp. rate 18, height 1.507 m (4' 11.33\"), weight 54.4 kg (120 lb). BMI=Body mass index is 23.97 kg/m .  GENERAL APPEARANCE ADULT: Alert, no acute distress  NECK: No adenopathy,masses or thyromegaly  RESP: lungs clear to auscultation   CV: irregular rhythm-irregularly irregular, rate-normal, no murmur  ABDOMEN: soft, no masses or tenderness, no bruits, femoral pulses: normal.  Feels like enlarged liver 2cm below right costal margin at baseline.     MS: Trace edema bilateral.      ASSESSMENT:   (I10) Essential hypertension  Comment: running high  Plan: amLODIPine (NORVASC) 5 MG tablet, metoprolol         tartrate (LOPRESSOR) 50 MG tablet,         triamterene-HCTZ (MAXZIDE-25) 37.5-25 MG tablet          Continue current Maxide 1/2 pill twice daily.    Continue the metoprolol 1 and 1/2 pills twice daily  Increase the amlodipine to 5mg a day .  Take two of the 2.5mg pills once daily until you run out.  New pills will be stronger (5mg) so you only have to take one per day.   Check blood pressure within a month.  IF it remains high, we can go up to 10mg daily on the amlodipine.  Add lisinopril if remains high.     (Z79.01) Long term current use of anticoagulant therapy  Comment:   Plan: warfarin (COUMADIN) 5 MG tablet        Refills.     (D36.9) Tubular adenoma  Comment:   Plan: Think about colonoscopy in 2022.  No need for stool test.     (R16.0) Enlarged liver  (primary encounter diagnosis)  Comment: uncertain cause  Plan: Lipid panel reflex to direct LDL Fasting, US         Abdomen Limited        Check blood for liver function and Schedule an appointment with diagnostics for liver ultrasound test.   Call [phone] to schedule.          "

## 2019-06-13 ENCOUNTER — ANTICOAGULATION THERAPY VISIT (OUTPATIENT)
Dept: ANTICOAGULATION | Facility: CLINIC | Age: 79
End: 2019-06-13
Payer: MEDICARE

## 2019-06-13 ENCOUNTER — NURSE TRIAGE (OUTPATIENT)
Dept: NURSING | Facility: CLINIC | Age: 79
End: 2019-06-13

## 2019-06-13 DIAGNOSIS — Z79.01 LONG TERM CURRENT USE OF ANTICOAGULANT THERAPY: ICD-10-CM

## 2019-06-13 DIAGNOSIS — I48.91 ATRIAL FIBRILLATION (H): ICD-10-CM

## 2019-06-13 PROCEDURE — 99207 ZZC NO CHARGE NURSE ONLY: CPT

## 2019-06-13 NOTE — PROGRESS NOTES
ANTICOAGULATION FOLLOW-UP CLINIC VISIT    Patient Name:  Nayely Kay  Date:  2019  Contact Type:  Telephone    SUBJECTIVE:  Patient Findings     Comments:   No changes in medications, activity, health, or diet noted. No bleeding or increased bruising noted. Took warfarin as prescribed.  Patient will continue weekly maintenance dose. INR is therapeutic.   Recheck in 4 weeks. Patient will contact [facility] for an appointment.  Patient verbalizes understanding and agrees to plan. No further questions or concerns.          Clinical Outcomes     Negatives:   Major bleeding event, Thromboembolic event, Anticoagulation-related hospital admission, Anticoagulation-related ED visit, Anticoagulation-related fatality    Comments:   No changes in medications, activity, health, or diet noted. No bleeding or increased bruising noted. Took warfarin as prescribed.  Patient will continue weekly maintenance dose. INR is therapeutic.   Recheck in 4 weeks. Patient will contact [facility] for an appointment.  Patient verbalizes understanding and agrees to plan. No further questions or concerns.             OBJECTIVE    INR   Date Value Ref Range Status   2019 2.42 (H) 0.86 - 1.14 Final       ASSESSMENT / PLAN  INR assessment THER    Recheck INR In: 4 WEEKS    INR Location Outside lab      Anticoagulation Summary  As of 2019    INR goal:   2.0-3.0   TTR:   82.4 % (4.2 y)   INR used for dosin.42 (2019)   Warfarin maintenance plan:   2.5 mg (5 mg x 0.5) every day   Full warfarin instructions:   2.5 mg every day   Weekly warfarin total:   17.5 mg   Plan last modified:   Oly Mcwilliams RN (2018)   Next INR check:   2019   Priority:   INR   Target end date:   Indefinite    Indications    Atrial fibrillation (H) [I48.91]  Long term current use of anticoagulant therapy [Z79.01]             Anticoagulation Episode Summary     INR check location:       Preferred lab:       Send INR reminders to:   [facility]  POOL    Comments:   * only wants dosing card. Pt uses 5mg tablets. wants to check every 4 weeks. has polymyalgia rheumatica (PMR)        Anticoagulation Care Providers     Provider Role Specialty Phone number    Ulysses Baker MD [facility] [phone]            See the Encounter Report to view Anticoagulation Flowsheet and Dosing Calendar (Go to Encounters tab in chart review, and find the Anticoagulation Therapy Visit)        Renetta Falcon RN

## 2019-06-14 NOTE — TELEPHONE ENCOUNTER
Per patient, she missed a call from the clinic tonight.  She is anxiously awaiting the results.    Results are in, but, not signed off.    Advised that I will route a message to Dr. Cedillo/Nurse Casey.  Advised patient to call clinic if she does not hear from them by 12 noon tomorrow.    Protocol and care advice reviewed  Caller states understanding of the recommended disposition  Advised to call back if further questions or concerns    Diane Celis RN / [facility]      Reason for Disposition    [1] Caller requesting NON-URGENT health information AND [2] PCP's office is the best resource    Protocols used: INFORMATION ONLY CALL-A-

## 2019-06-15 DIAGNOSIS — I10 ESSENTIAL HYPERTENSION: ICD-10-CM

## 2019-06-17 DIAGNOSIS — E83.52 SERUM CALCIUM ELEVATED: Primary | ICD-10-CM

## 2019-06-17 DIAGNOSIS — R17 ELEVATED BILIRUBIN: ICD-10-CM

## 2019-06-17 RX ORDER — AMLODIPINE BESYLATE 2.5 MG/1
TABLET ORAL
Qty: 30 TABLET | Refills: 0 | Status: SHIPPED | OUTPATIENT
Start: 2019-06-17 | End: 2019-07-12

## 2019-06-17 NOTE — RESULT ENCOUNTER NOTE
Please call.  Calcium is a little high as are bilirubin and alk phos tests for liver function.   PLAN: Schedule an appointment with diagnostics for the liver ultrasound.   Call [phone] to schedule.   Repeat blood tests, chem 8, hepatic panel and GGT sometime this week.  We may need to do additional testing.

## 2019-06-17 NOTE — TELEPHONE ENCOUNTER
"Requested Prescriptions   Pending Prescriptions Disp Refills     amLODIPine (NORVASC) 2.5 MG tablet [Pharmacy Med Name: AMLODIPINE  Last Written Prescription Date:  06/12/19  Last Fill Quantity: 30,  # refills: 1   Last office visit: 6/12/2019 with prescribing provider:  Ulysses Baker   Future Office Visit:   Next 5 appointments (look out 90 days)    Jul 15, 2019 10:00 AM CDT  Office Visit with Oswaldo Cedillo MD  [facility] [address] [place] [id number]  [phone]          BESYLATE 2.5 MG TAB] 90 tablet 3     Sig: TAKE 1 TABLET BY MOUTH EVERY DAY       Calcium Channel Blockers Protocol  Failed - 6/15/2019 10:06 AM        Failed - Blood pressure under 140/90 in past 12 months     BP Readings from Last 3 Encounters:   06/12/19 170/80   05/13/19 144/68   03/18/19 138/78           Failed - Recent (12 mo) or future (30 days) visit within the authorizing provider's specialty     Patient had office visit in the last 12 months or has a visit in the next 30 days with authorizing provider or within the authorizing provider's specialty.  See \"Patient Info\" tab in inbasket, or \"Choose Columns\" in Meds & Orders section of the refill encounter.         Passed - Medication is active on med list        Passed - Patient is age 18 or older        Passed - No active pregnancy on record        Passed - Normal serum creatinine on file in past 12 months     Recent Labs   Lab Test 06/12/19  1512   CR 0.72             Passed - No positive pregnancy test in past 12 months        "

## 2019-06-17 NOTE — TELEPHONE ENCOUNTER
Routing refill request to provider for review/approval because:  Labs out of range:  /80  Patient needs to be seen because it has been more than 1 year since last office visit.

## 2019-06-18 DIAGNOSIS — E83.52 SERUM CALCIUM ELEVATED: ICD-10-CM

## 2019-06-18 DIAGNOSIS — R17 ELEVATED BILIRUBIN: ICD-10-CM

## 2019-06-18 LAB
ALBUMIN SERPL-MCNC: 3.8 G/DL (ref 3.4–5)
ALP SERPL-CCNC: 137 U/L (ref 40–150)
ALT SERPL W P-5'-P-CCNC: 22 U/L (ref 0–50)
ANION GAP SERPL CALCULATED.3IONS-SCNC: 3 MMOL/L (ref 3–14)
AST SERPL W P-5'-P-CCNC: 30 U/L (ref 0–45)
BILIRUB DIRECT SERPL-MCNC: 0.4 MG/DL (ref 0–0.2)
BILIRUB SERPL-MCNC: 1.7 MG/DL (ref 0.2–1.3)
BUN SERPL-MCNC: 14 MG/DL (ref 7–30)
CALCIUM SERPL-MCNC: 10.1 MG/DL (ref 8.5–10.1)
CHLORIDE SERPL-SCNC: 95 MMOL/L (ref 94–109)
CO2 SERPL-SCNC: 32 MMOL/L (ref 20–32)
CREAT SERPL-MCNC: 0.79 MG/DL (ref 0.52–1.04)
GFR SERPL CREATININE-BSD FRML MDRD: 70 ML/MIN/{1.73_M2}
GGT SERPL-CCNC: 38 U/L (ref 0–40)
GLUCOSE SERPL-MCNC: 92 MG/DL (ref 70–99)
POTASSIUM SERPL-SCNC: 3.6 MMOL/L (ref 3.4–5.3)
PROT SERPL-MCNC: 7.8 G/DL (ref 6.8–8.8)
SODIUM SERPL-SCNC: 130 MMOL/L (ref 133–144)

## 2019-06-18 PROCEDURE — 80076 HEPATIC FUNCTION PANEL: CPT | Performed by: FAMILY MEDICINE

## 2019-06-18 PROCEDURE — 36415 COLL VENOUS BLD VENIPUNCTURE: CPT | Performed by: FAMILY MEDICINE

## 2019-06-18 PROCEDURE — 82977 ASSAY OF GGT: CPT | Performed by: FAMILY MEDICINE

## 2019-06-18 PROCEDURE — 80048 BASIC METABOLIC PNL TOTAL CA: CPT | Performed by: FAMILY MEDICINE

## 2019-06-19 NOTE — RESULT ENCOUNTER NOTE
Please call.  Bilirubin .increased but improved.    PLAN: do abdominal ultrasound as planned to check liver size and structure.    Has she set up appointment?

## 2019-06-24 ENCOUNTER — HOSPITAL ENCOUNTER (OUTPATIENT)
Dept: ULTRASOUND IMAGING | Facility: CLINIC | Age: 79
Discharge: HOME | End: 2019-06-24
Attending: FAMILY MEDICINE | Admitting: FAMILY MEDICINE
Payer: MEDICARE

## 2019-06-24 DIAGNOSIS — R16.0 ENLARGED LIVER: ICD-10-CM

## 2019-06-24 PROCEDURE — 76705 ECHO EXAM OF ABDOMEN: CPT

## 2019-06-26 NOTE — RESULT ENCOUNTER NOTE
Please call.  Her liver appears normal on ultrasound and is of normal size.   Her calcium and alk phos tests were elevated at initial blood tests but they had improved upon repeat.  Her bilirubin remained high but this may not be significant.   PLAN: I don't think she needs any additional testing or treatment at this time.

## 2019-06-29 DIAGNOSIS — Z79.01 LONG TERM CURRENT USE OF ANTICOAGULANT THERAPY: ICD-10-CM

## 2019-06-29 DIAGNOSIS — M35.3 POLYMYALGIA RHEUMATICA (H): ICD-10-CM

## 2019-07-01 NOTE — TELEPHONE ENCOUNTER
"Requested Prescriptions   Pending Prescriptions Disp Refills     warfarin (COUMADIN) 5 MG tablet [Pharmacy Med Name: WARFARIN SODIUM 5 MG TABLET] 90 tablet 3     Sig: TAKE 5MG BY MOUTH MONDAY AND 2.5MG ALL OTHER DAYS OR AS DIRECTED BY ANTICOAGULATION CLINIC  Duplicate request        Vitamin K Antagonists Failed - 6/29/2019  8:29 AM        Failed - Recent (12 mo) or future (30 days) visit within the authorizing provider's specialty     Patient had office visit in the last 12 months or has a visit in the next 30 days with authorizing provider or within the authorizing provider's specialty.  See \"Patient Info\" tab in inbasket, or \"Choose Columns\" in Meds & Orders section of the refill encounter.              Failed - INR is within goal in the past 6 weeks     Confirm INR is within goal in the past 6 weeks.     Recent Labs   Lab Test 06/12/19  1339   INR 2.42*                       Passed - Medication is active on med list        Passed - Patient is 18 years of age or older        Passed - Patient is not pregnant        Passed - No positive pregnancy on file in past 12 months        predniSONE (DELTASONE) 1 MG tablet [Pharmacy Med Name: PREDNISONE 1 MG TABLET] 90 tablet 4     Sig: TAKE 1 TABLET BY MOUTH EVERY DAY       There is no refill protocol information for this order     Last Written Prescription Date:  6/15/2018 d/c'd on 6/12/2019  Last Fill Quantity: 100,   # refills: 3  Last Office Visit: 6/12/2019 with Leaf  Future Office visit:    Next 5 appointments (look out 90 days)    Jul 15, 2019 10:00 AM CDT  Office Visit with Oswaldo Cedillo MD  [facility]) [address] [place] [id number] [phone]           Routing refill request to provider for review/approval because:  Drug not on the G, P or [facility] refill protocol or controlled substance    "

## 2019-07-02 NOTE — TELEPHONE ENCOUNTER
Routing refill request to provider for review/approval because:    Discontinued None Oswaldo Cedillo MD 6/12/19 4892

## 2019-07-03 RX ORDER — PREDNISONE 1 MG/1
TABLET ORAL
Qty: 90 TABLET | Refills: 4 | Status: SHIPPED | OUTPATIENT
Start: 2019-07-03 | End: 2019-07-15

## 2019-07-03 RX ORDER — WARFARIN SODIUM 5 MG/1
TABLET ORAL
Qty: 90 TABLET | Refills: 3 | Status: SHIPPED | OUTPATIENT
Start: 2019-07-03 | End: 2019-07-24

## 2019-07-12 DIAGNOSIS — I10 ESSENTIAL HYPERTENSION: ICD-10-CM

## 2019-07-12 RX ORDER — AMLODIPINE BESYLATE 5 MG/1
TABLET ORAL
Qty: 30 TABLET | Refills: 1 | Status: SHIPPED | OUTPATIENT
Start: 2019-07-12 | End: 2019-08-07

## 2019-07-12 RX ORDER — AMLODIPINE BESYLATE 2.5 MG/1
TABLET ORAL
Qty: 30 TABLET | Refills: 0 | Status: SHIPPED | OUTPATIENT
Start: 2019-07-12 | End: 2019-07-15 | Stop reason: ALTCHOICE

## 2019-07-12 NOTE — TELEPHONE ENCOUNTER
"Requested Prescriptions   Pending Prescriptions Disp Refills     amLODIPine (NORVASC) 5 MG tablet [Pharmacy Med Name: AMLODIPINE BESYLATE 5 MG TAB] 30 tablet 1     Sig: TAKE 1 TABLET BY MOUTH EVERY DAY       Calcium Channel Blockers Protocol  Failed - 7/12/2019  7:31 AM        Failed - Blood pressure under 140/90 in past 12 months     BP Readings from Last 3 Encounters:   06/12/19 170/80   05/13/19 144/68   03/18/19 138/78                 Passed - Recent (12 mo) or future (30 days) visit within the authorizing provider's specialty     Patient had office visit in the last 12 months or has a visit in the next 30 days with authorizing provider or within the authorizing provider's specialty.  See \"Patient Info\" tab in inbasket, or \"Choose Columns\" in Meds & Orders section of the refill encounter.              Passed - Medication is active on med list        Passed - Patient is age 18 or older        Passed - No active pregnancy on record        Passed - Normal serum creatinine on file in past 12 months     Recent Labs   Lab Test 06/18/19  1000   CR 0.79             Passed - No positive pregnancy test in past 12 months        Look like this is a dupilcation from 7/12/19 for amLODIPine (NORVASC) 5 MG tablet    Last Written Prescription Date:  6/12/19  Last Fill Quantity: 30,  # refills: 1   Last office visit: 6/12/2019 with prescribing provider:     Future Office Visit:   Next 5 appointments (look out 90 days)    Jul 15, 2019 10:00 AM CDT  [facility] Visit with [facility]aldo [facility]  [facility] [address] [place] [id number] [phone]             "

## 2019-07-12 NOTE — TELEPHONE ENCOUNTER
"Requested Prescriptions   Pending Prescriptions Disp Refills     amLODIPine (NORVASC) 2.5 MG tablet [Pharmacy Med Name: AMLODIPINE BESYLATE 2.5 MG TAB] 30 tablet 0     Sig: TAKE 1 TABLET BY MOUTH EVERY DAY OVERDUE FOR BP CHECK & OFFICE VISIT CALL FOR APPT       Calcium Channel Blockers Protocol  Failed - 7/12/2019  7:32 AM        Failed - Blood pressure under 140/90 in past 12 months     BP Readings from Last 3 Encounters:   06/12/19 170/80   05/13/19 144/68   03/18/19 138/78                 Failed - Recent (12 mo) or future (30 days) visit within the authorizing provider's specialty     Patient had office visit in the last 12 months or has a visit in the next 30 days with authorizing provider or within the authorizing provider's specialty.  See \"Patient Info\" tab in inbasket, or \"Choose Columns\" in Meds & Orders section of the refill encounter.              Passed - Medication is active on med list        Passed - Patient is age 18 or older        Passed - No active pregnancy on record        Passed - Normal serum creatinine on file in past 12 months     Recent Labs   Lab Test 06/18/19  1000   CR 0.79             Passed - No positive pregnancy test in past 12 months        Last Written Prescription Date:  6/17/19  Last Fill Quantity: 30,  # refills: 0   Last office visit: 6/12/2019 with prescribing provider:  Samuel   Future Office Visit:   Next 5 appointments (look out 90 days)    Jul 15, 2019 10:00 AM CDT  [facility] Visit with [facility]aldo [facility]  [facility] [address] [place] [address] [phone]           "

## 2019-07-12 NOTE — TELEPHONE ENCOUNTER
Routing refill request to provider for review/approval because:  BPs not at goal  BP Readings from Last 6 Encounters:   06/12/19 170/80   05/13/19 144/68   03/18/19 138/78   02/18/19 132/70   12/19/18 149/80   12/13/18 140/70     Giselle PALACIOS RN

## 2019-07-15 ENCOUNTER — OFFICE VISIT (OUTPATIENT)
Dept: FAMILY MEDICINE | Facility: CLINIC | Age: 79
End: 2019-07-15
Payer: MEDICARE

## 2019-07-15 ENCOUNTER — ANTICOAGULATION THERAPY VISIT (OUTPATIENT)
Dept: ANTICOAGULATION | Facility: CLINIC | Age: 79
End: 2019-07-15
Payer: MEDICARE

## 2019-07-15 VITALS
BODY MASS INDEX: 23.99 KG/M2 | TEMPERATURE: 97 F | WEIGHT: 119 LBS | OXYGEN SATURATION: 100 % | DIASTOLIC BLOOD PRESSURE: 70 MMHG | SYSTOLIC BLOOD PRESSURE: 142 MMHG | HEIGHT: 59 IN | HEART RATE: 62 BPM | RESPIRATION RATE: 20 BRPM

## 2019-07-15 DIAGNOSIS — I48.91 ATRIAL FIBRILLATION (H): ICD-10-CM

## 2019-07-15 DIAGNOSIS — Z79.01 LONG TERM CURRENT USE OF ANTICOAGULANT THERAPY: ICD-10-CM

## 2019-07-15 DIAGNOSIS — E87.1 HYPONATREMIA: ICD-10-CM

## 2019-07-15 DIAGNOSIS — I10 ESSENTIAL HYPERTENSION: Primary | ICD-10-CM

## 2019-07-15 DIAGNOSIS — R17 ELEVATED BILIRUBIN: ICD-10-CM

## 2019-07-15 LAB
ALBUMIN SERPL-MCNC: 4 G/DL (ref 3.4–5)
ALP SERPL-CCNC: 130 U/L (ref 40–150)
ALT SERPL W P-5'-P-CCNC: 22 U/L (ref 0–50)
ANION GAP SERPL CALCULATED.3IONS-SCNC: 7 MMOL/L (ref 3–14)
AST SERPL W P-5'-P-CCNC: 25 U/L (ref 0–45)
BILIRUB DIRECT SERPL-MCNC: 0.4 MG/DL (ref 0–0.2)
BILIRUB SERPL-MCNC: 1.7 MG/DL (ref 0.2–1.3)
BUN SERPL-MCNC: 14 MG/DL (ref 7–30)
CALCIUM SERPL-MCNC: 10.3 MG/DL (ref 8.5–10.1)
CHLORIDE SERPL-SCNC: 99 MMOL/L (ref 94–109)
CO2 SERPL-SCNC: 31 MMOL/L (ref 20–32)
CREAT SERPL-MCNC: 0.89 MG/DL (ref 0.52–1.04)
GFR SERPL CREATININE-BSD FRML MDRD: 61 ML/MIN/{1.73_M2}
GLUCOSE SERPL-MCNC: 85 MG/DL (ref 70–99)
INR PPP: 1.65 (ref 0.86–1.14)
POTASSIUM SERPL-SCNC: 3.5 MMOL/L (ref 3.4–5.3)
PROT SERPL-MCNC: 7.8 G/DL (ref 6.8–8.8)
SODIUM SERPL-SCNC: 137 MMOL/L (ref 133–144)

## 2019-07-15 PROCEDURE — 99207 ZZC NO CHARGE NURSE ONLY: CPT

## 2019-07-15 PROCEDURE — 80076 HEPATIC FUNCTION PANEL: CPT | Performed by: FAMILY MEDICINE

## 2019-07-15 PROCEDURE — 99213 OFFICE O/P EST LOW 20 MIN: CPT | Performed by: FAMILY MEDICINE

## 2019-07-15 PROCEDURE — 85610 PROTHROMBIN TIME: CPT | Performed by: FAMILY MEDICINE

## 2019-07-15 PROCEDURE — 80048 BASIC METABOLIC PNL TOTAL CA: CPT | Performed by: FAMILY MEDICINE

## 2019-07-15 PROCEDURE — 36415 COLL VENOUS BLD VENIPUNCTURE: CPT | Performed by: FAMILY MEDICINE

## 2019-07-15 RX ORDER — LISINOPRIL 5 MG/1
5 TABLET ORAL DAILY
Qty: 30 TABLET | Refills: 1 | Status: SHIPPED | OUTPATIENT
Start: 2019-07-15 | End: 2019-08-07

## 2019-07-15 ASSESSMENT — MIFFLIN-ST. JEOR: SCORE: 920.64

## 2019-07-15 NOTE — PROGRESS NOTES
ANTICOAGULATION FOLLOW-UP CLINIC VISIT    Patient Name:  Nayely Kay  Date:  7/15/2019  Contact Type:  Telephone    SUBJECTIVE:  Patient Findings     Positives:   Change in health (Swelling in ankles), Missed doses (Pt states that it is possible she may have missed a dose. pt is unsure though.)    Comments:   No changes in medications, activity, or diet noted. No concerns with clotting, bleeding, or increased bruising noted.  Pt is to take 5 mg today then resume maintenance dose. Recheck INR in 9 days.  Patient educated on the increased risk for a clot/stroke, precautions to take, S &S of a clot/stroke, and when to seek medical attention. Denies concerns or S&S of a clot.  Patient verbalizes understanding and agrees to plan. No further questions or concerns.        Clinical Outcomes     Negatives:   Major bleeding event, Thromboembolic event, Anticoagulation-related hospital admission, Anticoagulation-related ED visit, Anticoagulation-related fatality    Comments:   No changes in medications, activity, or diet noted. No concerns with clotting, bleeding, or increased bruising noted.  Pt is to take 5 mg today then resume maintenance dose. Recheck INR in 9 days.  Patient educated on the increased risk for a clot/stroke, precautions to take, S &S of a clot/stroke, and when to seek medical attention. Denies concerns or S&S of a clot.  Patient verbalizes understanding and agrees to plan. No further questions or concerns.           OBJECTIVE    INR   Date Value Ref Range Status   07/15/2019 1.65 (H) 0.86 - 1.14 Final       ASSESSMENT / PLAN  INR assessment SUB    Recheck INR In: 9 DAYS    INR Location Outside lab      Anticoagulation Summary  As of 7/15/2019    INR goal:   2.0-3.0   TTR:   81.8 % (4.3 y)   INR used for dosin.65! (7/15/2019)   Warfarin maintenance plan:   2.5 mg (5 mg x 0.5) every day   Full warfarin instructions:   7/15: 5 mg; Otherwise 2.5 mg every day   Weekly warfarin total:   17.5 mg   Plan  last modified:   Oly Mcwilliams RN (7/16/2018)   Next INR check:   7/24/2019   Priority:   INR   Target end date:   Indefinite    Indications    Atrial fibrillation (H) [I48.91]  Long term current use of anticoagulant therapy [Z79.01]             Anticoagulation Episode Summary     INR check location:       Preferred lab:       Send INR reminders to:   [facility] POOL    Comments:   * only wants dosing card. Pt uses 5mg tablets. wants to check every 4 weeks. has polymyalgia rheumatica (PMR)        Anticoagulation Care Providers     Provider Role Specialty Phone number    Ulysses Baker MD [facility] [phone]            See the Encounter Report to view Anticoagulation Flowsheet and Dosing Calendar (Go to Encounters tab in chart review, and find the Anticoagulation Therapy Visit)        Luz Elena Barrios RN

## 2019-07-15 NOTE — RESULT ENCOUNTER NOTE
Please call. One liver test remains a little high, unchanged and I think not causing problems and may have been high for a long time.   Sodium is better.  Other chemistries all OK.  PLAN: I suggest adding lisinopril 5mg daily for blood pressure.   Recheck blood pressure and chem8 in 2 weeks with clinic RN.  Please arrange for prescriptions and orders.   Rx #30 with 1 refill and future chem8.

## 2019-07-15 NOTE — PATIENT INSTRUCTIONS
ASSESSMENT:   (I10) Essential hypertension  (primary encounter diagnosis)  Comment: still a little high.  Some ankle swelling probably from the amlodipine.   Plan: I may make some changes in blood pressure medication but will see how your blood tests turn out and decide the best medications.    Possibly add lisinopril and decrease amlodipine.  I may stop the triamterene/hydrochlorothiazide if sodium remains low.     (R17) Elevated bilirubin  Comment:   Plan: Hepatic panel (Albumin, ALT, AST, Bili, Alk         Phos, TP)        REcheck liver blood tests today.   Liver size and structure was normal on the recent ultrasound.     (E87.1) Hyponatremia  Comment:   Plan: Basic metabolic panel        REcheck sodium today.

## 2019-07-15 NOTE — PROGRESS NOTES
"SUBJECTIVE: Nayely Kay is a 79 year old female  Chief Complaint   Patient presents with     Hypertension     Liver     Would like to discuss results.      She has been feeling OK.   She had enlarged liver on exam at last visit.  No history of liver disease in the past.     Last clinic visit: 6/12/2019    Hypertension Follow-up      Do you check your blood pressure regularly outside of the clinic? No     Are you following a low salt diet? Yes    Are your blood pressures ever more than 140 on the top number (systolic) OR more   than 90 on the bottom number (diastolic), for example 140/90?     Amount of exercise or physical activity:     Problems taking medications regularly: No    Medication side effects: none    At last visit I had increased her amlodipine to 5mg daily.  She has been on metoprolol 75mg twice daily.  On Maxide 1/2 pill twice daily.         Patient Active Problem List   Diagnosis     Headache     Atrial fibrillation (H)     Polymyalgia rheumatica (H)     Iron deficiency anemia     HYPERLIPIDEMIA LDL GOAL <130     Osteopenia     Family history of breast cancer     Eczema     Advanced directives, counseling/discussion     AK (actinic keratosis)     Ganglion cyst     Long term current use of anticoagulant therapy     Essential hypertension     Tubular adenoma      ROS:General: No change in weight, sleep or appetite.  Normal energy.  No fever or chills  Resp: No coughing, wheezing or shortness of breath  CV: No chest pains or palpitations  GI: POSITIVE for:, constipation, doing OK with fiber and prunes.   : No urinary frequency or dysuria, bladder or kidney problems  Musculoskeletal: No significant muscle or joint pains    OBJECTIVE:Blood pressure 142/70, pulse 62, temperature 97  F (36.1  C), temperature source Tympanic, resp. rate 20, height 1.507 m (4' 11.33\"), weight 54 kg (119 lb), SpO2 100 %. BMI=Body mass index is 23.77 kg/m .  GENERAL APPEARANCE ADULT: Alert, no acute distress  NECK: No " adenopathy,masses or thyromegaly  RESP: lungs clear to auscultation   CV: normal rate, regular rhythm, no murmur or gallop  ABDOMEN: soft, non-tender, liver 3cm below right costal margin.   MS: 1+ ankle edema bilateral.    Recheck blood pressure =158/72    ASSESSMENT:   (I10) Essential hypertension  (primary encounter diagnosis)  Comment: still a little high.  Some ankle swelling probably from the amlodipine.   Plan: I may make some changes in blood pressure medication but will see how your blood tests turn out and decide the best medications.    Possibly add lisinopril and decrease amlodipine.  I may stop the triamterene/hydrochlorothiazide if sodium remains low.     (R17) Elevated bilirubin  Comment:   Plan: Hepatic panel (Albumin, ALT, AST, Bili, Alk         Phos, TP)        REcheck liver blood tests today.   Liver size and structure was normal on the recent ultrasound.     (E87.1) Hyponatremia  Comment:   Plan: Basic metabolic panel        REcheck sodium today.

## 2019-07-24 ENCOUNTER — ANTICOAGULATION THERAPY VISIT (OUTPATIENT)
Dept: ANTICOAGULATION | Facility: CLINIC | Age: 79
End: 2019-07-24
Payer: MEDICARE

## 2019-07-24 DIAGNOSIS — I48.91 ATRIAL FIBRILLATION (H): ICD-10-CM

## 2019-07-24 DIAGNOSIS — Z79.01 LONG TERM CURRENT USE OF ANTICOAGULANT THERAPY: ICD-10-CM

## 2019-07-24 LAB — INR PPP: 2.2 (ref 0.86–1.14)

## 2019-07-24 PROCEDURE — 36415 COLL VENOUS BLD VENIPUNCTURE: CPT | Performed by: FAMILY MEDICINE

## 2019-07-24 PROCEDURE — 99207 ZZC NO CHARGE NURSE ONLY: CPT

## 2019-07-24 PROCEDURE — 85610 PROTHROMBIN TIME: CPT | Performed by: FAMILY MEDICINE

## 2019-07-24 RX ORDER — WARFARIN SODIUM 5 MG/1
TABLET ORAL
Qty: 90 TABLET | Refills: 3
Start: 2019-07-24 | End: 2020-07-27

## 2019-07-24 NOTE — PROGRESS NOTES
ANTICOAGULATION FOLLOW-UP CLINIC VISIT    Patient Name:  Nayely Kay  Date:  2019  Contact Type:  Telephone/ spoke with Nayely by phone    SUBJECTIVE:  Patient Findings     Comments:   No changes in diet, activity level, medications (including over the counter), or health. No missed doses of warfarin. Patient took dosing as prescribed. No signs of clots or bleeding concerns. Patient will continue maintenance warfarin dosing. We will recheck INR with other labs on 19 per patient request.          Clinical Outcomes     Negatives:   Major bleeding event, Thromboembolic event, Anticoagulation-related hospital admission, Anticoagulation-related ED visit, Anticoagulation-related fatality    Comments:   No changes in diet, activity level, medications (including over the counter), or health. No missed doses of warfarin. Patient took dosing as prescribed. No signs of clots or bleeding concerns. Patient will continue maintenance warfarin dosing. We will recheck INR with other labs on 19 per patient request.             OBJECTIVE    INR   Date Value Ref Range Status   2019 2.20 (H) 0.86 - 1.14 Final       ASSESSMENT / PLAN  INR assessment THER    Recheck INR In: 6 DAYS    INR Location Clinic lab     Anticoagulation Summary  As of 2019    INR goal:   2.0-3.0   TTR:   81.5 % (4.4 y)   INR used for dosin.20 (2019)   Warfarin maintenance plan:   2.5 mg (5 mg x 0.5) every day   Full warfarin instructions:   2.5 mg every day   Weekly warfarin total:   17.5 mg   No change documented:   Flaquita Neal RN   Plan last modified:   Oly Mcwilliams RN (2018)   Next INR check:   2019   Priority:   INR   Target end date:   Indefinite    Indications    Atrial fibrillation (H) [I48.91]  Long term current use of anticoagulant therapy [Z79.01]             Anticoagulation Episode Summary     INR check location:       Preferred lab:       Send INR reminders to:   [place] PHONE [facility]TICO[facility] POOL     Comments:   * only wants dosing card. Pt uses 5mg tablets. wants to check every 4 weeks. has polymyalgia rheumatica (PMR)        Anticoagulation Care Providers     Provider Role Specialty Phone number    Ulysses Baker MD [facility] [phone]            See the Encounter Report to view Anticoagulation Flowsheet and Dosing Calendar (Go to Encounters tab in chart review, and find the Anticoagulation Therapy Visit)        Flaquita Neal RN

## 2019-07-30 ENCOUNTER — ALLIED HEALTH/NURSE VISIT (OUTPATIENT)
Dept: FAMILY MEDICINE | Facility: CLINIC | Age: 79
End: 2019-07-30
Payer: MEDICARE

## 2019-07-30 VITALS — HEART RATE: 68 BPM | RESPIRATION RATE: 16 BRPM | SYSTOLIC BLOOD PRESSURE: 134 MMHG | DIASTOLIC BLOOD PRESSURE: 74 MMHG

## 2019-07-30 DIAGNOSIS — Z01.30 BP CHECK: Primary | ICD-10-CM

## 2019-07-30 DIAGNOSIS — I48.91 ATRIAL FIBRILLATION (H): ICD-10-CM

## 2019-07-30 DIAGNOSIS — I10 ESSENTIAL HYPERTENSION: ICD-10-CM

## 2019-07-30 DIAGNOSIS — Z79.01 LONG TERM CURRENT USE OF ANTICOAGULANT THERAPY: ICD-10-CM

## 2019-07-30 LAB
ANION GAP SERPL CALCULATED.3IONS-SCNC: 8 MMOL/L (ref 3–14)
BUN SERPL-MCNC: 15 MG/DL (ref 7–30)
CALCIUM SERPL-MCNC: 9.9 MG/DL (ref 8.5–10.1)
CHLORIDE SERPL-SCNC: 99 MMOL/L (ref 94–109)
CO2 SERPL-SCNC: 29 MMOL/L (ref 20–32)
CREAT SERPL-MCNC: 0.84 MG/DL (ref 0.52–1.04)
GFR SERPL CREATININE-BSD FRML MDRD: 65 ML/MIN/{1.73_M2}
GLUCOSE SERPL-MCNC: 84 MG/DL (ref 70–99)
POTASSIUM SERPL-SCNC: 3.5 MMOL/L (ref 3.4–5.3)
SODIUM SERPL-SCNC: 136 MMOL/L (ref 133–144)

## 2019-07-30 PROCEDURE — 99207 ZZC NO CHARGE NURSE ONLY: CPT

## 2019-07-30 PROCEDURE — 80048 BASIC METABOLIC PNL TOTAL CA: CPT | Performed by: FAMILY MEDICINE

## 2019-07-30 PROCEDURE — 36415 COLL VENOUS BLD VENIPUNCTURE: CPT | Performed by: FAMILY MEDICINE

## 2019-07-30 NOTE — LETTER
[facility]  [address] [place] [id number] [phone]    July 31, 2019    aNyely Kay                                                                                                                     [address] [address]            Dear Nayely,    The blood chemistries (Basic metabolic panel) are all normal including electrolytes (salt balances in the blood), blood glucose and kidney tests..  Calcium is normal.       Sincerely,         Oswaldo Cedillo MD/farhat      Results for orders placed or performed in visit on 07/30/19   Basic metabolic panel   Result Value Ref Range    Sodium 136 133 - 144 mmol/L    Potassium 3.5 3.4 - 5.3 mmol/L    Chloride 99 94 - 109 mmol/L    Carbon Dioxide 29 20 - 32 mmol/L    Anion Gap 8 3 - 14 mmol/L    Glucose 84 70 - 99 mg/dL    Urea Nitrogen 15 7 - 30 mg/dL    Creatinine 0.84 0.52 - 1.04 mg/dL    GFR Estimate 65 >60 mL/min/[1.73_m2]    GFR Estimate If Black 75 >60 mL/min/[1.73_m2]    Calcium 9.9 8.5 - 10.1 mg/dL

## 2019-07-30 NOTE — PROGRESS NOTES
Nayely Kay is a 79 year old year old patient who comes in today for a Blood Pressure check because of new medication and ongoing blood pressure monitoring.  Vital Signs as repeated by /70 and after 6 minutes 134/74  Patient is taking medication as prescribed  Patient is tolerating medications well.  Patient is not monitoring Blood Pressure at home.    Current complaints: none  Disposition:  patient to continue with the same medication.  Instructed to take AM meds at the same time each morning.  She did not take the amlodipine this AM, she was going to take it when got home  Nicolette Granger RN

## 2019-07-31 ENCOUNTER — TELEPHONE (OUTPATIENT)
Dept: ANTICOAGULATION | Facility: CLINIC | Age: 79
End: 2019-07-31

## 2019-07-31 NOTE — TELEPHONE ENCOUNTER
Patient was in yesterday for an INR and specimen was sent to [facility] for testing.  There was an instrument error and [facility] was unable to test sample.  Please contact patient to see if she can get in for POCT or schedule for a lab draw.  Thanks!

## 2019-07-31 NOTE — TELEPHONE ENCOUNTER
07/31/19    Writer spoke with the patient. She will be in to the [facility] tomorrow for an INR.    Renetta Falcon RN, BSN, PHN  Anticoagulation Clinic   [phone]

## 2019-07-31 NOTE — RESULT ENCOUNTER NOTE
Please call.  The blood chemistries (Basic metabolic panel) are all normal including electrolytes (salt balances in the blood), blood glucose and kidney tests..  Calcium is normal.

## 2019-08-01 ENCOUNTER — ANTICOAGULATION THERAPY VISIT (OUTPATIENT)
Dept: ANTICOAGULATION | Facility: CLINIC | Age: 79
End: 2019-08-01
Payer: MEDICARE

## 2019-08-01 DIAGNOSIS — I48.91 ATRIAL FIBRILLATION (H): ICD-10-CM

## 2019-08-01 DIAGNOSIS — Z79.01 LONG TERM CURRENT USE OF ANTICOAGULANT THERAPY: ICD-10-CM

## 2019-08-01 LAB — INR POINT OF CARE: 2.2 (ref 0.86–1.14)

## 2019-08-01 PROCEDURE — 36416 COLLJ CAPILLARY BLOOD SPEC: CPT

## 2019-08-01 PROCEDURE — 99207 ZZC NO CHARGE NURSE ONLY: CPT

## 2019-08-01 PROCEDURE — 85610 PROTHROMBIN TIME: CPT | Mod: QW

## 2019-08-01 NOTE — PROGRESS NOTES
ANTICOAGULATION FOLLOW-UP CLINIC VISIT    Patient Name:  Nayely Kay  Date:  2019  Contact Type:  Face to Face    SUBJECTIVE:  Patient Findings     Comments:   No changes in diet, activity level, medications (including over the counter), or health. No missed doses of warfarin. Patient took dosing as prescribed. No signs of clots or bleeding concerns. Patient will continue maintenance warfarin dosing.          Clinical Outcomes     Negatives:   Major bleeding event, Thromboembolic event, Anticoagulation-related hospital admission, Anticoagulation-related ED visit, Anticoagulation-related fatality    Comments:   No changes in diet, activity level, medications (including over the counter), or health. No missed doses of warfarin. Patient took dosing as prescribed. No signs of clots or bleeding concerns. Patient will continue maintenance warfarin dosing.             OBJECTIVE    INR Protime   Date Value Ref Range Status   2019 2.2 (A) 0.86 - 1.14 Final       ASSESSMENT / PLAN  INR assessment THER    Recheck INR In: 3 WEEKS    INR Location Clinic      Anticoagulation Summary  As of 2019    INR goal:   2.0-3.0   TTR:   81.6 % (4.4 y)   INR used for dosin.2 (2019)   Warfarin maintenance plan:   2.5 mg (5 mg x 0.5) every day   Full warfarin instructions:   2.5 mg every day   Weekly warfarin total:   17.5 mg   No change documented:   Flaquita Neal RN   Plan last modified:   Oly Mcwilliams RN (2018)   Next INR check:   2019   Priority:   INR   Target end date:   Indefinite    Indications    Atrial fibrillation (H) [I48.91]  Long term current use of anticoagulant therapy [Z79.01]             Anticoagulation Episode Summary     INR check location:       Preferred lab:       Send INR reminders to:   [facility] [employer]    Comments:   * only wants dosing card. Pt uses 5mg tablets. wants to check every 4 weeks. has polymyalgia rheumatica (PMR)        Anticoagulation Care Providers      Provider Role Specialty Phone number    Ulysses Baker MD [facility] [phone]            See the Encounter Report to view Anticoagulation Flowsheet and Dosing Calendar (Go to Encounters tab in chart review, and find the Anticoagulation Therapy Visit)        Flaquita Neal RN

## 2019-08-22 ENCOUNTER — ALLIED HEALTH/NURSE VISIT (OUTPATIENT)
Dept: FAMILY MEDICINE | Facility: CLINIC | Age: 79
End: 2019-08-22
Payer: MEDICARE

## 2019-08-22 ENCOUNTER — ANTICOAGULATION THERAPY VISIT (OUTPATIENT)
Dept: ANTICOAGULATION | Facility: CLINIC | Age: 79
End: 2019-08-22
Payer: MEDICARE

## 2019-08-22 VITALS — SYSTOLIC BLOOD PRESSURE: 128 MMHG | RESPIRATION RATE: 20 BRPM | HEART RATE: 60 BPM | DIASTOLIC BLOOD PRESSURE: 62 MMHG

## 2019-08-22 DIAGNOSIS — I48.91 ATRIAL FIBRILLATION (H): ICD-10-CM

## 2019-08-22 DIAGNOSIS — Z79.01 LONG TERM CURRENT USE OF ANTICOAGULANT THERAPY: ICD-10-CM

## 2019-08-22 DIAGNOSIS — I10 ESSENTIAL HYPERTENSION: Primary | ICD-10-CM

## 2019-08-22 LAB — INR POINT OF CARE: 2.4 (ref 0.86–1.14)

## 2019-08-22 PROCEDURE — 99207 ZZC NO CHARGE NURSE ONLY: CPT

## 2019-08-22 PROCEDURE — 85610 PROTHROMBIN TIME: CPT | Mod: QW

## 2019-08-22 PROCEDURE — 36416 COLLJ CAPILLARY BLOOD SPEC: CPT

## 2019-08-22 NOTE — PROGRESS NOTES
ANTICOAGULATION FOLLOW-UP CLINIC VISIT    Patient Name:  Nayely Kay  Date:  2019  Contact Type:  Face to Face    SUBJECTIVE:  Patient Findings     Comments:   No changes in diet, activity level, medications (including over the counter), or health. No missed doses of warfarin. Patient took dosing as prescribed. No signs of clots or bleeding concerns. Patient will continue maintenance warfarin dosing.          Clinical Outcomes     Negatives:   Major bleeding event, Thromboembolic event, Anticoagulation-related hospital admission, Anticoagulation-related ED visit, Anticoagulation-related fatality    Comments:   No changes in diet, activity level, medications (including over the counter), or health. No missed doses of warfarin. Patient took dosing as prescribed. No signs of clots or bleeding concerns. Patient will continue maintenance warfarin dosing.             OBJECTIVE    INR Protime   Date Value Ref Range Status   2019 2.4 (A) 0.86 - 1.14 Final       ASSESSMENT / PLAN  INR assessment THER    Recheck INR In: 6 WEEKS    INR Location Clinic      Anticoagulation Summary  As of 2019    INR goal:   2.0-3.0   TTR:   81.9 % (4.4 y)   INR used for dosin.4 (2019)   Warfarin maintenance plan:   2.5 mg (5 mg x 0.5) every day   Full warfarin instructions:   2.5 mg every day   Weekly warfarin total:   17.5 mg   No change documented:   Flaquita Neal RN   Plan last modified:   Oly Mcwilliams RN (2018)   Next INR check:   2019   Priority:   INR   Target end date:   Indefinite    Indications    Atrial fibrillation (H) [I48.91]  Long term current use of anticoagulant therapy [Z79.01]             Anticoagulation Episode Summary     INR check location:       Preferred lab:       Send INR reminders to:   [facility]    Comments:   * only wants dosing card. Pt uses 5mg tablets. wants to check every 4 weeks. has polymyalgia rheumatica (PMR)        Anticoagulation Care Providers      Provider Role Specialty Phone number    Ulysses Baker MD [facility] [phone]            See the Encounter Report to view Anticoagulation Flowsheet and Dosing Calendar (Go to Encounters tab in chart review, and find the Anticoagulation Therapy Visit)        Flaquita Neal RN

## 2019-09-23 ENCOUNTER — ALLIED HEALTH/NURSE VISIT (OUTPATIENT)
Dept: FAMILY MEDICINE | Facility: CLINIC | Age: 79
End: 2019-09-23
Payer: MEDICARE

## 2019-09-23 ENCOUNTER — ANTICOAGULATION THERAPY VISIT (OUTPATIENT)
Dept: ANTICOAGULATION | Facility: CLINIC | Age: 79
End: 2019-09-23
Payer: MEDICARE

## 2019-09-23 VITALS — RESPIRATION RATE: 18 BRPM | DIASTOLIC BLOOD PRESSURE: 64 MMHG | SYSTOLIC BLOOD PRESSURE: 136 MMHG | HEART RATE: 80 BPM

## 2019-09-23 DIAGNOSIS — I48.91 ATRIAL FIBRILLATION (H): ICD-10-CM

## 2019-09-23 DIAGNOSIS — Z79.01 LONG TERM CURRENT USE OF ANTICOAGULANT THERAPY: ICD-10-CM

## 2019-09-23 DIAGNOSIS — Z01.30 BP CHECK: Primary | ICD-10-CM

## 2019-09-23 LAB — INR POINT OF CARE: 2.2 (ref 0.86–1.14)

## 2019-09-23 PROCEDURE — 36416 COLLJ CAPILLARY BLOOD SPEC: CPT

## 2019-09-23 PROCEDURE — 85610 PROTHROMBIN TIME: CPT | Mod: QW

## 2019-09-23 PROCEDURE — 99207 ZZC NO CHARGE NURSE ONLY: CPT

## 2019-09-23 NOTE — PROGRESS NOTES
Nayely Kay is a 79 year old year old patient who comes in today for a Blood Pressure check because of ongoing blood pressure monitoring.  Vital Signs as repeated by /64  Patient is taking medication as prescribed  Patient is tolerating medications well.  Patient is not monitoring Blood Pressure at home.    Current complaints: none  Disposition:  patient to continue with the same medication.    Toenails trimmed without difficulty  Nicolette Granger RN

## 2019-09-23 NOTE — PROGRESS NOTES
ANTICOAGULATION FOLLOW-UP CLINIC VISIT    Patient Name:  Nayely Kay  Date:  2019  Contact Type:  Face to Face    SUBJECTIVE:  Patient Findings     Comments:   No changes in medications, activity, health, or diet noted. No bleeding or increased bruising noted. Took warfarin as prescribed.  Patient will continue weekly maintenance dose. INR is therapeutic.   Recheck in 4 weeks.   Patient verbalizes understanding and agrees to plan. No further questions or concerns.          Clinical Outcomes     Negatives:   Major bleeding event, Thromboembolic event, Anticoagulation-related hospital admission, Anticoagulation-related ED visit, Anticoagulation-related fatality    Comments:   No changes in medications, activity, health, or diet noted. No bleeding or increased bruising noted. Took warfarin as prescribed.  Patient will continue weekly maintenance dose. INR is therapeutic.   Recheck in 4 weeks.   Patient verbalizes understanding and agrees to plan. No further questions or concerns.             OBJECTIVE    INR Protime   Date Value Ref Range Status   2019 2.2 (A) 0.86 - 1.14 Final       ASSESSMENT / PLAN  INR assessment THER    Recheck INR In: 4 WEEKS    INR Location Clinic      Anticoagulation Summary  As of 2019    INR goal:   2.0-3.0   TTR:   82.2 % (4.5 y)   INR used for dosin.2 (2019)   Warfarin maintenance plan:   2.5 mg (5 mg x 0.5) every day   Full warfarin instructions:   2.5 mg every day   Weekly warfarin total:   17.5 mg   No change documented:   Renetta Falcon RN   Plan last modified:   Oly Mcwilliams RN (2018)   Next INR check:   10/21/2019   Priority:   INR   Target end date:   Indefinite    Indications    Atrial fibrillation (H) [I48.91]  Long term current use of anticoagulant therapy [Z79.01]             Anticoagulation Episode Summary     INR check location:       Preferred lab:       Send INR reminders to:   [facility]    Comments:   * only wants  dosing card. Pt uses 5mg tablets. wants to check every 4 weeks. has polymyalgia rheumatica (PMR)        Anticoagulation Care Providers     Provider Role Specialty Phone number    Ulysses Baker MD [facility] [phone]            See the Encounter Report to view Anticoagulation Flowsheet and Dosing Calendar (Go to Encounters tab in chart review, and find the Anticoagulation Therapy Visit)        Renetta Falcon RN

## 2019-10-21 ENCOUNTER — ANTICOAGULATION THERAPY VISIT (OUTPATIENT)
Dept: ANTICOAGULATION | Facility: CLINIC | Age: 79
End: 2019-10-21
Payer: MEDICARE

## 2019-10-21 ENCOUNTER — IMMUNIZATION (OUTPATIENT)
Dept: FAMILY MEDICINE | Facility: CLINIC | Age: 79
End: 2019-10-21
Payer: MEDICARE

## 2019-10-21 ENCOUNTER — ALLIED HEALTH/NURSE VISIT (OUTPATIENT)
Dept: FAMILY MEDICINE | Facility: CLINIC | Age: 79
End: 2019-10-21
Payer: MEDICARE

## 2019-10-21 VITALS — SYSTOLIC BLOOD PRESSURE: 124 MMHG | HEART RATE: 68 BPM | RESPIRATION RATE: 18 BRPM | DIASTOLIC BLOOD PRESSURE: 68 MMHG

## 2019-10-21 DIAGNOSIS — Z01.30 BP CHECK: ICD-10-CM

## 2019-10-21 DIAGNOSIS — I10 ESSENTIAL HYPERTENSION: Primary | ICD-10-CM

## 2019-10-21 DIAGNOSIS — Z23 NEED FOR PROPHYLACTIC VACCINATION AND INOCULATION AGAINST INFLUENZA: Primary | ICD-10-CM

## 2019-10-21 DIAGNOSIS — Z79.01 LONG TERM CURRENT USE OF ANTICOAGULANT THERAPY: ICD-10-CM

## 2019-10-21 DIAGNOSIS — I48.91 ATRIAL FIBRILLATION (H): ICD-10-CM

## 2019-10-21 LAB — INR POINT OF CARE: 1.6 (ref 0.86–1.14)

## 2019-10-21 PROCEDURE — 85610 PROTHROMBIN TIME: CPT | Mod: QW

## 2019-10-21 PROCEDURE — G0008 ADMIN INFLUENZA VIRUS VAC: HCPCS

## 2019-10-21 PROCEDURE — 99207 ZZC NO CHARGE NURSE ONLY: CPT

## 2019-10-21 PROCEDURE — 90662 IIV NO PRSV INCREASED AG IM: CPT

## 2019-10-21 PROCEDURE — 36416 COLLJ CAPILLARY BLOOD SPEC: CPT

## 2019-10-21 NOTE — PROGRESS NOTES
Nayely Kay is a 79 year old year old patient who comes in today for a Blood Pressure check because of ongoing blood pressure monitoring.  Vital Signs as repeated by /68  Patient is taking medication as prescribed  Patient is tolerating medications well.  Patient is not monitoring Blood Pressure at home.    Current complaints: none  Disposition:  patient to continue with the same medication  Nicolette Granger RN

## 2019-10-21 NOTE — PROGRESS NOTES
ANTICOAGULATION FOLLOW-UP CLINIC VISIT    Patient Name:  Nayely Kay  Date:  10/21/2019  Contact Type:  Face to Face    SUBJECTIVE:  Patient Findings     Positives:   Missed doses (Maybe a week or so ago.), Change in diet/appetite (Increase in greens.)    Comments:   No changes in medications, activity, or health noted. No bleeding or increased bruising noted.   Patient was instructed to take 5 mg today then to resume the maintenance dose.  Recheck in 2 weeks.   Patient verbalizes understanding and agrees to plan. No further questions or concerns.  Denies S/S clotting.        Clinical Outcomes     Negatives:   Major bleeding event, Thromboembolic event, Anticoagulation-related hospital admission, Anticoagulation-related ED visit, Anticoagulation-related fatality    Comments:   No changes in medications, activity, or health noted. No bleeding or increased bruising noted.   Patient was instructed to take 5 mg today then to resume the maintenance dose.  Recheck in 2 weeks.   Patient verbalizes understanding and agrees to plan. No further questions or concerns.  Denies S/S clotting.           OBJECTIVE    INR Protime   Date Value Ref Range Status   10/21/2019 1.6 (A) 0.86 - 1.14 Final       ASSESSMENT / PLAN  INR assessment SUB    Recheck INR In: 2 WEEKS    INR Location Clinic      Anticoagulation Summary  As of 10/21/2019    INR goal:   2.0-3.0   TTR:   81.4 % (4.6 y)   INR used for dosin.6! (10/21/2019)   Warfarin maintenance plan:   2.5 mg (5 mg x 0.5) every day   Full warfarin instructions:   10/21: 5 mg; Otherwise 2.5 mg every day   Weekly warfarin total:   17.5 mg   Plan last modified:   Oly Mcwilliams RN (2018)   Next INR check:   2019   Priority:   INR   Target end date:   Indefinite    Indications    Atrial fibrillation (H) [I48.91]  Long term current use of anticoagulant therapy [Z79.01]             Anticoagulation Episode Summary     INR check location:       Preferred lab:        Send INR reminders to:   [facility]    Comments:   * only wants dosing card. Pt uses 5mg tablets. wants to check every 4 weeks. has polymyalgia rheumatica (PMR)        Anticoagulation Care Providers     Provider Role Specialty Phone number    Ulysses Baker MD [facility] [phone]            See the Encounter Report to view Anticoagulation Flowsheet and Dosing Calendar (Go to Encounters tab in chart review, and find the Anticoagulation Therapy Visit)        Renetta Falcon RN

## 2019-11-11 ENCOUNTER — ALLIED HEALTH/NURSE VISIT (OUTPATIENT)
Dept: FAMILY MEDICINE | Facility: CLINIC | Age: 79
End: 2019-11-11
Payer: MEDICARE

## 2019-11-11 ENCOUNTER — ANTICOAGULATION THERAPY VISIT (OUTPATIENT)
Dept: ANTICOAGULATION | Facility: CLINIC | Age: 79
End: 2019-11-11
Payer: MEDICARE

## 2019-11-11 VITALS — SYSTOLIC BLOOD PRESSURE: 128 MMHG | HEART RATE: 76 BPM | RESPIRATION RATE: 16 BRPM | DIASTOLIC BLOOD PRESSURE: 68 MMHG

## 2019-11-11 DIAGNOSIS — Z79.01 LONG TERM CURRENT USE OF ANTICOAGULANT THERAPY: ICD-10-CM

## 2019-11-11 DIAGNOSIS — I48.91 ATRIAL FIBRILLATION (H): ICD-10-CM

## 2019-11-11 DIAGNOSIS — I10 HYPERTENSION, GOAL BELOW 140/90: Primary | ICD-10-CM

## 2019-11-11 LAB — INR POINT OF CARE: 1.6 (ref 0.86–1.14)

## 2019-11-11 PROCEDURE — 99207 ZZC NO CHARGE NURSE ONLY: CPT

## 2019-11-11 PROCEDURE — 85610 PROTHROMBIN TIME: CPT | Mod: QW

## 2019-11-11 PROCEDURE — 36416 COLLJ CAPILLARY BLOOD SPEC: CPT

## 2019-11-11 NOTE — PROGRESS NOTES
ANTICOAGULATION FOLLOW-UP CLINIC VISIT    Patient Name:  Nayely Kay  Date:  2019  Contact Type:  Face to Face    SUBJECTIVE:  Patient Findings     Positives:   Upcoming dental procedure (tooth extraction tomorrow), Missed doses    Comments:   Patient self held her warfarin the last 2 days in anticipation of a single tooth extraction tomorrow. Writer educated the patient that we usually do not hold warfarin for 1-2 teeth being removed. Her INR was low at the last visit as well but this time it is due to holding doses. Writer advised not to hold further doses and take 5 mg today, as it will take a couple days for this to impact her INR and she will not likely be above goal range at that point anyway. Recheck INR in 2 weeks.         Clinical Outcomes     Comments:   Patient self held her warfarin the last 2 days in anticipation of a single tooth extraction tomorrow. Writer educated the patient that we usually do not hold warfarin for 1-2 teeth being removed. Her INR was low at the last visit as well but this time it is due to holding doses. Writer advised not to hold further doses and take 5 mg today, as it will take a couple days for this to impact her INR and she will not likely be above goal range at that point anyway. Recheck INR in 2 weeks.            OBJECTIVE    INR Protime   Date Value Ref Range Status   2019 1.6 (A) 0.86 - 1.14 Final       ASSESSMENT / PLAN  INR assessment SUB missed doses   Recheck INR In: 2 WEEKS    INR Location Clinic      Anticoagulation Summary  As of 2019    INR goal:   2.0-3.0   TTR:   80.4 % (4.7 y)   INR used for dosin.6! (2019)   Warfarin maintenance plan:   2.5 mg (5 mg x 0.5) every day   Full warfarin instructions:   : 5 mg; Otherwise 2.5 mg every day   Weekly warfarin total:   17.5 mg   Plan last modified:   Oly Mcwilliams RN (2018)   Next INR check:   2019   Priority:   INR   Target end date:   Indefinite    Indications     Atrial fibrillation (H) [I48.91]  Long term current use of anticoagulant therapy [Z79.01]             Anticoagulation Episode Summary     INR check location:       Preferred lab:       Send INR reminders to:   [facility]    Comments:   * only wants dosing card. Pt uses 5mg tablets. wants to check every 4 weeks. has polymyalgia rheumatica (PMR)        Anticoagulation Care Providers     Provider Role Specialty Phone number    Ulysses Baker MD [facility] [phone]            See the Encounter Report to view Anticoagulation Flowsheet and Dosing Calendar (Go to Encounters tab in chart review, and find the Anticoagulation Therapy Visit)        Flaquita Neal RN

## 2019-11-18 ENCOUNTER — ANTICOAGULATION THERAPY VISIT (OUTPATIENT)
Dept: ANTICOAGULATION | Facility: CLINIC | Age: 79
End: 2019-11-18
Payer: MEDICARE

## 2019-11-18 DIAGNOSIS — I48.91 ATRIAL FIBRILLATION (H): ICD-10-CM

## 2019-11-18 DIAGNOSIS — Z79.01 LONG TERM CURRENT USE OF ANTICOAGULANT THERAPY: ICD-10-CM

## 2019-11-18 LAB — INR POINT OF CARE: 1.9 (ref 0.86–1.14)

## 2019-11-18 PROCEDURE — 36416 COLLJ CAPILLARY BLOOD SPEC: CPT

## 2019-11-18 PROCEDURE — 85610 PROTHROMBIN TIME: CPT | Mod: QW

## 2019-11-18 PROCEDURE — 99207 ZZC NO CHARGE NURSE ONLY: CPT

## 2019-11-18 NOTE — PROGRESS NOTES
ANTICOAGULATION FOLLOW-UP CLINIC VISIT    Patient Name:  Nayely Kay  Date:  2019  Contact Type:  Face to Face    SUBJECTIVE:  Patient Findings     Positives:   Upcoming dental procedure, Extra doses    Comments:   No changes in medications, activity, health, or diet noted. No bleeding or increased bruising noted. Took warfarin as prescribed.  Patient will continue weekly maintenance dose. INR is therapeutic.   Recheck in 2 weeks.   Patient verbalizes understanding and agrees to plan. No further questions or concerns.          Clinical Outcomes     Negatives:   Major bleeding event, Thromboembolic event, Anticoagulation-related hospital admission, Anticoagulation-related ED visit, Anticoagulation-related fatality    Comments:   No changes in medications, activity, health, or diet noted. No bleeding or increased bruising noted. Took warfarin as prescribed.  Patient will continue weekly maintenance dose. INR is therapeutic.   Recheck in 2 weeks.   Patient verbalizes understanding and agrees to plan. No further questions or concerns.             OBJECTIVE    INR Protime   Date Value Ref Range Status   2019 1.9 (A) 0.86 - 1.14 Final       ASSESSMENT / PLAN  INR assessment THER    Recheck INR In: 2 WEEKS    INR Location Clinic      Anticoagulation Summary  As of 2019    INR goal:   2.0-3.0   TTR:   80.1 % (4.7 y)   INR used for dosin.9! (2019)   Warfarin maintenance plan:   2.5 mg (5 mg x 0.5) every day   Full warfarin instructions:   2.5 mg every day   Weekly warfarin total:   17.5 mg   No change documented:   Renetta Falcon RN   Plan last modified:   Oly Mcwilliams RN (2018)   Next INR check:   2019   Priority:   INR   Target end date:   Indefinite    Indications    Atrial fibrillation (H) [I48.91]  Long term current use of anticoagulant therapy [Z79.01]             Anticoagulation Episode Summary     INR check location:       Preferred lab:       Send INR  reminders to:   [facility]    Comments:   * only wants dosing card. Pt uses 5mg tablets. wants to check every 4 weeks. has polymyalgia rheumatica (PMR)        Anticoagulation Care Providers     Provider Role Specialty Phone number    Ulysses Baker MD [facility] [phone]            See the Encounter Report to view Anticoagulation Flowsheet and Dosing Calendar (Go to Encounters tab in chart review, and find the Anticoagulation Therapy Visit)        Renetta Falcon RN

## 2019-12-02 ENCOUNTER — ALLIED HEALTH/NURSE VISIT (OUTPATIENT)
Dept: FAMILY MEDICINE | Facility: CLINIC | Age: 79
End: 2019-12-02
Payer: MEDICARE

## 2019-12-02 ENCOUNTER — ANTICOAGULATION THERAPY VISIT (OUTPATIENT)
Dept: ANTICOAGULATION | Facility: CLINIC | Age: 79
End: 2019-12-02
Payer: MEDICARE

## 2019-12-02 ENCOUNTER — TELEPHONE (OUTPATIENT)
Dept: ANTICOAGULATION | Facility: CLINIC | Age: 79
End: 2019-12-02

## 2019-12-02 VITALS — SYSTOLIC BLOOD PRESSURE: 126 MMHG | RESPIRATION RATE: 16 BRPM | HEART RATE: 82 BPM | DIASTOLIC BLOOD PRESSURE: 62 MMHG

## 2019-12-02 DIAGNOSIS — I10 HYPERTENSION, GOAL BELOW 140/90: Primary | ICD-10-CM

## 2019-12-02 DIAGNOSIS — Z79.01 LONG TERM CURRENT USE OF ANTICOAGULANT THERAPY: ICD-10-CM

## 2019-12-02 DIAGNOSIS — I48.91 ATRIAL FIBRILLATION (H): ICD-10-CM

## 2019-12-02 LAB — INR POINT OF CARE: 2.8 (ref 0.86–1.14)

## 2019-12-02 PROCEDURE — 99207 ZZC NO CHARGE NURSE ONLY: CPT

## 2019-12-02 PROCEDURE — 85610 PROTHROMBIN TIME: CPT | Mod: QW

## 2019-12-02 PROCEDURE — 36416 COLLJ CAPILLARY BLOOD SPEC: CPT

## 2019-12-02 NOTE — TELEPHONE ENCOUNTER
ANTICOAGULATION MANAGEMENT    Nayely Kay due for review and annual renewal of referral to anticoagulation monitoring.      Warfarin indication(s) Atrial Fibrillation   INR goal 2.0-3.0   Current duration of therapy Indefinite/long term therapy   Date of last office visit 7/15/19       Please advise if Nayely Kay's anticoagulation management referral can be renewed for next year.     Please confirm renewal approval in new note within this telephone encounter and route back. No further action is necessary at this time (referral orders,etc).     Renetta Falcon RN

## 2019-12-02 NOTE — PROGRESS NOTES
ANTICOAGULATION FOLLOW-UP CLINIC VISIT    Patient Name:  Nayely Kay  Date:  2019  Contact Type:  Face to Face    SUBJECTIVE:  Patient Findings     Comments:   No changes in medications, activity, health, or diet noted. No bleeding or increased bruising noted. Took warfarin as prescribed.  Patient will continue weekly maintenance dose. INR is therapeutic.   Recheck in 4 weeks.   Patient verbalizes understanding and agrees to plan. No further questions or concerns.          Clinical Outcomes     Negatives:   Major bleeding event, Thromboembolic event, Anticoagulation-related hospital admission, Anticoagulation-related ED visit, Anticoagulation-related fatality    Comments:   No changes in medications, activity, health, or diet noted. No bleeding or increased bruising noted. Took warfarin as prescribed.  Patient will continue weekly maintenance dose. INR is therapeutic.   Recheck in 4 weeks.   Patient verbalizes understanding and agrees to plan. No further questions or concerns.             OBJECTIVE    INR Protime   Date Value Ref Range Status   2019 2.8 (A) 0.86 - 1.14 Final       ASSESSMENT / PLAN  INR assessment THER    Recheck INR In: 4 WEEKS    INR Location Clinic      Anticoagulation Summary  As of 2019    INR goal:   2.0-3.0   TTR:   81.1 % (1 y)   INR used for dosin.8 (2019)   Warfarin maintenance plan:   2.5 mg (5 mg x 0.5) every day   Full warfarin instructions:   2.5 mg every day   Weekly warfarin total:   17.5 mg   No change documented:   Renetta Falcon RN   Plan last modified:   Oly Mcwilliams RN (2018)   Next INR check:   2019   Priority:   INR   Target end date:   Indefinite    Indications    Atrial fibrillation (H) [I48.91]  Long term current use of anticoagulant therapy [Z79.01]             Anticoagulation Episode Summary     INR check location:       Preferred lab:       Send INR reminders to:   [facility]    Comments:   * only wants  dosing card. Pt uses 5mg tablets. wants to check every 4 weeks. has polymyalgia rheumatica (PMR)        Anticoagulation Care Providers     Provider Role Specialty Phone number    Ulysses Baker MD [facility] [phone]            See the Encounter Report to view Anticoagulation Flowsheet and Dosing Calendar (Go to Encounters tab in chart review, and find the Anticoagulation Therapy Visit)        Renetta Falcon RN

## 2019-12-02 NOTE — PROGRESS NOTES
Nayely Kay is a 79 year old year old patient who comes in today for a Blood Pressure check because of ongoing blood pressure monitoring.  Vital Signs as repeated by /62  Patient is taking medication as prescribed  Patient is tolerating medications well.  Patient is not monitoring Blood Pressure at home.  Current  Current complaints: none  Disposition:  patient to continue with the same medication  Nicolette Granger RN

## 2019-12-04 NOTE — TELEPHONE ENCOUNTER
[facility], Nayely Kay's anticoagulation orders can be redone for one year. .Ulysses Baker MD  '

## 2019-12-30 ENCOUNTER — ANTICOAGULATION THERAPY VISIT (OUTPATIENT)
Dept: ANTICOAGULATION | Facility: CLINIC | Age: 79
End: 2019-12-30
Payer: MEDICARE

## 2019-12-30 ENCOUNTER — ALLIED HEALTH/NURSE VISIT (OUTPATIENT)
Dept: FAMILY MEDICINE | Facility: CLINIC | Age: 79
End: 2019-12-30
Payer: MEDICARE

## 2019-12-30 VITALS — SYSTOLIC BLOOD PRESSURE: 122 MMHG | RESPIRATION RATE: 16 BRPM | DIASTOLIC BLOOD PRESSURE: 60 MMHG | HEART RATE: 60 BPM

## 2019-12-30 DIAGNOSIS — I48.91 ATRIAL FIBRILLATION (H): ICD-10-CM

## 2019-12-30 DIAGNOSIS — Z79.01 LONG TERM CURRENT USE OF ANTICOAGULANT THERAPY: ICD-10-CM

## 2019-12-30 DIAGNOSIS — I10 HYPERTENSION, GOAL BELOW 140/90: Primary | ICD-10-CM

## 2019-12-30 LAB — INR POINT OF CARE: 1.9 (ref 0.86–1.14)

## 2019-12-30 PROCEDURE — 99207 ZZC NO CHARGE NURSE ONLY: CPT

## 2019-12-30 PROCEDURE — 85610 PROTHROMBIN TIME: CPT | Mod: QW

## 2019-12-30 PROCEDURE — 36416 COLLJ CAPILLARY BLOOD SPEC: CPT

## 2019-12-30 NOTE — PROGRESS NOTES
Nayely Kay is a 79 year old year old patient who comes in today for a Blood Pressure check because of ongoing blood pressure monitoring.  Vital Signs as repeated by /60 pulse 60  Patient is taking medication as prescribed  Patient is tolerating medications well.  Patient is not monitoring Blood Pressure at home.    Current complaints: none  Disposition:  patient to continue with the same medication and BP check when in clinic  Nicolette Granger RN

## 2019-12-30 NOTE — PROGRESS NOTES
ANTICOAGULATION FOLLOW-UP CLINIC VISIT    Patient Name:  Nayely Kay  Date:  2019  Contact Type:  Face to Face    SUBJECTIVE:  Patient Findings     Positives:   Missed doses (Missed a dose sometime last week, she took an extra quarter tablet to make up for part of it)    Comments:   No acute changes in maite, medications, diet (vitamin K intake), or activity noted. No issues with bleeding or unusual bruising noted.     Writer discussed taking the full missed dose the next morning. If the dose is remembered days later, do not make up for it.         Clinical Outcomes     Comments:   No acute changes in maite, medications, diet (vitamin K intake), or activity noted. No issues with bleeding or unusual bruising noted.     Writer discussed taking the full missed dose the next morning. If the dose is remembered days later, do not make up for it.            OBJECTIVE    INR Protime   Date Value Ref Range Status   2019 1.9 (A) 0.86 - 1.14 Final       ASSESSMENT / PLAN  No question data found.  Anticoagulation Summary  As of 2019    INR goal:   2.0-3.0   TTR:   80.3 % (1 y)   INR used for dosin.9! (2019)   Warfarin maintenance plan:   2.5 mg (5 mg x 0.5) every day   Full warfarin instructions:   2.5 mg every day   Weekly warfarin total:   17.5 mg   No change documented:   Jenny Barger RN   Plan last modified:   Oly Michel RN (2018)   Next INR check:   2020   Priority:   Maintenance   Target end date:   Indefinite    Indications    Atrial fibrillation (H) [I48.91]  Long term current use of anticoagulant therapy [Z79.01]             Anticoagulation Episode Summary     INR check location:       Preferred lab:       Send INR reminders to:   [facility]    Comments:   * 19 INR renewal completed. only wants dosing card. Pt uses 5mg tablets. wants to check every 4 weeks. has polymyalgia rheumatica (PMR)        Anticoagulation Care Providers      Provider Role Specialty Phone number    Ulysses Baker MD [facility] [phone]            See the Encounter Report to view Anticoagulation Flowsheet and Dosing Calendar (Go to Encounters tab in chart review, and find the Anticoagulation Therapy Visit)    Written dosing card with warfarin instruction and INR recheck date given to patient.      [facility]

## 2020-01-13 ENCOUNTER — ALLIED HEALTH/NURSE VISIT (OUTPATIENT)
Dept: FAMILY MEDICINE | Facility: CLINIC | Age: 80
End: 2020-01-13
Payer: MEDICARE

## 2020-01-13 ENCOUNTER — ANTICOAGULATION THERAPY VISIT (OUTPATIENT)
Dept: ANTICOAGULATION | Facility: CLINIC | Age: 80
End: 2020-01-13
Payer: MEDICARE

## 2020-01-13 VITALS — SYSTOLIC BLOOD PRESSURE: 138 MMHG | HEART RATE: 64 BPM | RESPIRATION RATE: 16 BRPM | DIASTOLIC BLOOD PRESSURE: 74 MMHG

## 2020-01-13 DIAGNOSIS — I10 HYPERTENSION, GOAL BELOW 140/90: Primary | ICD-10-CM

## 2020-01-13 DIAGNOSIS — Z79.01 LONG TERM CURRENT USE OF ANTICOAGULANT THERAPY: ICD-10-CM

## 2020-01-13 DIAGNOSIS — I48.91 ATRIAL FIBRILLATION (H): ICD-10-CM

## 2020-01-13 LAB — INR POINT OF CARE: 2.1 (ref 0.86–1.14)

## 2020-01-13 PROCEDURE — 85610 PROTHROMBIN TIME: CPT | Mod: QW

## 2020-01-13 PROCEDURE — 99207 ZZC NO CHARGE NURSE ONLY: CPT

## 2020-01-13 PROCEDURE — 36416 COLLJ CAPILLARY BLOOD SPEC: CPT

## 2020-01-13 NOTE — PROGRESS NOTES
Nayely Kay is a 79 year old year old patient who comes in today for a Blood Pressure check because of ongoing blood pressure monitoring.  Vital Signs as repeated by /74  Patient is taking medication as prescribed  Patient is tolerating medications well.  Patient is not monitoring Blood Pressure at home.   Current complaints: none  Disposition:  patient to continue with the same medication  Nicolette Granger RN

## 2020-01-13 NOTE — PROGRESS NOTES
ANTICOAGULATION FOLLOW-UP CLINIC VISIT    Patient Name:  Nayely Kay  Date:  2020  Contact Type:  Face to Face    SUBJECTIVE:  Patient Findings     Comments:   No acute changes in maite, medications, diet (vitamin K intake), or activity noted. Took warfarin as instructed, denies any missed doses. No issues with bleeding or unusual bruising noted.         Clinical Outcomes     Negatives:   Major bleeding event, Thromboembolic event, Anticoagulation-related hospital admission, Anticoagulation-related ED visit, Anticoagulation-related fatality    Comments:   No acute changes in maite, medications, diet (vitamin K intake), or activity noted. Took warfarin as instructed, denies any missed doses. No issues with bleeding or unusual bruising noted.            OBJECTIVE    INR Protime   Date Value Ref Range Status   2020 2.1 (A) 0.86 - 1.14 Final       ASSESSMENT / PLAN  No question data found.  Anticoagulation Summary  As of 2020    INR goal:   2.0-3.0   TTR:   78.4 % (1 y)   INR used for dosin.1 (2020)   Warfarin maintenance plan:   2.5 mg (5 mg x 0.5) every day   Full warfarin instructions:   2.5 mg every day   Weekly warfarin total:   17.5 mg   No change documented:   Jenny Barger RN   Plan last modified:   Oly Michel RN (2018)   Next INR check:   2/3/2020   Priority:   Maintenance   Target end date:   Indefinite    Indications    Atrial fibrillation (H) [I48.91]  Long term current use of anticoagulant therapy [Z79.01]             Anticoagulation Episode Summary     INR check location:       Preferred lab:       Send INR reminders to:   [facility]    Comments:   *  only wants dosing card. Pt uses 5mg tablets. wants to check every 4 weeks. has polymyalgia rheumatica (PMR)        Anticoagulation Care Providers     Provider Role Specialty Phone number    Ulysses Baker MD Referring [facility] [phone]            See the Encounter Report to  view Anticoagulation Flowsheet and Dosing Calendar (Go to Encounters tab in chart review, and find the Anticoagulation Therapy Visit)        Jenny Barger RN [facility]

## 2020-01-29 DIAGNOSIS — I10 ESSENTIAL HYPERTENSION: ICD-10-CM

## 2020-01-29 RX ORDER — AMLODIPINE BESYLATE 5 MG/1
TABLET ORAL
Qty: 90 TABLET | Refills: 1 | Status: SHIPPED | OUTPATIENT
Start: 2020-01-29 | End: 2020-06-15

## 2020-01-29 NOTE — TELEPHONE ENCOUNTER
"Requested Prescriptions   Pending Prescriptions Disp Refills     amLODIPine (NORVASC) 5 MG tablet [Pharmacy Med Name: AMLODIPINE BESYLATE 5 MG TAB] 90 tablet 1     Sig: TAKE 1 TABLET BY MOUTH EVERY DAY       Calcium Channel Blockers Protocol  Passed - 1/29/2020  1:52 AM        Passed - Blood pressure under 140/90 in past 12 months     BP Readings from Last 3 Encounters:   01/13/20 138/74   12/30/19 122/60   12/02/19 126/62                 Passed - Recent (12 mo) or future (30 days) visit within the authorizing provider's specialty     Patient has had an office visit with the authorizing provider or a provider within the authorizing providers department within the previous 12 mos or has a future within next 30 days. See \"Patient Info\" tab in inbasket, or \"Choose Columns\" in Meds & Orders section of the refill encounter.              Passed - Medication is active on med list        Passed - Patient is age 18 or older        Passed - No active pregnancy on record        Passed - Normal serum creatinine on file in past 12 months     Recent Labs   Lab Test 07/30/19  0859   CR 0.84             Passed - No positive pregnancy test in past 12 months        Last Written Prescription Date:  8/7/19  Last Fill Quantity: 90,  # refills: 1   Last office visit: 1/13/2020 with prescribing provider:     Future Office Visit:      "

## 2020-02-06 ENCOUNTER — ANTICOAGULATION THERAPY VISIT (OUTPATIENT)
Dept: ANTICOAGULATION | Facility: CLINIC | Age: 80
End: 2020-02-06
Payer: MEDICARE

## 2020-02-06 DIAGNOSIS — Z79.01 LONG TERM CURRENT USE OF ANTICOAGULANT THERAPY: ICD-10-CM

## 2020-02-06 DIAGNOSIS — I48.91 ATRIAL FIBRILLATION (H): ICD-10-CM

## 2020-02-06 LAB — INR POINT OF CARE: 2.1 (ref 0.86–1.14)

## 2020-02-06 PROCEDURE — 85610 PROTHROMBIN TIME: CPT | Mod: QW

## 2020-02-06 PROCEDURE — 36416 COLLJ CAPILLARY BLOOD SPEC: CPT

## 2020-02-06 PROCEDURE — 99207 ZZC NO CHARGE NURSE ONLY: CPT

## 2020-02-06 NOTE — PROGRESS NOTES
ANTICOAGULATION FOLLOW-UP CLINIC VISIT    Patient Name:  Nayely Kay  Date:  2020  Contact Type:  Face to Face    SUBJECTIVE:  Patient Findings     Comments:   No changes in diet, activity level, medications (including over the counter), or health. No missed doses of warfarin. Patient took dosing as prescribed. No signs of clots or bleeding concerns. Patient will continue maintenance warfarin dosing.          Clinical Outcomes     Negatives:   Major bleeding event, Thromboembolic event, Anticoagulation-related hospital admission, Anticoagulation-related ED visit, Anticoagulation-related fatality    Comments:   No changes in diet, activity level, medications (including over the counter), or health. No missed doses of warfarin. Patient took dosing as prescribed. No signs of clots or bleeding concerns. Patient will continue maintenance warfarin dosing.             OBJECTIVE    INR Protime   Date Value Ref Range Status   2020 2.1 (A) 0.86 - 1.14 Final       ASSESSMENT / PLAN  INR assessment THER    Recheck INR In: 3 WEEKS    INR Location Clinic      Anticoagulation Summary  As of 2020    INR goal:   2.0-3.0   TTR:   78.4 % (1 y)   INR used for dosin.1 (2020)   Warfarin maintenance plan:   2.5 mg (5 mg x 0.5) every day   Full warfarin instructions:   2.5 mg every day   Weekly warfarin total:   17.5 mg   No change documented:   Flaquita Neal RN   Plan last modified:   Oly Michel RN (2018)   Next INR check:   3/2/2020   Priority:   Maintenance   Target end date:   Indefinite    Indications    Atrial fibrillation (H) [I48.91]  Long term current use of anticoagulant therapy [Z79.01]             Anticoagulation Episode Summary     INR check location:       Preferred lab:       Send INR reminders to:   [facility]    Comments:   *  only wants dosing card. Pt uses 5mg tablets. wants to check every 4 weeks. has polymyalgia rheumatica (PMR)        Anticoagulation Care  Providers     Provider Role Specialty Phone number    Ulysses Baker MD Referring Family Practice [phone]            See the Encounter Report to view Anticoagulation Flowsheet and Dosing Calendar (Go to Encounters tab in chart review, and find the Anticoagulation Therapy Visit)        Flaquita Neal RN

## 2020-02-19 DIAGNOSIS — I10 ESSENTIAL HYPERTENSION: ICD-10-CM

## 2020-02-19 RX ORDER — LISINOPRIL 5 MG/1
5 TABLET ORAL DAILY
Qty: 90 TABLET | Refills: 1 | Status: SHIPPED | OUTPATIENT
Start: 2020-02-19 | End: 2020-06-15

## 2020-02-19 NOTE — TELEPHONE ENCOUNTER
"Requested Prescriptions   Pending Prescriptions Disp Refills     lisinopril 5 MG PO tablet 90 tablet 1     Sig: Take 1 tablet (5 mg) by mouth daily       ACE Inhibitors (Including Combos) Protocol Passed - 2/19/2020  9:12 AM        Passed - Blood pressure under 140/90 in past 12 months     BP Readings from Last 3 Encounters:   01/13/20 138/74   12/30/19 122/60   12/02/19 126/62                 Passed - Recent (12 mo) or future (30 days) visit within the authorizing provider's specialty     Patient has had an office visit with the authorizing provider or a provider within the authorizing providers department within the previous 12 mos or has a future within next 30 days. See \"Patient Info\" tab in inbasket, or \"Choose Columns\" in Meds & Orders section of the refill encounter.              Passed - Medication is active on med list        Passed - Patient is age 18 or older        Passed - No active pregnancy on record        Passed - Normal serum creatinine on file in past 12 months     Recent Labs   Lab Test 07/30/19  0859   CR 0.84             Passed - Normal serum potassium on file in past 12 months     Recent Labs   Lab Test 07/30/19  0859   POTASSIUM 3.5             Passed - No positive pregnancy test within past 12 months        Last Written Prescription Date:  8/7/19  Last Fill Quantity: 90,  # refills: 1   Last office visit: 1/13/2020 with prescribing provider:      Future Office Visit:   Next 5 appointments (look out 90 days)    Mar 02, 2020  1:30 PM CST  Nurse Only with [place] NB RN  [facility] [address] [place] [id number] [phone]           "

## 2020-03-02 ENCOUNTER — ANTICOAGULATION THERAPY VISIT (OUTPATIENT)
Dept: ANTICOAGULATION | Facility: CLINIC | Age: 80
End: 2020-03-02
Payer: MEDICARE

## 2020-03-02 ENCOUNTER — ALLIED HEALTH/NURSE VISIT (OUTPATIENT)
Dept: FAMILY MEDICINE | Facility: CLINIC | Age: 80
End: 2020-03-02
Payer: MEDICARE

## 2020-03-02 VITALS — SYSTOLIC BLOOD PRESSURE: 136 MMHG | DIASTOLIC BLOOD PRESSURE: 66 MMHG | RESPIRATION RATE: 16 BRPM | HEART RATE: 64 BPM

## 2020-03-02 DIAGNOSIS — I10 ESSENTIAL HYPERTENSION: Primary | ICD-10-CM

## 2020-03-02 DIAGNOSIS — Z79.01 LONG TERM CURRENT USE OF ANTICOAGULANT THERAPY: ICD-10-CM

## 2020-03-02 DIAGNOSIS — I48.91 ATRIAL FIBRILLATION (H): ICD-10-CM

## 2020-03-02 LAB — INR POINT OF CARE: 2.5 (ref 0.86–1.14)

## 2020-03-02 PROCEDURE — 99207 ZZC NO CHARGE NURSE ONLY: CPT

## 2020-03-02 PROCEDURE — 36416 COLLJ CAPILLARY BLOOD SPEC: CPT

## 2020-03-02 PROCEDURE — 85610 PROTHROMBIN TIME: CPT | Mod: QW

## 2020-03-02 NOTE — PROGRESS NOTES
ANTICOAGULATION FOLLOW-UP CLINIC VISIT    Patient Name:  Nayely Kay  Date:  3/2/2020  Contact Type:  Face to Face    SUBJECTIVE:  Patient Findings     Comments:   No acute changes in maite, medications, diet (vitamin K intake), or activity noted. Took warfarin as instructed, denies any missed doses. No issues with bleeding or unusual bruising noted.         Clinical Outcomes     Negatives:   Major bleeding event, Thromboembolic event, Anticoagulation-related hospital admission, Anticoagulation-related ED visit, Anticoagulation-related fatality    Comments:   No acute changes in maite, medications, diet (vitamin K intake), or activity noted. Took warfarin as instructed, denies any missed doses. No issues with bleeding or unusual bruising noted.            OBJECTIVE    INR Protime   Date Value Ref Range Status   2020 2.5 (A) 0.86 - 1.14 Final       ASSESSMENT / PLAN  INR assessment THER    Recheck INR In: 4 WEEKS    INR Location Clinic      Anticoagulation Summary  As of 3/2/2020    INR goal:   2.0-3.0   TTR:   78.4 % (1 y)   INR used for dosin.5 (3/2/2020)   Warfarin maintenance plan:   2.5 mg (5 mg x 0.5) every day   Full warfarin instructions:   2.5 mg every day   Weekly warfarin total:   17.5 mg   No change documented:   Jenny Barger RN   Plan last modified:   Oly Michel RN (2018)   Next INR check:   3/30/2020   Priority:   Maintenance   Target end date:   Indefinite    Indications    Atrial fibrillation (H) [I48.91]  Long term current use of anticoagulant therapy [Z79.01]             Anticoagulation Episode Summary     INR check location:       Preferred lab:       Send INR reminders to:   [facility]    Comments:   *  only wants dosing card. Pt uses 5mg tablets. wants to check every 4 weeks. has polymyalgia rheumatica (PMR)        Anticoagulation Care Providers     Provider Role Specialty Phone number    Ulysses Baker MD Referring Family Practice  [phone]            See the Encounter Report to view Anticoagulation Flowsheet and Dosing Calendar (Go to Encounters tab in chart review, and find the Anticoagulation Therapy Visit)    Written dosing card with warfarin instruction and INR recheck date given to patient.       Jenny Barger RN [facility]

## 2020-03-02 NOTE — PROGRESS NOTES
Nayely Kay is a 79 year old year old patient who comes in today for a Blood Pressure check because of ongoing blood pressure monitoring.  Vital Signs as repeated by /66  Patient is taking medication as prescribed  Patient is tolerating medications well.  Patient is not monitoring Blood Pressure at home.   Current complaints: none  Disposition:  patient to continue with the same medication  Nicolette Granger RN

## 2020-03-20 DIAGNOSIS — I48.91 ATRIAL FIBRILLATION (H): Primary | ICD-10-CM

## 2020-04-06 ENCOUNTER — ALLIED HEALTH/NURSE VISIT (OUTPATIENT)
Dept: FAMILY MEDICINE | Facility: CLINIC | Age: 80
End: 2020-04-06
Payer: MEDICARE

## 2020-04-06 ENCOUNTER — ANTICOAGULATION THERAPY VISIT (OUTPATIENT)
Dept: ANTICOAGULATION | Facility: CLINIC | Age: 80
End: 2020-04-06
Payer: MEDICARE

## 2020-04-06 VITALS — SYSTOLIC BLOOD PRESSURE: 124 MMHG | HEART RATE: 66 BPM | DIASTOLIC BLOOD PRESSURE: 82 MMHG | RESPIRATION RATE: 16 BRPM

## 2020-04-06 DIAGNOSIS — Z79.01 LONG TERM CURRENT USE OF ANTICOAGULANT THERAPY: ICD-10-CM

## 2020-04-06 DIAGNOSIS — I48.91 ATRIAL FIBRILLATION (H): ICD-10-CM

## 2020-04-06 DIAGNOSIS — I10 ESSENTIAL HYPERTENSION: Primary | ICD-10-CM

## 2020-04-06 LAB
CAPILLARY BLOOD COLLECTION: NORMAL
INR PPP: 2.1 (ref 0.86–1.14)

## 2020-04-06 PROCEDURE — 99207 ZZC NO CHARGE NURSE ONLY: CPT

## 2020-04-06 PROCEDURE — 85610 PROTHROMBIN TIME: CPT | Performed by: FAMILY MEDICINE

## 2020-04-06 PROCEDURE — 36416 COLLJ CAPILLARY BLOOD SPEC: CPT | Performed by: FAMILY MEDICINE

## 2020-04-06 NOTE — PROGRESS NOTES
ANTICOAGULATION FOLLOW-UP CLINIC VISIT    Patient Name:  Nayely Kay  Date:  4/6/2020  Contact Type:  Telephone    SUBJECTIVE:  Patient Findings     Comments:   Patient reports no changes in medication, activity, or diet. Patient reports no changes in health. Patient reports has taken warfarin as instructed. Patient reports no increased bruising or bleeding and no signs or symptoms of a blood clot.   Will plan to continue maintenance dose and recheck INR in 4 weeks, per patient request-she did not want to extend her INR check to 5-6 weeks at this time as writer offered. Patient to call [facility] with any changes or concerns. Patient verbalized understanding of all instructions, denies questions or concerns at this time.             Clinical Outcomes     Negatives:   Major bleeding event, Thromboembolic event, Anticoagulation-related hospital admission, Anticoagulation-related ED visit, Anticoagulation-related fatality    Comments:   Patient reports no changes in medication, activity, or diet. Patient reports no changes in health. Patient reports has taken warfarin as instructed. Patient reports no increased bruising or bleeding and no signs or symptoms of a blood clot.   Will plan to continue maintenance dose and recheck INR in 4 weeks, per patient request-she did not want to extend her INR check to 5-6 weeks at this time as writer offered. Patient to call [facility] with any changes or concerns. Patient verbalized understanding of all instructions, denies questions or concerns at this time.                OBJECTIVE    INR   Date Value Ref Range Status   04/06/2020 2.10 (H) 0.86 - 1.14 Final     Comment:     This test is intended for monitoring Coumadin therapy.  Results are not   accurate in patients with prolonged INR due to factor deficiency.         ASSESSMENT / PLAN  INR assessment THER    Recheck INR In: 4 WEEKS    INR Location Outside lab      Anticoagulation Summary  As of 4/6/2020    INR goal:   2.0-3.0   TTR:    78.4 % (1 y)   INR used for dosin.10 (2020)   Warfarin maintenance plan:   2.5 mg (5 mg x 0.5) every day   Full warfarin instructions:   2.5 mg every day   Weekly warfarin total:   17.5 mg   No change documented:   Jeannie Gaspar RN   Plan last modified:   Oly Michel RN (2018)   Next INR check:   2020   Priority:   Maintenance   Target end date:   Indefinite    Indications    Atrial fibrillation (H) [I48.91]  Long term current use of anticoagulant therapy [Z79.01]             Anticoagulation Episode Summary     INR check location:       Preferred lab:       Send INR reminders to:   [facility]    Comments:   *  only wants dosing card. Pt uses 5mg tablets. wants to check every 4 weeks. has polymyalgia rheumatica (PMR)        Anticoagulation Care Providers     Provider Role Specialty Phone number    Ulysses Baker MD Referring [facility] [phone]            See the Encounter Report to view Anticoagulation Flowsheet and Dosing Calendar (Go to Encounters tab in chart review, and find the Anticoagulation Therapy Visit)      Jeannie Gaspar, RN

## 2020-04-06 NOTE — PROGRESS NOTES
Nayely Kay is a 80 year old year old patient who comes in today for a Blood Pressure check because of ongoing blood pressure monitoring after her INR check  Vital Signs as repeated by /82  Patient is taking medication as prescribed  Patient is tolerating medications well.  Patient is not monitoring Blood Pressure at home.    Current complaints: none  Disposition:  patient to continue with the same medication   Nicolette Granger RN

## 2020-05-04 ENCOUNTER — ANTICOAGULATION THERAPY VISIT (OUTPATIENT)
Dept: ANTICOAGULATION | Facility: CLINIC | Age: 80
End: 2020-05-04
Payer: MEDICARE

## 2020-05-04 ENCOUNTER — ALLIED HEALTH/NURSE VISIT (OUTPATIENT)
Dept: FAMILY MEDICINE | Facility: CLINIC | Age: 80
End: 2020-05-04
Payer: MEDICARE

## 2020-05-04 VITALS — SYSTOLIC BLOOD PRESSURE: 122 MMHG | DIASTOLIC BLOOD PRESSURE: 68 MMHG

## 2020-05-04 DIAGNOSIS — I48.91 ATRIAL FIBRILLATION (H): ICD-10-CM

## 2020-05-04 DIAGNOSIS — Z79.01 LONG TERM CURRENT USE OF ANTICOAGULANT THERAPY: ICD-10-CM

## 2020-05-04 DIAGNOSIS — I10 ESSENTIAL HYPERTENSION: Primary | ICD-10-CM

## 2020-05-04 LAB
CAPILLARY BLOOD COLLECTION: NORMAL
INR PPP: 1.9 (ref 0.86–1.14)

## 2020-05-04 PROCEDURE — 36416 COLLJ CAPILLARY BLOOD SPEC: CPT | Performed by: FAMILY MEDICINE

## 2020-05-04 PROCEDURE — 99207 ZZC NO CHARGE NURSE ONLY: CPT

## 2020-05-04 PROCEDURE — 85610 PROTHROMBIN TIME: CPT | Performed by: FAMILY MEDICINE

## 2020-05-04 NOTE — PROGRESS NOTES
ANTICOAGULATION FOLLOW-UP CLINIC VISIT    Patient Name:  Nayely Kay  Date:  5/4/2020  Contact Type:  Telephone    SUBJECTIVE:  Patient Findings     Positives:   Change in diet/appetite (increased greens this week)    Comments:   Patient states the only change she had in the last week or so is likely eating more greens than normal--she states she usually just eats broccoli but added in brussel sprouts as well. Patient will continue same dose, recheck INR in 2 weeks. Discussed that if she wants to continue eating increased greens we can always adjust her dose of warfarin, patient thinks she wont be continuing to eat increased greens. Patient denies signs or symptoms of bleeding. Patient denies any signs or symptoms of a blood clot. Writer educated patient regarding increased clot risk and when to seek immediate medical attention. Patient verbalized understanding.          Clinical Outcomes     Comments:   Patient states the only change she had in the last week or so is likely eating more greens than normal--she states she usually just eats broccoli but added in brussel sprouts as well. Patient will continue same dose, recheck INR in 2 weeks. Discussed that if she wants to continue eating increased greens we can always adjust her dose of warfarin, patient thinks she wont be continuing to eat increased greens. Patient denies signs or symptoms of bleeding. Patient denies any signs or symptoms of a blood clot. Writer educated patient regarding increased clot risk and when to seek immediate medical attention. Patient verbalized understanding.             OBJECTIVE    INR   Date Value Ref Range Status   05/04/2020 1.90 (H) 0.86 - 1.14 Final     Comment:     This test is intended for monitoring Coumadin therapy.  Results are not   accurate in patients with prolonged INR due to factor deficiency.         ASSESSMENT / PLAN  INR assessment SUB    Recheck INR In: 2 WEEKS    INR Location Outside lab      Anticoagulation  Summary  As of 2020    INR goal:   2.0-3.0   TTR:   74.6 % (1 y)   INR used for dosin.90! (2020)   Warfarin maintenance plan:   2.5 mg (5 mg x 0.5) every day   Full warfarin instructions:   2.5 mg every day   Weekly warfarin total:   17.5 mg   No change documented:   Jeannie Gaspar RN   Plan last modified:   Oly Michel RN (2018)   Next INR check:   2020   Priority:   Maintenance   Target end date:   Indefinite    Indications    Atrial fibrillation (H) [I48.91]  Long term current use of anticoagulant therapy [Z79.01]             Anticoagulation Episode Summary     INR check location:       Preferred lab:       Send INR reminders to:   [facility]    Comments:   *  only wants dosing card. Pt uses 5mg tablets. wants to check every 4 weeks. has polymyalgia rheumatica (PMR)        Anticoagulation Care Providers     Provider Role Specialty Phone number    Ulysses Baker MD Referring [facility] [phone]            See the Encounter Report to view Anticoagulation Flowsheet and Dosing Calendar (Go to Encounters tab in chart review, and find the Anticoagulation Therapy Visit)      Jeannie Gaspar, RN

## 2020-05-18 ENCOUNTER — ANTICOAGULATION THERAPY VISIT (OUTPATIENT)
Dept: ANTICOAGULATION | Facility: CLINIC | Age: 80
End: 2020-05-18
Payer: MEDICARE

## 2020-05-18 ENCOUNTER — ALLIED HEALTH/NURSE VISIT (OUTPATIENT)
Dept: FAMILY MEDICINE | Facility: CLINIC | Age: 80
End: 2020-05-18
Payer: MEDICARE

## 2020-05-18 VITALS — RESPIRATION RATE: 16 BRPM | DIASTOLIC BLOOD PRESSURE: 72 MMHG | HEART RATE: 60 BPM | SYSTOLIC BLOOD PRESSURE: 120 MMHG

## 2020-05-18 DIAGNOSIS — Z79.01 LONG TERM CURRENT USE OF ANTICOAGULANT THERAPY: ICD-10-CM

## 2020-05-18 DIAGNOSIS — I48.91 ATRIAL FIBRILLATION (H): ICD-10-CM

## 2020-05-18 DIAGNOSIS — I10 ESSENTIAL HYPERTENSION: Primary | ICD-10-CM

## 2020-05-18 LAB
CAPILLARY BLOOD COLLECTION: NORMAL
INR PPP: 2.2 (ref 0.86–1.14)

## 2020-05-18 PROCEDURE — 85610 PROTHROMBIN TIME: CPT | Performed by: FAMILY MEDICINE

## 2020-05-18 PROCEDURE — 99207 ZZC NO CHARGE NURSE ONLY: CPT

## 2020-05-18 PROCEDURE — 36416 COLLJ CAPILLARY BLOOD SPEC: CPT | Performed by: FAMILY MEDICINE

## 2020-05-18 NOTE — PROGRESS NOTES
Nayely Kay is a 80 year old year old patient who comes in today for a Blood Pressure check because of ongoing blood pressure monitoring.  Vital Signs as repeated by /72  Patient is taking medication as prescribed  Patient is tolerating medications well.  Patient is not monitoring Blood Pressure at home.    Current complaints: none  Disposition:  BP check next month  Nicolette Granger RN

## 2020-05-18 NOTE — PROGRESS NOTES
ANTICOAGULATION FOLLOW-UP CLINIC VISIT    Patient Name:  Nayely Kay  Date:  2020  Contact Type:  Telephone    SUBJECTIVE:  Patient Findings     Comments:   Patient reports no changes in medication, activity, or diet. Patient reports no changes in health and no recent illness. Patient reports warfarin was taken as instructed, no missed or extra doses. Patient reports no increased bruising or bleeding and no signs or symptoms of a blood clot.   INR is therapeutic. Patient will continue maintenance dose of warfarin and recheck INR in 4 weeks. Patient to call [facility] with any changes or concerns. Patient verbalized understanding of all instructions, denies questions or concerns at this time.             Clinical Outcomes     Negatives:   Major bleeding event, Thromboembolic event, Anticoagulation-related hospital admission, Anticoagulation-related ED visit, Anticoagulation-related fatality    Comments:   Patient reports no changes in medication, activity, or diet. Patient reports no changes in health and no recent illness. Patient reports warfarin was taken as instructed, no missed or extra doses. Patient reports no increased bruising or bleeding and no signs or symptoms of a blood clot.   INR is therapeutic. Patient will continue maintenance dose of warfarin and recheck INR in 4 weeks. Patient to call [facility] with any changes or concerns. Patient verbalized understanding of all instructions, denies questions or concerns at this time.                OBJECTIVE    Recent labs: (last 7 days)     20  1017   INR 2.20*       ASSESSMENT / PLAN  INR assessment THER    Recheck INR In: 4 WEEKS    INR Location Outside lab      Anticoagulation Summary  As of 2020    INR goal:   2.0-3.0   TTR:   73.3 % (1 y)   INR used for dosin.20 (2020)   Warfarin maintenance plan:   2.5 mg (5 mg x 0.5) every day   Full warfarin instructions:   2.5 mg every day   Weekly warfarin total:   17.5 mg   No change documented:    Jeannie Gaspar RN   Plan last modified:   Oly Michel, RN (7/16/2018)   Next INR check:   6/15/2020   Priority:   Maintenance   Target end date:   Indefinite    Indications    Atrial fibrillation (H) [I48.91]  Long term current use of anticoagulant therapy [Z79.01]             Anticoagulation Episode Summary     INR check location:       Preferred lab:       Send INR reminders to:   [facility]    Comments:   *  only wants dosing card. Pt uses 5mg tablets. wants to check every 4 weeks. has polymyalgia rheumatica (PMR)        Anticoagulation Care Providers     Provider Role Specialty Phone number    Ulysses Baker MD Referring [facility] [phone]            See the Encounter Report to view Anticoagulation Flowsheet and Dosing Calendar (Go to Encounters tab in chart review, and find the Anticoagulation Therapy Visit)      Jeannie Gaspar RN

## 2020-06-15 ENCOUNTER — ANTICOAGULATION THERAPY VISIT (OUTPATIENT)
Dept: FAMILY MEDICINE | Facility: CLINIC | Age: 80
End: 2020-06-15
Payer: MEDICARE

## 2020-06-15 ENCOUNTER — ALLIED HEALTH/NURSE VISIT (OUTPATIENT)
Dept: FAMILY MEDICINE | Facility: CLINIC | Age: 80
End: 2020-06-15
Payer: MEDICARE

## 2020-06-15 VITALS — SYSTOLIC BLOOD PRESSURE: 130 MMHG | DIASTOLIC BLOOD PRESSURE: 60 MMHG

## 2020-06-15 DIAGNOSIS — I48.91 ATRIAL FIBRILLATION (H): ICD-10-CM

## 2020-06-15 DIAGNOSIS — I10 ESSENTIAL HYPERTENSION: ICD-10-CM

## 2020-06-15 DIAGNOSIS — I10 ESSENTIAL HYPERTENSION: Primary | ICD-10-CM

## 2020-06-15 DIAGNOSIS — Z79.01 LONG TERM CURRENT USE OF ANTICOAGULANT THERAPY: ICD-10-CM

## 2020-06-15 LAB
CAPILLARY BLOOD COLLECTION: NORMAL
INR PPP: 2.2 (ref 0.86–1.14)

## 2020-06-15 PROCEDURE — 36416 COLLJ CAPILLARY BLOOD SPEC: CPT | Performed by: FAMILY MEDICINE

## 2020-06-15 PROCEDURE — 99207 ZZC NO CHARGE NURSE ONLY: CPT | Performed by: FAMILY MEDICINE

## 2020-06-15 PROCEDURE — 99207 ZZC NO CHARGE NURSE ONLY: CPT

## 2020-06-15 PROCEDURE — 85610 PROTHROMBIN TIME: CPT | Performed by: FAMILY MEDICINE

## 2020-06-15 RX ORDER — LISINOPRIL 5 MG/1
TABLET ORAL
Qty: 90 TABLET | Refills: 1 | Status: SHIPPED | OUTPATIENT
Start: 2020-06-15 | End: 2020-12-16

## 2020-06-15 RX ORDER — TRIAMTERENE/HYDROCHLOROTHIAZID 37.5-25 MG
TABLET ORAL
Qty: 90 TABLET | Refills: 3 | Status: SHIPPED | OUTPATIENT
Start: 2020-06-15 | End: 2021-06-14

## 2020-06-15 RX ORDER — AMLODIPINE BESYLATE 5 MG/1
TABLET ORAL
Qty: 90 TABLET | Refills: 1 | Status: SHIPPED | OUTPATIENT
Start: 2020-06-15 | End: 2020-12-16

## 2020-06-15 RX ORDER — METOPROLOL TARTRATE 50 MG
75 TABLET ORAL 2 TIMES DAILY
Qty: 270 TABLET | Refills: 3 | Status: SHIPPED | OUTPATIENT
Start: 2020-06-15 | End: 2021-06-14

## 2020-06-15 NOTE — PROGRESS NOTES
ANTICOAGULATION FOLLOW-UP CLINIC VISIT    Patient Name:  Nayely Kay  Date:  6/15/2020  Contact Type:  Telephone    SUBJECTIVE:  Patient Findings     Comments:   The patient was assessed for diet, medication, and activity level changes, missed or extra doses, bruising or bleeding, with no problem findings.          Clinical Outcomes     Negatives:   Major bleeding event, Thromboembolic event, Anticoagulation-related hospital admission, Anticoagulation-related ED visit, Anticoagulation-related fatality    Comments:   The patient was assessed for diet, medication, and activity level changes, missed or extra doses, bruising or bleeding, with no problem findings.             OBJECTIVE    Recent labs: (last 7 days)     06/15/20  1004   INR 2.20*       ASSESSMENT / PLAN  No question data found.  Anticoagulation Summary  As of 6/15/2020    INR goal:   2.0-3.0   TTR:   73.3 % (1 y)   INR used for dosin.20 (6/15/2020)   Warfarin maintenance plan:   2.5 mg (5 mg x 0.5) every day   Full warfarin instructions:   2.5 mg every day   Weekly warfarin total:   17.5 mg   No change documented:   Adali Darnell RN   Plan last modified:   Oly Michel RN (2018)   Next INR check:   2020   Priority:   Maintenance   Target end date:   Indefinite    Indications    Atrial fibrillation (H) [I48.91]  Long term current use of anticoagulant therapy [Z79.01]             Anticoagulation Episode Summary     INR check location:       Preferred lab:       Send INR reminders to:   [facility]    Comments:   *  only wants dosing card. Pt uses 5mg tablets. wants to check every 4 weeks. has polymyalgia rheumatica (PMR)        Anticoagulation Care Providers     Provider Role Specialty Phone number    Ulysses Baker MD Referring [facility] [phone]            See the Encounter Report to view Anticoagulation Flowsheet and Dosing Calendar (Go to Encounters tab in chart review, and find the  Anticoagulation Therapy Visit)        Adali Darnell RN

## 2020-07-13 ENCOUNTER — ALLIED HEALTH/NURSE VISIT (OUTPATIENT)
Dept: FAMILY MEDICINE | Facility: CLINIC | Age: 80
End: 2020-07-13
Payer: MEDICARE

## 2020-07-13 ENCOUNTER — ANTICOAGULATION THERAPY VISIT (OUTPATIENT)
Dept: FAMILY MEDICINE | Facility: CLINIC | Age: 80
End: 2020-07-13

## 2020-07-13 VITALS — DIASTOLIC BLOOD PRESSURE: 68 MMHG | SYSTOLIC BLOOD PRESSURE: 136 MMHG

## 2020-07-13 DIAGNOSIS — I48.91 ATRIAL FIBRILLATION (H): ICD-10-CM

## 2020-07-13 DIAGNOSIS — I10 ESSENTIAL HYPERTENSION: ICD-10-CM

## 2020-07-13 DIAGNOSIS — Z01.30 BP CHECK: Primary | ICD-10-CM

## 2020-07-13 DIAGNOSIS — Z79.01 LONG TERM CURRENT USE OF ANTICOAGULANT THERAPY: ICD-10-CM

## 2020-07-13 LAB
CAPILLARY BLOOD COLLECTION: NORMAL
INR PPP: 2 (ref 0.86–1.14)

## 2020-07-13 PROCEDURE — 85610 PROTHROMBIN TIME: CPT | Performed by: FAMILY MEDICINE

## 2020-07-13 PROCEDURE — 99207 ZZC NO CHARGE NURSE ONLY: CPT

## 2020-07-13 PROCEDURE — 36416 COLLJ CAPILLARY BLOOD SPEC: CPT | Performed by: FAMILY MEDICINE

## 2020-07-13 NOTE — PROGRESS NOTES
Nayely Kay is a 80 year old year old patient who comes in today for a Blood Pressure check because of ongoing blood pressure monitoring.  Vital Signs as repeated by /68 pulse 60  Patient is taking medication as prescribed  Patient is tolerating medications well.  Patient is not monitoring Blood Pressure at home.    Current complaints: none  Disposition:  patient to continue with the same medication  Nicolette Granger RN    Toenails trimmed without incident  Nicolette Granger RN

## 2020-07-13 NOTE — PROGRESS NOTES
ANTICOAGULATION MANAGEMENT     Patient Name:  Nayely Kay  Date:  2020    ASSESSMENT /SUBJECTIVE:    Today's INR result of 2.0 is therapeutic. Goal INR of 2.0-3.0      Warfarin dose taken: Warfarin taken as previously instructed    Diet: No new diet changes affecting INR    Medication changes/ interactions: No new medications/supplements affecting INR    Previous INR: Therapeutic     S/S of bleeding or thromboembolism: No    New injury or illness: No    Upcoming surgery, procedure or cardioversion: No    Additional findings: None      PLAN:    Spoke with Nayely regarding INR result and instructed:     Warfarin Dosing Instructions: Continue your current warfarin dose 2.5 mg daily    Instructed patient to follow up no later than: 4 weeks  Lab visit scheduled    Education provided: Importance of consistent vitamin K intake      Nayely verbalizes understanding and agrees to warfarin dosing plan.    Instructed to call the Anticoagulation Clinic for any changes, questions or concerns. (#[phone])        Avani Olmedo [facility]      OBJECTIVE:  Recent labs: (last 7 days)     20  1307   INR 2.00*         No question data found.  Anticoagulation Summary  As of 2020    INR goal:   2.0-3.0   TTR:   77.0 % (1 y)   INR used for dosin.00 (2020)   Warfarin maintenance plan:   2.5 mg (5 mg x 0.5) every day   Full warfarin instructions:   2.5 mg every day   Weekly warfarin total:   17.5 mg   No change documented:   Avani Olmedo [facility]   Plan last modified:   Oly Michel RN (2018)   Next INR check:   8/10/2020   Priority:   Maintenance   Target end date:   Indefinite    Indications    Atrial fibrillation (H) [I48.91]  Long term current use of anticoagulant therapy [Z79.01]             Anticoagulation Episode Summary     INR check location:       Preferred lab:       Send INR reminders to:   [facility]    Comments:   *  only wants dosing card. Pt uses 5mg tablets.  wants to check every 4 weeks. has polymyalgia rheumatica (PMR)        Anticoagulation Care Providers     Provider Role Specialty Phone number    Ulysses Baker MD Referring [facility] [phone]

## 2020-07-27 ENCOUNTER — TELEPHONE (OUTPATIENT)
Dept: FAMILY MEDICINE | Facility: CLINIC | Age: 80
End: 2020-07-27

## 2020-07-27 ENCOUNTER — OFFICE VISIT (OUTPATIENT)
Dept: FAMILY MEDICINE | Facility: CLINIC | Age: 80
End: 2020-07-27
Payer: MEDICARE

## 2020-07-27 VITALS
WEIGHT: 123.8 LBS | RESPIRATION RATE: 20 BRPM | BODY MASS INDEX: 24.96 KG/M2 | SYSTOLIC BLOOD PRESSURE: 148 MMHG | DIASTOLIC BLOOD PRESSURE: 70 MMHG | TEMPERATURE: 97.5 F | HEIGHT: 59 IN

## 2020-07-27 DIAGNOSIS — R17 ELEVATED BILIRUBIN: ICD-10-CM

## 2020-07-27 DIAGNOSIS — I10 ESSENTIAL HYPERTENSION: ICD-10-CM

## 2020-07-27 DIAGNOSIS — L82.1 SEBORRHEIC KERATOSES: Primary | ICD-10-CM

## 2020-07-27 DIAGNOSIS — Z79.01 LONG TERM CURRENT USE OF ANTICOAGULANT THERAPY: ICD-10-CM

## 2020-07-27 DIAGNOSIS — I48.20 CHRONIC ATRIAL FIBRILLATION (H): ICD-10-CM

## 2020-07-27 DIAGNOSIS — R60.0 PERIPHERAL EDEMA: ICD-10-CM

## 2020-07-27 LAB
ALBUMIN SERPL-MCNC: 4 G/DL (ref 3.4–5)
ALP SERPL-CCNC: 128 U/L (ref 40–150)
ALT SERPL W P-5'-P-CCNC: 25 U/L (ref 0–50)
ANION GAP SERPL CALCULATED.3IONS-SCNC: 3 MMOL/L (ref 3–14)
AST SERPL W P-5'-P-CCNC: 31 U/L (ref 0–45)
BILIRUB DIRECT SERPL-MCNC: 0.4 MG/DL (ref 0–0.2)
BILIRUB SERPL-MCNC: 1.7 MG/DL (ref 0.2–1.3)
BUN SERPL-MCNC: 18 MG/DL (ref 7–30)
CALCIUM SERPL-MCNC: 10.2 MG/DL (ref 8.5–10.1)
CHLORIDE SERPL-SCNC: 101 MMOL/L (ref 94–109)
CO2 SERPL-SCNC: 32 MMOL/L (ref 20–32)
CREAT SERPL-MCNC: 1.04 MG/DL (ref 0.52–1.04)
GFR SERPL CREATININE-BSD FRML MDRD: 50 ML/MIN/{1.73_M2}
GLUCOSE SERPL-MCNC: 87 MG/DL (ref 70–99)
POTASSIUM SERPL-SCNC: 3.9 MMOL/L (ref 3.4–5.3)
PROT SERPL-MCNC: 8 G/DL (ref 6.8–8.8)
SODIUM SERPL-SCNC: 136 MMOL/L (ref 133–144)

## 2020-07-27 PROCEDURE — 80076 HEPATIC FUNCTION PANEL: CPT | Performed by: FAMILY MEDICINE

## 2020-07-27 PROCEDURE — 80048 BASIC METABOLIC PNL TOTAL CA: CPT | Performed by: FAMILY MEDICINE

## 2020-07-27 PROCEDURE — 99213 OFFICE O/P EST LOW 20 MIN: CPT | Performed by: FAMILY MEDICINE

## 2020-07-27 PROCEDURE — 36415 COLL VENOUS BLD VENIPUNCTURE: CPT | Performed by: FAMILY MEDICINE

## 2020-07-27 RX ORDER — LISINOPRIL 5 MG/1
5 TABLET ORAL DAILY
Qty: 90 TABLET | Refills: 3 | Status: CANCELLED | OUTPATIENT
Start: 2020-07-27

## 2020-07-27 RX ORDER — WARFARIN SODIUM 5 MG/1
TABLET ORAL
Qty: 90 TABLET | Refills: 3 | Status: SHIPPED | OUTPATIENT
Start: 2020-07-27 | End: 2020-11-23

## 2020-07-27 RX ORDER — AMLODIPINE BESYLATE 5 MG/1
5 TABLET ORAL DAILY
Qty: 90 TABLET | Refills: 3 | Status: CANCELLED | OUTPATIENT
Start: 2020-07-27

## 2020-07-27 ASSESSMENT — MIFFLIN-ST. JEOR: SCORE: 936.14

## 2020-07-27 NOTE — RESULT ENCOUNTER NOTE
Please call.  One liver test abnormal which is similar to past readings and I think nothing to worry about.   The blood chemistries (Basic metabolic panel) are all normal including electrolytes (salt balances in the blood) and blood glucose.  The creatinine, a blood test to measure kidney function is elevated indicating some decrease in kidney function.  This is a little worse than in the past.   PLAN: I don't think she needs to change anything but recheck in 6 months.

## 2020-07-27 NOTE — TELEPHONE ENCOUNTER
I spoke with her.   Her abdominal pain for one day was not related to her chronically elevated bilirubin.    REcheck with any persistent abdominal pain.   MASON BURRELL MD

## 2020-07-27 NOTE — TELEPHONE ENCOUNTER
Reason for call:  Patient reporting a symptom    Symptom or request: Pt would like to tell Dr she had a pain in her Rt Side. Pt said a month ago that did not last long. Provider asked her if she had any pain. She forgot to tell him this information.   Could this be due to her Liver that she was in for today.    Phone Number patient can be reached at:  Home number on file [phone] (home)    Best Time:  Any Time      Can we leave a detailed message on this number:  YES    Call taken on 7/27/2020 at 2:28 PM by Thais Burnette

## 2020-07-27 NOTE — PROGRESS NOTES
SUBJECTIVE: Nayely Kay is a 80 year old female  Chief Complaint   Patient presents with     Derm Problem     Edema        Last clinic visit: 7/15/2019.  I added lisinopril for hypertension.  She had 1+ ankle edema at that visit.     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    Accompanying Signs & Symptoms:  none    Previous history of similar problem:   yes    Precipitating factors:   Worsened by: ?    Alleviating factors:  Improved by: ?    Therapies Tried and outcome: Maxzide 37.5-25 mg 1/2 tab bid    She has had ankle swelling.  She tries to limit salt in her diet.     Patient Active Problem List   Diagnosis     Headache     Atrial fibrillation (H)     Polymyalgia rheumatica (H)     Iron deficiency anemia     HYPERLIPIDEMIA LDL GOAL <130     Osteopenia     Family history of breast cancer     Eczema     Advanced directives, counseling/discussion     AK (actinic keratosis)     Ganglion cyst     Long term current use of anticoagulant therapy     Essential hypertension     Tubular adenoma     Elevated bilirubin     BP check      She has been feeling well.   Does not check blood pressure at home. Has checked with INR checks and has been doing well.     ROS:General: No change in weight, sleep or appetite.  Normal energy.  No fever or chills  Resp: No coughing, wheezing or shortness of breath  CV: No chest pains or palpitations  GI: No nausea, vomiting,  heartburn, abdominal pain, diarrhea, constipation or change in bowel habits  : No urinary frequency or dysuria, bladder  "or kidney problems  Musculoskeletal: No significant muscle or joint pains  Psychiatric: No problems with anxiety, depression or mental health    OBJECTIVE:Blood pressure (!) 148/70, temperature 97.5  F (36.4  C), temperature source Tympanic, resp. rate 20, height 1.505 m (4' 11.25\"), weight 56.2 kg (123 lb 12.8 oz). BMI=Body mass index is 24.79 kg/m .  GENERAL APPEARANCE ADULT: Alert, no acute distress  NECK: No adenopathy,masses or thyromegaly  RESP: lungs clear to auscultation   CV: irregular rhythm-irregularly irregular, rate-normal, no murmur  ABDOMEN: soft, non-tender, liver edge about 3cm below right costal margin.   MS: She has 1+ edema lower legs/ankles.    SKIN: She has several raised lightly pigmented spots right temple and upper back concistent with seborrheic keratosis.     ASSESSMENT:   (L82.1) Seborrheic keratoses  (primary encounter diagnosis)  Comment: The spots you had concern about are \"age spots\" and are not worrisome and will not turn into any cancer.  It is OK to leave them alone.   Plan: No treatment needed.     (R60.9) Peripheral edema  Comment: Mild swelling in ankle area.    Plan: No treatment needed.     (I10) Essential hypertension  Comment: a little higher today-has been consistently good  Plan: Basic metabolic panel        No change in current treatment plan.     (Z79.01) Long term current use of anticoagulant therapy  Comment:   Plan: warfarin ANTICOAGULANT (COUMADIN) 5 MG tablet        Refills.     (R17) Elevated bilirubin  Comment:   Plan: Hepatic panel (Albumin, ALT, AST, Bili, Alk         Phos, TP)        Check liver blood tests today.     (I48.20) Chronic atrial fibrillation (H)  Comment: stable  Plan: No change in current treatment plan.  Continue warfarin.           "

## 2020-07-27 NOTE — PATIENT INSTRUCTIONS
"ASSESSMENT:   (L82.1) Seborrheic keratoses  (primary encounter diagnosis)  Comment: The spots you had concern about are \"age spots\" and are not worrisome and will not turn into any cancer.  It is OK to leave them alone.   Plan: No treatment needed.     (R60.9) Peripheral edema  Comment: Mild swelling in ankle area.    Plan: No treatment needed.     (I10) Essential hypertension  Comment: a little higher today-has been consistently good  Plan: Basic metabolic panel        No change in current treatment plan.     (Z79.01) Long term current use of anticoagulant therapy  Comment:   Plan: warfarin ANTICOAGULANT (COUMADIN) 5 MG tablet        Refills.     (R17) Elevated bilirubin  Comment:   Plan: Hepatic panel (Albumin, ALT, AST, Bili, Alk         Phos, TP)        Check liver blood tests today.     (I48.20) Chronic atrial fibrillation (H)  Comment: stable  Plan: No change in current treatment plan.  Continue warfarin.    "

## 2020-07-27 NOTE — NURSING NOTE
"Chief Complaint   Patient presents with     Derm Problem     Edema       Initial BP (!) 150/64   Temp 97.5  F (36.4  C) (Tympanic)   Resp 20   Ht 1.505 m (4' 11.25\")   Wt 56.2 kg (123 lb 12.8 oz)   BMI 24.79 kg/m   Estimated body mass index is 24.79 kg/m  as calculated from the following:    Height as of this encounter: 1.505 m (4' 11.25\").    Weight as of this encounter: 56.2 kg (123 lb 12.8 oz).    Patient presents to the clinic using     Health Maintenance that is potentially due pending provider review:          Is there anyone who you would like to be able to receive your results?   If yes have patient fill out BOLA    "

## 2020-08-10 ENCOUNTER — ALLIED HEALTH/NURSE VISIT (OUTPATIENT)
Dept: FAMILY MEDICINE | Facility: CLINIC | Age: 80
End: 2020-08-10
Payer: MEDICARE

## 2020-08-10 ENCOUNTER — ANTICOAGULATION THERAPY VISIT (OUTPATIENT)
Dept: ANTICOAGULATION | Facility: CLINIC | Age: 80
End: 2020-08-10

## 2020-08-10 VITALS — HEART RATE: 64 BPM | DIASTOLIC BLOOD PRESSURE: 62 MMHG | SYSTOLIC BLOOD PRESSURE: 132 MMHG

## 2020-08-10 DIAGNOSIS — Z01.30 BP CHECK: ICD-10-CM

## 2020-08-10 DIAGNOSIS — I10 ESSENTIAL HYPERTENSION: Primary | ICD-10-CM

## 2020-08-10 DIAGNOSIS — I48.20 CHRONIC ATRIAL FIBRILLATION (H): ICD-10-CM

## 2020-08-10 DIAGNOSIS — I48.91 ATRIAL FIBRILLATION (H): ICD-10-CM

## 2020-08-10 DIAGNOSIS — Z79.01 LONG TERM CURRENT USE OF ANTICOAGULANT THERAPY: ICD-10-CM

## 2020-08-10 LAB
CAPILLARY BLOOD COLLECTION: NORMAL
INR PPP: 2.1 (ref 0.86–1.14)

## 2020-08-10 PROCEDURE — 85610 PROTHROMBIN TIME: CPT | Performed by: FAMILY MEDICINE

## 2020-08-10 PROCEDURE — 36416 COLLJ CAPILLARY BLOOD SPEC: CPT | Performed by: FAMILY MEDICINE

## 2020-08-10 PROCEDURE — 99207 ZZC NO CHARGE NURSE ONLY: CPT

## 2020-08-10 NOTE — PROGRESS NOTES
Nayely Kay is a 80 year old year old patient who comes in today for a Blood Pressure check because of new medication.  Vital Signs as repeated by /62 pulse 62  Patient is taking medication as prescribed  Patient is tolerating medications well.  Patient is not monitoring Blood Pressure at home.    Current complaints: none  Disposition:  patient to continue with the same medication  Nicolette Granger RN

## 2020-08-10 NOTE — PROGRESS NOTES
ANTICOAGULATION MANAGEMENT     Patient Name:  Nayely Kay  Date:  8/10/2020    ASSESSMENT /SUBJECTIVE:    Today's INR result of 2.10 is therapeutic. Goal INR of 2.0-3.0      Warfarin dose taken: Warfarin taken as previously instructed    Diet: No new diet changes affecting INR    Medication changes/ interactions: No new medications/supplements affecting INR    Previous INR: Therapeutic 2.00    S/S of bleeding or thromboembolism: No    New injury or illness: No    Upcoming surgery, procedure or cardioversion: No    Additional findings: None      PLAN:    Spoke with Nayely regarding INR result and instructed:     Warfarin Dosing Instructions: Continue your current warfarin dose 2.5mg daily    Instructed patient to follow up no later than: 4 weeks  Lab visit scheduled    Education provided:  Importance of notifying clinic for changes in medications/health      Nayely verbalizes understanding and agrees to warfarin dosing plan.    Instructed to call the Anticoagulation Clinic for any changes, questions or concerns. (#[phone])      OBJECTIVE:  Recent labs: (last 7 days)     08/10/20  0951   INR 2.10*         INR assessment THER    Recheck INR In: 4 WEEKS    INR Location Clinic      Anticoagulation Summary  As of 8/10/2020    INR goal:   2.0-3.0   TTR:   78.9 % (1 y)   INR used for dosin.10 (8/10/2020)   Warfarin maintenance plan:   2.5 mg (5 mg x 0.5) every day   Full warfarin instructions:   2.5 mg every day   Weekly warfarin total:   17.5 mg   No change documented:   Jenny Barger RN   Plan last modified:   Oly Michel RN (2018)   Next INR check:   2020   Priority:   Maintenance   Target end date:   Indefinite    Indications    Atrial fibrillation (H) [I48.91]  Long term current use of anticoagulant therapy [Z79.01]             Anticoagulation Episode Summary     INR check location:       Preferred lab:       Send INR reminders to:   [facility]    Comments:   *   only wants dosing card. Pt uses 5mg tablets. wants to check every 4 weeks. has polymyalgia rheumatica (PMR)        Anticoagulation Care Providers     Provider Role Specialty Phone number    Ulysses Baker MD [facility] [phone]

## 2020-09-11 ENCOUNTER — ANTICOAGULATION THERAPY VISIT (OUTPATIENT)
Dept: ANTICOAGULATION | Facility: CLINIC | Age: 80
End: 2020-09-11

## 2020-09-11 ENCOUNTER — ALLIED HEALTH/NURSE VISIT (OUTPATIENT)
Dept: FAMILY MEDICINE | Facility: CLINIC | Age: 80
End: 2020-09-11
Payer: MEDICARE

## 2020-09-11 VITALS — SYSTOLIC BLOOD PRESSURE: 122 MMHG | DIASTOLIC BLOOD PRESSURE: 68 MMHG | HEART RATE: 72 BPM

## 2020-09-11 DIAGNOSIS — I48.20 CHRONIC ATRIAL FIBRILLATION (H): ICD-10-CM

## 2020-09-11 DIAGNOSIS — Z79.01 LONG TERM CURRENT USE OF ANTICOAGULANT THERAPY: ICD-10-CM

## 2020-09-11 DIAGNOSIS — Z01.30 BP CHECK: ICD-10-CM

## 2020-09-11 DIAGNOSIS — I48.91 ATRIAL FIBRILLATION (H): ICD-10-CM

## 2020-09-11 DIAGNOSIS — I10 ESSENTIAL HYPERTENSION: Primary | ICD-10-CM

## 2020-09-11 LAB
CAPILLARY BLOOD COLLECTION: NORMAL
INR PPP: 2.3 (ref 0.86–1.14)

## 2020-09-11 PROCEDURE — 85610 PROTHROMBIN TIME: CPT | Performed by: FAMILY MEDICINE

## 2020-09-11 PROCEDURE — 36416 COLLJ CAPILLARY BLOOD SPEC: CPT | Performed by: FAMILY MEDICINE

## 2020-09-11 PROCEDURE — 99207 ZZC NO CHARGE NURSE ONLY: CPT

## 2020-09-11 NOTE — PROGRESS NOTES
ANTICOAGULATION MANAGEMENT     Patient Name:  Nayely Kay  Date:  2020    ASSESSMENT /SUBJECTIVE:    Today's INR result of 2.30 is therapeutic. Goal INR of 2.0-3.0      Warfarin dose taken: Warfarin taken as previously instructed    Diet: No new diet changes affecting INR    Medication changes/ interactions: No new medications/supplements affecting INR    Previous INR: Therapeutic 2.10    S/S of bleeding or thromboembolism: No    New injury or illness: No    Upcoming surgery, procedure or cardioversion: No    Additional findings: None      PLAN:    Spoke with Nayely regarding INR result and instructed:     Warfarin Dosing Instructions: Continue your current warfarin dose 2.5mg daily    Instructed patient to follow up no later than: 4 weeks  Lab visit scheduled    Education provided: Importance of notifying clinic for changes in medications/health      Nayely verbalizes understanding and agrees to warfarin dosing plan.    Instructed to call the Anticoagulation Clinic for any changes, questions or concerns. (#[phone])      OBJECTIVE:  Recent labs: (last 7 days)     20  1509   INR 2.30*         INR assessment THER    Recheck INR In: 4 WEEKS    INR Location Clinic      Anticoagulation Summary  As of 2020    INR goal:   2.0-3.0   TTR:   78.9 % (1 y)   INR used for dosin.30 (2020)   Warfarin maintenance plan:   2.5 mg (5 mg x 0.5) every day   Full warfarin instructions:   2.5 mg every day   Weekly warfarin total:   17.5 mg   No change documented:   Jenny Barger RN   Plan last modified:   Oly Michel RN (2018)   Next INR check:   10/9/2020   Priority:   Maintenance   Target end date:   Indefinite    Indications    Atrial fibrillation (H) [I48.91]  Long term current use of anticoagulant therapy [Z79.01]             Anticoagulation Episode Summary     INR check location:       Preferred lab:       Send INR reminders to:   [facility]    Comments:   *   only wants dosing card. Pt uses 5mg tablets. wants to check every 4 weeks. has polymyalgia rheumatica (PMR)        Anticoagulation Care Providers     Provider Role Specialty Phone number    Ulysses Baker MD [facility] [phone]

## 2020-09-11 NOTE — PROGRESS NOTES
Nayely Kay is a 80 year old year old patient who comes in today for a Blood Pressure check because of ongoing blood pressure monitoring.  Vital Signs as repeated by /78  Patient is taking medication as prescribed  Patient is tolerating medications well.  Patient is not monitoring Blood Pressure at home.    Current complaints: none  Disposition:  patient to continue with the same medication  Nicolette Granger RN      
39

## 2020-10-09 ENCOUNTER — ANTICOAGULATION THERAPY VISIT (OUTPATIENT)
Dept: ANTICOAGULATION | Facility: CLINIC | Age: 80
End: 2020-10-09

## 2020-10-09 ENCOUNTER — ALLIED HEALTH/NURSE VISIT (OUTPATIENT)
Dept: FAMILY MEDICINE | Facility: CLINIC | Age: 80
End: 2020-10-09
Payer: MEDICARE

## 2020-10-09 VITALS — DIASTOLIC BLOOD PRESSURE: 74 MMHG | SYSTOLIC BLOOD PRESSURE: 126 MMHG

## 2020-10-09 DIAGNOSIS — Z23 NEED FOR PROPHYLACTIC VACCINATION AND INOCULATION AGAINST INFLUENZA: Primary | ICD-10-CM

## 2020-10-09 DIAGNOSIS — Z79.01 LONG TERM CURRENT USE OF ANTICOAGULANT THERAPY: ICD-10-CM

## 2020-10-09 DIAGNOSIS — I10 ESSENTIAL HYPERTENSION: ICD-10-CM

## 2020-10-09 DIAGNOSIS — Z23 ENCOUNTER FOR IMMUNIZATION: Primary | ICD-10-CM

## 2020-10-09 DIAGNOSIS — I48.91 ATRIAL FIBRILLATION (H): ICD-10-CM

## 2020-10-09 DIAGNOSIS — I48.20 CHRONIC ATRIAL FIBRILLATION (H): ICD-10-CM

## 2020-10-09 LAB — INR PPP: 2 (ref 0.86–1.14)

## 2020-10-09 PROCEDURE — 99207 PR NO CHARGE NURSE ONLY: CPT

## 2020-10-09 PROCEDURE — G0008 ADMIN INFLUENZA VIRUS VAC: HCPCS

## 2020-10-09 PROCEDURE — 85610 PROTHROMBIN TIME: CPT | Performed by: FAMILY MEDICINE

## 2020-10-09 PROCEDURE — 90662 IIV NO PRSV INCREASED AG IM: CPT

## 2020-10-09 PROCEDURE — 36416 COLLJ CAPILLARY BLOOD SPEC: CPT | Performed by: FAMILY MEDICINE

## 2020-10-09 NOTE — PROGRESS NOTES
See separate encounter. Pt had been scheduled and arrived under visit type 'LAB' and not 'NURSE ONLY,' therefore RN unable to properly chart in this encounter, even after the type was changed.    Giselle PALACIOS RN, BSN

## 2020-10-09 NOTE — PROGRESS NOTES
ANTICOAGULATION MANAGEMENT     Patient Name:  Nayely Kay  Date:  10/9/2020    ASSESSMENT /SUBJECTIVE:    Today's INR result of 2.00 is therapeutic. Goal INR of 2.0-3.0      Warfarin dose taken: Warfarin taken as instructed    Diet: No new diet changes affecting INR    Medication changes/ interactions: No new medications/supplements affecting INR    Previous INR: Therapeutic 2.30    S/S of bleeding or thromboembolism: No    New injury or illness: No    Upcoming surgery, procedure or cardioversion: No    Additional findings: None      PLAN:    Telephone call with Nayely regarding INR result and instructed:     Warfarin Dosing Instructions: Continue your current warfarin dose 2.5mg daily    Instructed patient to follow up no later than: 4 weeks  Lab visit scheduled    Education provided: Contact the anticoagulation clinic with any changes, questions or concerns at #[phone]       Nayely verbalizes understanding and agrees to warfarin dosing plan.    Instructed to call the Anticoagulation Clinic for any changes, questions or concerns. (#[phone])        Jenny Barger RN CACP       OBJECTIVE:  Recent labs: (last 7 days)     10/09/20  0929   INR 2.00*         INR assessment THER    Recheck INR In: 4 WEEKS    INR Location Clinic      Anticoagulation Summary  As of 10/9/2020    INR goal:  2.0-3.0   TTR:  81.0 % (1 y)   INR used for dosin.00 (10/9/2020)   Warfarin maintenance plan:  2.5 mg (5 mg x 0.5) every day   Full warfarin instructions:  2.5 mg every day   Weekly warfarin total:  17.5 mg   No change documented:  Jenny Barger RN   Plan last modified:  Oly Michel RN (2018)   Next INR check:  2020   Priority:  Maintenance   Target end date:  Indefinite    Indications    Atrial fibrillation (H) [I48.91]  Long term current use of anticoagulant therapy [Z79.01]             Anticoagulation Episode Summary     INR check location:      Preferred lab:      Send INR  reminders to:  [facility]    Comments:  *  only wants dosing card. Pt uses 5mg tablets. wants to check every 4 weeks. has polymyalgia rheumatica (PMR)        Anticoagulation Care Providers     Provider Role Specialty Phone number    Ulysses Baker MD Referring [facility] [phone]

## 2020-10-09 NOTE — PROGRESS NOTES
Pt was seen at 0930 this morning by RN; BP checked and high dose flu shot administered. Error in the way she was scheduled so RN had to schedule and arrive pt later on the RN schedule in order to chart.      Vitals:    10/09/20 0940   BP: 126/74   BP Location: Right arm   Patient Position: Sitting   Cuff Size: Adult Regular       Giselle PALACIOS RN, BSN

## 2020-11-06 ENCOUNTER — ANTICOAGULATION THERAPY VISIT (OUTPATIENT)
Dept: ANTICOAGULATION | Facility: CLINIC | Age: 80
End: 2020-11-06

## 2020-11-06 ENCOUNTER — ALLIED HEALTH/NURSE VISIT (OUTPATIENT)
Dept: FAMILY MEDICINE | Facility: CLINIC | Age: 80
End: 2020-11-06
Payer: MEDICARE

## 2020-11-06 VITALS — SYSTOLIC BLOOD PRESSURE: 138 MMHG | HEART RATE: 62 BPM | OXYGEN SATURATION: 98 % | DIASTOLIC BLOOD PRESSURE: 66 MMHG

## 2020-11-06 DIAGNOSIS — I48.91 ATRIAL FIBRILLATION (H): ICD-10-CM

## 2020-11-06 DIAGNOSIS — Z79.01 LONG TERM CURRENT USE OF ANTICOAGULANT THERAPY: ICD-10-CM

## 2020-11-06 DIAGNOSIS — I10 ESSENTIAL HYPERTENSION: Primary | ICD-10-CM

## 2020-11-06 LAB
CAPILLARY BLOOD COLLECTION: NORMAL
INR PPP: 1.7 (ref 0.86–1.14)

## 2020-11-06 PROCEDURE — 85610 PROTHROMBIN TIME: CPT | Performed by: FAMILY MEDICINE

## 2020-11-06 PROCEDURE — 36416 COLLJ CAPILLARY BLOOD SPEC: CPT | Performed by: FAMILY MEDICINE

## 2020-11-06 PROCEDURE — 99207 PR NO CHARGE NURSE ONLY: CPT

## 2020-11-06 NOTE — PROGRESS NOTES
I met with Nayely Kay at the request of Dr. Cedillo to recheck her blood pressure.  Blood pressure medications on the med list were reviewed with patient.    Patient has taken all medications as per usual regimen: yes  Patient reports tolerating them without any issues or concerns: yes    Vitals:    11/06/20 1044   BP: 138/66   Pulse: 62   SpO2: 98%       Blood pressure was taken, previous encounter was reviewed, recorded blood pressure below 140/90. Patient was discharged and the note will be sent to the provider for final review.

## 2020-11-06 NOTE — PROGRESS NOTES
Anticoagulation Management    Unable to reach Nayely today.    Today's INR result of 1.7 is subtherapeutic (goal INR of 2.0-3.0).  Result received from: Clinic Lab    Follow up required to confirm warfarin dose taken and assess for changes    No instructions provided. Unable to leave voicemail. No consent to leave DVM and no mychart. Pt to call back when able.      Anticoagulation clinic to follow up    Flaquita Neal RN  ANTICOAGULATION FOLLOW-UP CLINIC VISIT    Patient Name:  Nayely Kay  Date:  2020  Contact Type:  Telephone    SUBJECTIVE:  Patient Findings     Comments:  No changes in diet, activity level, medications (including over the counter), or health. No missed doses of warfarin. Patient took dosing as prescribed. No signs of clots or bleeding concerns. Patient will continue maintenance warfarin dosing with a booster dose today. Recheck in 10 days.          Clinical Outcomes     Negatives:  Major bleeding event, Thromboembolic event, Anticoagulation-related hospital admission, Anticoagulation-related ED visit, Anticoagulation-related fatality    Comments:  No changes in diet, activity level, medications (including over the counter), or health. No missed doses of warfarin. Patient took dosing as prescribed. No signs of clots or bleeding concerns. Patient will continue maintenance warfarin dosing with a booster dose today. Recheck in 10 days.             OBJECTIVE    Recent labs: (last 7 days)     20  1014   INR 1.70*       ASSESSMENT / PLAN  INR assessment SUB    Recheck INR In: 10 DAYS    INR Location Clinic      Anticoagulation Summary  As of 2020    INR goal:  2.0-3.0   TTR:  81.0 % (1 y)   INR used for dosin.70 (2020)   Warfarin maintenance plan:  2.5 mg (5 mg x 0.5) every day   Full warfarin instructions:  : 5 mg; Otherwise 2.5 mg every day   Weekly warfarin total:  17.5 mg   Plan last modified:  Oly Michel RN (2018)   Next INR check:  2020    Priority:  High   Target end date:      Indications    Atrial fibrillation (H) [I48.91]  Long term current use of anticoagulant therapy [Z79.01]             Anticoagulation Episode Summary     INR check location:      Preferred lab:      Send INR reminders to:  [facility]    Comments:  *  only wants dosing card. Pt uses 5mg tablets. wants to check every 4 weeks. has polymyalgia rheumatica (PMR)        Anticoagulation Care Providers     Provider Role Specialty Phone number    Ulysses Baker MD Referring Family Medicine [phone]            See the Encounter Report to view Anticoagulation Flowsheet and Dosing Calendar (Go to Encounters tab in chart review, and find the Anticoagulation Therapy Visit)        Flaquita Neal RN

## 2020-11-23 ENCOUNTER — ANTICOAGULATION THERAPY VISIT (OUTPATIENT)
Dept: ANTICOAGULATION | Facility: CLINIC | Age: 80
End: 2020-11-23

## 2020-11-23 ENCOUNTER — ALLIED HEALTH/NURSE VISIT (OUTPATIENT)
Dept: FAMILY MEDICINE | Facility: CLINIC | Age: 80
End: 2020-11-23
Payer: MEDICARE

## 2020-11-23 VITALS — HEART RATE: 66 BPM | DIASTOLIC BLOOD PRESSURE: 66 MMHG | RESPIRATION RATE: 18 BRPM | SYSTOLIC BLOOD PRESSURE: 138 MMHG

## 2020-11-23 DIAGNOSIS — I10 ESSENTIAL HYPERTENSION: Primary | ICD-10-CM

## 2020-11-23 DIAGNOSIS — Z01.30 BP CHECK: ICD-10-CM

## 2020-11-23 DIAGNOSIS — I48.91 ATRIAL FIBRILLATION (H): ICD-10-CM

## 2020-11-23 DIAGNOSIS — Z79.01 LONG TERM CURRENT USE OF ANTICOAGULANT THERAPY: ICD-10-CM

## 2020-11-23 LAB
CAPILLARY BLOOD COLLECTION: NORMAL
INR PPP: 1.7 (ref 0.86–1.14)

## 2020-11-23 PROCEDURE — 85610 PROTHROMBIN TIME: CPT | Performed by: FAMILY MEDICINE

## 2020-11-23 PROCEDURE — 36416 COLLJ CAPILLARY BLOOD SPEC: CPT | Performed by: FAMILY MEDICINE

## 2020-11-23 RX ORDER — WARFARIN SODIUM 5 MG/1
TABLET ORAL
Qty: 90 TABLET | Refills: 0
Start: 2020-11-23 | End: 2021-09-10

## 2020-11-23 NOTE — PROGRESS NOTES
Nayely Kay is a 80 year old year old patient who comes in today for a Blood Pressure check because of ongoing blood pressure monitoring.  Gets BP check when comes in for INR  Vital Signs as repeated by /66  Patient is taking medication as prescribed  Patient is tolerating medications well.  Patient is not monitoring Blood Pressure at home.    Current complaints: none  Disposition:  patient to continue with the same medication.[email or web address]

## 2020-11-23 NOTE — PROGRESS NOTES
ANTICOAGULATION MANAGEMENT     Patient Name:  Nayely Kay  Date:  2020    ASSESSMENT /SUBJECTIVE:    Today's INR result of 1.70 is subtherapeutic. Goal INR of 2.0-3.0      Warfarin dose taken: Warfarin taken as instructed    Diet: No new diet changes affecting INR    Medication changes/ interactions: No new medications/supplements affecting INR    Previous INR: Subtherapeutic 1.70    S/S of bleeding or thromboembolism: No    New injury or illness: No    Upcoming surgery, procedure or cardioversion: No    Additional findings: Second INR out of range, will increase maintenance dose.      PLAN:    Telephone call with Nayely regarding INR result and instructed:     Warfarin Dosing Instructions: Change your warfarin dose to 5 mg every Mon; 2.5 mg all other days    Instructed patient to follow up no later than: 2 weeks  Lab visit scheduled    Education provided: Target INR goal and significance of current INR result, Monitoring for clotting signs and symptoms and When to seek medical attention/emergency care      Nayely verbalizes understanding and agrees to warfarin dosing plan.    Instructed to call the Anticoagulation Clinic for any changes, questions or concerns. (#[phone])        Jeannie Gaspar RN      OBJECTIVE:  Recent labs: (last 7 days)     20  1345   INR 1.70*         No question data found.  Anticoagulation Summary  As of 2020    INR goal:  2.0-3.0   TTR:  79.8 % (1 y)   INR used for dosin.70 (2020)   Warfarin maintenance plan:  5 mg (5 mg x 1) every Mon; 2.5 mg (5 mg x 0.5) all other days   Full warfarin instructions:  : 5 mg; Otherwise 5 mg every Mon; 2.5 mg all other days   Weekly warfarin total:  20 mg   Plan last modified:  Jeannie Gaspar RN (2020)   Next INR check:  2020   Priority:  High   Target end date:  Indefinite    Indications    Atrial fibrillation (H) [I48.91]  Long term current use of anticoagulant therapy [Z79.01]              Anticoagulation Episode Summary     INR check location:      Preferred lab:      Send INR reminders to:  [facility]    Comments:  *  only wants dosing card. Pt uses 5mg tablets. wants to check every 4 weeks. has polymyalgia rheumatica (PMR)        Anticoagulation Care Providers     Provider Role Specialty Phone number    Ulysses Baker MD Referring Family Medicine [phone]

## 2020-11-28 ENCOUNTER — ANCILLARY PROCEDURE (OUTPATIENT)
Dept: MAMMOGRAPHY | Facility: CLINIC | Age: 80
End: 2020-11-28
Attending: FAMILY MEDICINE
Payer: MEDICARE

## 2020-11-28 DIAGNOSIS — Z12.31 VISIT FOR SCREENING MAMMOGRAM: ICD-10-CM

## 2020-11-28 PROCEDURE — 77067 SCR MAMMO BI INCL CAD: CPT | Performed by: RADIOLOGY

## 2020-12-07 ENCOUNTER — ANTICOAGULATION THERAPY VISIT (OUTPATIENT)
Dept: ANTICOAGULATION | Facility: CLINIC | Age: 80
End: 2020-12-07

## 2020-12-07 ENCOUNTER — TELEPHONE (OUTPATIENT)
Dept: FAMILY MEDICINE | Facility: CLINIC | Age: 80
End: 2020-12-07

## 2020-12-07 DIAGNOSIS — Z79.01 LONG TERM CURRENT USE OF ANTICOAGULANT THERAPY: ICD-10-CM

## 2020-12-07 DIAGNOSIS — I48.20 CHRONIC ATRIAL FIBRILLATION (H): Primary | ICD-10-CM

## 2020-12-07 DIAGNOSIS — I48.91 ATRIAL FIBRILLATION (H): ICD-10-CM

## 2020-12-07 LAB
CAPILLARY BLOOD COLLECTION: NORMAL
INR PPP: 2.1 (ref 0.86–1.14)

## 2020-12-07 PROCEDURE — 36416 COLLJ CAPILLARY BLOOD SPEC: CPT | Performed by: FAMILY MEDICINE

## 2020-12-07 PROCEDURE — 99207 PR NO CHARGE NURSE ONLY: CPT

## 2020-12-07 PROCEDURE — 85610 PROTHROMBIN TIME: CPT | Performed by: FAMILY MEDICINE

## 2020-12-07 NOTE — TELEPHONE ENCOUNTER
ANTICOAGULATION MANAGEMENT      Nayely Kay due for annual renewal of referral to anticoagulation monitoring. Order pended for your review and signature.      ANTICOAGULATION SUMMARY      Warfarin indication(s)     Atrial fibrillation    Heart valve present?  NO       Current goal range   INR: 2.0-3.0     Goal appropriate for indication? Yes, INR 2-3 appropriate for hx of DVT, PE, hypercoagulable state, Afib, LVAD, or bileaflet AVR without risk factors     Current duration of therapy Indefinite/long term therapy   Time in Therapeutic Range (TTR)  (Goal > 60%) 76.9 %       Office visit with referring provider's group within last year yes on 7-27-20       Flaquita Neal RN

## 2020-12-07 NOTE — PROGRESS NOTES
ANTICOAGULATION FOLLOW-UP CLINIC VISIT    Patient Name:  Nayely Kay  Date:  2020  Contact Type:  Telephone    SUBJECTIVE:  Patient Findings     Comments:  No changes in diet, activity level, medications (including over the counter), or health. No missed doses of warfarin. Patient took dosing as prescribed. No signs of clots or bleeding concerns. Patient will continue maintenance warfarin dosing. Recheck INR in 3 weeks.           Clinical Outcomes     Negatives:  Major bleeding event, Thromboembolic event, Anticoagulation-related hospital admission, Anticoagulation-related ED visit, Anticoagulation-related fatality    Comments:  No changes in diet, activity level, medications (including over the counter), or health. No missed doses of warfarin. Patient took dosing as prescribed. No signs of clots or bleeding concerns. Patient will continue maintenance warfarin dosing. Recheck INR in 3 weeks.              OBJECTIVE    Recent labs: (last 7 days)     20  1029   INR 2.10*       ASSESSMENT / PLAN  INR assessment THER    Recheck INR In: 3 WEEKS    INR Location Clinic      Anticoagulation Summary  As of 2020    INR goal:  2.0-3.0   TTR:  76.9 % (1 y)   INR used for dosin.10 (2020)   Warfarin maintenance plan:  5 mg (5 mg x 1) every Mon; 2.5 mg (5 mg x 0.5) all other days   Full warfarin instructions:  5 mg every Mon; 2.5 mg all other days   Weekly warfarin total:  20 mg   No change documented:  Flaquita Neal RN   Plan last modified:  Jeannie Gaspar RN (2020)   Next INR check:  2020   Priority:  Maintenance   Target end date:  Indefinite    Indications    Atrial fibrillation (H) [I48.91]  Long term current use of anticoagulant therapy [Z79.01]             Anticoagulation Episode Summary     INR check location:      Preferred lab:      Send INR reminders to:  [facility]    Comments:  *  only wants dosing card. Pt uses 5mg tablets. wants to check every 4 weeks. has  polymyalgia rheumatica (PMR)        Anticoagulation Care Providers     Provider Role Specialty Phone number    Ulysses Baker MD Referring Family Medicine [phone]            See the Encounter Report to view Anticoagulation Flowsheet and Dosing Calendar (Go to Encounters tab in chart review, and find the Anticoagulation Therapy Visit)        Flaquita Neal RN

## 2020-12-10 ENCOUNTER — ALLIED HEALTH/NURSE VISIT (OUTPATIENT)
Dept: FAMILY MEDICINE | Facility: CLINIC | Age: 80
End: 2020-12-10
Payer: MEDICARE

## 2020-12-10 VITALS — DIASTOLIC BLOOD PRESSURE: 68 MMHG | SYSTOLIC BLOOD PRESSURE: 130 MMHG

## 2020-12-10 DIAGNOSIS — I10 ESSENTIAL HYPERTENSION: Primary | ICD-10-CM

## 2020-12-10 PROCEDURE — 99207 PR NO CHARGE NURSE ONLY: CPT

## 2020-12-10 NOTE — PROGRESS NOTES
Nayely Kay is a 80 year old year old patient who comes in today for a Blood Pressure check because of ongoing blood pressure monitoring.  Vital Signs as repeated by RN   BP Readings from Last 6 Encounters:   12/10/20 130/68   11/23/20 138/66   11/06/20 138/66   10/09/20 126/74   09/11/20 122/68   08/10/20 132/62     Patient is taking medication as prescribed  Patient is tolerating medications well.  Current complaints: none  Disposition:  Continue same medications. Recheck BP when in for next INR, that is her routine. She said she forgot to stop and have it checked the last time she was in to have her INR checked.     Giselle PALACIOS RN, BSN

## 2020-12-16 DIAGNOSIS — I10 ESSENTIAL HYPERTENSION: ICD-10-CM

## 2020-12-16 RX ORDER — LISINOPRIL 5 MG/1
TABLET ORAL
Qty: 90 TABLET | Refills: 1 | Status: SHIPPED | OUTPATIENT
Start: 2020-12-16 | End: 2021-06-14

## 2020-12-16 RX ORDER — AMLODIPINE BESYLATE 5 MG/1
TABLET ORAL
Qty: 90 TABLET | Refills: 1 | Status: SHIPPED | OUTPATIENT
Start: 2020-12-16 | End: 2021-06-14

## 2020-12-28 ENCOUNTER — ANTICOAGULATION THERAPY VISIT (OUTPATIENT)
Dept: ANTICOAGULATION | Facility: CLINIC | Age: 80
End: 2020-12-28

## 2020-12-28 ENCOUNTER — ALLIED HEALTH/NURSE VISIT (OUTPATIENT)
Dept: FAMILY MEDICINE | Facility: CLINIC | Age: 80
End: 2020-12-28
Payer: MEDICARE

## 2020-12-28 VITALS — DIASTOLIC BLOOD PRESSURE: 68 MMHG | HEART RATE: 60 BPM | SYSTOLIC BLOOD PRESSURE: 136 MMHG

## 2020-12-28 DIAGNOSIS — I48.91 ATRIAL FIBRILLATION (H): ICD-10-CM

## 2020-12-28 DIAGNOSIS — I10 ESSENTIAL HYPERTENSION: Primary | ICD-10-CM

## 2020-12-28 DIAGNOSIS — Z79.01 LONG TERM CURRENT USE OF ANTICOAGULANT THERAPY: ICD-10-CM

## 2020-12-28 DIAGNOSIS — I48.20 CHRONIC ATRIAL FIBRILLATION (H): ICD-10-CM

## 2020-12-28 LAB
CAPILLARY BLOOD COLLECTION: NORMAL
INR PPP: 2.1 (ref 0.86–1.14)

## 2020-12-28 PROCEDURE — 85610 PROTHROMBIN TIME: CPT | Performed by: FAMILY MEDICINE

## 2020-12-28 PROCEDURE — 36416 COLLJ CAPILLARY BLOOD SPEC: CPT | Performed by: FAMILY MEDICINE

## 2020-12-28 PROCEDURE — 99207 PR NO CHARGE NURSE ONLY: CPT

## 2020-12-28 NOTE — PROGRESS NOTES
Nayely Kay is a 80 year old year old patient who comes in today for a Blood Pressure check because of ongoing blood pressure monitoring.  Vital Signs as repeated by RN N/A  Patient is taking medication as prescribed  Patient is tolerating medications well.  Patient is not monitoring Blood Pressure at home.  Average readings if yes are N/A  Current complaints: none  Disposition:  patient to continue with the same medications.  NOVA Rosales RN

## 2020-12-28 NOTE — PROGRESS NOTES
ANTICOAGULATION MANAGEMENT     Patient Name:  Nayely Kay  Date:  2020    ASSESSMENT /SUBJECTIVE:    Today's INR result of 2.10 is therapeutic. Goal INR of 2.0-3.0      Warfarin dose taken: Warfarin taken as instructed    Diet: No new diet changes affecting INR    Medication changes/ interactions: No new medications/supplements affecting INR    Previous INR: Therapeutic 2.10    S/S of bleeding or thromboembolism: No    New injury or illness: No    Upcoming surgery, procedure or cardioversion: No    Additional findings: None      PLAN:    Telephone call with Nayely regarding INR result and instructed:     Warfarin Dosing Instructions: Continue your current warfarin dose 5 mg every Mon; 2.5 mg all other days    Instructed patient to follow up no later than: 4 weeks  Lab visit scheduled    Education provided: Target INR goal and significance of current INR result      Nayely verbalizes understanding and agrees to warfarin dosing plan.    Instructed to call the Anticoagulation Clinic for any changes, questions or concerns. (#[phone])        Jeannie Gaspar RN      OBJECTIVE:  Recent labs: (last 7 days)     20  1327   INR 2.10*         No question data found.  Anticoagulation Summary  As of 2020    INR goal:  2.0-3.0   TTR:  77.5 % (1 y)   INR used for dosin.10 (2020)   Warfarin maintenance plan:  5 mg (5 mg x 1) every Mon; 2.5 mg (5 mg x 0.5) all other days   Full warfarin instructions:  5 mg every Mon; 2.5 mg all other days   Weekly warfarin total:  20 mg   No change documented:  Jeannie Gaspar RN   Plan last modified:  Jeannie Gaspar RN (2020)   Next INR check:  2021   Priority:  Maintenance   Target end date:  Indefinite    Indications    Chronic atrial fibrillation (H) [I48.20]  Long term current use of anticoagulant therapy [Z79.01]             Anticoagulation Episode Summary     INR check location:      Preferred lab:      Send INR reminders to:   [facility]    Comments:  *  only wants dosing card. Pt uses 5mg tablets. wants to check every 4 weeks. has polymyalgia rheumatica (PMR)        Anticoagulation Care Providers     Provider Role Specialty Phone number    Ulysses Baker MD Referring Family Medicine [phone]    Oswaldo Cedillo MD Referring Family Medicine [phone]

## 2021-02-09 ENCOUNTER — ANTICOAGULATION THERAPY VISIT (OUTPATIENT)
Dept: ANTICOAGULATION | Facility: CLINIC | Age: 81
End: 2021-02-09

## 2021-02-09 ENCOUNTER — ALLIED HEALTH/NURSE VISIT (OUTPATIENT)
Dept: FAMILY MEDICINE | Facility: CLINIC | Age: 81
End: 2021-02-09
Payer: MEDICARE

## 2021-02-09 VITALS — SYSTOLIC BLOOD PRESSURE: 124 MMHG | DIASTOLIC BLOOD PRESSURE: 72 MMHG

## 2021-02-09 DIAGNOSIS — I48.91 ATRIAL FIBRILLATION (H): ICD-10-CM

## 2021-02-09 DIAGNOSIS — I48.20 CHRONIC ATRIAL FIBRILLATION (H): ICD-10-CM

## 2021-02-09 DIAGNOSIS — I10 ESSENTIAL HYPERTENSION: Primary | ICD-10-CM

## 2021-02-09 DIAGNOSIS — Z79.01 LONG TERM CURRENT USE OF ANTICOAGULANT THERAPY: ICD-10-CM

## 2021-02-09 LAB
CAPILLARY BLOOD COLLECTION: NORMAL
INR PPP: 2.3 (ref 0.86–1.14)

## 2021-02-09 PROCEDURE — 36416 COLLJ CAPILLARY BLOOD SPEC: CPT | Performed by: FAMILY MEDICINE

## 2021-02-09 PROCEDURE — 85610 PROTHROMBIN TIME: CPT | Performed by: FAMILY MEDICINE

## 2021-02-09 PROCEDURE — 99207 PR NO CHARGE NURSE ONLY: CPT

## 2021-02-09 NOTE — PROGRESS NOTES
ANTICOAGULATION MANAGEMENT     Patient Name:  Nayely Kay  Date:  2021    ASSESSMENT /SUBJECTIVE:    Today's INR result of 2.30 is therapeutic. Goal INR of 2.0-3.0      Warfarin dose taken: Warfarin taken as instructed    Diet: No new diet changes affecting INR    Medication changes/ interactions: No new medications/supplements affecting INR    Previous INR: Therapeutic     S/S of bleeding or thromboembolism: No    New injury or illness: No    Upcoming surgery, procedure or cardioversion: No    Additional findings: None      PLAN:    Telephone call with Nayely regarding INR result and instructed:     Warfarin Dosing Instructions: Continue your current warfarin dose 5 mg every Mon; 2.5 mg all other days    Instructed patient to follow up no later than: 4 weeks  Lab visit scheduled    Education provided: Target INR goal and significance of current INR result      Nayely verbalizes understanding and agrees to warfarin dosing plan.    Instructed to call the Anticoagulation Clinic for any changes, questions or concerns. (#[phone])        Jeannie Gaspar RN      OBJECTIVE:  Recent labs: (last 7 days)     21  1332   INR 2.30*         No question data found.  Anticoagulation Summary  As of 2021    INR goal:  2.0-3.0   TTR:  79.7 % (1 y)   INR used for dosin.30 (2021)   Warfarin maintenance plan:  5 mg (5 mg x 1) every Mon; 2.5 mg (5 mg x 0.5) all other days   Full warfarin instructions:  5 mg every Mon; 2.5 mg all other days   Weekly warfarin total:  20 mg   Plan last modified:  Jeannie Gaspar RN (2020)   Next INR check:     Priority:  Maintenance   Target end date:  Indefinite    Indications    Chronic atrial fibrillation (H) [I48.20]  Long term current use of anticoagulant therapy [Z79.01]             Anticoagulation Episode Summary     INR check location:      Preferred lab:      Send INR reminders to:  [facility]    Comments:  *  only wants dosing card. Pt uses 5mg  tablets. wants to check every 4 weeks. has polymyalgia rheumatica (PMR)        Anticoagulation Care Providers     Provider Role Specialty Phone number    Ulysses Baker MD Referring Family Medicine [phone]    Oswaldo Cedillo MD Referring Family Medicine [phone]

## 2021-03-08 ENCOUNTER — ALLIED HEALTH/NURSE VISIT (OUTPATIENT)
Dept: FAMILY MEDICINE | Facility: CLINIC | Age: 81
End: 2021-03-08
Payer: MEDICARE

## 2021-03-08 ENCOUNTER — ANTICOAGULATION THERAPY VISIT (OUTPATIENT)
Dept: ANTICOAGULATION | Facility: CLINIC | Age: 81
End: 2021-03-08

## 2021-03-08 VITALS — DIASTOLIC BLOOD PRESSURE: 66 MMHG | RESPIRATION RATE: 16 BRPM | SYSTOLIC BLOOD PRESSURE: 136 MMHG

## 2021-03-08 DIAGNOSIS — Z79.01 LONG TERM CURRENT USE OF ANTICOAGULANT THERAPY: ICD-10-CM

## 2021-03-08 DIAGNOSIS — I48.20 CHRONIC ATRIAL FIBRILLATION (H): ICD-10-CM

## 2021-03-08 DIAGNOSIS — Z01.30 BP CHECK: ICD-10-CM

## 2021-03-08 DIAGNOSIS — I10 ESSENTIAL HYPERTENSION: Primary | ICD-10-CM

## 2021-03-08 DIAGNOSIS — I48.91 ATRIAL FIBRILLATION (H): ICD-10-CM

## 2021-03-08 LAB
CAPILLARY BLOOD COLLECTION: NORMAL
INR PPP: 1.9 (ref 0.86–1.14)

## 2021-03-08 PROCEDURE — 99207 PR NO CHARGE NURSE ONLY: CPT

## 2021-03-08 PROCEDURE — 36416 COLLJ CAPILLARY BLOOD SPEC: CPT | Performed by: FAMILY MEDICINE

## 2021-03-08 PROCEDURE — 85610 PROTHROMBIN TIME: CPT | Performed by: FAMILY MEDICINE

## 2021-03-08 NOTE — PROGRESS NOTES
ANTICOAGULATION MANAGEMENT     Patient Name:  Nayely Kay  Date:  3/8/2021    ASSESSMENT /SUBJECTIVE:    Today's INR result of 1.90 is subtherapeutic. Goal INR of 2.0-3.0      Warfarin dose taken: Warfarin taken as instructed    Diet: No new diet changes affecting INR    Medication changes/ interactions: No new medications/supplements affecting INR    Previous INR: Therapeutic     S/S of bleeding or thromboembolism: No    New injury or illness: No    Upcoming surgery, procedure or cardioversion: No    Additional findings: None      PLAN:    Telephone call with Nayely regarding INR result and instructed:     Warfarin Dosing Instructions: Continue your current warfarin dose 5 mg every Mon; 2.5 mg all other days    Instructed patient to follow up no later than: 2 weeks  Lab visit scheduled    Education provided: Target INR goal and significance of current INR result      Nayely verbalizes understanding and agrees to warfarin dosing plan.    Instructed to call the Anticoagulation Clinic for any changes, questions or concerns. (#[phone])        Jeannie Gaspar RN      OBJECTIVE:  Recent labs: (last 7 days)     21  1330   INR 1.90*         No question data found.  Anticoagulation Summary  As of 3/8/2021    INR goal:  2.0-3.0   TTR:  77.8 % (1 y)   INR used for dosin.90 (3/8/2021)   Warfarin maintenance plan:  5 mg (5 mg x 1) every Mon; 2.5 mg (5 mg x 0.5) all other days   Full warfarin instructions:  5 mg every Mon; 2.5 mg all other days   Weekly warfarin total:  20 mg   Plan last modified:  Jeannie Gaspar RN (2020)   Next INR check:     Priority:  Maintenance   Target end date:  Indefinite    Indications    Chronic atrial fibrillation (H) [I48.20]  Long term current use of anticoagulant therapy [Z79.01]             Anticoagulation Episode Summary     INR check location:      Preferred lab:      Send INR reminders to:  [facility]    Comments:  *  only wants dosing card. Pt uses  5mg tablets. wants to check every 4 weeks. has polymyalgia rheumatica (PMR)        Anticoagulation Care Providers     Provider Role Specialty Phone number    Ulysses Baker MD Referring Family Medicine [phone]    Oswaldo Cedillo MD Referring Family Medicine [phone]

## 2021-03-08 NOTE — PROGRESS NOTES
Nayely Kay is a 80 year old year old patient who comes in today for a Blood Pressure check because of ongoing blood pressure monitoring.  Vital Signs as repeated by /66 pulse 66  Patient is taking medication as prescribed  Patient is tolerating medications well.  Patient is not monitoring Blood Pressure at home.    Current complaints: none  Disposition:  patient to continue with the same medication.  Recheck BP when gets INR done  Nicolette Granger RN

## 2021-03-12 ENCOUNTER — IMMUNIZATION (OUTPATIENT)
Dept: FAMILY MEDICINE | Facility: CLINIC | Age: 81
End: 2021-03-12
Payer: MEDICARE

## 2021-03-12 PROCEDURE — 0011A PR COVID VAC MODERNA 100 MCG/0.5 ML IM: CPT

## 2021-03-12 PROCEDURE — 91301 PR COVID VAC MODERNA 100 MCG/0.5 ML IM: CPT

## 2021-03-22 ENCOUNTER — ALLIED HEALTH/NURSE VISIT (OUTPATIENT)
Dept: FAMILY MEDICINE | Facility: CLINIC | Age: 81
End: 2021-03-22
Payer: MEDICARE

## 2021-03-22 ENCOUNTER — ANTICOAGULATION THERAPY VISIT (OUTPATIENT)
Dept: ANTICOAGULATION | Facility: CLINIC | Age: 81
End: 2021-03-22

## 2021-03-22 VITALS — RESPIRATION RATE: 16 BRPM | DIASTOLIC BLOOD PRESSURE: 58 MMHG | HEART RATE: 64 BPM | SYSTOLIC BLOOD PRESSURE: 122 MMHG

## 2021-03-22 DIAGNOSIS — I48.20 CHRONIC ATRIAL FIBRILLATION (H): ICD-10-CM

## 2021-03-22 DIAGNOSIS — I10 ESSENTIAL HYPERTENSION: Primary | ICD-10-CM

## 2021-03-22 DIAGNOSIS — Z79.01 LONG TERM CURRENT USE OF ANTICOAGULANT THERAPY: ICD-10-CM

## 2021-03-22 DIAGNOSIS — Z01.30 BP CHECK: ICD-10-CM

## 2021-03-22 LAB
CAPILLARY BLOOD COLLECTION: NORMAL
INR PPP: 2.1 (ref 0.86–1.14)

## 2021-03-22 PROCEDURE — 85610 PROTHROMBIN TIME: CPT | Performed by: FAMILY MEDICINE

## 2021-03-22 PROCEDURE — 99207 PR NO CHARGE NURSE ONLY: CPT

## 2021-03-22 PROCEDURE — 36416 COLLJ CAPILLARY BLOOD SPEC: CPT | Performed by: FAMILY MEDICINE

## 2021-03-22 NOTE — PROGRESS NOTES
Nayely Kay is a 81 year old year old patient who comes in today for a Blood Pressure check because of ongoing blood pressure monitoring.  Vital Signs as repeated by /58  Patient is  taking medication as prescribed  Patient is tolerating medications well.  Patient is not monitoring Blood Pressure at home.   Current complaints: none  Disposition:  patient to continue with the same medication.  BP next month    Toenails trimmed without incident.  Nicolette Granger RN

## 2021-03-22 NOTE — PROGRESS NOTES
ANTICOAGULATION MANAGEMENT     Patient Name:  Nayely Kay  Date:  3/22/2021    ASSESSMENT /SUBJECTIVE:    Today's INR result of 2.10 is therapeutic. Goal INR of 2.0-3.0      Warfarin dose taken: Warfarin taken as instructed    Diet: No new diet changes affecting INR    Medication changes/ interactions: No new medications/supplements affecting INR    Previous INR: Subtherapeutic     S/S of bleeding or thromboembolism: No    New injury or illness: No    Upcoming surgery, procedure or cardioversion: No    Additional findings: None      PLAN:    Telephone call with Nayely regarding INR result and instructed:     Warfarin Dosing Instructions: Continue your current warfarin dose 5 mg every Mon; 2.5 mg all other days    Instructed patient to follow up no later than: 4 weeks  Lab visit scheduled    Education provided: Target INR goal and significance of current INR result      Nayely verbalizes understanding and agrees to warfarin dosing plan.    Instructed to call the Anticoagulation Clinic for any changes, questions or concerns. (#[phone])        Jeannie Gaspar RN      OBJECTIVE:  Recent labs: (last 7 days)     21  1140   INR 2.10*         No question data found.  Anticoagulation Summary  As of 3/22/2021    INR goal:  2.0-3.0   TTR:  75.9 % (1 y)   INR used for dosin.10 (3/22/2021)   Warfarin maintenance plan:  5 mg (5 mg x 1) every Mon; 2.5 mg (5 mg x 0.5) all other days   Full warfarin instructions:  5 mg every Mon; 2.5 mg all other days   Weekly warfarin total:  20 mg   No change documented:  Jeannie Gaspar RN   Plan last modified:  Jeannie Gsapar RN (2020)   Next INR check:  2021   Priority:  Maintenance   Target end date:  Indefinite    Indications    Chronic atrial fibrillation (H) [I48.20]  Long term current use of anticoagulant therapy [Z79.01]             Anticoagulation Episode Summary     INR check location:      Preferred lab:      Send INR reminders to:  [facility]  [place]    Comments:  *  only wants dosing card. Pt uses 5mg tablets. wants to check every 4 weeks. has polymyalgia rheumatica (PMR)        Anticoagulation Care Providers     Provider Role Specialty Phone number    Ulysses Baker MD Referring Family Medicine [phone]    Oswaldo Cedillo MD Referring Family Medicine [phone]

## 2021-04-09 ENCOUNTER — IMMUNIZATION (OUTPATIENT)
Dept: FAMILY MEDICINE | Facility: CLINIC | Age: 81
End: 2021-04-09
Attending: FAMILY MEDICINE
Payer: MEDICARE

## 2021-04-09 PROCEDURE — 0012A PR COVID VAC MODERNA 100 MCG/0.5 ML IM: CPT

## 2021-04-09 PROCEDURE — 91301 PR COVID VAC MODERNA 100 MCG/0.5 ML IM: CPT

## 2021-04-19 ENCOUNTER — ANTICOAGULATION THERAPY VISIT (OUTPATIENT)
Dept: ANTICOAGULATION | Facility: CLINIC | Age: 81
End: 2021-04-19

## 2021-04-19 ENCOUNTER — ALLIED HEALTH/NURSE VISIT (OUTPATIENT)
Dept: FAMILY MEDICINE | Facility: CLINIC | Age: 81
End: 2021-04-19
Payer: MEDICARE

## 2021-04-19 VITALS — RESPIRATION RATE: 18 BRPM | SYSTOLIC BLOOD PRESSURE: 116 MMHG | HEART RATE: 64 BPM | DIASTOLIC BLOOD PRESSURE: 62 MMHG

## 2021-04-19 DIAGNOSIS — I48.20 CHRONIC ATRIAL FIBRILLATION (H): ICD-10-CM

## 2021-04-19 DIAGNOSIS — Z79.01 LONG TERM CURRENT USE OF ANTICOAGULANT THERAPY: ICD-10-CM

## 2021-04-19 DIAGNOSIS — I10 ESSENTIAL HYPERTENSION: Primary | ICD-10-CM

## 2021-04-19 LAB
CAPILLARY BLOOD COLLECTION: NORMAL
INR PPP: 1.9 (ref 0.86–1.14)

## 2021-04-19 PROCEDURE — 85610 PROTHROMBIN TIME: CPT | Performed by: FAMILY MEDICINE

## 2021-04-19 PROCEDURE — 99207 PR NO CHARGE NURSE ONLY: CPT

## 2021-04-19 PROCEDURE — 36416 COLLJ CAPILLARY BLOOD SPEC: CPT | Performed by: FAMILY MEDICINE

## 2021-04-19 NOTE — PROGRESS NOTES
ANTICOAGULATION MANAGEMENT     Patient Name:  Nayely Kay  Date:  2021    ASSESSMENT /SUBJECTIVE:    Today's INR result of 1.90 is subtherapeutic. Goal INR of 2.0-3.0      Warfarin dose taken: Warfarin taken as instructed    Diet: No new diet changes affecting INR -- states it's possible she ate some more greens than usual, she wasn't keeping as close track of it this time.     Medication changes/ interactions: No new medications/supplements affecting INR    Previous INR: Therapeutic 2.10    S/S of bleeding or thromboembolism: No    New injury or illness: No    Upcoming surgery, procedure or cardioversion: No    Additional findings: None      PLAN:    Telephone call with Nayely regarding INR result and instructed:     Warfarin Dosing Instructions: Continue your current warfarin dose 5mg Mon; 2.5mg all other days    Instructed patient to follow up no later than: 2 weeks  Lab visit scheduled    Education provided: Importance of notifying clinic for changes in medications or health; a sooner lab recheck maybe needed       Nayely verbalizes understanding and agrees to warfarin dosing plan.    Instructed to call the Anticoagulation Clinic for any changes, questions or concerns. (#[phone])        Jenny Barger RN CACP       OBJECTIVE:  Recent labs: (last 7 days)     21  1018   INR 1.90*         INR assessment SUB    Recheck INR In: 2 WEEKS    INR Location Clinic      Anticoagulation Summary  As of 2021    INR goal:  2.0-3.0   TTR:  72.1 % (1 y)   INR used for dosin.90 (2021)   Warfarin maintenance plan:  5 mg (5 mg x 1) every Mon; 2.5 mg (5 mg x 0.5) all other days   Full warfarin instructions:  5 mg every Mon; 2.5 mg all other days   Weekly warfarin total:  20 mg   No change documented:  Jenny Barger RN   Plan last modified:  Jeannie Gaspar RN (2020)   Next INR check:  5/3/2021   Priority:  Maintenance   Target end date:  Indefinite    Indications    Chronic  atrial fibrillation (H) [I48.20]  Long term current use of anticoagulant therapy [Z79.01]             Anticoagulation Episode Summary     INR check location:      Preferred lab:      Send INR reminders to:  [facility]    Comments:  *  only wants dosing card. Pt uses 5mg tablets. wants to check every 4 weeks. has polymyalgia rheumatica (PMR)        Anticoagulation Care Providers     Provider Role Specialty Phone number    Ulysses Baker MD Referring Family Medicine [phone]    Oswaldo Cedillo MD Referring Family Medicine [phone]

## 2021-04-19 NOTE — PROGRESS NOTES
Nayely Kay is a 81 year old year old patient who comes in today for a Blood Pressure check because of ongoing blood pressure monitoring.  Vital Signs as repeated by /82  Patient is taking medication as prescribed  Patient is tolerating medications well.  Patient is not monitoring Blood Pressure at home.    Current complaints: none  Disposition:  patient to continue with the same medication  Nicolette Granger RN

## 2021-05-03 ENCOUNTER — ANTICOAGULATION THERAPY VISIT (OUTPATIENT)
Dept: ANTICOAGULATION | Facility: CLINIC | Age: 81
End: 2021-05-03

## 2021-05-03 ENCOUNTER — ALLIED HEALTH/NURSE VISIT (OUTPATIENT)
Dept: FAMILY MEDICINE | Facility: CLINIC | Age: 81
End: 2021-05-03
Payer: MEDICARE

## 2021-05-03 VITALS — SYSTOLIC BLOOD PRESSURE: 118 MMHG | HEART RATE: 64 BPM | DIASTOLIC BLOOD PRESSURE: 66 MMHG | RESPIRATION RATE: 18 BRPM

## 2021-05-03 DIAGNOSIS — Z79.01 LONG TERM CURRENT USE OF ANTICOAGULANT THERAPY: ICD-10-CM

## 2021-05-03 DIAGNOSIS — I10 ESSENTIAL HYPERTENSION: Primary | ICD-10-CM

## 2021-05-03 DIAGNOSIS — I48.20 CHRONIC ATRIAL FIBRILLATION (H): ICD-10-CM

## 2021-05-03 DIAGNOSIS — Z01.30 BP CHECK: ICD-10-CM

## 2021-05-03 LAB — INR PPP: 2.1 (ref 0.86–1.14)

## 2021-05-03 PROCEDURE — 36416 COLLJ CAPILLARY BLOOD SPEC: CPT | Performed by: FAMILY MEDICINE

## 2021-05-03 PROCEDURE — 99207 PR NO CHARGE NURSE ONLY: CPT

## 2021-05-03 PROCEDURE — 85610 PROTHROMBIN TIME: CPT | Performed by: FAMILY MEDICINE

## 2021-05-03 NOTE — PROGRESS NOTES
Nayely Kay is a 81 year old year old patient who comes in today for a Blood Pressure check because of ongoing blood pressure monitoring.  Vital Signs as repeated by /76 pulse 64  Patient is taking medication as prescribed  Patient is tolerating medications well.  Patient is not monitoring Blood Pressure at home.    Current complaints: none  Disposition:  patient to continue with the same medication.  Check BP when get INR check  Nicolette Granger RN

## 2021-05-03 NOTE — PROGRESS NOTES
ANTICOAGULATION MANAGEMENT     Patient Name:  Nayely Kay  Date:  5/3/2021    ASSESSMENT /SUBJECTIVE:    Today's INR result of 2.1 is therapeutic. Goal INR of 2.0-3.0      Warfarin dose taken: Warfarin taken as instructed    Diet: No new diet changes affecting INR    Medication changes/ interactions: No new medications/supplements affecting INR    Previous INR: Subtherapeutic     S/S of bleeding or thromboembolism: No    New injury or illness: No    Upcoming surgery, procedure or cardioversion: No    Additional findings: None      PLAN:    Telephone call with Nayely regarding INR result and instructed:     Warfarin Dosing Instructions: Continue your current warfarin dose 5 mg every Mon; 2.5 mg all other days    Instructed patient to follow up no later than: 3 weeks  Lab visit scheduled    Education provided: Please call back if any changes to your diet, medications or how you've been taking warfarin      Pt verbalizes understanding and agrees to warfarin dosing plan.    Instructed to call the Anticoagulation Clinic for any changes, questions or concerns. (#[phone])        Luz Elena Barrios RN      OBJECTIVE:  Recent labs: (last 7 days)     21  0926   INR 2.10*         No question data found.  Anticoagulation Summary  As of 5/3/2021    INR goal:  2.0-3.0   TTR:  73.6 % (1 y)   INR used for dosin.10 (5/3/2021)   Warfarin maintenance plan:  5 mg (5 mg x 1) every Mon; 2.5 mg (5 mg x 0.5) all other days   Full warfarin instructions:  5 mg every Mon; 2.5 mg all other days   Weekly warfarin total:  20 mg   No change documented:  Luz Elena Barrios RN   Plan last modified:  Jeannie Gaspar RN (2020)   Next INR check:  2021   Priority:  Maintenance   Target end date:  Indefinite    Indications    Chronic atrial fibrillation (H) [I48.20]  Long term current use of anticoagulant therapy [Z79.01]             Anticoagulation Episode Summary     INR check location:      Preferred lab:      Send INR  reminders to:  [facility]    Comments:  *  only wants dosing card. Pt uses 5mg tablets. wants to check every 4 weeks. has polymyalgia rheumatica (PMR)        Anticoagulation Care Providers     Provider Role Specialty Phone number    Ulysses Baker MD Referring Family Medicine [phone]    Oswaldo Cedillo MD Referring Family Medicine [phone]

## 2021-05-24 ENCOUNTER — ANTICOAGULATION THERAPY VISIT (OUTPATIENT)
Dept: ANTICOAGULATION | Facility: CLINIC | Age: 81
End: 2021-05-24

## 2021-05-24 ENCOUNTER — ALLIED HEALTH/NURSE VISIT (OUTPATIENT)
Dept: FAMILY MEDICINE | Facility: CLINIC | Age: 81
End: 2021-05-24
Payer: MEDICARE

## 2021-05-24 VITALS — RESPIRATION RATE: 18 BRPM | SYSTOLIC BLOOD PRESSURE: 118 MMHG | HEART RATE: 68 BPM | DIASTOLIC BLOOD PRESSURE: 60 MMHG

## 2021-05-24 DIAGNOSIS — I48.20 CHRONIC ATRIAL FIBRILLATION (H): ICD-10-CM

## 2021-05-24 DIAGNOSIS — Z79.01 LONG TERM CURRENT USE OF ANTICOAGULANT THERAPY: ICD-10-CM

## 2021-05-24 DIAGNOSIS — Z01.30 BP CHECK: ICD-10-CM

## 2021-05-24 DIAGNOSIS — I10 ESSENTIAL HYPERTENSION: Primary | ICD-10-CM

## 2021-05-24 LAB
CAPILLARY BLOOD COLLECTION: NORMAL
INR PPP: 2.5 (ref 0.86–1.14)

## 2021-05-24 PROCEDURE — 99207 PR NO CHARGE NURSE ONLY: CPT

## 2021-05-24 PROCEDURE — 36416 COLLJ CAPILLARY BLOOD SPEC: CPT | Performed by: FAMILY MEDICINE

## 2021-05-24 PROCEDURE — 85610 PROTHROMBIN TIME: CPT | Performed by: FAMILY MEDICINE

## 2021-05-24 NOTE — PROGRESS NOTES
ANTICOAGULATION MANAGEMENT     Patient Name:  Nayely Kay  Date:  5/24/2021    ASSESSMENT /SUBJECTIVE:    Today's INR result of 2.5 is therapeutic. Goal INR of 2.0-3.0      Warfarin dose taken: Warfarin taken as instructed    Diet: No new diet changes affecting INR    Medication changes/ interactions: No new medications/supplements affecting INR    Previous INR: Therapeutic     S/S of bleeding or thromboembolism: No    New injury or illness: No    Upcoming surgery, procedure or cardioversion: No    Additional findings: None      PLAN:    Telephone call with Nayely regarding INR result and instructed:     Warfarin Dosing Instructions: Continue your current warfarin dose 5 mg Mondays; 2.5 mg ROW    Instructed patient to follow up no later than: 4 weeks  Lab visit scheduled    Education provided: Contact [facility]tico[facility]: [phone]  with any changes, questions or concerns.       Nayely verbalizes understanding and agrees to warfarin dosing plan.    Instructed to call the Anticoagulation Clinic for any changes, questions or concerns. (#[phone])        Flaquita Neal RN      OBJECTIVE:  Recent labs: (last 7 days)     05/24/21  0941   INR 2.50*         No question data found.  Anticoagulation Summary  As of 5/24/2021    INR goal:  2.0-3.0   TTR:  75.3 % (1 y)   INR used for dosing:     Warfarin maintenance plan:  5 mg (5 mg x 1) every Mon; 2.5 mg (5 mg x 0.5) all other days   Full warfarin instructions:  5 mg every Mon; 2.5 mg all other days   Weekly warfarin total:  20 mg   Plan last modified:  Jeannie Gaspar RN (11/23/2020)   Next INR check:  6/21/2021   Priority:  Maintenance   Target end date:  Indefinite    Indications    Chronic atrial fibrillation (H) [I48.20]  Long term current use of anticoagulant therapy [Z79.01]             Anticoagulation Episode Summary     INR check location:      Preferred lab:      Send INR reminders to:  [facility]    Comments:  *  only  wants dosing card. Pt uses 5mg tablets. wants to check every 4 weeks. has polymyalgia rheumatica (PMR)        Anticoagulation Care Providers     Provider Role Specialty Phone number    Ulysses Baker MD Referring Family Medicine [phone]    Oswaldo Cedillo MD Referring Family Medicine [phone]

## 2021-05-24 NOTE — PROGRESS NOTES
Nayely Kay is a 81 year old year old patient who comes in today for a Blood Pressure check because of ongoing blood pressure monitoring.  Vital Signs as repeated by /60  Patient is taking medication as prescribed  Patient is tolerating medications well.  Patient is not monitoring Blood Pressure at home.    Current complaints: none  Disposition:  patient to continue with the same medication.  BP next time after INR check.  Nicolette Granger RN

## 2021-06-13 DIAGNOSIS — I10 ESSENTIAL HYPERTENSION: ICD-10-CM

## 2021-06-14 DIAGNOSIS — I10 ESSENTIAL HYPERTENSION: ICD-10-CM

## 2021-06-14 RX ORDER — METOPROLOL TARTRATE 50 MG
75 TABLET ORAL 2 TIMES DAILY
Qty: 270 TABLET | Refills: 3 | Status: SHIPPED | OUTPATIENT
Start: 2021-06-14 | End: 2022-04-18

## 2021-06-14 RX ORDER — LISINOPRIL 5 MG/1
TABLET ORAL
Qty: 90 TABLET | Refills: 1 | Status: SHIPPED | OUTPATIENT
Start: 2021-06-14 | End: 2021-12-06

## 2021-06-14 RX ORDER — AMLODIPINE BESYLATE 5 MG/1
TABLET ORAL
Qty: 90 TABLET | Refills: 1 | Status: SHIPPED | OUTPATIENT
Start: 2021-06-14 | End: 2021-12-06

## 2021-06-14 RX ORDER — TRIAMTERENE/HYDROCHLOROTHIAZID 37.5-25 MG
TABLET ORAL
Qty: 90 TABLET | Refills: 3 | Status: SHIPPED | OUTPATIENT
Start: 2021-06-14 | End: 2022-04-18

## 2021-06-29 ENCOUNTER — ANTICOAGULATION THERAPY VISIT (OUTPATIENT)
Dept: ANTICOAGULATION | Facility: CLINIC | Age: 81
End: 2021-06-29

## 2021-06-29 ENCOUNTER — TELEPHONE (OUTPATIENT)
Dept: FAMILY MEDICINE | Facility: CLINIC | Age: 81
End: 2021-06-29

## 2021-06-29 ENCOUNTER — ALLIED HEALTH/NURSE VISIT (OUTPATIENT)
Dept: FAMILY MEDICINE | Facility: CLINIC | Age: 81
End: 2021-06-29
Payer: MEDICARE

## 2021-06-29 DIAGNOSIS — Z79.01 LONG TERM CURRENT USE OF ANTICOAGULANT THERAPY: ICD-10-CM

## 2021-06-29 DIAGNOSIS — I48.20 CHRONIC ATRIAL FIBRILLATION (H): ICD-10-CM

## 2021-06-29 DIAGNOSIS — I10 ESSENTIAL HYPERTENSION: Primary | ICD-10-CM

## 2021-06-29 LAB
CAPILLARY BLOOD COLLECTION: NORMAL
INR PPP: 4.7 (ref 0.86–1.14)

## 2021-06-29 PROCEDURE — 99207 PR NO CHARGE NURSE ONLY: CPT

## 2021-06-29 PROCEDURE — 36416 COLLJ CAPILLARY BLOOD SPEC: CPT | Performed by: FAMILY MEDICINE

## 2021-06-29 PROCEDURE — 85610 PROTHROMBIN TIME: CPT | Performed by: FAMILY MEDICINE

## 2021-06-29 NOTE — PROGRESS NOTES
Nayely Kay is a 81 year old year old patient who comes in today for a Blood Pressure check because of ongoing blood pressure monitoring.  Vital Signs as repeated by SEKOU Headley  Patient is taking medication as prescribed  Patient is tolerating medications well.  Patient is not monitoring Blood Pressure at home.  Average readings if yes are na  Current complaints: none  Disposition:  Routed to pcp for review    Bp 130/68. P 64.    Angie JIMÉNEZ RN

## 2021-06-29 NOTE — PROGRESS NOTES
ANTICOAGULATION MANAGEMENT     Nayely Kay 81 year old female is on warfarin with supratherapeutic INR result. (Goal INR 2.0-3.0)    Recent labs: (last 7 days)     06/29/21  1548   INR 4.70*       ASSESSMENT     Source(s): Patient/Caregiver Call       Warfarin doses taken: Warfarin taken as instructed    Diet: No new diet changes identified    New illness, injury, or hospitalization: Yes: Pain r/t polymyalgia rheumatica    Medication/supplement changes: None noted    Signs or symptoms of bleeding or clotting: No    Previous INR: Therapeutic last 2(+) visits    Additional findings: None     PLAN     Recommended plan for no diet, medication or health factor changes affecting INR     Dosing Instructions: Hold 2 doses then continue your current warfarin dose with next INR in 3 days       Summary  As of 6/29/2021    Full warfarin instructions:  6/29: Hold; 6/30: Hold; Otherwise 5 mg every Mon; 2.5 mg all other days   Next INR check:  7/2/2021             Telephone call with Nayely whom verbalizes understanding and agrees to plan    Check at provider office visit    Education provided: Please call back if any changes to your diet, medications or how you've been taking warfarin, Monitoring for bleeding signs and symptoms, When to seek medical attention/emergency care and Contact [phone]  with any changes, questions or concerns.     Plan made per [facility] anticoagulation protocol    Renetta Falcon RN  Anticoagulation Clinic  6/29/2021    _______________________________________________________________________     Anticoagulation Episode Summary     Current INR goal:  2.0-3.0   TTR:  67.7 % (1 y)   Target end date:  Indefinite   Send INR reminders to:  [facility]    Indications    Chronic atrial fibrillation (H) [I48.20]  Long term current use of anticoagulant therapy [Z79.01]           Comments:  *  only wants dosing card. Pt uses 5mg tablets. wants to check every 4 weeks. has polymyalgia rheumatica  (PMR)           Anticoagulation Care Providers     Provider Role Specialty Phone number    Ulysses Baker MD Referring Family Medicine [phone]    Oswaldo Cedillo MD Referring Family Medicine [phone]

## 2021-07-02 ENCOUNTER — ANTICOAGULATION THERAPY VISIT (OUTPATIENT)
Dept: ANTICOAGULATION | Facility: CLINIC | Age: 81
End: 2021-07-02

## 2021-07-02 ENCOUNTER — OFFICE VISIT (OUTPATIENT)
Dept: FAMILY MEDICINE | Facility: CLINIC | Age: 81
End: 2021-07-02
Payer: MEDICARE

## 2021-07-02 VITALS
DIASTOLIC BLOOD PRESSURE: 64 MMHG | WEIGHT: 116 LBS | SYSTOLIC BLOOD PRESSURE: 125 MMHG | HEART RATE: 50 BPM | OXYGEN SATURATION: 99 % | TEMPERATURE: 97.4 F | HEIGHT: 59 IN | BODY MASS INDEX: 23.39 KG/M2

## 2021-07-02 DIAGNOSIS — Z79.01 LONG TERM CURRENT USE OF ANTICOAGULANT THERAPY: ICD-10-CM

## 2021-07-02 DIAGNOSIS — I48.20 CHRONIC ATRIAL FIBRILLATION (H): ICD-10-CM

## 2021-07-02 DIAGNOSIS — M54.50 ACUTE RIGHT-SIDED LOW BACK PAIN WITHOUT SCIATICA: Primary | ICD-10-CM

## 2021-07-02 LAB
CAPILLARY BLOOD COLLECTION: NORMAL
INR PPP: 2.6 (ref 0.86–1.14)

## 2021-07-02 PROCEDURE — 99213 OFFICE O/P EST LOW 20 MIN: CPT | Performed by: FAMILY MEDICINE

## 2021-07-02 PROCEDURE — 85610 PROTHROMBIN TIME: CPT | Performed by: FAMILY MEDICINE

## 2021-07-02 PROCEDURE — 36416 COLLJ CAPILLARY BLOOD SPEC: CPT | Performed by: FAMILY MEDICINE

## 2021-07-02 ASSESSMENT — MIFFLIN-ST. JEOR: SCORE: 883.86

## 2021-07-02 NOTE — PROGRESS NOTES
"ASSESSMENT:   (M54.5) Acute right-sided low back pain without sciatica  (primary encounter diagnosis)  Comment: I think this is a pulled muscle.   Plan: This should steadily improve over the next week or two.   If pain worsens or persists beyond a couple weeks, let me know.      Heat is a good choice.  Ice is OK to try if you want.   Stay active.  Don't overdo it or do activities that are painful.     Acetaminophen (Tylenol) may be taken as needed for pain and fever.  The maximum doses are listed below, around 3000mg a day.  Regular strength pills are 325mg.  The maximum daily dose is two pills (650mg) every 4 to 6 hours up to 3250mg a day.   Extra strength pills are 500mg each.  The maximum dose is two pills (1000mg) every 6 hours as needed up to 3000mg a day.   Extended release pills are 650mg each.  The maximum dose is two pills (1300mg) every 8 hours as needed up to 3900mg a day.     Watch out for acetaminophen in other over the counter or prescription medications.      Too much acetaminophen can lead to serious liver damage.       Janna Adler is a 81 year old who presents for the following health issues   Chief Complaint   Patient presents with     Back Pain      Last clinic visit: 7/27/2020    Pain posterior right hip starting in the past week.  No radiation of pain.   \"I usually don't have back pain\".  Since pain started careful about bending and movements.    No known injury.   Occupation: housekeeping.   Course of symptoms over time: better this AM.      No pain in leg or neuro symptoms.   Alleviating factors: heating pad.   Stiff after sitting for a while.      No other pain at this time.  occasional pain in neck.  No pain in shoulders or thighs.   Sleeps in a chair.     She has had polymyalgia rheumatica in the past.   No migraine headaches for a long time.     Back Pain  Onset/Duration: 1 week   Description:   Location of pain: low back right  Character of pain: \"medium\" and intermittent  Pain " "radiation: none  New numbness or weakness in legs, not attributed to pain: no   Intensity: Currently 6/10, At its worst 7/10  Progression of Symptoms: improving and intermittent  History:   Specific cause: none  Pain interferes with job: not applicable  History of back problems: no prior back problems  Any previous MRI or X-rays: None  Sees a specialist for back pain: No  Alleviating factors:   Improved by: heat    Precipitating factors:  Worsened by: Bending  Therapies tried and outcome: heat is effective    Patient Active Problem List   Diagnosis     Headache     Chronic atrial fibrillation (H)     Polymyalgia rheumatica (H)     Iron deficiency anemia     HYPERLIPIDEMIA LDL GOAL <130     Osteopenia     Family history of breast cancer     Eczema     Advanced directives, counseling/discussion     AK (actinic keratosis)     Ganglion cyst     Long term current use of anticoagulant therapy     Essential hypertension     Tubular adenoma     Elevated bilirubin     OBJECTIVE:Blood pressure 125/64, pulse 50, temperature 97.4  F (36.3  C), temperature source Tympanic, height 1.486 m (4' 10.5\"), weight 52.6 kg (116 lb), SpO2 99 %, not currently breastfeeding. BMI=Body mass index is 23.83 kg/m .  GENERAL APPEARANCE ADULT: Alert, no acute distress  CV: irregular rhythm-irregularly irregular, rate-normal=60 per minute, no murmur  ABDOMEN: soft, no organomegaly, masses or tenderness  MS: back exam: normal posture, shoulders, inferior scapular borders and hips even and symmetrical, ROM good.  A little pain with rotation to either side, no tenderness to palpation  hip exam range of motion OK with little pain.         "

## 2021-07-02 NOTE — PROGRESS NOTES
ANTICOAGULATION MANAGEMENT     Nayely Kay 81 year old female is on warfarin with therapeutic INR result. (Goal INR 2.0-3.0)    Recent labs: (last 7 days)     07/02/21  0930   INR 2.60*       ASSESSMENT     Source(s): Chart Review and Patient/Caregiver Call       Warfarin doses taken: Warfarin taken as instructed    Diet: No new diet changes identified    New illness, injury, or hospitalization: Yes: saw Dr. Cedillo today for acute low back pain -- suspected pulled muscle    Medication/supplement changes: Tylenol as needed use which has potential for interaction; increasing INR with high doses over consecutive days    Signs or symptoms of bleeding or clotting: No    Previous INR: Supratherapeutic    Additional findings: None     PLAN     Recommended plan for temporary change(s) affecting INR     Dosing Instructions: Continue your current warfarin dose with next INR in 1 week   -- writer requested a recheck within 7 days, however she would need to go to another clinic because [place] is full. She opted to wait until 7/13 to schedule at [facility].    Summary  As of 7/2/2021    Full warfarin instructions:  5 mg every Mon; 2.5 mg all other days   Next INR check:  7/13/2021             Telephone call with Nayely whom verbalizes understanding and agrees to plan    Lab visit scheduled    Education provided: Potential interaction between warfarin and high doses of tylenol, Monitoring for bleeding signs and symptoms, When to seek medical attention/emergency care and Contact [phone]  with any changes, questions or concerns.      Back pain/inflammation can affect the INR. If you have worsening pain you should return to the clinic sooner than 7/13.    Plan made per [facility] anticoagulation protocol    Jenny Barger, RN  Anticoagulation Clinic  7/2/2021    _______________________________________________________________________     Anticoagulation Episode Summary     Current INR goal:  2.0-3.0   TTR:  67.0 % (1 y)   Target end  date:  Indefinite   Send INR reminders to:  [facility]    Indications    Chronic atrial fibrillation (H) [I48.20]  Long term current use of anticoagulant therapy [Z79.01]           Comments:  *  only wants dosing card. Pt uses 5mg tablets. wants to check every 4 weeks. has polymyalgia rheumatica (PMR)           Anticoagulation Care Providers     Provider Role Specialty Phone number    Ulyssse Baker MD Referring Family Medicine [phone]    Oswaldo Cedillo MD Referring Family Medicine [phone]

## 2021-07-02 NOTE — PATIENT INSTRUCTIONS
ASSESSMENT:   (M54.5) Acute right-sided low back pain without sciatica  (primary encounter diagnosis)  Comment: I think this is a pulled muscle.   Plan: This should steadily improve over the next week or two.   If pain worsens or persists beyond a couple weeks, let me know.      Heat is a good choice.  Ice is OK to try if you want.   Stay active.  Don't overdo it or do activities that are painful.     Acetaminophen (Tylenol) may be taken as needed for pain and fever.  The maximum doses are listed below, around 3000mg a day.  Regular strength pills are 325mg.  The maximum daily dose is two pills (650mg) every 4 to 6 hours up to 3250mg a day.   Extra strength pills are 500mg each.  The maximum dose is two pills (1000mg) every 6 hours as needed up to 3000mg a day.   Extended release pills are 650mg each.  The maximum dose is two pills (1300mg) every 8 hours as needed up to 3900mg a day.     Watch out for acetaminophen in other over the counter or prescription medications.      Too much acetaminophen can lead to serious liver damage.

## 2021-07-02 NOTE — NURSING NOTE
"Initial /64   Pulse 50   Temp 97.4  F (36.3  C) (Tympanic)   Ht 1.486 m (4' 10.5\")   Wt 52.6 kg (116 lb)   SpO2 99%   Breastfeeding No   BMI 23.83 kg/m   Estimated body mass index is 23.83 kg/m  as calculated from the following:    Height as of this encounter: 1.486 m (4' 10.5\").    Weight as of this encounter: 52.6 kg (116 lb). .      "

## 2021-07-09 ENCOUNTER — TELEPHONE (OUTPATIENT)
Dept: FAMILY MEDICINE | Facility: CLINIC | Age: 81
End: 2021-07-09

## 2021-07-09 DIAGNOSIS — Z79.01 LONG TERM CURRENT USE OF ANTICOAGULANT THERAPY: ICD-10-CM

## 2021-07-09 DIAGNOSIS — I48.20 CHRONIC ATRIAL FIBRILLATION (H): ICD-10-CM

## 2021-07-09 NOTE — TELEPHONE ENCOUNTER
4:46 PM    Writer spoke with the patient. Patient was concerned about her hemorrhoids bleeding. It is not continuous. She had an episode on Sunday and today. Patient skipped her dose on Monday and Tuesday. Writer informed patient it is common for hemorrhoids to bleed if irritated. Writer suggested patient to remain on the Coumadin 2.5 mg daily until the INR on 7/13/21. Patient has a Chadsvasc score of 5 and is moderate risk for clotting. Patient was educated on S/S bleeding and when to seek medical attention. Advised patient to go to  over the weekend if the hemorrhoids persist to bleed.    Renetta Falcon, RN, BSN, PHN  Anticoagulation Clinic   [phone]

## 2021-07-09 NOTE — TELEPHONE ENCOUNTER
Patient left voicemail stating she is seen through [facility] and asking for a call.    Flaquita Neely RN    [facility]tico[facility]

## 2021-07-13 ENCOUNTER — ALLIED HEALTH/NURSE VISIT (OUTPATIENT)
Dept: FAMILY MEDICINE | Facility: CLINIC | Age: 81
End: 2021-07-13
Payer: MEDICARE

## 2021-07-13 ENCOUNTER — LAB (OUTPATIENT)
Dept: LAB | Facility: CLINIC | Age: 81
End: 2021-07-13
Payer: MEDICARE

## 2021-07-13 ENCOUNTER — ANTICOAGULATION THERAPY VISIT (OUTPATIENT)
Dept: ANTICOAGULATION | Facility: CLINIC | Age: 81
End: 2021-07-13

## 2021-07-13 VITALS — HEART RATE: 60 BPM | DIASTOLIC BLOOD PRESSURE: 64 MMHG | SYSTOLIC BLOOD PRESSURE: 132 MMHG

## 2021-07-13 DIAGNOSIS — Z79.01 LONG TERM CURRENT USE OF ANTICOAGULANT THERAPY: ICD-10-CM

## 2021-07-13 DIAGNOSIS — I10 ESSENTIAL HYPERTENSION: Primary | ICD-10-CM

## 2021-07-13 DIAGNOSIS — I48.20 CHRONIC ATRIAL FIBRILLATION (H): ICD-10-CM

## 2021-07-13 DIAGNOSIS — I48.20 CHRONIC ATRIAL FIBRILLATION (H): Primary | ICD-10-CM

## 2021-07-13 LAB — INR BLD: 1.9 (ref 0.9–1.1)

## 2021-07-13 PROCEDURE — 85610 PROTHROMBIN TIME: CPT

## 2021-07-13 PROCEDURE — 36416 COLLJ CAPILLARY BLOOD SPEC: CPT

## 2021-07-13 PROCEDURE — 99207 PR NO CHARGE NURSE ONLY: CPT

## 2021-07-13 NOTE — PROGRESS NOTES
Nayely Kay is a 81 year old year old patient who comes in today for a Blood Pressure check because of ongoing blood pressure monitoring.  BP Readings from Last 6 Encounters:   07/13/21 132/64   07/02/21 125/64   05/24/21 118/60   05/03/21 118/66   04/19/21 116/62   03/22/21 122/58     HR 60  Vital Signs as repeated by RN N/A  Patient is taking medication as prescribed  Patient is tolerating medications well.  Patient is not monitoring Blood Pressure at home.  Average readings if yes are N/A  Current complaints: none  Disposition:  patient to continue with the same medication  NOVA Rosales RN

## 2021-07-13 NOTE — PROGRESS NOTES
ANTICOAGULATION MANAGEMENT     Nayely Kay 81 year old female is on warfarin with subtherapeutic INR result. (Goal INR 2.0-3.0)    Recent labs: (last 7 days)     07/13/21  1306   INR 1.9*       ASSESSMENT     Source(s): Patient/Caregiver Call       Warfarin doses taken: Less warfarin taken than planned which may be affecting INR    Diet: No new diet changes identified    New illness, injury, or hospitalization: No    Medication/supplement changes: None noted    Signs or symptoms of bleeding or clotting: Yes: Hemorrhoids. It has improved since last conversation on Friday.    Previous INR: Supratherapeutic    Additional findings: None     PLAN     Recommended plan for no diet, medication or health factor changes affecting INR     Dosing Instructions: Continue your current warfarin dose with next INR in 2 weeks       Summary  As of 7/13/2021    Full warfarin instructions:  5 mg every Mon; 2.5 mg all other days   Next INR check:  7/27/2021             Telephone call with Nayely who verbalizes understanding and agrees to plan    Lab visit scheduled    Education provided: Please call back if any changes to your diet, medications or how you've been taking warfarin, Monitoring for bleeding signs and symptoms, When to seek medical attention/emergency care and Contact [phone]  with any changes, questions or concerns.     Plan made per [facility]agulation protocol    Renetta Falcon RN  Anticoagulation Clinic  7/13/2021    _______________________________________________________________________     Anticoagulation Episode Summary     Current INR goal:  2.0-3.0   TTR:  66.6 % (1 y)   Target end date:  Indefinite   Send INR reminders to:  [facility]    Indications    Chronic atrial fibrillation (H) [I48.20]  Long term current use of anticoagulant therapy [Z79.01]           Comments:  *  only wants dosing card. Pt uses 5mg tablets. wants to check every 4 weeks. has polymyalgia rheumatica (PMR)            Anticoagulation Care Providers     Provider Role Specialty Phone number    Ulysses Baker MD Referring Family Medicine [phone]    Oswaldo Cedillo MD Referring Family Medicine [phone]

## 2021-07-27 ENCOUNTER — ALLIED HEALTH/NURSE VISIT (OUTPATIENT)
Dept: FAMILY MEDICINE | Facility: CLINIC | Age: 81
End: 2021-07-27
Payer: MEDICARE

## 2021-07-27 ENCOUNTER — LAB (OUTPATIENT)
Dept: LAB | Facility: CLINIC | Age: 81
End: 2021-07-27
Payer: MEDICARE

## 2021-07-27 ENCOUNTER — ANTICOAGULATION THERAPY VISIT (OUTPATIENT)
Dept: ANTICOAGULATION | Facility: CLINIC | Age: 81
End: 2021-07-27

## 2021-07-27 VITALS — DIASTOLIC BLOOD PRESSURE: 68 MMHG | SYSTOLIC BLOOD PRESSURE: 134 MMHG | HEART RATE: 68 BPM

## 2021-07-27 DIAGNOSIS — Z79.01 LONG TERM CURRENT USE OF ANTICOAGULANT THERAPY: ICD-10-CM

## 2021-07-27 DIAGNOSIS — I48.20 CHRONIC ATRIAL FIBRILLATION (H): ICD-10-CM

## 2021-07-27 DIAGNOSIS — I10 ESSENTIAL HYPERTENSION: Primary | ICD-10-CM

## 2021-07-27 DIAGNOSIS — I48.20 CHRONIC ATRIAL FIBRILLATION (H): Primary | ICD-10-CM

## 2021-07-27 LAB — INR BLD: 2 (ref 0.9–1.1)

## 2021-07-27 PROCEDURE — 36415 COLL VENOUS BLD VENIPUNCTURE: CPT

## 2021-07-27 PROCEDURE — 99207 PR NO CHARGE NURSE ONLY: CPT

## 2021-07-27 PROCEDURE — 85610 PROTHROMBIN TIME: CPT

## 2021-07-27 NOTE — PROGRESS NOTES
Nayely Kay is a 81 year old year old patient who comes in today for a Blood Pressure check because of ongoing blood pressure monitoring.  BP Readings from Last 6 Encounters:   07/27/21 134/68   07/13/21 132/64   07/02/21 125/64   05/24/21 118/60   05/03/21 118/66   04/19/21 116/62   HR 68  Vital Signs as repeated by RN N/A  Patient is taking medication as prescribed  Patient is tolerating medications well.  Patient is not monitoring Blood Pressure at home.  Average readings if yes are N/A  Current complaints: none  Disposition:  patient to continue with the same medication  NOVA Rosales RN

## 2021-07-27 NOTE — PROGRESS NOTES
ANTICOAGULATION MANAGEMENT     Nayely Kay 81 year old female is on warfarin with therapeutic INR result. (Goal INR 2.0-3.0)    Recent labs: (last 7 days)     07/27/21  1422   INR 2.0*       ASSESSMENT     Source(s): Chart Review and Patient/Caregiver Call       Warfarin doses taken: Warfarin taken as instructed    Diet: No new diet changes identified    New illness, injury, or hospitalization: No    Medication/supplement changes: None noted    Signs or symptoms of bleeding or clotting: No    Previous INR: Subtherapeutic    Additional findings: None     PLAN     Recommended plan for no diet, medication or health factor changes affecting INR     Dosing Instructions: Continue your current warfarin dose with next INR in 3 weeks       Summary  As of 7/27/2021    Full warfarin instructions:  5 mg every Mon; 2.5 mg all other days   Next INR check:  8/17/2021             Telephone call with Nayely who verbalizes understanding and agrees to plan    Lab visit scheduled    Education provided: Please call back if any changes to your diet, medications or how you've been taking warfarin    Plan made per [facility] anticoagulation protocol    Luz Eelna Barrios RN  Anticoagulation Clinic  7/27/2021    _______________________________________________________________________     Anticoagulation Episode Summary     Current INR goal:  2.0-3.0   TTR:  62.8 % (1 y)   Target end date:  Indefinite   Send INR reminders to:  [facility]    Indications    Chronic atrial fibrillation (H) [I48.20]  Long term current use of anticoagulant therapy [Z79.01]           Comments:  *  only wants dosing card. Pt uses 5mg tablets. wants to check every 4 weeks. has polymyalgia rheumatica (PMR)           Anticoagulation Care Providers     Provider Role Specialty Phone number    Ulysses Baker MD Referring Family Medicine [phone]    Oswaldo Cedillo MD Referring Family Medicine [phone]

## 2021-08-17 ENCOUNTER — LAB (OUTPATIENT)
Dept: LAB | Facility: CLINIC | Age: 81
End: 2021-08-17
Payer: MEDICARE

## 2021-08-17 ENCOUNTER — ALLIED HEALTH/NURSE VISIT (OUTPATIENT)
Dept: FAMILY MEDICINE | Facility: CLINIC | Age: 81
End: 2021-08-17
Payer: MEDICARE

## 2021-08-17 ENCOUNTER — ANTICOAGULATION THERAPY VISIT (OUTPATIENT)
Dept: ANTICOAGULATION | Facility: CLINIC | Age: 81
End: 2021-08-17

## 2021-08-17 VITALS — SYSTOLIC BLOOD PRESSURE: 130 MMHG | DIASTOLIC BLOOD PRESSURE: 62 MMHG

## 2021-08-17 DIAGNOSIS — I10 ESSENTIAL HYPERTENSION: Primary | ICD-10-CM

## 2021-08-17 DIAGNOSIS — Z79.01 LONG TERM CURRENT USE OF ANTICOAGULANT THERAPY: ICD-10-CM

## 2021-08-17 DIAGNOSIS — I48.20 CHRONIC ATRIAL FIBRILLATION (H): ICD-10-CM

## 2021-08-17 DIAGNOSIS — I48.20 CHRONIC ATRIAL FIBRILLATION (H): Primary | ICD-10-CM

## 2021-08-17 LAB — INR BLD: 2.2 (ref 0.9–1.1)

## 2021-08-17 PROCEDURE — 36416 COLLJ CAPILLARY BLOOD SPEC: CPT

## 2021-08-17 PROCEDURE — 85610 PROTHROMBIN TIME: CPT

## 2021-08-17 PROCEDURE — 99207 PR NO CHARGE NURSE ONLY: CPT

## 2021-08-17 NOTE — PROGRESS NOTES
ANTICOAGULATION MANAGEMENT     Nayely Kay 81 year old female is on warfarin with therapeutic INR result. (Goal INR 2.0-3.0)    Recent labs: (last 7 days)     08/17/21  0926   INR 2.2*       ASSESSMENT     Source(s): Chart Review and Patient/Caregiver Call       Warfarin doses taken: Warfarin taken as instructed    Diet: No new diet changes identified    New illness, injury, or hospitalization: No    Medication/supplement changes: None noted    Signs or symptoms of bleeding or clotting: No    Previous INR: Therapeutic last visit; previously outside of goal range    Additional findings: None     PLAN     Recommended plan for no diet, medication or health factor changes affecting INR     Dosing Instructions: Continue your current warfarin dose with next INR in 4 weeks       Summary  As of 8/17/2021    Full warfarin instructions:  5 mg every Mon; 2.5 mg all other days   Next INR check:  9/13/2021             Telephone call with Nayely who verbalizes understanding and agrees to plan    Lab visit scheduled    Education provided: Contact [phone]  with any changes, questions or concerns.     Plan made per [facility] anticoagulation protocol    Jeannie Gaspar RN  Anticoagulation Clinic  8/17/2021    _______________________________________________________________________     Anticoagulation Episode Summary     Current INR goal:  2.0-3.0   TTR:  62.7 % (1 y)   Target end date:  Indefinite   Send INR reminders to:  [facility]    Indications    Chronic atrial fibrillation (H) [I48.20]  Long term current use of anticoagulant therapy [Z79.01]           Comments:  *  only wants dosing card. Pt uses 5mg tablets. wants to check every 4 weeks. has polymyalgia rheumatica (PMR)           Anticoagulation Care Providers     Provider Role Specialty Phone number    Ulysses Baker MD Referring Family Medicine [phone]    Oswaldo Cedillo MD Referring Family Medicine [phone]

## 2021-08-17 NOTE — PROGRESS NOTES
Nayely Kay is a 81 year old year old patient who comes in today for a Blood Pressure check because of ongoing blood pressure monitoring.  Vital Signs as repeated by RN   Patient is taking medication as prescribed  Patient is tolerating medications well.  Patient is not monitoring Blood Pressure at home.  Average readings if yes are   Current complaints: none  Disposition:  patient to continue with the same medication

## 2021-09-13 ENCOUNTER — ALLIED HEALTH/NURSE VISIT (OUTPATIENT)
Dept: FAMILY MEDICINE | Facility: CLINIC | Age: 81
End: 2021-09-13
Payer: MEDICARE

## 2021-09-13 ENCOUNTER — LAB (OUTPATIENT)
Dept: LAB | Facility: CLINIC | Age: 81
End: 2021-09-13

## 2021-09-13 ENCOUNTER — ANTICOAGULATION THERAPY VISIT (OUTPATIENT)
Dept: ANTICOAGULATION | Facility: CLINIC | Age: 81
End: 2021-09-13

## 2021-09-13 VITALS — HEART RATE: 60 BPM | RESPIRATION RATE: 18 BRPM | DIASTOLIC BLOOD PRESSURE: 60 MMHG | SYSTOLIC BLOOD PRESSURE: 112 MMHG

## 2021-09-13 DIAGNOSIS — Z79.01 LONG TERM CURRENT USE OF ANTICOAGULANT THERAPY: ICD-10-CM

## 2021-09-13 DIAGNOSIS — Z01.30 BP CHECK: Primary | ICD-10-CM

## 2021-09-13 DIAGNOSIS — I10 ESSENTIAL HYPERTENSION: ICD-10-CM

## 2021-09-13 DIAGNOSIS — I48.20 CHRONIC ATRIAL FIBRILLATION (H): Primary | ICD-10-CM

## 2021-09-13 DIAGNOSIS — I48.20 CHRONIC ATRIAL FIBRILLATION (H): ICD-10-CM

## 2021-09-13 LAB — INR BLD: 2.4 (ref 0.9–1.1)

## 2021-09-13 PROCEDURE — 36416 COLLJ CAPILLARY BLOOD SPEC: CPT

## 2021-09-13 PROCEDURE — 85610 PROTHROMBIN TIME: CPT

## 2021-09-13 PROCEDURE — 99207 PR NO CHARGE LOS: CPT

## 2021-09-13 NOTE — PROGRESS NOTES
Nayely Kay is a 81 year old year old patient who comes in today for a Blood Pressure check because of ongoing blood pressure monitoring.  Vital Signs as repeated by /60 and pulse 60  Patient is taking medication as prescribed  Patient is tolerating medications well.  Patient is not monitoring Blood Pressure at home.   Current complaints: none  Disposition:  patient to continue with the same medication.    BP check as needed.    Toenails trimmed    Nicolette Granger RN

## 2021-09-13 NOTE — PROGRESS NOTES
ANTICOAGULATION MANAGEMENT     Nayely Kay 81 year old female is on warfarin with therapeutic INR result. (Goal INR 2.0-3.0)    Recent labs: (last 7 days)     09/13/21  0941   INR 2.4*       ASSESSMENT     Source(s): Chart Review and Patient/Caregiver Call       Warfarin doses taken: Warfarin taken as instructed    Diet: No new diet changes identified    New illness, injury, or hospitalization: No    Medication/supplement changes: None noted    Signs or symptoms of bleeding or clotting: No    Previous INR: Therapeutic last 2(+) visits    Additional findings: None     PLAN     Recommended plan for no diet, medication or health factor changes affecting INR     Dosing Instructions: Continue your current warfarin dose with next INR in 4 weeks       Summary  As of 9/13/2021    Full warfarin instructions:  5 mg every Mon; 2.5 mg all other days   Next INR check:  10/11/2021             Telephone call with Nayely who verbalizes understanding and agrees to plan    Lab visit scheduled    Education provided: Contact [phone]  with any changes, questions or concerns.     Plan made per [facility] anticoagulation protocol    Jeannie Gaspar RN  Anticoagulation Clinic  9/13/2021    _______________________________________________________________________     Anticoagulation Episode Summary     Current INR goal:  2.0-3.0   TTR:  62.7 % (1 y)   Target end date:  Indefinite   Send INR reminders to:  [facility]    Indications    Chronic atrial fibrillation (H) [I48.20]  Long term current use of anticoagulant therapy [Z79.01]           Comments:  *  only wants dosing card. Pt uses 5mg tablets. wants to check every 4 weeks. has polymyalgia rheumatica (PMR)           Anticoagulation Care Providers     Provider Role Specialty Phone number    Ulysses Baker MD Referring Family Medicine [phone]    Oswaldo Cedillo MD Referring Family Medicine [phone]

## 2021-10-11 ENCOUNTER — ANTICOAGULATION THERAPY VISIT (OUTPATIENT)
Dept: ANTICOAGULATION | Facility: CLINIC | Age: 81
End: 2021-10-11

## 2021-10-11 ENCOUNTER — LAB (OUTPATIENT)
Dept: LAB | Facility: CLINIC | Age: 81
End: 2021-10-11
Payer: MEDICARE

## 2021-10-11 ENCOUNTER — ALLIED HEALTH/NURSE VISIT (OUTPATIENT)
Dept: FAMILY MEDICINE | Facility: CLINIC | Age: 81
End: 2021-10-11
Payer: MEDICARE

## 2021-10-11 VITALS — RESPIRATION RATE: 18 BRPM | HEART RATE: 60 BPM | SYSTOLIC BLOOD PRESSURE: 138 MMHG | DIASTOLIC BLOOD PRESSURE: 70 MMHG

## 2021-10-11 DIAGNOSIS — Z79.01 LONG TERM CURRENT USE OF ANTICOAGULANT THERAPY: ICD-10-CM

## 2021-10-11 DIAGNOSIS — I48.20 CHRONIC ATRIAL FIBRILLATION (H): Primary | ICD-10-CM

## 2021-10-11 DIAGNOSIS — Z01.30 BP CHECK: Primary | ICD-10-CM

## 2021-10-11 DIAGNOSIS — I48.20 CHRONIC ATRIAL FIBRILLATION (H): ICD-10-CM

## 2021-10-11 DIAGNOSIS — I10 ESSENTIAL HYPERTENSION: ICD-10-CM

## 2021-10-11 DIAGNOSIS — Z23 NEED FOR PROPHYLACTIC VACCINATION AND INOCULATION AGAINST INFLUENZA: Primary | ICD-10-CM

## 2021-10-11 LAB — INR BLD: 1.7 (ref 0.9–1.1)

## 2021-10-11 PROCEDURE — 85610 PROTHROMBIN TIME: CPT

## 2021-10-11 PROCEDURE — 36415 COLL VENOUS BLD VENIPUNCTURE: CPT

## 2021-10-11 PROCEDURE — 99207 PR NO CHARGE NURSE ONLY: CPT

## 2021-10-11 PROCEDURE — G0008 ADMIN INFLUENZA VIRUS VAC: HCPCS

## 2021-10-11 PROCEDURE — 90662 IIV NO PRSV INCREASED AG IM: CPT

## 2021-10-11 NOTE — PROGRESS NOTES
Nayely Kay is a 81 year old year old patient who comes in today for a Blood Pressure check because of ongoing blood pressure monitoring.  Vital Signs as repeated by /70 and pulse 60  Patient is taking medication as prescribed  Patient is tolerating medications well.  Patient is not monitoring Blood Pressure at home.    Current complaints: none  Disposition:  patient to continue with the same medication.  BP check as needed.  She usually has it checked when in for INR lab.  Nicolette Granger RN

## 2021-10-11 NOTE — PROGRESS NOTES
ANTICOAGULATION MANAGEMENT     Nayely Kay 81 year old female is on warfarin with subtherapeutic INR result. (Goal INR 2.0-3.0)    Recent labs: (last 7 days)     10/11/21  0929   INR 1.7*       ASSESSMENT     Source(s): Chart Review and Patient/Caregiver Call       Warfarin doses taken: Missed dose(s) may be affecting INR    Diet: No new diet changes identified    New illness, injury, or hospitalization: No    Medication/supplement changes: None noted    Signs or symptoms of bleeding or clotting: No    Previous INR: Therapeutic last 2(+) visits    Additional findings: None     PLAN     Recommended plan for temporary change(s) affecting INR     Dosing Instructions: Booster dose then continue your current warfarin dose with next INR in 2 weeks       Summary  As of 10/11/2021    Full warfarin instructions:  10/11: 7.5 mg; Otherwise 5 mg every Mon; 2.5 mg all other days   Next INR check:  10/26/2021             Telephone call with Nayely who verbalizes understanding and agrees to plan    Lab visit scheduled    Education provided: Please call back if any changes to your diet, medications or how you've been taking warfarin    Plan made per [facility] anticoagulation protocol    Caridad Saldivar, RN  Anticoagulation Clinic  10/11/2021    _______________________________________________________________________     Anticoagulation Episode Summary     Current INR goal:  2.0-3.0   TTR:  59.9 % (1 y)   Target end date:  Indefinite   Send INR reminders to:  [facility]    Indications    Chronic atrial fibrillation (H) [I48.20]  Long term current use of anticoagulant therapy [Z79.01]           Comments:  *  only wants dosing card. Pt uses 5mg tablets. wants to check every 4 weeks. has polymyalgia rheumatica (PMR)           Anticoagulation Care Providers     Provider Role Specialty Phone number    Ulysses Baker MD Referring Family Medicine [phone]    Oswaldo Cedillo MD Referring Family Medicine [phone]

## 2021-10-26 ENCOUNTER — LAB (OUTPATIENT)
Dept: LAB | Facility: CLINIC | Age: 81
End: 2021-10-26
Payer: MEDICARE

## 2021-10-26 ENCOUNTER — DOCUMENTATION ONLY (OUTPATIENT)
Dept: ANTICOAGULATION | Facility: CLINIC | Age: 81
End: 2021-10-26

## 2021-10-26 ENCOUNTER — ANTICOAGULATION THERAPY VISIT (OUTPATIENT)
Dept: ANTICOAGULATION | Facility: CLINIC | Age: 81
End: 2021-10-26

## 2021-10-26 DIAGNOSIS — I48.20 CHRONIC ATRIAL FIBRILLATION (H): Primary | ICD-10-CM

## 2021-10-26 DIAGNOSIS — I48.20 CHRONIC ATRIAL FIBRILLATION (H): ICD-10-CM

## 2021-10-26 DIAGNOSIS — Z79.01 LONG TERM CURRENT USE OF ANTICOAGULANTS WITH INR GOAL OF 2.0-3.0: ICD-10-CM

## 2021-10-26 DIAGNOSIS — Z79.01 LONG TERM CURRENT USE OF ANTICOAGULANT THERAPY: ICD-10-CM

## 2021-10-26 LAB — INR BLD: 2.1 (ref 0.9–1.1)

## 2021-10-26 PROCEDURE — 85610 PROTHROMBIN TIME: CPT

## 2021-10-26 PROCEDURE — 36416 COLLJ CAPILLARY BLOOD SPEC: CPT

## 2021-10-26 NOTE — PROGRESS NOTES
ANTICOAGULATION MANAGEMENT      Nayely Kay due for annual renewal of referral to anticoagulation monitoring. Order pended for your review and signature.      ANTICOAGULATION SUMMARY      Warfarin indication(s)     Atrial fibrillation    Heart valve present?  NO       Current goal range   INR: 2.0-3.0     Goal appropriate for indication? Yes, INR 2-3 appropriate for hx of DVT, PE, hypercoagulable state, Afib, LVAD, or bileaflet AVR without risk factors     Current duration of therapy Indefinite/long term therapy   Time in Therapeutic Range (TTR)  (Goal > 60%) 61%       Office visit with referring provider's group within last year yes on 7/2/2021       Diane Schneider RN

## 2021-10-26 NOTE — PROGRESS NOTES
ANTICOAGULATION MANAGEMENT     Nayely Kay 81 year old female is on warfarin with therapeutic INR result. (Goal INR 2.0-3.0)    Recent labs: (last 7 days)     10/26/21  1008   INR 2.1*       ASSESSMENT     Source(s): Patient/Caregiver Call       Warfarin doses taken: Warfarin taken as instructed    Diet: No new diet changes identified    New illness, injury, or hospitalization: No    Medication/supplement changes: None noted    Signs or symptoms of bleeding or clotting: No    Previous INR: Subtherapeutic    Additional findings: None     PLAN     Recommended plan for no diet, medication or health factor changes affecting INR     Dosing Instructions: Continue your current warfarin dose with next INR in 3 weeks       Summary  As of 10/26/2021    Full warfarin instructions:  5 mg every Mon; 2.5 mg all other days   Next INR check:  11/15/2021             Telephone call with Nayely who verbalizes understanding and agrees to plan    Lab visit scheduled    Education provided: Contact [phone]  with any changes, questions or concerns.     Plan made per [facility] anticoagulation protocol    Diane Schneider RN  Anticoagulation Clinic  10/26/2021    _______________________________________________________________________     Anticoagulation Episode Summary     Current INR goal:  2.0-3.0   TTR:  60.9 % (1 y)   Target end date:  Indefinite   Send INR reminders to:  [facility]    Indications    Chronic atrial fibrillation (H) [I48.20]  Long term current use of anticoagulant therapy [Z79.01]  Long term current use of anticoagulants with INR goal of 2.0-3.0 [Z79.01]           Comments:  *  only wants dosing card. Pt uses 5mg tablets. wants to check every 4 weeks. has polymyalgia rheumatica (PMR)           Anticoagulation Care Providers     Provider Role Specialty Phone number    Ulysses Baker MD Referring Family Medicine [phone]    Oswaldo Cedillo MD Referring Family Medicine [phone]          '

## 2021-11-15 ENCOUNTER — ALLIED HEALTH/NURSE VISIT (OUTPATIENT)
Dept: FAMILY MEDICINE | Facility: CLINIC | Age: 81
End: 2021-11-15
Payer: MEDICARE

## 2021-11-15 ENCOUNTER — LAB (OUTPATIENT)
Dept: LAB | Facility: CLINIC | Age: 81
End: 2021-11-15
Payer: MEDICARE

## 2021-11-15 ENCOUNTER — ANTICOAGULATION THERAPY VISIT (OUTPATIENT)
Dept: ANTICOAGULATION | Facility: CLINIC | Age: 81
End: 2021-11-15

## 2021-11-15 VITALS — HEART RATE: 72 BPM | RESPIRATION RATE: 18 BRPM | SYSTOLIC BLOOD PRESSURE: 122 MMHG | DIASTOLIC BLOOD PRESSURE: 62 MMHG

## 2021-11-15 DIAGNOSIS — Z79.01 LONG TERM CURRENT USE OF ANTICOAGULANTS WITH INR GOAL OF 2.0-3.0: ICD-10-CM

## 2021-11-15 DIAGNOSIS — Z01.30 BP CHECK: ICD-10-CM

## 2021-11-15 DIAGNOSIS — I10 ESSENTIAL HYPERTENSION: Primary | ICD-10-CM

## 2021-11-15 DIAGNOSIS — I48.20 CHRONIC ATRIAL FIBRILLATION (H): Primary | ICD-10-CM

## 2021-11-15 DIAGNOSIS — Z79.01 LONG TERM CURRENT USE OF ANTICOAGULANT THERAPY: ICD-10-CM

## 2021-11-15 DIAGNOSIS — I48.20 CHRONIC ATRIAL FIBRILLATION (H): ICD-10-CM

## 2021-11-15 LAB — INR BLD: 1.7 (ref 0.9–1.1)

## 2021-11-15 PROCEDURE — 36416 COLLJ CAPILLARY BLOOD SPEC: CPT

## 2021-11-15 PROCEDURE — 99207 PR NO CHARGE NURSE ONLY: CPT

## 2021-11-15 PROCEDURE — 85610 PROTHROMBIN TIME: CPT

## 2021-11-15 NOTE — PROGRESS NOTES
Nayely Kay is a 81 year old year old patient who comes in today for a Blood Pressure check because of ongoing blood pressure monitoring.  Vital Signs as repeated by /62 pulse 72  Patient is taking medication as prescribed  Patient is tolerating medications well.  Patient is not monitoring Blood Pressure at home.    Current complaints: none  Disposition:  patient to continue with the same medication.    Nicolette Granger RN

## 2021-11-15 NOTE — PROGRESS NOTES
ANTICOAGULATION MANAGEMENT     Nayely Kay 81 year old female is on warfarin with subtherapeutic INR result. (Goal INR 2.0-3.0)    Recent labs: (last 7 days)     11/15/21  0921   INR 1.7*       ASSESSMENT     Source(s): Chart Review and Patient/Caregiver Call       Warfarin doses taken: Warfarin taken as instructed    Diet: Increased greens/vitamin K in diet; plans to resume previous intake    New illness, injury, or hospitalization: No    Medication/supplement changes: None noted    Signs or symptoms of bleeding or clotting: No    Previous INR: Therapeutic last visit; previously outside of goal range    Additional findings: None     PLAN     Recommended plan for temporary change(s) affecting INR     Dosing Instructions: Booster dose then continue your current warfarin dose with next INR in 2 weeks       Summary  As of 11/15/2021    Full warfarin instructions:  11/15: 7.5 mg; Otherwise 5 mg every Mon; 2.5 mg all other days   Next INR check:  11/29/2021             Telephone call with Nayely who verbalizes understanding and agrees to plan    Lab visit scheduled    Education provided: Please call back if any changes to your diet, medications or how you've been taking warfarin and Monitoring for clotting signs and symptoms    Plan made per [facility] anticoagulation protocol    Luz Elena Barrios RN  Anticoagulation Clinic  11/15/2021    _______________________________________________________________________     Anticoagulation Episode Summary     Current INR goal:  2.0-3.0   TTR:  62.3 % (1 y)   Target end date:  Indefinite   Send INR reminders to:  [facility]    Indications    Chronic atrial fibrillation (H) [I48.20]  Long term current use of anticoagulant therapy [Z79.01]  Long term current use of anticoagulants with INR goal of 2.0-3.0 [Z79.01]           Comments:  polymyalgia rheumatica (PMR)         Anticoagulation Care Providers     Provider Role Specialty Phone number    Ulysses Baker MD Referring  Family Medicine [phone]    Oswaldo Cedillo MD Referring Family Medicine [phone]

## 2021-11-18 NOTE — PATIENT INSTRUCTIONS
WOUND CARE INSTRUCTIONS   FOR CRYOSURGERY   This area treated with liquid nitrogen should form a blister (areas treated may or may not blister-skin may just turn dark and slough off). You do not need to bandage the area unless a blister forms and breaks (which may be a few days). When the blister breaks, begin daily dressing changes as follows:  1) Clean and dry the area with tap water using clean Q-tip or sterile gauze pad.   2) Apply Polysporin ointment or Bacitracin ointment over entire wound. Do NOT use Neosporin ointment.   3) Cover the wound with a band-aid or sterile non-stick gauze pad and micropore paper tape.   REPEAT THESE INSTRUCTIONS AT LEAST ONCE A DAY UNTIL THE WOUND HAS COMPLETELY HEALED.   It is an old wives tale that a wound heals better when it is exposed to air and allowed to dry out. The wound will heal faster with a better cosmetic result if it is kept moist with ointment and covered with a bandage.   Do not let the wound dry out.   IMPORTANT INFORMATION ON REVERSE SIDE   Supplies Needed:   *Cotton tipped applicators (Q-tips)   *Polysporin ointment or Bacitracin ointment (NOT NEOSPORIN)   *Band-aids, or non stick gauze pads and micropore paper tape   PATIENT INFORMATION   During the healing process you will notice a number of changes. All wounds develop a small halo of redness surrounding the wound. This means healing is occurring. Severe itching with extensive redness usually indicates sensitivity to the ointment or bandage tape used to dress the wound. You should call our office if this develops.   Swelling and/or discoloration around your surgical site is common, particularly when performed around the eye.   All wounds normally drain. The larger the wound the more drainage there will be. After 7-10 days, you will notice the wound beginning to shrink and new skin will begin to grow. The wound is healed when you can see skin has formed over the entire area. A healed wound has a healthy, shiny  look to the surface and is red to dark pink in color to normalize. Wounds may take approximately 4-6 weeks to heal. Larger wounds may take 6-8 weeks. After the wound is healed you may discontinue dressing changes.   You may experience a sensation of tightness as your wound heals. This is normal and will gradually subside.   Your healed wound may be sensitive to temperature changes. This sensitivity improves with time, but if you re having a lot of discomfort, try to avoid temperature extremes.   Patients frequently experience itching after their wound appears to have healed because of the continue healing under the skin. Plain Vaseline will help relieve the itching.          Cyclophosphamide Counseling:  I discussed with the patient the risks of cyclophosphamide including but not limited to hair loss, hormonal abnormalities, decreased fertility, abdominal pain, diarrhea, nausea and vomiting, bone marrow suppression and infection. The patient understands that monitoring is required while taking this medication.

## 2021-11-29 ENCOUNTER — ALLIED HEALTH/NURSE VISIT (OUTPATIENT)
Dept: FAMILY MEDICINE | Facility: CLINIC | Age: 81
End: 2021-11-29
Payer: MEDICARE

## 2021-11-29 ENCOUNTER — ANTICOAGULATION THERAPY VISIT (OUTPATIENT)
Dept: ANTICOAGULATION | Facility: CLINIC | Age: 81
End: 2021-11-29

## 2021-11-29 ENCOUNTER — LAB (OUTPATIENT)
Dept: LAB | Facility: CLINIC | Age: 81
End: 2021-11-29
Payer: MEDICARE

## 2021-11-29 VITALS — DIASTOLIC BLOOD PRESSURE: 72 MMHG | RESPIRATION RATE: 18 BRPM | SYSTOLIC BLOOD PRESSURE: 150 MMHG

## 2021-11-29 DIAGNOSIS — I48.20 CHRONIC ATRIAL FIBRILLATION (H): Primary | ICD-10-CM

## 2021-11-29 DIAGNOSIS — I10 ESSENTIAL HYPERTENSION: Primary | ICD-10-CM

## 2021-11-29 DIAGNOSIS — Z79.01 LONG TERM CURRENT USE OF ANTICOAGULANTS WITH INR GOAL OF 2.0-3.0: ICD-10-CM

## 2021-11-29 DIAGNOSIS — Z79.01 LONG TERM CURRENT USE OF ANTICOAGULANT THERAPY: ICD-10-CM

## 2021-11-29 DIAGNOSIS — I48.20 CHRONIC ATRIAL FIBRILLATION (H): ICD-10-CM

## 2021-11-29 LAB — INR BLD: 2.3 (ref 0.9–1.1)

## 2021-11-29 PROCEDURE — 36416 COLLJ CAPILLARY BLOOD SPEC: CPT

## 2021-11-29 PROCEDURE — 99207 PR NO CHARGE NURSE ONLY: CPT

## 2021-11-29 PROCEDURE — 85610 PROTHROMBIN TIME: CPT

## 2021-11-29 NOTE — PROGRESS NOTES
ANTICOAGULATION MANAGEMENT     Nayely Kay 81 year old female is on warfarin with therapeutic INR result. (Goal INR 2.0-3.0)    Recent labs: (last 7 days)     11/29/21  1248   INR 2.3*       ASSESSMENT     Source(s): Chart Review and Patient/Caregiver Call       Warfarin doses taken: Warfarin taken as instructed    Diet: No new diet changes identified    New illness, injury, or hospitalization: No, BP was elevated today-she states she was eating more salt in her diet and plans to cut back    Medication/supplement changes: None noted    Signs or symptoms of bleeding or clotting: No    Previous INR: Subtherapeutic    Additional findings: None     PLAN     Recommended plan for no diet, medication or health factor changes affecting INR     Dosing Instructions: Continue your current warfarin dose with next INR in 3 weeks       Summary  As of 11/29/2021    Full warfarin instructions:  5 mg every Mon; 2.5 mg all other days   Next INR check:  12/20/2021             Telephone call with Nayely who verbalizes understanding and agrees to plan    Lab visit scheduled    Education provided: Contact [phone]  with any changes, questions or concerns.     Plan made per [facility] anticoagulation protocol    Jeannie Gaspar RN  Anticoagulation Clinic  11/29/2021    _______________________________________________________________________     Anticoagulation Episode Summary     Current INR goal:  2.0-3.0   TTR:  64.2 % (1 y)   Target end date:  Indefinite   Send INR reminders to:  [facility]    Indications    Chronic atrial fibrillation (H) [I48.20]  Long term current use of anticoagulant therapy [Z79.01]  Long term current use of anticoagulants with INR goal of 2.0-3.0 [Z79.01]           Comments:           Anticoagulation Care Providers     Provider Role Specialty Phone number    Ulysses Baker MD Referring Family Medicine [phone]    Oswaldo Cedillo MD Referring Family Medicine [phone]

## 2021-11-29 NOTE — PROGRESS NOTES
Nayely Kay is a 81 year old year old patient who comes in today for a Blood Pressure check because of ongoing blood pressure monitoring.  Vital Signs as repeated by /72 and pulse 72  Patient is taking medication as prescribed  Patient is tolerating medications well.  Patient is not monitoring Blood Pressure at home.    Current complaints: none  Disposition:  patient to continue with the same medication.  BP check with next INR check  Toenails trimmed  Nicolette Granger RN

## 2021-12-03 DIAGNOSIS — I10 ESSENTIAL HYPERTENSION: ICD-10-CM

## 2021-12-06 RX ORDER — AMLODIPINE BESYLATE 5 MG/1
TABLET ORAL
Qty: 90 TABLET | Refills: 1 | Status: SHIPPED | OUTPATIENT
Start: 2021-12-06 | End: 2022-04-18

## 2021-12-06 RX ORDER — LISINOPRIL 5 MG/1
TABLET ORAL
Qty: 90 TABLET | Refills: 1 | Status: SHIPPED | OUTPATIENT
Start: 2021-12-06 | End: 2022-04-18

## 2021-12-08 ENCOUNTER — ALLIED HEALTH/NURSE VISIT (OUTPATIENT)
Dept: FAMILY MEDICINE | Facility: CLINIC | Age: 81
End: 2021-12-08
Payer: MEDICARE

## 2021-12-08 ENCOUNTER — TELEPHONE (OUTPATIENT)
Dept: FAMILY MEDICINE | Facility: CLINIC | Age: 81
End: 2021-12-08

## 2021-12-08 VITALS — DIASTOLIC BLOOD PRESSURE: 60 MMHG | SYSTOLIC BLOOD PRESSURE: 140 MMHG | HEART RATE: 68 BPM

## 2021-12-08 DIAGNOSIS — I10 ESSENTIAL HYPERTENSION: Primary | ICD-10-CM

## 2021-12-08 PROCEDURE — 99207 PR NO CHARGE NURSE ONLY: CPT

## 2021-12-08 NOTE — TELEPHONE ENCOUNTER
Her BP is borderline.    PLAN: I agree.  No new changes in treatment recommended and follow-up again later this month.   MASON BURRELL MD

## 2021-12-08 NOTE — PROGRESS NOTES
Nayely Kay is a 81 year old year old patient who comes in today for a Blood Pressure check because of ongoing blood pressure monitoring.  Vital Signs as repeated by /62. 140/60  Patient is taking medication as prescribed  Patient is tolerating medications well.  Patient is not monitoring Blood Pressure at home.    Current complaints: none  Disposition:  patient to continue with the same medication. She will have another BP check with next INR on 12/20/21.   Kellee PALACIOS RN

## 2021-12-10 ENCOUNTER — TELEPHONE (OUTPATIENT)
Dept: FAMILY MEDICINE | Facility: CLINIC | Age: 81
End: 2021-12-10
Payer: MEDICARE

## 2021-12-10 ENCOUNTER — NURSE TRIAGE (OUTPATIENT)
Dept: NURSING | Facility: CLINIC | Age: 81
End: 2021-12-10
Payer: MEDICARE

## 2021-12-10 DIAGNOSIS — Z79.01 LONG TERM CURRENT USE OF ANTICOAGULANT THERAPY: ICD-10-CM

## 2021-12-10 DIAGNOSIS — I48.20 CHRONIC ATRIAL FIBRILLATION (H): Primary | ICD-10-CM

## 2021-12-10 DIAGNOSIS — Z79.01 LONG TERM CURRENT USE OF ANTICOAGULANTS WITH INR GOAL OF 2.0-3.0: ICD-10-CM

## 2021-12-10 NOTE — TELEPHONE ENCOUNTER
Patient left VM on [facility] phone asking for a call back. Patient states she doesn't think she ate enough greens this week and would like to discuss possible bleeding? Noted patient was not sent to triage for this.      Patient asking if she should do anything different until her lab appointment on Monday.     Jenni Oneil, RN, BSN, PHN  Anticoagulation Nurse  [phone]

## 2021-12-10 NOTE — TELEPHONE ENCOUNTER
Happened before. On Warfarin, went to bathroom had blood in rectal area, probably hemorrhoids. It was with wiping and turned the toilet water read.  Thinks she hasn't eaten enough vegetables.  She has an appointment for Monday and is wondering if she should hold her warfarin dose(s) today? I told her her MD has to guide her in that decision. Please call her at:  [phone]. May leave a detailed message.  Thank you,  Yecenia Hou RN  [facility]      Reason for Disposition    MODERATE rectal bleeding (small blood clots, passing blood without stool, or toilet water turns red)    Additional Information    Negative: Passed out (i.e., fainted, collapsed and was not responding)    Negative: Shock suspected (e.g., cold/pale/clammy skin, too weak to stand, low BP, rapid pulse)    Negative: Vomiting red blood or black (coffee ground) material    Negative: Sounds like a life-threatening emergency to the triager    Negative: Diarrhea is the main symptom    Negative: Rectal symptoms    Negative: SEVERE rectal bleeding (large blood clots; on and off, or constant bleeding)    Negative: SEVERE dizziness (e.g., unable to stand, requires support to walk, feels like passing out now)    Negative: MODERATE rectal bleeding (small blood clots, passing blood without stool, or toilet water turns red) more than once a day    Negative: Bloody, black, or tarry bowel movements (Exception: chronic-unchanged  black-grey bowel movements and is taking iron pills or Pepto-bismol)    Negative: High-risk adult (e.g., prior surgery on aorta, abdominal aortic aneurysm)    Negative: Rectal foreign body (inserted or swallowed)    Negative: SEVERE abdominal pain (e.g., excruciating)    Negative: Constant abdominal pain lasting > 2 hours    Negative: Pale skin (pallor) of new onset or worsening    Negative: Patient sounds very sick or weak to the triager    Protocols used: RECTAL BLEEDING-A-[place]

## 2021-12-10 NOTE — TELEPHONE ENCOUNTER
12/10/21 11:20 AM    Writer spoke with the patient. She will hold her Coumadin dose for today. Patient will consume a side of greens. Educated patient on consistency with consumption of Vitamin K foods. Educated on S/S bleeding and when to seek medical attention.    Renetta Falcon RN, BSN, PHN  Anticoagulation Clinic   [place] # [id number]  [phone]

## 2021-12-10 NOTE — TELEPHONE ENCOUNTER
11:24 AM    Writer spoke with the patient. Please refer to the nurse triage note for today.    Renetta Falcon RN, BSN, PHN  Anticoagulation Clinic   [place] # [id number]  [phone]

## 2021-12-13 ENCOUNTER — ANTICOAGULATION THERAPY VISIT (OUTPATIENT)
Dept: ANTICOAGULATION | Facility: CLINIC | Age: 81
End: 2021-12-13

## 2021-12-13 ENCOUNTER — ALLIED HEALTH/NURSE VISIT (OUTPATIENT)
Dept: FAMILY MEDICINE | Facility: CLINIC | Age: 81
End: 2021-12-13
Payer: MEDICARE

## 2021-12-13 ENCOUNTER — LAB (OUTPATIENT)
Dept: LAB | Facility: CLINIC | Age: 81
End: 2021-12-13
Payer: MEDICARE

## 2021-12-13 VITALS — RESPIRATION RATE: 18 BRPM | DIASTOLIC BLOOD PRESSURE: 72 MMHG | SYSTOLIC BLOOD PRESSURE: 128 MMHG | HEART RATE: 68 BPM

## 2021-12-13 DIAGNOSIS — I48.20 CHRONIC ATRIAL FIBRILLATION (H): ICD-10-CM

## 2021-12-13 DIAGNOSIS — Z01.30 BP CHECK: ICD-10-CM

## 2021-12-13 DIAGNOSIS — Z79.01 LONG TERM CURRENT USE OF ANTICOAGULANTS WITH INR GOAL OF 2.0-3.0: ICD-10-CM

## 2021-12-13 DIAGNOSIS — Z79.01 LONG TERM CURRENT USE OF ANTICOAGULANT THERAPY: ICD-10-CM

## 2021-12-13 DIAGNOSIS — I10 ESSENTIAL HYPERTENSION: Primary | ICD-10-CM

## 2021-12-13 DIAGNOSIS — I48.20 CHRONIC ATRIAL FIBRILLATION (H): Primary | ICD-10-CM

## 2021-12-13 LAB — INR BLD: 1.8 (ref 0.9–1.1)

## 2021-12-13 PROCEDURE — 99207 PR NO CHARGE NURSE ONLY: CPT

## 2021-12-13 PROCEDURE — 85610 PROTHROMBIN TIME: CPT

## 2021-12-13 PROCEDURE — 36416 COLLJ CAPILLARY BLOOD SPEC: CPT

## 2021-12-13 NOTE — PROGRESS NOTES
Nayely Kay is a 81 year old year old patient who comes in today for a Blood Pressure check because of ongoing blood pressure monitoring.  Vital Signs as repeated by /72 pulse 68   Patient is taking medication as prescribed  Patient is tolerating medications well.  Patient is not monitoring Blood Pressure at home.   Current complaints: none  Disposition:  patient to continue with the same medication.  BP check as needed  Nicolette Granger RN

## 2021-12-13 NOTE — PROGRESS NOTES
ANTICOAGULATION MANAGEMENT     Nayely Kay 81 year old female is on warfarin with subtherapeutic INR result. (Goal INR 2.0-3.0)    Recent labs: (last 7 days)     12/13/21  1431   INR 1.8*       ASSESSMENT     Source(s): Chart Review and Patient/Caregiver Call       Warfarin doses taken: Warfarin recently held for rectal bleeding which may be affecting INR    Diet: No new diet changes identified    New illness, injury, or hospitalization: No    Medication/supplement changes: None noted    Signs or symptoms of bleeding or clotting: Yes: rectal bleeding which resolved    Previous INR: Therapeutic last visit; previously outside of goal range    Additional findings: None     PLAN     Recommended plan for temporary change(s) affecting INR     Dosing Instructions: Continue your current warfarin dose with next INR in 2 weeks       Summary  As of 12/13/2021    Full warfarin instructions:  5 mg every Mon; 2.5 mg all other days   Next INR check:  12/27/2021             Telephone call with Nayely who verbalizes understanding and agrees to plan    Lab visit scheduled    Education provided: Please call back if any changes to your diet, medications or how you've been taking warfarin, Monitoring for bleeding signs and symptoms and Contact [phone]  with any changes, questions or concerns.     Plan made per [facility] anticoagulation protocol    Flaquita Neal RN  Anticoagulation Clinic  12/13/2021    _______________________________________________________________________     Anticoagulation Episode Summary     Current INR goal:  2.0-3.0   TTR:  64.0 % (1 y)   Target end date:  Indefinite   Send INR reminders to:  [facility]    Indications    Chronic atrial fibrillation (H) [I48.20]  Long term current use of anticoagulant therapy [Z79.01]  Long term current use of anticoagulants with INR goal of 2.0-3.0 [Z79.01]           Comments:           Anticoagulation Care Providers     Provider Role Specialty Phone number     Ulysses Baker MD Referring Family Medicine [phone]    Oswaldo Cedillo MD Referring Family Medicine [phone]

## 2021-12-17 ENCOUNTER — TELEPHONE (OUTPATIENT)
Dept: FAMILY MEDICINE | Facility: CLINIC | Age: 81
End: 2021-12-17
Payer: MEDICARE

## 2021-12-17 NOTE — TELEPHONE ENCOUNTER
Patient called the clinic today to find out when her last colonoscopy was, no other bleeding concerns today.      Read the results from the colonoscopy from 7-13-17 and pathology from the biopsy. Patient had no further questions.      SEKOU Jack

## 2022-01-03 ENCOUNTER — ANTICOAGULATION THERAPY VISIT (OUTPATIENT)
Dept: FAMILY MEDICINE | Facility: CLINIC | Age: 82
End: 2022-01-03

## 2022-01-03 ENCOUNTER — OFFICE VISIT (OUTPATIENT)
Dept: FAMILY MEDICINE | Facility: CLINIC | Age: 82
End: 2022-01-03
Payer: MEDICARE

## 2022-01-03 VITALS
HEIGHT: 59 IN | HEART RATE: 70 BPM | RESPIRATION RATE: 20 BRPM | BODY MASS INDEX: 23.39 KG/M2 | SYSTOLIC BLOOD PRESSURE: 130 MMHG | TEMPERATURE: 97.2 F | WEIGHT: 116 LBS | DIASTOLIC BLOOD PRESSURE: 62 MMHG

## 2022-01-03 DIAGNOSIS — Z79.01 LONG TERM CURRENT USE OF ANTICOAGULANTS WITH INR GOAL OF 2.0-3.0: ICD-10-CM

## 2022-01-03 DIAGNOSIS — I10 ESSENTIAL HYPERTENSION WITH GOAL BLOOD PRESSURE LESS THAN 140/90: ICD-10-CM

## 2022-01-03 DIAGNOSIS — I48.20 CHRONIC ATRIAL FIBRILLATION (H): ICD-10-CM

## 2022-01-03 DIAGNOSIS — Z00.00 ENCOUNTER FOR SUBSEQUENT ANNUAL WELLNESS VISIT IN MEDICARE PATIENT: Primary | ICD-10-CM

## 2022-01-03 DIAGNOSIS — I48.20 CHRONIC ATRIAL FIBRILLATION (H): Primary | ICD-10-CM

## 2022-01-03 LAB
ANION GAP SERPL CALCULATED.3IONS-SCNC: 5 MMOL/L (ref 3–14)
BUN SERPL-MCNC: 21 MG/DL (ref 7–30)
CALCIUM SERPL-MCNC: 10 MG/DL (ref 8.5–10.1)
CHLORIDE BLD-SCNC: 100 MMOL/L (ref 94–109)
CO2 SERPL-SCNC: 30 MMOL/L (ref 20–32)
CREAT SERPL-MCNC: 1.09 MG/DL (ref 0.52–1.04)
GFR SERPL CREATININE-BSD FRML MDRD: 50 ML/MIN/1.73M2
GLUCOSE BLD-MCNC: 90 MG/DL (ref 70–99)
INR BLD: 2.2 (ref 0.9–1.1)
POTASSIUM BLD-SCNC: 4.2 MMOL/L (ref 3.4–5.3)
SODIUM SERPL-SCNC: 135 MMOL/L (ref 133–144)

## 2022-01-03 PROCEDURE — 36415 COLL VENOUS BLD VENIPUNCTURE: CPT | Performed by: FAMILY MEDICINE

## 2022-01-03 PROCEDURE — 85610 PROTHROMBIN TIME: CPT | Performed by: FAMILY MEDICINE

## 2022-01-03 PROCEDURE — 80048 BASIC METABOLIC PNL TOTAL CA: CPT | Performed by: FAMILY MEDICINE

## 2022-01-03 PROCEDURE — G0439 PPPS, SUBSEQ VISIT: HCPCS | Performed by: FAMILY MEDICINE

## 2022-01-03 ASSESSMENT — ENCOUNTER SYMPTOMS
HEARTBURN: 0
SORE THROAT: 0
COUGH: 0
SHORTNESS OF BREATH: 0
JOINT SWELLING: 0
PALPITATIONS: 0
NERVOUS/ANXIOUS: 0
FEVER: 0
WEAKNESS: 0
HEMATURIA: 0
BREAST MASS: 0
DIZZINESS: 0
ARTHRALGIAS: 0
CONSTIPATION: 0
FREQUENCY: 0
CHILLS: 0
NAUSEA: 0
HEADACHES: 0
PARESTHESIAS: 0
HEMATOCHEZIA: 0
ABDOMINAL PAIN: 0
MYALGIAS: 0
DIARRHEA: 0
DYSURIA: 0
EYE PAIN: 0

## 2022-01-03 ASSESSMENT — MIFFLIN-ST. JEOR: SCORE: 883.86

## 2022-01-03 ASSESSMENT — ACTIVITIES OF DAILY LIVING (ADL): CURRENT_FUNCTION: NO ASSISTANCE NEEDED

## 2022-01-03 NOTE — PATIENT INSTRUCTIONS
ASSESSMENT/PLAN:                                                    Lifestyle recommendations:  The following exams/tests were recommended and discussed for health maintenance:  When to stop colon cancer screening?  Experts agree that colon cancer screening is generally not recommended when life expectancy is <10 years and not at age over 85.  The Expert group USPSTF recommends against screening for ages 76 and older.  It is reasonable to stop screening for colon cancer sometime between age 76 and 85 for most individuals.   Mammogram screening recommendations differ among expert groups.  They may be started at age 40 or 50.  Screening can be yearly or every other year depending upon a person's risk and preference.   When to stop breast cancer screening/mammograms?  Many expert groups recommend screening only up to age 74.  Some experts advocate screening up until estimated 5-10 years anticipated remaining lifespan.     (Z00.00) Encounter for subsequent annual wellness visit in Medicare patient  (primary encounter diagnosis)    (I48.20) Chronic atrial fibrillation (H)  Comment: stable.  On chronic warfarin.   Plan: No change in current treatment plan.      (I10) Essential hypertension with goal blood pressure less than 140/90  Comment: doing well on medication  Plan: Basic metabolic panel  (Ca, Cl, CO2, Creat,         Gluc, K, Na, BUN)        No change in current treatment plan.  REfills as needed  Chem 8 blood test.       If you would like to do another colonoscopy, let me know.  You are due for one this year if you choose to continue.

## 2022-01-03 NOTE — PROGRESS NOTES
ANTICOAGULATION MANAGEMENT     Nayely Kay 81 year old female is on warfarin with therapeutic INR result. (Goal INR 2.0-3.0)    Recent labs: (last 7 days)     01/03/22  1205   INR 2.2*       ASSESSMENT     Source(s): Chart Review and Patient/Caregiver Call       Warfarin doses taken: Warfarin taken as instructed    Diet: No new diet changes identified    New illness, injury, or hospitalization: No    Medication/supplement changes: None noted    Signs or symptoms of bleeding or clotting: No    Previous INR: Subtherapeutic    Additional findings: None     PLAN     Recommended plan for no diet, medication or health factor changes affecting INR     Dosing Instructions: Continue your current warfarin dose with next INR in 3 weeks       Summary  As of 1/3/2022    Full warfarin instructions:  5 mg every Mon; 2.5 mg all other days   Next INR check:  1/24/2022             Telephone call with Nayely who verbalizes understanding and agrees to plan    Lab visit scheduled    Education provided: Please call back if any changes to your diet, medications or how you've been taking warfarin    Plan made per [facility] anticoagulation protocol    Marjorie Faulkner RN  Anticoagulation Clinic  1/3/2022    _______________________________________________________________________     Anticoagulation Episode Summary     Current INR goal:  2.0-3.0   TTR:  61.2 % (1 y)   Target end date:  Indefinite   Send INR reminders to:  [facility]    Indications    Chronic atrial fibrillation (H) [I48.20]  Long term current use of anticoagulants with INR goal of 2.0-3.0 [Z79.01]           Comments:           Anticoagulation Care Providers     Provider Role Specialty Phone number    Ulysses Baker MD Referring Family Medicine [phone]    Oswaldo Cedillo MD Referring Family Medicine [phone]

## 2022-01-03 NOTE — NURSING NOTE
"Chief Complaint   Patient presents with     Physical       Initial /62   Pulse 70   Temp 97.2  F (36.2  C) (Tympanic)   Resp 20   Ht 1.486 m (4' 10.5\")   Wt 52.6 kg (116 lb)   BMI 23.83 kg/m   Estimated body mass index is 23.83 kg/m  as calculated from the following:    Height as of this encounter: 1.486 m (4' 10.5\").    Weight as of this encounter: 52.6 kg (116 lb).    Patient presents to the clinic using     Health Maintenance that is potentially due pending provider review:          Is there anyone who you would like to be able to receive your results?   If yes have patient fill out BOLA    "

## 2022-01-04 PROBLEM — N18.31 STAGE 3A CHRONIC KIDNEY DISEASE (H): Status: ACTIVE | Noted: 2022-01-04

## 2022-01-04 NOTE — RESULT ENCOUNTER NOTE
Please call.    The creatinine, a blood test to measure kidney function is elevated indicating some decrease in kidney function.  This has been present off and on since 2014 and indicates some chronic kidney disease.   PLAN: No new changes in treatment recommended.   Good control of blood pressure and the lisinopril can help prevent more kidney damage over time.    Avoid ibuprofen and naproxen which can affect kidney function.

## 2022-01-24 ENCOUNTER — ANTICOAGULATION THERAPY VISIT (OUTPATIENT)
Dept: ANTICOAGULATION | Facility: CLINIC | Age: 82
End: 2022-01-24

## 2022-01-24 ENCOUNTER — ALLIED HEALTH/NURSE VISIT (OUTPATIENT)
Dept: FAMILY MEDICINE | Facility: CLINIC | Age: 82
End: 2022-01-24
Payer: MEDICARE

## 2022-01-24 ENCOUNTER — LAB (OUTPATIENT)
Dept: LAB | Facility: CLINIC | Age: 82
End: 2022-01-24
Payer: MEDICARE

## 2022-01-24 VITALS — SYSTOLIC BLOOD PRESSURE: 124 MMHG | RESPIRATION RATE: 18 BRPM | DIASTOLIC BLOOD PRESSURE: 68 MMHG | HEART RATE: 72 BPM

## 2022-01-24 DIAGNOSIS — I10 ESSENTIAL HYPERTENSION WITH GOAL BLOOD PRESSURE LESS THAN 140/90: Primary | ICD-10-CM

## 2022-01-24 DIAGNOSIS — Z79.01 LONG TERM CURRENT USE OF ANTICOAGULANTS WITH INR GOAL OF 2.0-3.0: ICD-10-CM

## 2022-01-24 DIAGNOSIS — I48.20 CHRONIC ATRIAL FIBRILLATION (H): Primary | ICD-10-CM

## 2022-01-24 DIAGNOSIS — I48.20 CHRONIC ATRIAL FIBRILLATION (H): ICD-10-CM

## 2022-01-24 DIAGNOSIS — Z01.30 BP CHECK: ICD-10-CM

## 2022-01-24 LAB — INR BLD: 2.7 (ref 0.9–1.1)

## 2022-01-24 PROCEDURE — 99207 PR NO CHARGE NURSE ONLY: CPT

## 2022-01-24 PROCEDURE — 36415 COLL VENOUS BLD VENIPUNCTURE: CPT

## 2022-01-24 PROCEDURE — 85610 PROTHROMBIN TIME: CPT

## 2022-01-24 NOTE — PROGRESS NOTES
Nayely Kay is a 81 year old year old patient who comes in today for a Blood Pressure check because of ongoing blood pressure monitoring.  Vital Signs as repeated by /68  Patient is taking medication as prescribed  Patient is tolerating medications well.  Patient is not monitoring Blood Pressure at home.    Current complaints: none  Disposition:  patient to continue with the same medication  Nicolette Granger RN

## 2022-01-24 NOTE — PROGRESS NOTES
ANTICOAGULATION MANAGEMENT     Nayely Kay 81 year old female is on warfarin with therapeutic INR result. (Goal INR 2.0-3.0)    Recent labs: (last 7 days)     01/24/22  0941   INR 2.7*       ASSESSMENT     Source(s): Chart Review and Patient/Caregiver Call       Warfarin doses taken: Warfarin taken as instructed    Diet: No new diet changes identified    New illness, injury, or hospitalization: No    Medication/supplement changes: None noted    Signs or symptoms of bleeding or clotting: No    Previous INR: Therapeutic last visit; previously outside of goal range    Additional findings: None     PLAN     Recommended plan for no diet, medication or health factor changes affecting INR     Dosing Instructions: Continue your current warfarin dose with next INR in 4 weeks       Summary  As of 1/24/2022    Full warfarin instructions:  5 mg every Mon; 2.5 mg all other days   Next INR check:  2/21/2022             Telephone call with Nayely who verbalizes understanding and agrees to plan    Lab visit scheduled    Education provided: Contact [phone]  with any changes, questions or concerns.     Plan made per [facility] anticoagulation protocol    Jeannie Gaspar RN  Anticoagulation Clinic  1/24/2022    _______________________________________________________________________     Anticoagulation Episode Summary     Current INR goal:  2.0-3.0   TTR:  61.2 % (1 y)   Target end date:  Indefinite   Send INR reminders to:  [facility]    Indications    Chronic atrial fibrillation (H) [I48.20]  Long term current use of anticoagulants with INR goal of 2.0-3.0 [Z79.01]           Comments:           Anticoagulation Care Providers     Provider Role Specialty Phone number    Ulysses Baker MD Referring Family Medicine [phone]    Oswaldo Cedillo MD Referring Family Medicine [phone]

## 2022-01-28 ENCOUNTER — TELEPHONE (OUTPATIENT)
Dept: FAMILY MEDICINE | Facility: CLINIC | Age: 82
End: 2022-01-28
Payer: MEDICARE

## 2022-01-28 NOTE — CONFIDENTIAL NOTE
Please call.   Continue warfarin as recommended.   Make sure stools are soft and regular to lessen irritation on hemorrhoids. There are things we can do to help bowels like fiber-Metamucil or polyethylene glycol (Miralax) if needed for constipation.   If darker blood or maroon, recommend visit.   If increased bleeding or pain, come in to be checked.   MASON BURRELL MD

## 2022-01-28 NOTE — TELEPHONE ENCOUNTER
When wiping with toilet paper, patient has some bright red blood. No blood in toilet water. This is not new for the patient. She says she has struggled with hemorrhoids for many years. No other bleeding concerns.    Will route to PCP for follow up as this does not really necessitate changing her warfarin dose and her INR 4 days ago was in range.    Flaquita Neal, RN, BSN, PHN

## 2022-01-28 NOTE — TELEPHONE ENCOUNTER
Reason for Call:  Other     Detailed comments: patient would like to talk with an INR nurse about some bleeding hemorrids     Phone Number Patient can be reached at: Home number on file [phone] (home)    Best Time: any    Can we leave a detailed message on this number? YES    Call taken on 1/28/2022 at 1:37 PM by Elisha King

## 2022-01-31 NOTE — TELEPHONE ENCOUNTER
Patient called to let nurse know her hemorrhoids are better and no longer bleeding. Message was given to Nayely per Dr Cedillo's recommendations that are in chart.

## 2022-02-21 ENCOUNTER — ANTICOAGULATION THERAPY VISIT (OUTPATIENT)
Dept: ANTICOAGULATION | Facility: CLINIC | Age: 82
End: 2022-02-21

## 2022-02-21 ENCOUNTER — ALLIED HEALTH/NURSE VISIT (OUTPATIENT)
Dept: FAMILY MEDICINE | Facility: CLINIC | Age: 82
End: 2022-02-21
Payer: MEDICARE

## 2022-02-21 ENCOUNTER — LAB (OUTPATIENT)
Dept: LAB | Facility: CLINIC | Age: 82
End: 2022-02-21
Payer: MEDICARE

## 2022-02-21 VITALS — RESPIRATION RATE: 18 BRPM | DIASTOLIC BLOOD PRESSURE: 62 MMHG | SYSTOLIC BLOOD PRESSURE: 122 MMHG | HEART RATE: 66 BPM

## 2022-02-21 DIAGNOSIS — I10 ESSENTIAL HYPERTENSION WITH GOAL BLOOD PRESSURE LESS THAN 140/90: Primary | ICD-10-CM

## 2022-02-21 DIAGNOSIS — Z01.30 BP CHECK: ICD-10-CM

## 2022-02-21 DIAGNOSIS — I48.20 CHRONIC ATRIAL FIBRILLATION (H): Primary | ICD-10-CM

## 2022-02-21 DIAGNOSIS — Z79.01 LONG TERM CURRENT USE OF ANTICOAGULANTS WITH INR GOAL OF 2.0-3.0: ICD-10-CM

## 2022-02-21 DIAGNOSIS — I48.20 CHRONIC ATRIAL FIBRILLATION (H): ICD-10-CM

## 2022-02-21 LAB — INR BLD: 2.1 (ref 0.9–1.1)

## 2022-02-21 PROCEDURE — 36416 COLLJ CAPILLARY BLOOD SPEC: CPT

## 2022-02-21 PROCEDURE — 99207 PR NO CHARGE NURSE ONLY: CPT

## 2022-02-21 PROCEDURE — 85610 PROTHROMBIN TIME: CPT

## 2022-02-21 NOTE — PROGRESS NOTES
ANTICOAGULATION MANAGEMENT     Nayely Kay 81 year old female is on warfarin with therapeutic INR result. (Goal INR 2.0-3.0)    Recent labs: (last 7 days)     02/21/22  0946   INR 2.1*       ASSESSMENT     Source(s): Chart Review and Patient/Caregiver Call       Warfarin doses taken: Warfarin taken as instructed    Diet: No new diet changes identified    New illness, injury, or hospitalization: No    Medication/supplement changes: None noted    Signs or symptoms of bleeding or clotting: No    Previous INR: Therapeutic last 2(+) visits    Additional findings: None     PLAN     Recommended plan for no diet, medication or health factor changes affecting INR     Dosing Instructions: Continue your current warfarin dose with next INR in 4 weeks       Summary  As of 2/21/2022    Full warfarin instructions:  5 mg every Mon; 2.5 mg all other days   Next INR check:  3/21/2022             Telephone call with Nayely who verbalizes understanding and agrees to plan    Lab visit scheduled    Education provided: Contact [phone]  with any changes, questions or concerns.     Plan made per [facility] anticoagulation protocol    Jeannie Gaspar RN  Anticoagulation Clinic  2/21/2022    _______________________________________________________________________     Anticoagulation Episode Summary     Current INR goal:  2.0-3.0   TTR:  61.2 % (1 y)   Target end date:  Indefinite   Send INR reminders to:  [facility]    Indications    Chronic atrial fibrillation (H) [I48.20]  Long term current use of anticoagulants with INR goal of 2.0-3.0 [Z79.01]           Comments:           Anticoagulation Care Providers     Provider Role Specialty Phone number    Ulysses Baker MD Referring Family Medicine [phone]    Oswaldo Cedillo MD Referring Family Medicine [phone]

## 2022-02-21 NOTE — PROGRESS NOTES
Nayely Kay is a 81 year old year old patient who comes in today for a Blood Pressure check because of ongoing blood pressure monitoring.  Vital Signs as repeated by /62  Patient is taking medication as prescribed  Patient is tolerating medications well.  Patient is not monitoring Blood Pressure at home.    Current complaints: none  Disposition:  patient to continue with the same medication.  BP next month with INR check  Nicolette Granger RN

## 2022-03-21 ENCOUNTER — TELEPHONE (OUTPATIENT)
Dept: FAMILY MEDICINE | Facility: CLINIC | Age: 82
End: 2022-03-21

## 2022-03-21 ENCOUNTER — ALLIED HEALTH/NURSE VISIT (OUTPATIENT)
Dept: FAMILY MEDICINE | Facility: CLINIC | Age: 82
End: 2022-03-21
Payer: MEDICARE

## 2022-03-21 ENCOUNTER — ANTICOAGULATION THERAPY VISIT (OUTPATIENT)
Dept: ANTICOAGULATION | Facility: CLINIC | Age: 82
End: 2022-03-21

## 2022-03-21 ENCOUNTER — LAB (OUTPATIENT)
Dept: LAB | Facility: CLINIC | Age: 82
End: 2022-03-21
Payer: MEDICARE

## 2022-03-21 VITALS — DIASTOLIC BLOOD PRESSURE: 58 MMHG | HEART RATE: 56 BPM | SYSTOLIC BLOOD PRESSURE: 118 MMHG

## 2022-03-21 DIAGNOSIS — I48.20 CHRONIC ATRIAL FIBRILLATION (H): ICD-10-CM

## 2022-03-21 DIAGNOSIS — Z79.01 LONG TERM CURRENT USE OF ANTICOAGULANTS WITH INR GOAL OF 2.0-3.0: ICD-10-CM

## 2022-03-21 DIAGNOSIS — I48.20 CHRONIC ATRIAL FIBRILLATION (H): Primary | ICD-10-CM

## 2022-03-21 DIAGNOSIS — I10 ESSENTIAL HYPERTENSION WITH GOAL BLOOD PRESSURE LESS THAN 140/90: Primary | ICD-10-CM

## 2022-03-21 LAB — INR BLD: 2.9 (ref 0.9–1.1)

## 2022-03-21 PROCEDURE — 36416 COLLJ CAPILLARY BLOOD SPEC: CPT

## 2022-03-21 PROCEDURE — 99207 PR NO CHARGE NURSE ONLY: CPT

## 2022-03-21 PROCEDURE — 85610 PROTHROMBIN TIME: CPT

## 2022-03-21 NOTE — PROGRESS NOTES
ANTICOAGULATION MANAGEMENT     Nayely Kay 82 year old female is on warfarin with therapeutic INR result. (Goal INR 2.0-3.0)    Recent labs: (last 7 days)     03/21/22  1012   INR 2.9*       ASSESSMENT       Source(s): Chart Review and Patient/Caregiver Call       Warfarin doses taken: Warfarin taken as instructed    Diet: No new diet changes identified    New illness, injury, or hospitalization: No    Medication/supplement changes: None noted    Signs or symptoms of bleeding or clotting: No    Previous INR: Therapeutic last 2(+) visits    Additional findings: None       PLAN     Recommended plan for no diet, medication or health factor changes affecting INR     Dosing Instructions: Continue your current warfarin dose with next INR in 4 weeks       Summary  As of 3/21/2022    Full warfarin instructions:  5 mg every Mon; 2.5 mg all other days   Next INR check:  4/18/2022             Telephone call with Nayely who verbalizes understanding and agrees to plan    Lab visit scheduled    Education provided: Contact [phone]  with any changes, questions or concerns.     Plan made per [facility]agulation protocol    Jeannie Gaspar RN  Anticoagulation Clinic  3/21/2022    _______________________________________________________________________     Anticoagulation Episode Summary     Current INR goal:  2.0-3.0   TTR:  64.9 % (1 y)   Target end date:  Indefinite   Send INR reminders to:  [facility]    Indications    Chronic atrial fibrillation (H) [I48.20]  Long term current use of anticoagulants with INR goal of 2.0-3.0 [Z79.01]           Comments:           Anticoagulation Care Providers     Provider Role Specialty Phone number    Ulysses Baker MD Referring Family Medicine [phone]    Oswaldo Cedillo MD Referring Family Medicine [phone]

## 2022-03-21 NOTE — TELEPHONE ENCOUNTER
Nayely Kay is a 82 year old year old patient who comes in today for a Blood Pressure check because of ongoing blood pressure monitoring. Today 118/58 P 56 today.  Patient is taking medication as prescribed  Patient is tolerating medications well.  Patient is not monitoring Blood Pressure at home.    Current complaints: none  Disposition:  patient instructed to Continue meds as ordered. Will route encounter to PCP to review.   Kellee PALACIOS RN

## 2022-03-21 NOTE — PROGRESS NOTES
Nayely Kay is a 82 year old year old patient who comes in today for a Blood Pressure check because of ongoing blood pressure monitoring. Today 118/58 P 56  Patient is taking medication as prescribed  Patient is tolerating medications well.  Patient is not monitoring Blood Pressure at home.    Current complaints: none  Disposition:  patient instructed to Continue meds as ordered. Will route encounter to PCP to review.   Kellee PALACIOS RN

## 2022-03-23 ENCOUNTER — NURSE TRIAGE (OUTPATIENT)
Dept: FAMILY MEDICINE | Facility: CLINIC | Age: 82
End: 2022-03-23

## 2022-03-23 ENCOUNTER — ANTICOAGULATION THERAPY VISIT (OUTPATIENT)
Dept: ANTICOAGULATION | Facility: CLINIC | Age: 82
End: 2022-03-23

## 2022-03-23 ENCOUNTER — OFFICE VISIT (OUTPATIENT)
Dept: URGENT CARE | Facility: URGENT CARE | Age: 82
End: 2022-03-23
Payer: MEDICARE

## 2022-03-23 VITALS
OXYGEN SATURATION: 99 % | WEIGHT: 123 LBS | SYSTOLIC BLOOD PRESSURE: 124 MMHG | BODY MASS INDEX: 25.27 KG/M2 | HEART RATE: 63 BPM | DIASTOLIC BLOOD PRESSURE: 68 MMHG | TEMPERATURE: 97.9 F

## 2022-03-23 DIAGNOSIS — Z79.01 LONG TERM CURRENT USE OF ANTICOAGULANTS WITH INR GOAL OF 2.0-3.0: ICD-10-CM

## 2022-03-23 DIAGNOSIS — I48.20 CHRONIC ATRIAL FIBRILLATION (H): Primary | ICD-10-CM

## 2022-03-23 DIAGNOSIS — I48.20 CHRONIC ATRIAL FIBRILLATION (H): ICD-10-CM

## 2022-03-23 LAB — INR BLD: 3.2 (ref 0.9–1.1)

## 2022-03-23 PROCEDURE — 36416 COLLJ CAPILLARY BLOOD SPEC: CPT | Performed by: STUDENT IN AN ORGANIZED HEALTH CARE EDUCATION/TRAINING PROGRAM

## 2022-03-23 PROCEDURE — 85610 PROTHROMBIN TIME: CPT | Performed by: STUDENT IN AN ORGANIZED HEALTH CARE EDUCATION/TRAINING PROGRAM

## 2022-03-23 PROCEDURE — 99213 OFFICE O/P EST LOW 20 MIN: CPT | Performed by: STUDENT IN AN ORGANIZED HEALTH CARE EDUCATION/TRAINING PROGRAM

## 2022-03-23 NOTE — PROGRESS NOTES
Assessment & Plan     Chronic atrial fibrillation (H)  Long term current use of anticoagulants with INR goal of 2.0-3.0  Patient reports she had her INR drawn at the clinic on 3/21/2022 and it was 2.9. this morning she had a bowel movement and when she wiped there was blood on the toilet paper. She denies blood in urine or when she was brushing her teeth. She states she does have a history of hemorrhoids and that this has happened before. INR drawn today at 3.2. Per Anti coagulation therapy clinic direction patient to hold her dose of warfarin and restart 5mg on Mondays and 2.5 on all other days and return to clinic for a recheck on 4/6/2022. Monitor for further blood in stools and if recurrent, advised to be seen again in urgent care or ER.  - INR point of care       Patient to hold wararin dose today and restart dosing tomorrow 3/24/2022 at 2.5mg daily except on Mondays to take 5mg and have a re draw on 4/5/2022. Patient updated on dosing schedule and is agreement with plan and states understating of when to return to clinic if bleeding occurs.    Physician Attestation   I, NADER Tilley CNP, saw this patient and agree with the findings and plan of care as documented in the note by Jory Nelson, [employer] student.    NADER Tilley CNP    No follow-ups on file.    NADER Tilley CNP  [facility]    Janna Adler is a 82 year old female who presents to clinic today for the following health issues:  Chief Complaint   Patient presents with     Rectal Problem     Was in on monday for INR 2.9. Had BM this morning with bright red blood. Did have this happen before.      HPI    Patient states after a bowel movement this morning she saw blood on the toilet paper, she states that this has happened before but because she takes warfarin she has been told she needs to come to urgent care right away. Denies feeling dizzy or any other signs of bleeding. VVS.        Review of Systems  Constitutional, HEENT, cardiovascular, pulmonary, gi and gu systems are negative, except as otherwise noted.      Objective    /68   Pulse 63   Temp 97.9  F (36.6  C) (Tympanic)   Wt 55.8 kg (123 lb)   SpO2 99%   BMI 25.27 kg/m    Physical Exam   GENERAL: healthy, alert and no distress  RESP: lungs clear to auscultation - no rales, rhonchi or wheezes  CV: regular rate and rhythm, normal S1 S2, no S3 or S4, no murmur, click or rub, no peripheral edema and peripheral pulses strong  ABDOMEN: soft, nontender, no hepatosplenomegaly, no masses and bowel sounds normal

## 2022-03-23 NOTE — PROGRESS NOTES
ANTICOAGULATION MANAGEMENT     Nayely Kay 82 year old female is on warfarin with supratherapeutic INR result. (Goal INR 2.0-3.0)    Recent labs: (last 7 days)     03/23/22  1115   INR 3.2*       ASSESSMENT       Source(s): Chart Review and Patient/Caregiver Call       Warfarin doses taken: Warfarin taken as instructed    Diet: No new diet changes identified    New illness, injury, or hospitalization: Yes: UC visit today for rectal problem; BM this morning w/ bright red blood. This has happened to pt before per OV notes. Pt has a Hx of hemorrhoids.    Medication/supplement changes: None noted    Signs or symptoms of bleeding or clotting: Yes: bright red blood while using the bathroom. See OV    Previous INR: Therapeutic last 2(+) visits    Additional findings: None     PLAN     Recommended plan for temporary change(s) affecting INR     Dosing Instructions: Hold dose then continue your current warfarin dose with next INR in 1 week       Summary  As of 3/23/2022    Full warfarin instructions:  3/23: Hold; Otherwise 5 mg every Mon; 2.5 mg all other days   Next INR check:  3/31/2022             Telephone call with Nayely who verbalizes understanding and agrees to plan    Lab visit scheduled    Education provided: Please call back if any changes to your diet, medications or how you've been taking warfarin and Monitoring for bleeding signs and symptoms    Plan made per [facility] protocol    Luz Elena Barrios RN  Anticoagulation Clinic  3/23/2022    _______________________________________________________________________     Anticoagulation Episode Summary     Current INR goal:  2.0-3.0   TTR:  64.5 % (1 y)   Target end date:  Indefinite   Send INR reminders to:  [facility]    Indications    Chronic atrial fibrillation (H) [I48.20]  Long term current use of anticoagulants with INR goal of 2.0-3.0 [Z79.01]           Comments:           Anticoagulation Care Providers     Provider Role Specialty Phone  number    Ulysses Baker MD Referring Family Medicine [phone]    Oswaldo Cedillo MD Referring Family Medicine [phone]

## 2022-03-23 NOTE — TELEPHONE ENCOUNTER
"Patient reports no other symptoms, feels well at this time. Noticed blood in toilet when got up to go to bathroom in middle of night. Toilet water was red and bright red blood on toilet paper. No rectal pain or burning. No abdominal pain or cramping. Blood is not on or in stools. Patient has long standing history of having hemorrhoids, feels one may have ruptured. Biggest concern is bleeding while taking Warfarin. Triage dispo notes \"moderate blood\" if toilet water turns red. However patient feels the bleeding is minor. \"It's not that much\". Hemodynamically stable at this time.   Triage advice recommended for office visit today due to patient taking Warfarin.  No openings with primary care clinic or PCP today. PCP does not start clinic today till 1:00 pm. Advised patient to seek soonest care, not to delay. Patient is going to go to urgent care at [facility] today. Hours of operation given. According to [facility] website, open at 10:00 am. Patient planning to be there shortly before opening time to get on list.   Writer instructed patient to go to ED if worsening symptoms, increased or heavier bleeding. If feeling lightheaded or dizzy, feeling faint, weak, feels heart rate is rapid, patient was instructed to call 911.    *Will route encounter to NB triage RN team as second level triage for clinic provider review of recommendations.        Brook Garcia RN  [facility]        Reason for Disposition    Taking Coumadin (warfarin) or other strong blood thinner, or known bleeding disorder (e.g., thrombocytopenia)    MODERATE rectal bleeding (small blood clots, passing blood without stool, or toilet water turns red)    Additional Information    Negative: Passed out (i.e., fainted, collapsed and was not responding)    Negative: Shock suspected (e.g., cold/pale/clammy skin, too weak to stand, low BP, rapid pulse)    Negative: Vomiting red blood or black (coffee ground) " "material    Negative: Sounds like a life-threatening emergency to the triager    Negative: Diarrhea is the main symptom    Negative: Rectal symptoms    Negative: SEVERE dizziness (e.g., unable to stand, requires support to walk, feels like passing out now)    Negative: Bloody, black, or tarry bowel movements (Exception: chronic-unchanged  black-grey bowel movements and is taking iron pills or Pepto-bismol)    Negative: Rectal foreign body (inserted or swallowed)    Negative: Patient sounds very sick or weak to the triager    Negative: Pale skin (pallor) of new onset or worsening    Negative: Constant abdominal pain lasting > 2 hours    Negative: SEVERE abdominal pain (e.g., excruciating)    Negative: Colonoscopy in past 72 hours    Negative: Known cirrhosis of the liver (or history of liver failure or ascites)    Negative: Patient wants to be seen    Negative: SEVERE rectal bleeding (large blood clots; on and off, or constant bleeding)    Negative: MODERATE rectal bleeding (small blood clots, passing blood without stool, or toilet water turns red) more than once a day    Negative: High-risk adult (e.g., prior surgery on aorta, abdominal aortic aneurysm)    Answer Assessment - Initial Assessment Questions  1. APPEARANCE of BLOOD: \"What color is it?\" \"Is it passed separately, on the surface of the stool, or mixed in with the stool?\"       Bright red blood from rectum. Does not appear to be with stools, patient has hemorrhoids. Feels the hemorrhoids are ruptured.  2. AMOUNT: \"How much blood was passed?\"       Hard to tell, but toilet water was red and blood on toilet paper when wiping.   3. FREQUENCY: \"How many times has blood been passed with the stools?\"       Started late last night, patient went to bathroom in middle of night and noticed blood. Has only gone to bathroom twice. Blood both times.   4. ONSET: \"When was the blood first seen in the stools?\" (Days or weeks)       Started last night.  5. DIARRHEA: \"Is " "there also some diarrhea?\" If so, ask: \"How many diarrhea stools were passed in past 24 hours?\"       No diarrhea  6. CONSTIPATION: \"Do you have constipation?\" If so, \"How bad is it?\"      No constipation  7. RECURRENT SYMPTOMS: \"Have you had blood in your stools before?\" If so, ask: \"When was the last time?\" and \"What happened that time?\"       Has a history of hemorrhoids and have bled before. No blood in stool or on surface of stool.   8. BLOOD THINNERS: \"Do you take any blood thinners?\" (e.g., Coumadin/warfarin, Pradaxa/dabigatran, aspirin)      Taking Warfarin  9. OTHER SYMPTOMS: \"Do you have any other symptoms?\"  (e.g., abdominal pain, vomiting, dizziness, fever)      No rectal pain, no abdominal pain or cramping, no dizziness or nausea or vomiting. Is wearing a small pad in undergarments. Blood is in pad, not soaking or saturating. Patient feels the bleeding is minor. Bright red. Denies black, tarry stools. Had a BM this morning, was normal. Denies headaches, fast heart rate, feeling weak. States \"I feel fine\". No symptoms, only the minor bleeding and taking Warfarin.  10. PREGNANCY: \"Is there any chance you are pregnant?\" \"When was your last menstrual period?\"        No, is 82 yrs old    Protocols used: RECTAL BLEEDING-A-[place]      "

## 2022-03-31 ENCOUNTER — ANTICOAGULATION THERAPY VISIT (OUTPATIENT)
Dept: ANTICOAGULATION | Facility: CLINIC | Age: 82
End: 2022-03-31

## 2022-03-31 ENCOUNTER — LAB (OUTPATIENT)
Dept: LAB | Facility: CLINIC | Age: 82
End: 2022-03-31
Payer: MEDICARE

## 2022-03-31 ENCOUNTER — ALLIED HEALTH/NURSE VISIT (OUTPATIENT)
Dept: FAMILY MEDICINE | Facility: CLINIC | Age: 82
End: 2022-03-31
Payer: MEDICARE

## 2022-03-31 VITALS — DIASTOLIC BLOOD PRESSURE: 64 MMHG | SYSTOLIC BLOOD PRESSURE: 136 MMHG | HEART RATE: 64 BPM

## 2022-03-31 DIAGNOSIS — I10 ESSENTIAL HYPERTENSION WITH GOAL BLOOD PRESSURE LESS THAN 140/90: Primary | ICD-10-CM

## 2022-03-31 DIAGNOSIS — Z79.01 LONG TERM CURRENT USE OF ANTICOAGULANTS WITH INR GOAL OF 2.0-3.0: ICD-10-CM

## 2022-03-31 DIAGNOSIS — I48.20 CHRONIC ATRIAL FIBRILLATION (H): Primary | ICD-10-CM

## 2022-03-31 DIAGNOSIS — I48.20 CHRONIC ATRIAL FIBRILLATION (H): ICD-10-CM

## 2022-03-31 LAB — INR BLD: 2 (ref 0.9–1.1)

## 2022-03-31 PROCEDURE — 85610 PROTHROMBIN TIME: CPT

## 2022-03-31 PROCEDURE — 99207 PR NO CHARGE NURSE ONLY: CPT

## 2022-03-31 PROCEDURE — 36416 COLLJ CAPILLARY BLOOD SPEC: CPT

## 2022-03-31 NOTE — PROGRESS NOTES
Nayely Kay is a 82 year old year old patient who comes in today for a Blood Pressure check because of ongoing blood pressure monitoring.  BP Readings from Last 6 Encounters:   03/31/22 136/64   03/23/22 124/68   03/21/22 118/58   02/21/22 122/62   01/24/22 124/68   01/03/22 130/62   HR 64  Patient is taking medication as prescribed  Patient is tolerating medications well.  Patient is not monitoring Blood Pressure at home.    Current complaints: none  Disposition:  patient to continue with the same medication  NOVA Rosales RN

## 2022-03-31 NOTE — PROGRESS NOTES
ANTICOAGULATION MANAGEMENT     Nayely Kay 82 year old female is on warfarin with therapeutic INR result. (Goal INR 2.0-3.0)    Recent labs: (last 7 days)     03/31/22  1455   INR 2.0*       ASSESSMENT       Source(s): Chart Review and Patient/Caregiver Call       Warfarin doses taken: Warfarin taken differently, but did not change total weekly dose    Diet: No new diet changes identified    New illness, injury, or hospitalization: No    Medication/supplement changes: None noted    Signs or symptoms of bleeding or clotting: No    Previous INR: Supratherapeutic    Additional findings: no further rectal bleeding or any other bleeding       PLAN     Recommended plan for no diet, medication or health factor changes affecting INR     Dosing Instructions: continue your current warfarin dose with next INR in 2 weeks       Summary  As of 3/31/2022    Full warfarin instructions:  5 mg every Mon; 2.5 mg all other days   Next INR check:  4/18/2022             Telephone call with Nayely who verbalizes understanding and agrees to plan    Lab visit scheduled    Education provided: Please call back if any changes to your diet, medications or how you've been taking warfarin, Monitoring for bleeding signs and symptoms, When to seek medical attention/emergency care and Contact [phone]  with any changes, questions or concerns.     Plan made per [facility] anticoagulation protocol    Flaquita Neal RN  Anticoagulation Clinic  3/31/2022    _______________________________________________________________________     Anticoagulation Episode Summary     Current INR goal:  2.0-3.0   TTR:  64.2 % (1 y)   Target end date:  Indefinite   Send INR reminders to:  [facility]    Indications    Chronic atrial fibrillation (H) [I48.20]  Long term current use of anticoagulants with INR goal of 2.0-3.0 [Z79.01]           Comments:           Anticoagulation Care Providers     Provider Role Specialty Phone number    Ulysses Baker MD  Referring Family Medicine [phone]    Oswaldo Cedillo MD Referring Family Medicine [phone]

## 2022-04-18 ENCOUNTER — OFFICE VISIT (OUTPATIENT)
Dept: FAMILY MEDICINE | Facility: CLINIC | Age: 82
End: 2022-04-18
Payer: MEDICARE

## 2022-04-18 ENCOUNTER — LAB (OUTPATIENT)
Dept: LAB | Facility: CLINIC | Age: 82
End: 2022-04-18

## 2022-04-18 ENCOUNTER — ANTICOAGULATION THERAPY VISIT (OUTPATIENT)
Dept: ANTICOAGULATION | Facility: CLINIC | Age: 82
End: 2022-04-18

## 2022-04-18 VITALS
TEMPERATURE: 97.3 F | RESPIRATION RATE: 18 BRPM | BODY MASS INDEX: 24.35 KG/M2 | DIASTOLIC BLOOD PRESSURE: 70 MMHG | HEIGHT: 59 IN | HEART RATE: 54 BPM | OXYGEN SATURATION: 100 % | WEIGHT: 120.8 LBS | SYSTOLIC BLOOD PRESSURE: 138 MMHG

## 2022-04-18 DIAGNOSIS — Z79.01 LONG TERM CURRENT USE OF ANTICOAGULANTS WITH INR GOAL OF 2.0-3.0: ICD-10-CM

## 2022-04-18 DIAGNOSIS — Z12.31 ENCOUNTER FOR SCREENING MAMMOGRAM FOR BREAST CANCER: ICD-10-CM

## 2022-04-18 DIAGNOSIS — L57.0 AK (ACTINIC KERATOSIS): Primary | ICD-10-CM

## 2022-04-18 DIAGNOSIS — I48.20 CHRONIC ATRIAL FIBRILLATION (H): ICD-10-CM

## 2022-04-18 DIAGNOSIS — N18.31 STAGE 3A CHRONIC KIDNEY DISEASE (H): ICD-10-CM

## 2022-04-18 DIAGNOSIS — I48.20 CHRONIC ATRIAL FIBRILLATION (H): Primary | ICD-10-CM

## 2022-04-18 DIAGNOSIS — I10 ESSENTIAL HYPERTENSION: ICD-10-CM

## 2022-04-18 DIAGNOSIS — Z12.11 SPECIAL SCREENING FOR MALIGNANT NEOPLASMS, COLON: ICD-10-CM

## 2022-04-18 LAB
CREAT UR-MCNC: 36 MG/DL
ERYTHROCYTE [DISTWIDTH] IN BLOOD BY AUTOMATED COUNT: 13.9 % (ref 10–15)
HCT VFR BLD AUTO: 39.4 % (ref 35–47)
HGB BLD-MCNC: 12.5 G/DL (ref 11.7–15.7)
INR BLD: 2.2 (ref 0.9–1.1)
MCH RBC QN AUTO: 29.9 PG (ref 26.5–33)
MCHC RBC AUTO-ENTMCNC: 31.7 G/DL (ref 31.5–36.5)
MCV RBC AUTO: 94 FL (ref 78–100)
MICROALBUMIN UR-MCNC: 30 MG/L
MICROALBUMIN/CREAT UR: 83.33 MG/G CR (ref 0–25)
PLATELET # BLD AUTO: 265 10E3/UL (ref 150–450)
RBC # BLD AUTO: 4.18 10E6/UL (ref 3.8–5.2)
WBC # BLD AUTO: 6.7 10E3/UL (ref 4–11)

## 2022-04-18 PROCEDURE — 17000 DESTRUCT PREMALG LESION: CPT | Performed by: FAMILY MEDICINE

## 2022-04-18 PROCEDURE — 99214 OFFICE O/P EST MOD 30 MIN: CPT | Mod: 25 | Performed by: FAMILY MEDICINE

## 2022-04-18 PROCEDURE — 85610 PROTHROMBIN TIME: CPT

## 2022-04-18 PROCEDURE — 36415 COLL VENOUS BLD VENIPUNCTURE: CPT | Performed by: FAMILY MEDICINE

## 2022-04-18 PROCEDURE — 17003 DESTRUCT PREMALG LES 2-14: CPT | Performed by: FAMILY MEDICINE

## 2022-04-18 PROCEDURE — 36416 COLLJ CAPILLARY BLOOD SPEC: CPT

## 2022-04-18 PROCEDURE — 82043 UR ALBUMIN QUANTITATIVE: CPT | Performed by: FAMILY MEDICINE

## 2022-04-18 PROCEDURE — 85027 COMPLETE CBC AUTOMATED: CPT | Performed by: FAMILY MEDICINE

## 2022-04-18 RX ORDER — TRIAMTERENE/HYDROCHLOROTHIAZID 37.5-25 MG
TABLET ORAL
Qty: 90 TABLET | Refills: 3 | Status: SHIPPED | OUTPATIENT
Start: 2022-04-18 | End: 2023-03-20

## 2022-04-18 RX ORDER — AMLODIPINE BESYLATE 5 MG/1
5 TABLET ORAL DAILY
Qty: 90 TABLET | Refills: 3 | Status: SHIPPED | OUTPATIENT
Start: 2022-04-18 | End: 2023-03-20

## 2022-04-18 RX ORDER — LISINOPRIL 5 MG/1
5 TABLET ORAL DAILY
Qty: 90 TABLET | Refills: 3 | Status: SHIPPED | OUTPATIENT
Start: 2022-04-18 | End: 2023-03-20

## 2022-04-18 RX ORDER — METOPROLOL TARTRATE 50 MG
75 TABLET ORAL 2 TIMES DAILY
Qty: 270 TABLET | Refills: 3 | Status: SHIPPED | OUTPATIENT
Start: 2022-04-18 | End: 2023-03-20

## 2022-04-18 ASSESSMENT — PAIN SCALES - GENERAL: PAINLEVEL: NO PAIN (0)

## 2022-04-18 NOTE — NURSING NOTE
"No chief complaint on file.      Initial BP (!) 144/70   Pulse 54   Temp 97.3  F (36.3  C) (Tympanic)   Resp 18   Ht 1.486 m (4' 10.5\")   Wt 54.8 kg (120 lb 12.8 oz)   SpO2 100%   BMI 24.82 kg/m   Estimated body mass index is 24.82 kg/m  as calculated from the following:    Height as of this encounter: 1.486 m (4' 10.5\").    Weight as of this encounter: 54.8 kg (120 lb 12.8 oz).    Patient presents to the clinic using     Health Maintenance that is potentially due pending provider review:    Blood pressure elevated    Is there anyone who you would like to be able to receive your results?   If yes have patient fill out BOLA      "

## 2022-04-18 NOTE — PATIENT INSTRUCTIONS
ASSESSMENT:   (L57.0) AK (actinic keratosis)  (primary encounter diagnosis)  Comment: 6 actinic keratosis skin spots treated today.   Plan: Treatment was begun with Liquid nitrogen, freeze/thaw/freeze.  Expect redness for a day or two, then possible blister or sometimes blood blister.  Then the wound may get raw and sore and scab over.  Skin spots should steadily heal and look pink for a few weeks.  New skin should be smooth and the rough irregular skin gone.  Recheck if abnormal skin does not disappear with the treatment.      (I10) Essential hypertension  Comment: doing well  Plan: amLODIPine (NORVASC) 5 MG tablet, lisinopril         (ZESTRIL) 5 MG tablet, metoprolol tartrate         (LOPRESSOR) 50 MG tablet, triamterene-HCTZ         (MAXZIDE-25) 37.5-25 MG tablet        No change in current treatment plan.  Refills.     (Z12.11) Special screening for malignant neoplasms, colon  Comment:   Plan: Fecal colorectal cancer screen (FIT)        Complete FIT colon cancer screening test and send in the mail.      (Z12.31) Encounter for screening mammogram for breast cancer  Comment:   It is fine to continue mammograms yearly.    Plan: Check with you insurance to make sure they cover mammograms yearly.      (N18.31) Stage 3a chronic kidney disease (H)  Comment: due for blood and urine testing.   Plan: Albumin Random Urine Quantitative with Creat         Ratio, CBC with platelets

## 2022-04-18 NOTE — PROGRESS NOTES
ANTICOAGULATION MANAGEMENT     Nayely Kay 82 year old female is on warfarin with therapeutic INR result. (Goal INR 2.0-3.0)    Recent labs: (last 7 days)     04/18/22  0918   INR 2.2*       ASSESSMENT       Source(s): Chart Review and Patient/Caregiver Call       Warfarin doses taken: Warfarin taken as instructed    Diet: No new diet changes identified    New illness, injury, or hospitalization: No    Medication/supplement changes: None noted    Signs or symptoms of bleeding or clotting: No    Previous INR: Therapeutic last visit; previously outside of goal range    Additional findings: None       PLAN     Recommended plan for no diet, medication or health factor changes affecting INR     Dosing Instructions: continue your current warfarin dose with next INR in 4 weeks       Summary  As of 4/18/2022    Full warfarin instructions:  5 mg every Mon; 2.5 mg all other days   Next INR check:  5/16/2022             Telephone call with Nayely who verbalizes understanding and agrees to plan    Lab visit scheduled    Education provided: Please call back if any changes to your diet, medications or how you've been taking warfarin and Contact [phone]  with any changes, questions or concerns.     Plan made per [facility] anticoagulation protocol    Kyleigh Perez RN  Anticoagulation Clinic  4/18/2022    _______________________________________________________________________     Anticoagulation Episode Summary     Current INR goal:  2.0-3.0   TTR:  67.7 % (1 y)   Target end date:  Indefinite   Send INR reminders to:  [facility]    Indications    Chronic atrial fibrillation (H) [I48.20]  Long term current use of anticoagulants with INR goal of 2.0-3.0 [Z79.01]           Comments:           Anticoagulation Care Providers     Provider Role Specialty Phone number    Ulysses Baker MD Referring Family Medicine [phone]    Oswaldo Cedillo MD Referring Family Medicine [phone]

## 2022-04-18 NOTE — PROGRESS NOTES
ASSESSMENT:   (L57.0) AK (actinic keratosis)  (primary encounter diagnosis)  Comment: 6 actinic keratosis skin spots treated today.   Plan: Treatment was begun with Liquid nitrogen, freeze/thaw/freeze.  Expect redness for a day or two, then possible blister or sometimes blood blister.  Then the wound may get raw and sore and scab over.  Skin spots should steadily heal and look pink for a few weeks.  New skin should be smooth and the rough irregular skin gone.  Recheck if abnormal skin does not disappear with the treatment.      (I10) Essential hypertension  Comment: doing well  Plan: amLODIPine (NORVASC) 5 MG tablet, lisinopril         (ZESTRIL) 5 MG tablet, metoprolol tartrate         (LOPRESSOR) 50 MG tablet, triamterene-HCTZ         (MAXZIDE-25) 37.5-25 MG tablet        No change in current treatment plan.  Refills.     (Z12.11) Special screening for malignant neoplasms, colon  Comment:   Plan: Fecal colorectal cancer screen (FIT)        Complete FIT colon cancer screening test and send in the mail.      (Z12.31) Encounter for screening mammogram for breast cancer  Comment:   It is fine to continue mammograms yearly.    Plan: Check with you insurance to make sure they cover mammograms yearly.      (N18.31) Stage 3a chronic kidney disease (H)  Comment: due for blood and urine testing.   Plan: Albumin Random Urine Quantitative with Creat         Ratio, CBC with platelets               Subjective   Nayely is a 82 year old who presents for the following health issues  No chief complaint on file.     Seen in ED 3/23 for rectal bleeding.  INR was borderline increased at the time.  Attributed to hemorrhoids.   Last clinic visit: 1/3/22 for wellness exam.   No changes made.     She had INR checked today.   Questions about mammogram.  She has a family history of breast cancer.   Last mammogram 11/28/2020.    She had the blood only once.  She has not noted since that time.    She does not want to do colonoscopy but would  "like to do FIT test.     Check face for skin cancer spots.     Needs refills on blood pressure medications.       Hypertension Follow-up      Do you check your blood pressure regularly outside of the clinic? No     Are you following a low salt diet? Yes    Are your blood pressures ever more than 140 on the top number (systolic) OR more   than 90 on the bottom number (diastolic), for example 140/90? No    Patient Active Problem List   Diagnosis     Headache     Chronic atrial fibrillation (H)     Polymyalgia rheumatica (H)     Iron deficiency anemia     HYPERLIPIDEMIA LDL GOAL <130     Osteopenia     Family history of breast cancer     Eczema     Advanced directives, counseling/discussion     AK (actinic keratosis)     Ganglion cyst     Essential hypertension with goal blood pressure less than 140/90     Tubular adenoma     Elevated bilirubin     Long term current use of anticoagulants with INR goal of 2.0-3.0     Stage 3a chronic kidney disease (H)      ROS:  Taking BP meds;amlodipine 5mg daily, lisinopril 5mg daily and Maxide, metoprolol 75mg twice daily.   NOt checking BP at home.     OBJECTIVE:Blood pressure 138/70, pulse 54, temperature 97.3  F (36.3  C), temperature source Tympanic, resp. rate 18, height 1.486 m (4' 10.5\"), weight 54.8 kg (120 lb 12.8 oz), SpO2 100 %, not currently breastfeeding. BMI=Body mass index is 24.82 kg/m .  GENERAL APPEARANCE ADULT: Alert, no acute distress  SKIN: She has scattered scaly slightly red spots on her face they are all less than a centimeter in size.  She had 2 spots left cheek one on the nose midline one on right cheek and two in right temple area.  These are all consistent with actinic keratosis.  "

## 2022-04-18 NOTE — RESULT ENCOUNTER NOTE
Please call.   Urine tests show positive MAC (microalbumin/creatinine ratio) meaning protein leaking from the kidneys into the urine. This is a sign of some kidney damage related to chronic kidney disease.   Your blood counts including the white blood cells, red blood cells and platelets are all normal.       PLAN:  No new changes in treatment recommended.   MASON BURRELL MD

## 2022-04-18 NOTE — PROGRESS NOTES
ANTICOAGULATION MANAGEMENT     Nayely Kay 82 year old female is on warfarin with therapeutic INR result. (Goal INR 2.0-3.0)    Recent labs: (last 7 days)     04/18/22  0918   INR 2.2*       ASSESSMENT       Source(s): Chart Review    Previous INR was Therapeutic last visit; previously outside of goal range    Medication, diet, health changes since last INR chart reviewed; none identified     Had office visit today, but no changes except some areas of actinic keratosis were removed.           PLAN     Unable to reach Nayely today.    No instructions provided. Unable to leave voicemail.    Follow up required to confirm warfarin dose taken and assess for changes    Kyleigh Perez, RN  Anticoagulation Clinic  4/18/2022

## 2022-05-09 ENCOUNTER — ANCILLARY PROCEDURE (OUTPATIENT)
Dept: MAMMOGRAPHY | Facility: CLINIC | Age: 82
End: 2022-05-09
Attending: FAMILY MEDICINE
Payer: MEDICARE

## 2022-05-09 DIAGNOSIS — Z12.31 VISIT FOR SCREENING MAMMOGRAM: ICD-10-CM

## 2022-05-09 PROCEDURE — 77067 SCR MAMMO BI INCL CAD: CPT | Mod: TC | Performed by: RADIOLOGY

## 2022-05-16 ENCOUNTER — ANTICOAGULATION THERAPY VISIT (OUTPATIENT)
Dept: ANTICOAGULATION | Facility: CLINIC | Age: 82
End: 2022-05-16

## 2022-05-16 ENCOUNTER — ALLIED HEALTH/NURSE VISIT (OUTPATIENT)
Dept: FAMILY MEDICINE | Facility: CLINIC | Age: 82
End: 2022-05-16
Payer: MEDICARE

## 2022-05-16 ENCOUNTER — LAB (OUTPATIENT)
Dept: LAB | Facility: CLINIC | Age: 82
End: 2022-05-16
Payer: MEDICARE

## 2022-05-16 VITALS — SYSTOLIC BLOOD PRESSURE: 112 MMHG | DIASTOLIC BLOOD PRESSURE: 68 MMHG | HEART RATE: 60 BPM | RESPIRATION RATE: 18 BRPM

## 2022-05-16 DIAGNOSIS — Z01.30 BP CHECK: ICD-10-CM

## 2022-05-16 DIAGNOSIS — I10 ESSENTIAL HYPERTENSION: Primary | ICD-10-CM

## 2022-05-16 DIAGNOSIS — I48.20 CHRONIC ATRIAL FIBRILLATION (H): ICD-10-CM

## 2022-05-16 DIAGNOSIS — Z79.01 LONG TERM CURRENT USE OF ANTICOAGULANTS WITH INR GOAL OF 2.0-3.0: ICD-10-CM

## 2022-05-16 DIAGNOSIS — I48.20 CHRONIC ATRIAL FIBRILLATION (H): Primary | ICD-10-CM

## 2022-05-16 LAB — INR BLD: 2.8 (ref 0.9–1.1)

## 2022-05-16 PROCEDURE — 36416 COLLJ CAPILLARY BLOOD SPEC: CPT

## 2022-05-16 PROCEDURE — 99207 PR NO CHARGE NURSE ONLY: CPT

## 2022-05-16 PROCEDURE — 85610 PROTHROMBIN TIME: CPT

## 2022-05-16 NOTE — PROGRESS NOTES
ANTICOAGULATION MANAGEMENT     Nayely Kay 82 year old female is on warfarin with therapeutic INR result. (Goal INR 2.0-3.0)    Recent labs: (last 7 days)     05/16/22  1149   INR 2.8*       ASSESSMENT       Source(s): Chart Review and Patient/Caregiver Call       Warfarin doses taken: Warfarin taken as instructed    Diet: No new diet changes identified    New illness, injury, or hospitalization: No    Medication/supplement changes: None noted    Signs or symptoms of bleeding or clotting: No    Previous INR: Therapeutic last 2(+) visits    Additional findings: None     PLAN     Recommended plan for no diet, medication or health factor changes affecting INR     Dosing Instructions: continue your current warfarin dose with next INR in 4 weeks       Summary  As of 5/16/2022    Full warfarin instructions:  5 mg every Mon; 2.5 mg all other days   Next INR check:  6/13/2022             Telephone call with Nayely who verbalizes understanding and agrees to plan    Lab visit scheduled    Education provided: Please call back if any changes to your diet, medications or how you've been taking warfarin    Plan made per [facility] anticoagulation protocol    Luz Elena Barrios RN  Anticoagulation Clinic  5/16/2022    _______________________________________________________________________     Anticoagulation Episode Summary     Current INR goal:  2.0-3.0   TTR:  69.9 % (1 y)   Target end date:  Indefinite   Send INR reminders to:  [facility]    Indications    Chronic atrial fibrillation (H) [I48.20]  Long term current use of anticoagulants with INR goal of 2.0-3.0 [Z79.01]           Comments:           Anticoagulation Care Providers     Provider Role Specialty Phone number    Ulysses Baker MD Referring Family Medicine [phone]    Oswaldo Cedillo MD Referring Family Medicine [phone]

## 2022-05-16 NOTE — PROGRESS NOTES
Nayely Kay is a 82 year old year old patient who comes in today for a Blood Pressure check because of ongoing blood pressure monitoring.  Vital Signs as repeated by /68 and 6 minutes later 112/68  Patient is taking medication as prescribed  Patient is tolerating medications well.  Patient is not monitoring Blood Pressure at home.  Current complaints: none  Disposition:  patient to continue with the same medication.  BP check next time has INR done.    toenails trimmed without incident.  Nicolette Granger RN

## 2022-06-02 ENCOUNTER — ALLIED HEALTH/NURSE VISIT (OUTPATIENT)
Dept: FAMILY MEDICINE | Facility: CLINIC | Age: 82
End: 2022-06-02
Payer: MEDICARE

## 2022-06-02 ENCOUNTER — TELEPHONE (OUTPATIENT)
Dept: FAMILY MEDICINE | Facility: CLINIC | Age: 82
End: 2022-06-02

## 2022-06-02 VITALS — SYSTOLIC BLOOD PRESSURE: 138 MMHG | DIASTOLIC BLOOD PRESSURE: 68 MMHG | HEART RATE: 60 BPM

## 2022-06-02 DIAGNOSIS — I10 ESSENTIAL HYPERTENSION WITH GOAL BLOOD PRESSURE LESS THAN 140/90: Primary | ICD-10-CM

## 2022-06-02 PROCEDURE — 99207 PR NO CHARGE NURSE ONLY: CPT

## 2022-06-02 NOTE — TELEPHONE ENCOUNTER
Nayely Kay is a 82 year old year old patient who comes in today for a Blood Pressure check because of ongoing blood pressure monitoring.  Vital Signs as repeated by /68, P 60 . Patient is taking medication as prescribed  Patient is tolerating medications well.  Patient is not monitoring Blood Pressure at home.    Current complaints: none  Disposition:  patient to continue with the same medication.    Shelley Mehta RN

## 2022-06-20 ENCOUNTER — LAB (OUTPATIENT)
Dept: LAB | Facility: CLINIC | Age: 82
End: 2022-06-20
Payer: MEDICARE

## 2022-06-20 ENCOUNTER — ALLIED HEALTH/NURSE VISIT (OUTPATIENT)
Dept: FAMILY MEDICINE | Facility: CLINIC | Age: 82
End: 2022-06-20

## 2022-06-20 ENCOUNTER — ANTICOAGULATION THERAPY VISIT (OUTPATIENT)
Dept: ANTICOAGULATION | Facility: CLINIC | Age: 82
End: 2022-06-20

## 2022-06-20 VITALS — DIASTOLIC BLOOD PRESSURE: 62 MMHG | RESPIRATION RATE: 18 BRPM | SYSTOLIC BLOOD PRESSURE: 136 MMHG

## 2022-06-20 DIAGNOSIS — Z01.30 BP CHECK: ICD-10-CM

## 2022-06-20 DIAGNOSIS — I48.20 CHRONIC ATRIAL FIBRILLATION (H): Primary | ICD-10-CM

## 2022-06-20 DIAGNOSIS — I10 ESSENTIAL HYPERTENSION WITH GOAL BLOOD PRESSURE LESS THAN 140/90: Primary | ICD-10-CM

## 2022-06-20 DIAGNOSIS — Z79.01 LONG TERM CURRENT USE OF ANTICOAGULANTS WITH INR GOAL OF 2.0-3.0: ICD-10-CM

## 2022-06-20 DIAGNOSIS — I48.20 CHRONIC ATRIAL FIBRILLATION (H): ICD-10-CM

## 2022-06-20 LAB — INR BLD: 2.7 (ref 0.9–1.1)

## 2022-06-20 PROCEDURE — 99207 PR NO CHARGE NURSE ONLY: CPT

## 2022-06-20 PROCEDURE — 36416 COLLJ CAPILLARY BLOOD SPEC: CPT

## 2022-06-20 PROCEDURE — 85610 PROTHROMBIN TIME: CPT

## 2022-06-20 NOTE — PROGRESS NOTES
ANTICOAGULATION MANAGEMENT     Nayely Kay 82 year old female is on warfarin with therapeutic INR result. (Goal INR 2.0-3.0)    Recent labs: (last 7 days)     06/20/22  0953   INR 2.7*       ASSESSMENT       Source(s): Chart Review and Patient/Caregiver Call       Warfarin doses taken: Warfarin taken as instructed    Diet: No new diet changes identified    New illness, injury, or hospitalization: No    Medication/supplement changes: None noted    Signs or symptoms of bleeding or clotting: No    Previous INR: Therapeutic last 2(+) visits    Additional findings: None       PLAN     Recommended plan for no diet, medication or health factor changes affecting INR     Dosing Instructions: continue your current warfarin dose with next INR in 4 weeks       Summary  As of 6/20/2022    Full warfarin instructions:  5 mg every Mon; 2.5 mg all other days   Next INR check:  7/18/2022             Telephone call with Nayely who verbalizes understanding and agrees to plan    Lab visit scheduled    Education provided: Monitoring for bleeding signs and symptoms and Monitoring for clotting signs and symptoms    Plan made per [facility] anticoagulation protocol    Marlin Nunn, RN  Anticoagulation Clinic  6/20/2022    _______________________________________________________________________     Anticoagulation Episode Summary     Current INR goal:  2.0-3.0   TTR:  75.0 % (1 y)   Target end date:  Indefinite   Send INR reminders to:  [facility]    Indications    Chronic atrial fibrillation (H) [I48.20]  Long term current use of anticoagulants with INR goal of 2.0-3.0 [Z79.01]           Comments:           Anticoagulation Care Providers     Provider Role Specialty Phone number    Ulysses Baker MD Referring Family Medicine [phone]    Oswaldo Cedillo MD Referring Family Medicine [phone]

## 2022-06-20 NOTE — PROGRESS NOTES
Nayely Kay is a 82 year old year old patient who comes in today for a Blood Pressure check because of ongoing blood pressure monitoring.  Vital Signs as repeated by /62  Patient is taking medication as prescribed  Patient is tolerating medications well.  Patient is not monitoring Blood Pressure at home.    Current complaints: none  Disposition:  patient to continue with the same medication.    Toenails trimmed without incident  Nicolette Granger RN

## 2022-07-01 ENCOUNTER — TELEPHONE (OUTPATIENT)
Dept: FAMILY MEDICINE | Facility: CLINIC | Age: 82
End: 2022-07-01

## 2022-07-01 NOTE — TELEPHONE ENCOUNTER
"Pt calls & states she has a bruise between the right elbow & wrist. States Wednesday afternoon she turned the corner too sharp in living room & hit arm on the corner of the wall. Skin tore open & was bleeding a little bit. \"pulled skin loose, skin tear\". States not oozing, not actively bleeding, is clean & moist. Pt using 2x2 & bandage to secure. States the 2x2 covers the skin tear & the surrounding bruise is \"a little bigger than that\". There are a couple of little spots of blood when she takes off the 2x2. Has changed the 2x2 a couple of times since Wednesday.  States there has been no need to change daily. Pt wondering if she needs to stop in to have bandage changed.   Pt was told to keep up with current tx. Keep clean, covered & if unable to stop bleeding or if looking infected would need to be seen in UC over the weekend. Pt is on warfarin. Pt did not hit head & no other injury.   Pt verbalized understanding. Thinks she can take care of this at home. Pt has no further questions/concerns. Told to call back if has further quest.    Reyna Stevens RN    "

## 2022-07-02 ENCOUNTER — OFFICE VISIT (OUTPATIENT)
Dept: URGENT CARE | Facility: URGENT CARE | Age: 82
End: 2022-07-02
Payer: MEDICARE

## 2022-07-02 VITALS
WEIGHT: 119 LBS | OXYGEN SATURATION: 99 % | SYSTOLIC BLOOD PRESSURE: 139 MMHG | HEART RATE: 58 BPM | TEMPERATURE: 97.1 F | DIASTOLIC BLOOD PRESSURE: 75 MMHG | BODY MASS INDEX: 24.45 KG/M2

## 2022-07-02 DIAGNOSIS — S51.811A FOREARM LACERATION, RIGHT, INITIAL ENCOUNTER: Primary | ICD-10-CM

## 2022-07-02 PROCEDURE — 99213 OFFICE O/P EST LOW 20 MIN: CPT | Performed by: PHYSICIAN ASSISTANT

## 2022-07-02 RX ORDER — BACITRACIN ZINC 500 [USP'U]/G
OINTMENT TOPICAL 2 TIMES DAILY
Qty: 30 G | Refills: 0 | Status: SHIPPED | OUTPATIENT
Start: 2022-07-02 | End: 2022-07-02

## 2022-07-02 RX ORDER — BACITRACIN ZINC 500 [USP'U]/G
OINTMENT TOPICAL 2 TIMES DAILY
Qty: 30 G | Refills: 0 | Status: SHIPPED | OUTPATIENT
Start: 2022-07-02 | End: 2023-03-20

## 2022-07-02 NOTE — PROGRESS NOTES
Assessment & Plan      1. Forearm laceration, right, initial encounter  Healing well  - bacitracin 500 UNIT/GM external ointment; Apply topically 2 times daily  Dispense: 30 g; Refill: 0    Apply telfa and kerlix after applying bacitracin. Monitor any new symptoms and follow up as needed.                 Jareth García PA-C  [facility] [place]    Janna Adler is a 82 year old female who presents to clinic today for the following health issues:  Chief Complaint   Patient presents with     Laceration     Hit right elbow on wall 3 days ago. Has had it bandaged since injury. Is on blood thinners.      HPI    Hit right elbow on wall 3 days ago. Disrupted skin. Taking warfarin 2.5 mg daily except 5 mg one day Monday with last INR at 2.7 about 12 days ago. It has been stable over months. No pain to wound. And minimal bleeding since initial laceration. She has been dressing it daily.         Review of Systems        Objective    /75   Pulse 58   Temp 97.1  F (36.2  C)   Wt 54 kg (119 lb)   SpO2 99%   BMI 24.45 kg/m    Physical Exam  Musculoskeletal:        Arms:       Comments: Skin edges with good apposition and adherence. No tenderness or bleeding.

## 2022-07-18 ENCOUNTER — ANTICOAGULATION THERAPY VISIT (OUTPATIENT)
Dept: ANTICOAGULATION | Facility: CLINIC | Age: 82
End: 2022-07-18

## 2022-07-18 ENCOUNTER — LAB (OUTPATIENT)
Dept: LAB | Facility: CLINIC | Age: 82
End: 2022-07-18
Payer: MEDICARE

## 2022-07-18 ENCOUNTER — ALLIED HEALTH/NURSE VISIT (OUTPATIENT)
Dept: FAMILY MEDICINE | Facility: CLINIC | Age: 82
End: 2022-07-18

## 2022-07-18 VITALS — HEART RATE: 72 BPM | DIASTOLIC BLOOD PRESSURE: 64 MMHG | SYSTOLIC BLOOD PRESSURE: 118 MMHG | RESPIRATION RATE: 18 BRPM

## 2022-07-18 DIAGNOSIS — I10 ESSENTIAL HYPERTENSION WITH GOAL BLOOD PRESSURE LESS THAN 140/90: Primary | ICD-10-CM

## 2022-07-18 DIAGNOSIS — I48.20 CHRONIC ATRIAL FIBRILLATION (H): ICD-10-CM

## 2022-07-18 DIAGNOSIS — Z79.01 LONG TERM CURRENT USE OF ANTICOAGULANTS WITH INR GOAL OF 2.0-3.0: ICD-10-CM

## 2022-07-18 DIAGNOSIS — Z01.30 BP CHECK: ICD-10-CM

## 2022-07-18 DIAGNOSIS — I48.20 CHRONIC ATRIAL FIBRILLATION (H): Primary | ICD-10-CM

## 2022-07-18 LAB — INR BLD: 2.3 (ref 0.9–1.1)

## 2022-07-18 PROCEDURE — 85610 PROTHROMBIN TIME: CPT

## 2022-07-18 PROCEDURE — 36415 COLL VENOUS BLD VENIPUNCTURE: CPT

## 2022-07-18 PROCEDURE — 99207 PR NO CHARGE NURSE ONLY: CPT

## 2022-07-18 NOTE — PROGRESS NOTES
ANTICOAGULATION MANAGEMENT     Nayely Kay 82 year old female is on warfarin with therapeutic INR result. (Goal INR 2.0-3.0)    Recent labs: (last 7 days)     07/18/22  0837   INR 2.3*       ASSESSMENT       Source(s): Chart Review and Patient/Caregiver Call       Warfarin doses taken: Warfarin taken as instructed    Diet: No new diet changes identified    New illness, injury, or hospitalization: No    Medication/supplement changes: None noted    Signs or symptoms of bleeding or clotting: No    Previous INR: Therapeutic last 2(+) visits    Additional findings: patient cut her forearm and went to  about 2 weeks ago. It is healing nicely and no infection signs       PLAN     Recommended plan for no diet, medication or health factor changes affecting INR     Dosing Instructions: continue your current warfarin dose with next INR in 4 weeks       Summary  As of 7/18/2022    Full warfarin instructions:  5 mg every Mon; 2.5 mg all other days   Next INR check:  8/15/2022             Telephone call with Nayely who verbalizes understanding and agrees to plan    Lab visit scheduled    Education provided: Please call back if any changes to your diet, medications or how you've been taking warfarin and Contact [phone]  with any changes, questions or concerns.     Plan made per [facility]agulation protocol    Flaquita Neal RN  Anticoagulation Clinic  7/18/2022    _______________________________________________________________________     Anticoagulation Episode Summary     Current INR goal:  2.0-3.0   TTR:  79.8 % (1 y)   Target end date:  Indefinite   Send INR reminders to:  [facility]    Indications    Chronic atrial fibrillation (H) [I48.20]  Long term current use of anticoagulants with INR goal of 2.0-3.0 [Z79.01]           Comments:           Anticoagulation Care Providers     Provider Role Specialty Phone number    Ulysses Baker MD Referring Family Medicine [phone]    Oswaldo Cedillo MD  [facility] Family Medicine [phone]

## 2022-07-18 NOTE — PROGRESS NOTES
Nayely Kay is a 82 year old year old patient who comes in today for a Blood Pressure check because of ongoing blood pressure monitoring.  Vital Signs as repeated by /70 and 6 minutes later 118/64  Pulse 72  Patient is taking medication as prescribed  Patient is tolerating medications well.  Patient is not monitoring Blood Pressure at home.    Current complaints: none  Disposition:  patient to continue with the same medication  Nicolette Granger RN

## 2022-08-15 ENCOUNTER — LAB (OUTPATIENT)
Dept: LAB | Facility: CLINIC | Age: 82
End: 2022-08-15
Payer: MEDICARE

## 2022-08-15 ENCOUNTER — ALLIED HEALTH/NURSE VISIT (OUTPATIENT)
Dept: FAMILY MEDICINE | Facility: CLINIC | Age: 82
End: 2022-08-15

## 2022-08-15 ENCOUNTER — ANTICOAGULATION THERAPY VISIT (OUTPATIENT)
Dept: ANTICOAGULATION | Facility: CLINIC | Age: 82
End: 2022-08-15

## 2022-08-15 VITALS — DIASTOLIC BLOOD PRESSURE: 66 MMHG | RESPIRATION RATE: 18 BRPM | SYSTOLIC BLOOD PRESSURE: 136 MMHG | HEART RATE: 64 BPM

## 2022-08-15 DIAGNOSIS — Z79.01 LONG TERM CURRENT USE OF ANTICOAGULANTS WITH INR GOAL OF 2.0-3.0: ICD-10-CM

## 2022-08-15 DIAGNOSIS — I48.20 CHRONIC ATRIAL FIBRILLATION (H): ICD-10-CM

## 2022-08-15 DIAGNOSIS — I48.20 CHRONIC ATRIAL FIBRILLATION (H): Primary | ICD-10-CM

## 2022-08-15 DIAGNOSIS — I10 ESSENTIAL HYPERTENSION WITH GOAL BLOOD PRESSURE LESS THAN 140/90: Primary | ICD-10-CM

## 2022-08-15 DIAGNOSIS — Z01.30 BP CHECK: ICD-10-CM

## 2022-08-15 LAB — INR BLD: 2 (ref 0.9–1.1)

## 2022-08-15 PROCEDURE — 36416 COLLJ CAPILLARY BLOOD SPEC: CPT

## 2022-08-15 PROCEDURE — 85610 PROTHROMBIN TIME: CPT

## 2022-08-15 PROCEDURE — 99207 PR NO BILLABLE SERVICE THIS VISIT: CPT

## 2022-08-15 NOTE — PROGRESS NOTES
ANTICOAGULATION MANAGEMENT     Nayely Kay 82 year old female is on warfarin with therapeutic INR result. (Goal INR 2.0-3.0)    Recent labs: (last 7 days)     08/15/22  0912   INR 2.0*       ASSESSMENT       Source(s): Chart Review and Patient/Caregiver Call       Warfarin doses taken: Warfarin taken as instructed    Diet: No new diet changes identified    New illness, injury, or hospitalization: No    Medication/supplement changes: None noted    Signs or symptoms of bleeding or clotting: No    Previous INR: Therapeutic last 2(+) visits    Additional findings: None       PLAN     Recommended plan for no diet, medication or health factor changes affecting INR     Dosing Instructions: Continue your current warfarin dose with next INR in 4 weeks       Summary  As of 8/15/2022    Full warfarin instructions:  5 mg every Mon; 2.5 mg all other days   Next INR check:  9/12/2022             Telephone call with Nayely who verbalizes understanding and agrees to plan    Lab visit scheduled    Education provided: Contact [phone]  with any changes, questions or concerns.     Plan made per [facility]agulation protocol    Jeannie Gaspar RN  Anticoagulation Clinic  8/15/2022    _______________________________________________________________________     Anticoagulation Episode Summary     Current INR goal:  2.0-3.0   TTR:  82.5 % (1 y)   Target end date:  Indefinite   Send INR reminders to:  [facility]    Indications    Chronic atrial fibrillation (H) [I48.20]  Long term current use of anticoagulants with INR goal of 2.0-3.0 [Z79.01]           Comments:           Anticoagulation Care Providers     Provider Role Specialty Phone number    Ulysses Baker MD Referring Family Medicine [phone]    Oswaldo Cedillo MD Referring Family Medicine [phone]

## 2022-08-15 NOTE — PROGRESS NOTES
Nayely Kay is a 82 year old year old patient who comes in today for a Blood Pressure check because of ongoing blood pressure monitoring.  Vital Signs as repeated by 136/66, pulse 64   Patient is taking medication as prescribed  Patient is tolerating medications well.  Patient is not monitoring Blood Pressure at home.    Current complaints: none  Disposition:  patient to continue with the same medication.  BP check at next INR visit  Nicolette Granger RN

## 2022-08-18 ENCOUNTER — TELEPHONE (OUTPATIENT)
Dept: FAMILY MEDICINE | Facility: CLINIC | Age: 82
End: 2022-08-18

## 2022-08-18 NOTE — TELEPHONE ENCOUNTER
Spoke with Nayely, she does not have any pain and no further bleeding with bowel movment. She will monitor her sx and call back with further concerns

## 2022-08-18 NOTE — TELEPHONE ENCOUNTER
Symptoms    Describe your symptoms: Pt had bright red blood in her stool this am. The 2nd time she went she did not have any blood. This happened this am only.  Pt is wondering should she be seen for this?     Any pain: No    How long have you been having symptoms: am today      Preferred Pharmacy:       [facility] [id number] [place] [address]  Phone: [phone] Fax: [phone]      Okay to leave a detailed message?: Yes at Home number on file [phone] (home)  [facility]

## 2022-08-18 NOTE — TELEPHONE ENCOUNTER
Reason for Call:  Other appointment    Detailed comments: patient is wanting to now if she can be fit in ASAP for painful hemorrhoid.    Phone Number Patient can be reached at: Home number on file [phone] (home)    Best Time: any     Can we leave a detailed message on this number? YES    Call taken on 8/18/2022 at 7:23 AM by Flori Zamarripa

## 2022-09-06 ENCOUNTER — ANTICOAGULATION THERAPY VISIT (OUTPATIENT)
Dept: ANTICOAGULATION | Facility: CLINIC | Age: 82
End: 2022-09-06

## 2022-09-06 ENCOUNTER — LAB (OUTPATIENT)
Dept: LAB | Facility: CLINIC | Age: 82
End: 2022-09-06
Payer: MEDICARE

## 2022-09-06 ENCOUNTER — ALLIED HEALTH/NURSE VISIT (OUTPATIENT)
Dept: FAMILY MEDICINE | Facility: CLINIC | Age: 82
End: 2022-09-06
Payer: MEDICARE

## 2022-09-06 VITALS — DIASTOLIC BLOOD PRESSURE: 64 MMHG | HEART RATE: 60 BPM | SYSTOLIC BLOOD PRESSURE: 136 MMHG

## 2022-09-06 DIAGNOSIS — Z79.01 LONG TERM CURRENT USE OF ANTICOAGULANTS WITH INR GOAL OF 2.0-3.0: ICD-10-CM

## 2022-09-06 DIAGNOSIS — I48.20 CHRONIC ATRIAL FIBRILLATION (H): Primary | ICD-10-CM

## 2022-09-06 DIAGNOSIS — I10 ESSENTIAL HYPERTENSION WITH GOAL BLOOD PRESSURE LESS THAN 140/90: Primary | ICD-10-CM

## 2022-09-06 DIAGNOSIS — I48.20 CHRONIC ATRIAL FIBRILLATION (H): ICD-10-CM

## 2022-09-06 LAB — INR BLD: 2.6 (ref 0.9–1.1)

## 2022-09-06 PROCEDURE — 99207 PR NO CHARGE NURSE ONLY: CPT

## 2022-09-06 PROCEDURE — 36416 COLLJ CAPILLARY BLOOD SPEC: CPT

## 2022-09-06 PROCEDURE — 85610 PROTHROMBIN TIME: CPT

## 2022-09-06 NOTE — PROGRESS NOTES
Nayely Kay is a 82 year old year old patient who comes in today for a Blood Pressure check because of ongoing blood pressure monitoring.  Patient is taking medication as prescribed  Patient is tolerating medications well.  Patient is not monitoring Blood Pressure at home.    Current complaints: none  Disposition:  patient to continue with the same medication  NOVA Rosales RN

## 2022-09-06 NOTE — PROGRESS NOTES
ANTICOAGULATION MANAGEMENT     Nayely Kay 82 year old female is on warfarin with therapeutic INR result. (Goal INR 2.0-3.0)    Recent labs: (last 7 days)     09/06/22  0840   INR 2.6*       ASSESSMENT       Source(s): Chart Review and Patient/Caregiver Call       Warfarin doses taken: Warfarin taken as instructed    Diet: No new diet changes identified    New illness, injury, or hospitalization: No    Medication/supplement changes: None noted    Signs or symptoms of bleeding or clotting: No    Previous INR: Therapeutic last 2(+) visits    Additional findings: None       PLAN     Recommended plan for no diet, medication or health factor changes affecting INR     Dosing Instructions: Continue your current warfarin dose with next INR in 4 weeks       Summary  As of 9/6/2022    Full warfarin instructions:  5 mg every Mon; 2.5 mg all other days   Next INR check:  10/4/2022             Telephone call with Nayely who verbalizes understanding and agrees to plan    Lab visit scheduled    Education provided: Contact [phone]  with any changes, questions or concerns.     Plan made per [facility]agulation protocol    Jeannie Gaspar RN  Anticoagulation Clinic  9/6/2022    _______________________________________________________________________     Anticoagulation Episode Summary     Current INR goal:  2.0-3.0   TTR:  82.5 % (1 y)   Target end date:  Indefinite   Send INR reminders to:  [facility]    Indications    Chronic atrial fibrillation (H) [I48.20]  Long term current use of anticoagulants with INR goal of 2.0-3.0 [Z79.01]           Comments:           Anticoagulation Care Providers     Provider Role Specialty Phone number    Ulysses Baker MD Referring Family Medicine [phone]    Oswaldo Cedillo MD Referring Family Medicine [phone]

## 2022-09-14 DIAGNOSIS — Z79.01 LONG TERM CURRENT USE OF ANTICOAGULANT THERAPY: ICD-10-CM

## 2022-09-15 ENCOUNTER — IMMUNIZATION (OUTPATIENT)
Dept: FAMILY MEDICINE | Facility: CLINIC | Age: 82
End: 2022-09-15
Payer: MEDICARE

## 2022-09-15 PROCEDURE — 90662 IIV NO PRSV INCREASED AG IM: CPT

## 2022-09-15 PROCEDURE — G0008 ADMIN INFLUENZA VIRUS VAC: HCPCS

## 2022-09-15 RX ORDER — WARFARIN SODIUM 5 MG/1
TABLET ORAL
Qty: 51 TABLET | Refills: 1 | Status: SHIPPED | OUTPATIENT
Start: 2022-09-15

## 2022-09-15 NOTE — TELEPHONE ENCOUNTER
"Requested Prescriptions   Pending Prescriptions Disp Refills    warfarin ANTICOAGULANT (COUMADIN) 5 MG tablet [Pharmacy Med Name: WARFARIN SODIUM 5 MG TABLET] 48 tablet 0     Sig: TAKE 1TAB(5MG) BY MOUTH EVERY MONDAY, 1/2TAB(2.5MG) ALL OTHER DAYS OR AS DIRECTED BY INR CLINIC        Vitamin K Antagonists Failed - 9/14/2022 11:35 AM        Failed - INR is within goal in the past 6 weeks     Confirm INR is within goal in the past 6 weeks.     Recent Labs   Lab Test 09/06/22  0840   INR 2.6*                       Passed - Recent (12 mo) or future (30 days) visit within the authorizing provider's specialty     Patient has had an office visit with the authorizing provider or a provider within the authorizing providers department within the previous 12 mos or has a future within next 30 days. See \"Patient Info\" tab in inbasket, or \"Choose Columns\" in Meds & Orders section of the refill encounter.              Passed - Medication is active on med list        Passed - Patient is 18 years of age or older        Passed - Patient is not pregnant        Passed - No positive pregnancy on file in past 12 months              "

## 2022-09-15 NOTE — TELEPHONE ENCOUNTER
ANTICOAGULATION MANAGEMENT:  Medication Refill    Anticoagulation Summary  As of 9/6/2022    Warfarin maintenance plan:  5 mg (5 mg x 1) every Mon; 2.5 mg (5 mg x 0.5) all other days   Next INR check:  10/4/2022   Target end date:  Indefinite    Indications    Chronic atrial fibrillation (H) [I48.20]  Long term current use of anticoagulants with INR goal of 2.0-3.0 [Z79.01]             Anticoagulation Care Providers     Provider Role Specialty Phone number    Ulysses Baker MD Referring Family Medicine [phone]    Oswaldo Cedillo MD Referring Family Medicine [phone]          Visit with referring provider/group within last year: Yes    [facility] referral signed within last year: Yes    Nayely meets all criteria for refill (current [facility] referral, office visit with referring provider/group in last year, lab monitoring up to date or not exceeding 2 weeks overdue). Rx instructions and quantity supplied updated to match patient's current dosing plan. Warfarin 90 day supply with 1 refill granted per [facility] protocol     Flaquita Neal RN  Anticoagulation Clinic

## 2022-10-04 ENCOUNTER — LAB (OUTPATIENT)
Dept: LAB | Facility: CLINIC | Age: 82
End: 2022-10-04
Payer: MEDICARE

## 2022-10-04 ENCOUNTER — ANTICOAGULATION THERAPY VISIT (OUTPATIENT)
Dept: ANTICOAGULATION | Facility: CLINIC | Age: 82
End: 2022-10-04

## 2022-10-04 ENCOUNTER — TELEPHONE (OUTPATIENT)
Dept: ANTICOAGULATION | Facility: CLINIC | Age: 82
End: 2022-10-04

## 2022-10-04 ENCOUNTER — TELEPHONE (OUTPATIENT)
Dept: FAMILY MEDICINE | Facility: CLINIC | Age: 82
End: 2022-10-04

## 2022-10-04 ENCOUNTER — ALLIED HEALTH/NURSE VISIT (OUTPATIENT)
Dept: FAMILY MEDICINE | Facility: CLINIC | Age: 82
End: 2022-10-04

## 2022-10-04 VITALS — DIASTOLIC BLOOD PRESSURE: 68 MMHG | SYSTOLIC BLOOD PRESSURE: 134 MMHG | HEART RATE: 64 BPM

## 2022-10-04 DIAGNOSIS — I10 ESSENTIAL HYPERTENSION WITH GOAL BLOOD PRESSURE LESS THAN 140/90: Primary | ICD-10-CM

## 2022-10-04 DIAGNOSIS — Z79.01 LONG TERM CURRENT USE OF ANTICOAGULANTS WITH INR GOAL OF 2.0-3.0: ICD-10-CM

## 2022-10-04 DIAGNOSIS — I48.20 CHRONIC ATRIAL FIBRILLATION (H): Primary | ICD-10-CM

## 2022-10-04 DIAGNOSIS — I48.20 CHRONIC ATRIAL FIBRILLATION (H): ICD-10-CM

## 2022-10-04 LAB — INR BLD: 2.9 (ref 0.9–1.1)

## 2022-10-04 PROCEDURE — 99207 PR NO CHARGE NURSE ONLY: CPT

## 2022-10-04 PROCEDURE — 85610 PROTHROMBIN TIME: CPT

## 2022-10-04 PROCEDURE — 36416 COLLJ CAPILLARY BLOOD SPEC: CPT

## 2022-10-04 NOTE — PROGRESS NOTES
ANTICOAGULATION MANAGEMENT     Nayely Kay 82 year old female is on warfarin with therapeutic INR result. (Goal INR 2.0-3.0)    Recent labs: (last 7 days)     10/04/22  0900   INR 2.9*       ASSESSMENT       Source(s): Chart Review and Patient/Caregiver Call       Warfarin doses taken: Warfarin taken as instructed    Diet: No new diet changes identified    New illness, injury, or hospitalization: No    Medication/supplement changes: None noted    Signs or symptoms of bleeding or clotting: No    Previous INR: Therapeutic last 2(+) visits    Additional findings: None       PLAN     Recommended plan for no diet, medication or health factor changes affecting INR     Dosing Instructions: Continue your current warfarin dose with next INR in 4 weeks       Summary  As of 10/4/2022    Full warfarin instructions:  5 mg every Mon; 2.5 mg all other days   Next INR check:  11/1/2022             Telephone call with Nayely who verbalizes understanding and agrees to plan    Lab visit scheduled    Education provided: Contact [phone]  with any changes, questions or concerns.     Plan made per [facility] anticoagulation protocol    Jeannie Gaspar RN  Anticoagulation Clinic  10/4/2022    _______________________________________________________________________     Anticoagulation Episode Summary     Current INR goal:  2.0-3.0   TTR:  83.8 % (1 y)   Target end date:  Indefinite   Send INR reminders to:  [facility]    Indications    Chronic atrial fibrillation (H) [I48.20]  Long term current use of anticoagulants with INR goal of 2.0-3.0 [Z79.01]           Comments:           Anticoagulation Care Providers     Provider Role Specialty Phone number    Ulysses Baker MD Referring Family Medicine [phone]    Oswaldo Cedillo MD Referring Family Medicine [phone]

## 2022-10-04 NOTE — TELEPHONE ENCOUNTER
Patient calling to inquire when she had her shingles vaccine and if she is due for another. Informed patient we do not have record of shingles vaccine given. It would have been a series of 2 and she does not need to repeat. Advised to check with her pharmacy if given there.  Kellee PALACIOS RN  I checked with [facility], if shingles vaccine given by them, records should be in [facility].   Kellee PALACIOS RN

## 2022-10-04 NOTE — PROGRESS NOTES
Nayely Kay is a 82 year old year old patient who comes in today for a Blood Pressure check because of ongoing blood pressure monitoring.  BP Readings from Last 6 Encounters:   10/04/22 134/68   09/06/22 136/64   08/15/22 136/66   07/18/22 118/64   07/02/22 139/75   06/20/22 136/62   HR 64  Patient is taking medication as prescribed  Patient is tolerating medications well.  Patient is not monitoring Blood Pressure at home.    Current complaints: none  Disposition:  patient to continue with the same medication  NOVA Rosales RN

## 2022-10-04 NOTE — TELEPHONE ENCOUNTER
ANTICOAGULATION CLINIC REFERRAL RENEWAL REQUEST       An annual renewal order is required for all patients referred to [facility].?  Please review and sign the pended referral order for Nayely BRETT Lindsayphuong.       ANTICOAGULATION SUMMARY      Warfarin indication(s)   Atrial Fibrillation    Mechanical heart valve present?  NO       Current goal range   INR: 2.0-3.0     Goal appropriate for indication? Goal INR 2-3, standard for indication(s) above     Time in Therapeutic Range (TTR)  (Goal > 60%) 83.8%       Office visit with referring provider's group within last year yes on 4/18/2022       Jeannie Gaspar RN  [facility]

## 2022-10-31 ENCOUNTER — ALLIED HEALTH/NURSE VISIT (OUTPATIENT)
Dept: FAMILY MEDICINE | Facility: CLINIC | Age: 82
End: 2022-10-31
Payer: MEDICARE

## 2022-10-31 ENCOUNTER — ANTICOAGULATION THERAPY VISIT (OUTPATIENT)
Dept: ANTICOAGULATION | Facility: CLINIC | Age: 82
End: 2022-10-31

## 2022-10-31 ENCOUNTER — LAB (OUTPATIENT)
Dept: LAB | Facility: CLINIC | Age: 82
End: 2022-10-31
Payer: MEDICARE

## 2022-10-31 VITALS
HEIGHT: 62 IN | SYSTOLIC BLOOD PRESSURE: 122 MMHG | WEIGHT: 120 LBS | HEART RATE: 60 BPM | RESPIRATION RATE: 16 BRPM | DIASTOLIC BLOOD PRESSURE: 62 MMHG | BODY MASS INDEX: 22.08 KG/M2

## 2022-10-31 DIAGNOSIS — I48.20 CHRONIC ATRIAL FIBRILLATION (H): ICD-10-CM

## 2022-10-31 DIAGNOSIS — I48.20 CHRONIC ATRIAL FIBRILLATION (H): Primary | ICD-10-CM

## 2022-10-31 DIAGNOSIS — I10 ESSENTIAL HYPERTENSION WITH GOAL BLOOD PRESSURE LESS THAN 140/90: Primary | ICD-10-CM

## 2022-10-31 DIAGNOSIS — Z79.01 LONG TERM CURRENT USE OF ANTICOAGULANTS WITH INR GOAL OF 2.0-3.0: ICD-10-CM

## 2022-10-31 LAB — INR BLD: 2.1 (ref 0.9–1.1)

## 2022-10-31 PROCEDURE — 85610 PROTHROMBIN TIME: CPT

## 2022-10-31 PROCEDURE — 99207 PR NO CHARGE NURSE ONLY: CPT

## 2022-10-31 PROCEDURE — 36416 COLLJ CAPILLARY BLOOD SPEC: CPT

## 2022-10-31 NOTE — PROGRESS NOTES
Nayely Kay is a 82 year old year old patient who comes in today for a Blood Pressure check because of ongoing blood pressure monitoring.  Vital Signs as repeated by /60 and five minutes later 122/62  Patient is taking medication as prescribed  Patient is tolerating medications well.  Patient is not monitoring Blood Pressure at home.    Current complaints: none  Disposition:  patient to continue with the same medication.    Toenails trimmed without incident.  Nicolette Granger RN

## 2022-10-31 NOTE — PROGRESS NOTES
ANTICOAGULATION MANAGEMENT     Nayely Kay 82 year old female is on warfarin with therapeutic INR result. (Goal INR 2.0-3.0)    Recent labs: (last 7 days)     10/31/22  0910   INR 2.1*       ASSESSMENT       Source(s): Chart Review and Patient/Caregiver Call       Warfarin doses taken: Warfarin taken as instructed    Diet: No new diet changes identified    New illness, injury, or hospitalization: No    Medication/supplement changes: None noted    Signs or symptoms of bleeding or clotting: No    Previous INR: Therapeutic last 2(+) visits    Additional findings: None       PLAN     Recommended plan for no diet, medication or health factor changes affecting INR     Dosing Instructions: Continue your current warfarin dose with next INR in 4 weeks       Summary  As of 10/31/2022    Full warfarin instructions:  5 mg every Mon; 2.5 mg all other days; Starting 10/31/2022   Next INR check:  11/28/2022             Telephone call with Nayely who verbalizes understanding and agrees to plan    Lab visit scheduled    Education provided:     Contact [phone]  with any changes, questions or concerns.     Plan made per [facility] anticoagulation protocol    Jeannie Gaspar RN  Anticoagulation Clinic  10/31/2022    _______________________________________________________________________     Anticoagulation Episode Summary     Current INR goal:  2.0-3.0   TTR:  88.8 % (1 y)   Target end date:  Indefinite   Send INR reminders to:  [facility]    Indications    Chronic atrial fibrillation (H) [I48.20]  Long term current use of anticoagulants with INR goal of 2.0-3.0 [Z79.01]           Comments:           Anticoagulation Care Providers     Provider Role Specialty Phone number    Ulysses Baker MD Referring Family Medicine [phone]    Oswaldo Cedillo MD Referring Family Medicine [phone]    Haritha Whiting PA-C Referring Family Medicine [phone]

## 2022-11-28 ENCOUNTER — ANTICOAGULATION THERAPY VISIT (OUTPATIENT)
Dept: ANTICOAGULATION | Facility: CLINIC | Age: 82
End: 2022-11-28

## 2022-11-28 ENCOUNTER — ALLIED HEALTH/NURSE VISIT (OUTPATIENT)
Dept: FAMILY MEDICINE | Facility: CLINIC | Age: 82
End: 2022-11-28
Payer: MEDICARE

## 2022-11-28 ENCOUNTER — TELEPHONE (OUTPATIENT)
Dept: FAMILY MEDICINE | Facility: CLINIC | Age: 82
End: 2022-11-28

## 2022-11-28 ENCOUNTER — LAB (OUTPATIENT)
Dept: LAB | Facility: CLINIC | Age: 82
End: 2022-11-28
Payer: MEDICARE

## 2022-11-28 VITALS — DIASTOLIC BLOOD PRESSURE: 64 MMHG | HEART RATE: 60 BPM | SYSTOLIC BLOOD PRESSURE: 134 MMHG | RESPIRATION RATE: 16 BRPM

## 2022-11-28 DIAGNOSIS — Z79.01 LONG TERM CURRENT USE OF ANTICOAGULANTS WITH INR GOAL OF 2.0-3.0: ICD-10-CM

## 2022-11-28 DIAGNOSIS — I48.20 CHRONIC ATRIAL FIBRILLATION (H): Primary | ICD-10-CM

## 2022-11-28 DIAGNOSIS — I10 ESSENTIAL HYPERTENSION WITH GOAL BLOOD PRESSURE LESS THAN 140/90: Primary | ICD-10-CM

## 2022-11-28 DIAGNOSIS — I48.20 CHRONIC ATRIAL FIBRILLATION (H): ICD-10-CM

## 2022-11-28 LAB — INR BLD: 2.5 (ref 0.9–1.1)

## 2022-11-28 PROCEDURE — 36416 COLLJ CAPILLARY BLOOD SPEC: CPT

## 2022-11-28 PROCEDURE — 99207 PR NO CHARGE NURSE ONLY: CPT

## 2022-11-28 PROCEDURE — 85610 PROTHROMBIN TIME: CPT

## 2022-11-28 NOTE — PROGRESS NOTES
ANTICOAGULATION MANAGEMENT     Nayely Kay 82 year old female is on warfarin with therapeutic INR result. (Goal INR 2.0-3.0)    Recent labs: (last 7 days)     11/28/22  0911   INR 2.5*       ASSESSMENT       Source(s): Chart Review and Patient/Caregiver Call       Warfarin doses taken: Warfarin taken as instructed    Diet: No new diet changes identified    New illness, injury, or hospitalization: No    Medication/supplement changes: None noted    Signs or symptoms of bleeding or clotting: No    Previous INR: Therapeutic last 2(+) visits    Additional findings: None       PLAN     Recommended plan for no diet, medication or health factor changes affecting INR     Dosing Instructions: Continue your current warfarin dose with next INR in 4 weeks       Summary  As of 11/28/2022    Full warfarin instructions:  5 mg every Mon; 2.5 mg all other days; Starting 11/28/2022   Next INR check:  12/26/2022             Telephone call with Nayely who verbalizes understanding and agrees to plan    Lab visit scheduled    Education provided:     Contact [phone]  with any changes, questions or concerns.     Plan made per [facility] anticoagulation protocol    Jeannie Gaspar RN  Anticoagulation Clinic  11/28/2022    _______________________________________________________________________     Anticoagulation Episode Summary     Current INR goal:  2.0-3.0   TTR:  94.9 % (1 y)   Target end date:  Indefinite   Send INR reminders to:  [facility]    Indications    Chronic atrial fibrillation (H) [I48.20]  Long term current use of anticoagulants with INR goal of 2.0-3.0 [Z79.01]           Comments:           Anticoagulation Care Providers     Provider Role Specialty Phone number    Ulysses Baker MD Referring Family Medicine [phone]    Oswaldo Cedillo MD Referring Family Medicine [phone]    Haritha Whiting PA-C Referring Family Medicine [phone]

## 2022-11-28 NOTE — TELEPHONE ENCOUNTER
I talked with Nayely and told her pulse is OK.  Her pulse range is on average 60's.    Denies lightheadedness.   Nicolette Granger RN

## 2022-11-28 NOTE — TELEPHONE ENCOUNTER
General Call    Contacts       Type Contact Phone/Fax    11/28/2022 01:41 PM CST Phone (Incoming) Nayely Kay (Self) [phone] (H)        Reason for Call:  Patient calling about Plus rate    What are your questions or concerns:  Patient states she was in over a month ago and Pulse was around 64. Stating that her pulse today was 60. Wondering what is too low. She thinks that 60 is too low/ Would like to speak with a nurse or with Dr. Cedillo.    Patient is confident that she is talking about her pulse rate. Was in 11/28 for a BP check.      Date of last appointment with provider: 4/18/22    Okay to leave a detailed message?: Yes at Home number on file [phone] (home)

## 2022-11-28 NOTE — PROGRESS NOTES
Nayely Kay is a 82 year old year old patient who comes in today for a Blood Pressure check because of ongoing blood pressure monitoring.  Vital Signs as repeated by /64 and pulse 60 and regular  Patient is taking medication as prescribed  Patient is tolerating medications well.  Patient is not monitoring Blood Pressure at home.   Current complaints: none  Disposition:  patient to continue with the same medication.  BP check next month when gets INR check.  Will cut toenails then  Nicolette Granger RN

## 2022-12-26 ENCOUNTER — ALLIED HEALTH/NURSE VISIT (OUTPATIENT)
Dept: FAMILY MEDICINE | Facility: CLINIC | Age: 82
End: 2022-12-26
Payer: MEDICARE

## 2022-12-26 ENCOUNTER — LAB (OUTPATIENT)
Dept: LAB | Facility: CLINIC | Age: 82
End: 2022-12-26
Payer: MEDICARE

## 2022-12-26 ENCOUNTER — ANTICOAGULATION THERAPY VISIT (OUTPATIENT)
Dept: ANTICOAGULATION | Facility: CLINIC | Age: 82
End: 2022-12-26

## 2022-12-26 VITALS — RESPIRATION RATE: 16 BRPM | DIASTOLIC BLOOD PRESSURE: 62 MMHG | HEART RATE: 68 BPM | SYSTOLIC BLOOD PRESSURE: 118 MMHG

## 2022-12-26 DIAGNOSIS — I10 ESSENTIAL HYPERTENSION WITH GOAL BLOOD PRESSURE LESS THAN 140/90: Primary | ICD-10-CM

## 2022-12-26 DIAGNOSIS — Z79.01 LONG TERM CURRENT USE OF ANTICOAGULANTS WITH INR GOAL OF 2.0-3.0: ICD-10-CM

## 2022-12-26 DIAGNOSIS — I48.20 CHRONIC ATRIAL FIBRILLATION (H): Primary | ICD-10-CM

## 2022-12-26 DIAGNOSIS — I48.20 CHRONIC ATRIAL FIBRILLATION (H): ICD-10-CM

## 2022-12-26 LAB — INR BLD: 2 (ref 0.9–1.1)

## 2022-12-26 PROCEDURE — 99207 PR NO CHARGE LOS: CPT

## 2022-12-26 PROCEDURE — 36416 COLLJ CAPILLARY BLOOD SPEC: CPT

## 2022-12-26 PROCEDURE — 85610 PROTHROMBIN TIME: CPT

## 2022-12-26 NOTE — PROGRESS NOTES
ANTICOAGULATION MANAGEMENT     Nayely Kay 82 year old female is on warfarin with therapeutic INR result. (Goal INR 2.0-3.0)    Recent labs: (last 7 days)     12/26/22  0954   INR 2.0*       ASSESSMENT       Source(s): Chart Review and Patient/Caregiver Call       Warfarin doses taken: Warfarin taken as instructed    Diet: No new diet changes identified    New illness, injury, or hospitalization: No    Medication/supplement changes: None noted    Signs or symptoms of bleeding or clotting: No    Previous INR: Therapeutic last 2(+) visits    Additional findings: None       PLAN     Recommended plan for no diet, medication or health factor changes affecting INR     Dosing Instructions: Continue your current warfarin dose with next INR in 4 weeks       Summary  As of 12/26/2022    Full warfarin instructions:  5 mg every Mon; 2.5 mg all other days   Next INR check:  1/23/2023             Telephone call with Nayely who verbalizes understanding and agrees to plan    Lab visit scheduled    Education provided:     Contact [phone]  with any changes, questions or concerns.     Plan made per [facility] anticoagulation protocol    Jeannie Gaspar RN  Anticoagulation Clinic  12/26/2022    _______________________________________________________________________     Anticoagulation Episode Summary     Current INR goal:  2.0-3.0   TTR:  99.3 % (1 y)   Target end date:  Indefinite   Send INR reminders to:  [facility]    Indications    Chronic atrial fibrillation (H) [I48.20]  Long term current use of anticoagulants with INR goal of 2.0-3.0 [Z79.01]           Comments:           Anticoagulation Care Providers     Provider Role Specialty Phone number    Ulysses Baker MD Referring Family Medicine [phone]    Oswaldo Cedillo MD Referring Family Medicine [phone]    Haritha Whiting PA-C Referring Family Medicine [phone]

## 2022-12-26 NOTE — PROGRESS NOTES
Nayely Kay is a 82 year old year old patient who comes in today for a Blood Pressure check because of ongoing blood pressure monitoring.  Vital Signs as repeated by /62 and pulse 68  Patient is taking medication as prescribed  Patient is tolerating medications well.  Patient is not monitoring Blood Pressure at home.    Current complaints: none  Disposition:  patient to continue with the same medication.  BP check monthly per patient       Toenails trimmed with out difficulty  Nicolette Granger RN

## 2023-01-23 ENCOUNTER — ANTICOAGULATION THERAPY VISIT (OUTPATIENT)
Dept: ANTICOAGULATION | Facility: CLINIC | Age: 83
End: 2023-01-23

## 2023-01-23 ENCOUNTER — LAB (OUTPATIENT)
Dept: LAB | Facility: CLINIC | Age: 83
End: 2023-01-23
Payer: MEDICARE

## 2023-01-23 ENCOUNTER — ALLIED HEALTH/NURSE VISIT (OUTPATIENT)
Dept: FAMILY MEDICINE | Facility: CLINIC | Age: 83
End: 2023-01-23
Payer: MEDICARE

## 2023-01-23 VITALS — DIASTOLIC BLOOD PRESSURE: 68 MMHG | HEART RATE: 64 BPM | RESPIRATION RATE: 16 BRPM | SYSTOLIC BLOOD PRESSURE: 124 MMHG

## 2023-01-23 DIAGNOSIS — I48.20 CHRONIC ATRIAL FIBRILLATION (H): ICD-10-CM

## 2023-01-23 DIAGNOSIS — I48.20 CHRONIC ATRIAL FIBRILLATION (H): Primary | ICD-10-CM

## 2023-01-23 DIAGNOSIS — Z79.01 LONG TERM CURRENT USE OF ANTICOAGULANTS WITH INR GOAL OF 2.0-3.0: ICD-10-CM

## 2023-01-23 DIAGNOSIS — Z01.30 BP CHECK: ICD-10-CM

## 2023-01-23 DIAGNOSIS — I10 ESSENTIAL HYPERTENSION WITH GOAL BLOOD PRESSURE LESS THAN 140/90: Primary | ICD-10-CM

## 2023-01-23 LAB — INR BLD: 2.7 (ref 0.9–1.1)

## 2023-01-23 PROCEDURE — 99207 PR NO CHARGE NURSE ONLY: CPT

## 2023-01-23 PROCEDURE — 36416 COLLJ CAPILLARY BLOOD SPEC: CPT

## 2023-01-23 PROCEDURE — 85610 PROTHROMBIN TIME: CPT

## 2023-01-23 NOTE — PROGRESS NOTES
ANTICOAGULATION MANAGEMENT     Nayely Kay 82 year old female is on warfarin with therapeutic INR result. (Goal INR 2.0-3.0)    Recent labs: (last 7 days)     01/23/23  0924   INR 2.7*       ASSESSMENT       Source(s): Chart Review and Patient/Caregiver Call       Warfarin doses taken: Warfarin taken as instructed    Diet: No new diet changes identified    New illness, injury, or hospitalization: No    Medication/supplement changes: None noted    Signs or symptoms of bleeding or clotting: No    Previous INR: Therapeutic last 2(+) visits    Additional findings: None       PLAN     Recommended plan for no diet, medication or health factor changes affecting INR     Dosing Instructions: Continue your current warfarin dose with next INR in 4 weeks       Summary  As of 1/23/2023    Full warfarin instructions:  5 mg every Mon; 2.5 mg all other days   Next INR check:  2/20/2023             Telephone call with Nayely who verbalizes understanding and agrees to plan    Lab visit scheduled    Education provided:     Contact [phone]  with any changes, questions or concerns.     Plan made per [facility] anticoagulation protocol    Jeannie Gaspar RN  Anticoagulation Clinic  1/23/2023    _______________________________________________________________________     Anticoagulation Episode Summary     Current INR goal:  2.0-3.0   TTR:  99.3 % (1 y)   Target end date:  Indefinite   Send INR reminders to:  [facility]    Indications    Chronic atrial fibrillation (H) [I48.20]  Long term current use of anticoagulants with INR goal of 2.0-3.0 [Z79.01]           Comments:           Anticoagulation Care Providers     Provider Role Specialty Phone number    Ulysses Baker MD Referring Family Medicine [phone]    Oswaldo Cedillo MD Referring Family Medicine [phone]    Haritha Whiting PA-C Referring Family Medicine [phone]

## 2023-01-23 NOTE — PROGRESS NOTES
Nayely Kay is a 82 year old year old patient who comes in today for a Blood Pressure check because of ongoing blood pressure monitoring.  Vital Signs as repeated by /78 and 6 minutes later 124/68.  Pulse 60 and 64  Patient is taking medication as prescribed  Patient is tolerating medications well.  Patient is not monitoring Blood Pressure at home.    Current complaints: none  Disposition:  patient to continue with the same medication.  Recheck BP with INR check is currently what she does monthly.  Nicolette Granger RN

## 2023-02-20 ENCOUNTER — ALLIED HEALTH/NURSE VISIT (OUTPATIENT)
Dept: FAMILY MEDICINE | Facility: CLINIC | Age: 83
End: 2023-02-20
Payer: COMMERCIAL

## 2023-02-20 ENCOUNTER — ANTICOAGULATION THERAPY VISIT (OUTPATIENT)
Dept: ANTICOAGULATION | Facility: CLINIC | Age: 83
End: 2023-02-20

## 2023-02-20 ENCOUNTER — LAB (OUTPATIENT)
Dept: LAB | Facility: CLINIC | Age: 83
End: 2023-02-20
Payer: COMMERCIAL

## 2023-02-20 VITALS — RESPIRATION RATE: 16 BRPM | SYSTOLIC BLOOD PRESSURE: 122 MMHG | HEART RATE: 72 BPM | DIASTOLIC BLOOD PRESSURE: 74 MMHG

## 2023-02-20 DIAGNOSIS — I10 ESSENTIAL HYPERTENSION WITH GOAL BLOOD PRESSURE LESS THAN 140/90: Primary | ICD-10-CM

## 2023-02-20 DIAGNOSIS — Z79.01 LONG TERM CURRENT USE OF ANTICOAGULANTS WITH INR GOAL OF 2.0-3.0: ICD-10-CM

## 2023-02-20 DIAGNOSIS — I48.20 CHRONIC ATRIAL FIBRILLATION (H): ICD-10-CM

## 2023-02-20 DIAGNOSIS — I48.20 CHRONIC ATRIAL FIBRILLATION (H): Primary | ICD-10-CM

## 2023-02-20 LAB — INR BLD: 2.5 (ref 0.9–1.1)

## 2023-02-20 PROCEDURE — 85610 PROTHROMBIN TIME: CPT

## 2023-02-20 PROCEDURE — 99207 PR NO CHARGE NURSE ONLY: CPT

## 2023-02-20 PROCEDURE — 36416 COLLJ CAPILLARY BLOOD SPEC: CPT

## 2023-02-20 NOTE — PROGRESS NOTES
ANTICOAGULATION MANAGEMENT     Nayely Kay 82 year old female is on warfarin with therapeutic INR result. (Goal INR 2.0-3.0)    Recent labs: (last 7 days)     02/20/23  1010   INR 2.5*       ASSESSMENT       Source(s): Chart Review and Patient/Caregiver Call       Warfarin doses taken: Warfarin taken as instructed    Diet: No new diet changes identified    New illness, injury, or hospitalization: No    Medication/supplement changes: None noted    Signs or symptoms of bleeding or clotting: No    Previous INR: Therapeutic last 2(+) visits    Additional findings: None       PLAN     Recommended plan for no diet, medication or health factor changes affecting INR     Dosing Instructions: Continue your current warfarin dose with next INR in 4 weeks       Summary  As of 2/20/2023    Full warfarin instructions:  5 mg every Mon; 2.5 mg all other days   Next INR check:  3/20/2023             Telephone call with Nayely who verbalizes understanding and agrees to plan    Lab visit scheduled    Education provided:     Contact [phone]  with any changes, questions or concerns.     Plan made per [facility] anticoagulation protocol    Jeannie Gaspar RN  Anticoagulation Clinic  2/20/2023    _______________________________________________________________________     Anticoagulation Episode Summary     Current INR goal:  2.0-3.0   TTR:  99.3 % (1 y)   Target end date:  Indefinite   Send INR reminders to:  [facility]    Indications    Chronic atrial fibrillation (H) [I48.20]  Long term current use of anticoagulants with INR goal of 2.0-3.0 [Z79.01]           Comments:           Anticoagulation Care Providers     Provider Role Specialty Phone number    Ulysses Baker MD Referring Family Medicine [phone]    Oswaldo Cedillo MD Referring Family Medicine [phone]    Haritha Whiting PA-C Referring Family Medicine [phone]

## 2023-02-20 NOTE — PROGRESS NOTES
Nayely Kay is a 82 year old year old patient who comes in today for a Blood Pressure check because of ongoing blood pressure monitoring.  Vital Signs as repeated by /74 pulse 72  Patient is taking medication as prescribed  Patient is tolerating medications well.  Patient is not monitoring Blood Pressure at home.  Current complaints: none  Disposition:  patient to continue with the same medication.  BP check with INR.  Nicolette Granger RN

## 2023-03-13 ENCOUNTER — ALLIED HEALTH/NURSE VISIT (OUTPATIENT)
Dept: FAMILY MEDICINE | Facility: CLINIC | Age: 83
End: 2023-03-13
Payer: COMMERCIAL

## 2023-03-13 ENCOUNTER — ANTICOAGULATION THERAPY VISIT (OUTPATIENT)
Dept: ANTICOAGULATION | Facility: CLINIC | Age: 83
End: 2023-03-13

## 2023-03-13 ENCOUNTER — LAB (OUTPATIENT)
Dept: LAB | Facility: CLINIC | Age: 83
End: 2023-03-13
Payer: COMMERCIAL

## 2023-03-13 VITALS — SYSTOLIC BLOOD PRESSURE: 124 MMHG | HEART RATE: 64 BPM | RESPIRATION RATE: 16 BRPM | DIASTOLIC BLOOD PRESSURE: 76 MMHG

## 2023-03-13 DIAGNOSIS — I10 ESSENTIAL HYPERTENSION WITH GOAL BLOOD PRESSURE LESS THAN 140/90: Primary | ICD-10-CM

## 2023-03-13 DIAGNOSIS — Z79.01 LONG TERM CURRENT USE OF ANTICOAGULANTS WITH INR GOAL OF 2.0-3.0: ICD-10-CM

## 2023-03-13 DIAGNOSIS — I48.20 CHRONIC ATRIAL FIBRILLATION (H): Primary | ICD-10-CM

## 2023-03-13 DIAGNOSIS — I48.20 CHRONIC ATRIAL FIBRILLATION (H): ICD-10-CM

## 2023-03-13 LAB — INR BLD: 2.3 (ref 0.9–1.1)

## 2023-03-13 PROCEDURE — 99207 PR NO CHARGE NURSE ONLY: CPT

## 2023-03-13 PROCEDURE — 36416 COLLJ CAPILLARY BLOOD SPEC: CPT

## 2023-03-13 PROCEDURE — 85610 PROTHROMBIN TIME: CPT

## 2023-03-13 NOTE — PROGRESS NOTES
ANTICOAGULATION MANAGEMENT     Nayely Kay 82 year old female is on warfarin with therapeutic INR result. (Goal INR 2.0-3.0)    Recent labs: (last 7 days)     03/13/23  0949   INR 2.3*       ASSESSMENT       Source(s): Chart Review and Patient/Caregiver Call       Warfarin doses taken: Warfarin taken as instructed    Diet: No new diet changes identified    New illness, injury, or hospitalization: No    Medication/supplement changes: None noted    Signs or symptoms of bleeding or clotting: No    Previous INR: Therapeutic last 2(+) visits    Additional findings: None         PLAN     Recommended plan for no diet, medication or health factor changes affecting INR     Dosing Instructions: Continue your current warfarin dose with next INR in 4 weeks       Summary  As of 3/13/2023    Full warfarin instructions:  5 mg every Mon; 2.5 mg all other days   Next INR check:  4/10/2023             Telephone call with Nayely who verbalizes understanding and agrees to plan    Lab visit scheduled    Education provided:     Please call back if any changes to your diet, medications or how you've been taking warfarin    Plan made per [facility] anticoagulation protocol    Marjorie Faulkner, RN  Anticoagulation Clinic  3/13/2023    _______________________________________________________________________     Anticoagulation Episode Summary     Current INR goal:  2.0-3.0   TTR:  99.3 % (1 y)   Target end date:  Indefinite   Send INR reminders to:  [facility]    Indications    Chronic atrial fibrillation (H) [I48.20]  Long term current use of anticoagulants with INR goal of 2.0-3.0 [Z79.01]           Comments:           Anticoagulation Care Providers     Provider Role Specialty Phone number    Ulysses Baker MD Referring Family Medicine [phone]    Oswaldo Cedillo MD Referring Family Medicine [phone]    Haritha Whiting PA-C Referring Family Medicine [phone]

## 2023-03-13 NOTE — PROGRESS NOTES
Nayely Kay is a 82 year old year old patient who comes in today for a Blood Pressure check because of ongoing blood pressure monitoring.  Vital Signs as repeated by /76 and pulse 64  Patient is taking medication as prescribed  Patient is tolerating medications well.  Patient is not monitoring Blood Pressure at home.   Current complaints: none  Disposition:  patient to continue with the same medication.  BP check monthly when in for lab.    Toenails trimmed without difficulty  Nicolette Granger RN

## 2023-03-20 ENCOUNTER — ANTICOAGULATION THERAPY VISIT (OUTPATIENT)
Dept: ANTICOAGULATION | Facility: CLINIC | Age: 83
End: 2023-03-20

## 2023-03-20 ENCOUNTER — OFFICE VISIT (OUTPATIENT)
Dept: FAMILY MEDICINE | Facility: CLINIC | Age: 83
End: 2023-03-20
Payer: COMMERCIAL

## 2023-03-20 VITALS
HEIGHT: 59 IN | HEART RATE: 59 BPM | DIASTOLIC BLOOD PRESSURE: 74 MMHG | WEIGHT: 118 LBS | TEMPERATURE: 97.7 F | RESPIRATION RATE: 16 BRPM | SYSTOLIC BLOOD PRESSURE: 128 MMHG | OXYGEN SATURATION: 99 % | BODY MASS INDEX: 23.79 KG/M2

## 2023-03-20 DIAGNOSIS — N18.31 STAGE 3A CHRONIC KIDNEY DISEASE (H): ICD-10-CM

## 2023-03-20 DIAGNOSIS — R79.89 ELEVATED SERUM CREATININE: ICD-10-CM

## 2023-03-20 DIAGNOSIS — Z13.220 SCREENING FOR HYPERLIPIDEMIA: ICD-10-CM

## 2023-03-20 DIAGNOSIS — I10 ESSENTIAL HYPERTENSION: ICD-10-CM

## 2023-03-20 DIAGNOSIS — M35.3 POLYMYALGIA RHEUMATICA (H): ICD-10-CM

## 2023-03-20 DIAGNOSIS — I48.20 CHRONIC ATRIAL FIBRILLATION (H): Primary | ICD-10-CM

## 2023-03-20 DIAGNOSIS — I48.20 CHRONIC ATRIAL FIBRILLATION (H): ICD-10-CM

## 2023-03-20 DIAGNOSIS — Z79.01 LONG TERM CURRENT USE OF ANTICOAGULANTS WITH INR GOAL OF 2.0-3.0: ICD-10-CM

## 2023-03-20 DIAGNOSIS — Z00.00 MEDICARE ANNUAL WELLNESS VISIT, SUBSEQUENT: Primary | ICD-10-CM

## 2023-03-20 DIAGNOSIS — E78.5 HYPERLIPIDEMIA LDL GOAL <130: ICD-10-CM

## 2023-03-20 LAB
ANION GAP SERPL CALCULATED.3IONS-SCNC: 12 MMOL/L (ref 7–15)
BUN SERPL-MCNC: 33.9 MG/DL (ref 8–23)
CALCIUM SERPL-MCNC: 10.5 MG/DL (ref 8.8–10.2)
CHLORIDE SERPL-SCNC: 102 MMOL/L (ref 98–107)
CHOLEST SERPL-MCNC: 194 MG/DL
CREAT SERPL-MCNC: 1.32 MG/DL (ref 0.51–0.95)
CREAT UR-MCNC: 100.6 MG/DL
DEPRECATED HCO3 PLAS-SCNC: 28 MMOL/L (ref 22–29)
GFR SERPL CREATININE-BSD FRML MDRD: 40 ML/MIN/1.73M2
GLUCOSE SERPL-MCNC: 79 MG/DL (ref 70–99)
HDLC SERPL-MCNC: 72 MG/DL
HGB BLD-MCNC: 12.2 G/DL (ref 11.7–15.7)
INR BLD: 1.8 (ref 0.9–1.1)
LDLC SERPL CALC-MCNC: 103 MG/DL
MICROALBUMIN UR-MCNC: 75.2 MG/L
MICROALBUMIN/CREAT UR: 74.75 MG/G CR (ref 0–25)
NONHDLC SERPL-MCNC: 122 MG/DL
POTASSIUM SERPL-SCNC: 4.3 MMOL/L (ref 3.4–5.3)
SODIUM SERPL-SCNC: 142 MMOL/L (ref 136–145)
TRIGL SERPL-MCNC: 93 MG/DL

## 2023-03-20 PROCEDURE — 85610 PROTHROMBIN TIME: CPT | Performed by: NURSE PRACTITIONER

## 2023-03-20 PROCEDURE — 82043 UR ALBUMIN QUANTITATIVE: CPT | Performed by: NURSE PRACTITIONER

## 2023-03-20 PROCEDURE — 82570 ASSAY OF URINE CREATININE: CPT | Performed by: NURSE PRACTITIONER

## 2023-03-20 PROCEDURE — 85018 HEMOGLOBIN: CPT | Performed by: NURSE PRACTITIONER

## 2023-03-20 PROCEDURE — 80048 BASIC METABOLIC PNL TOTAL CA: CPT | Performed by: NURSE PRACTITIONER

## 2023-03-20 PROCEDURE — 36415 COLL VENOUS BLD VENIPUNCTURE: CPT | Performed by: NURSE PRACTITIONER

## 2023-03-20 PROCEDURE — G0439 PPPS, SUBSEQ VISIT: HCPCS | Performed by: NURSE PRACTITIONER

## 2023-03-20 PROCEDURE — 99214 OFFICE O/P EST MOD 30 MIN: CPT | Mod: 25 | Performed by: NURSE PRACTITIONER

## 2023-03-20 PROCEDURE — 80061 LIPID PANEL: CPT | Performed by: NURSE PRACTITIONER

## 2023-03-20 RX ORDER — LISINOPRIL 5 MG/1
5 TABLET ORAL DAILY
Qty: 90 TABLET | Refills: 3 | Status: SHIPPED | OUTPATIENT
Start: 2023-03-20 | End: 2024-03-22

## 2023-03-20 RX ORDER — METOPROLOL TARTRATE 50 MG
75 TABLET ORAL 2 TIMES DAILY
Qty: 270 TABLET | Refills: 3 | Status: SHIPPED | OUTPATIENT
Start: 2023-03-20 | End: 2024-03-22

## 2023-03-20 RX ORDER — AMLODIPINE BESYLATE 5 MG/1
5 TABLET ORAL DAILY
Qty: 90 TABLET | Refills: 3 | Status: SHIPPED | OUTPATIENT
Start: 2023-03-20 | End: 2024-03-22

## 2023-03-20 RX ORDER — TRIAMTERENE/HYDROCHLOROTHIAZID 37.5-25 MG
TABLET ORAL
Qty: 90 TABLET | Refills: 3 | Status: SHIPPED | OUTPATIENT
Start: 2023-03-20 | End: 2024-03-22

## 2023-03-20 ASSESSMENT — PAIN SCALES - GENERAL: PAINLEVEL: NO PAIN (0)

## 2023-03-20 NOTE — PROGRESS NOTES
Assessment & Plan     (Z00.00) Medicare annual wellness visit, subsequent  (primary encounter diagnosis)  Comment:   Plan:     (Z13.220) Screening for hyperlipidemia  Comment:   Plan: Lipid panel reflex to direct LDL Non-fasting            (E78.5) Hyperlipidemia LDL goal <130  Comment:  Controlled no change in treatment plan  Plan: Lipid panel reflex to direct LDL Non-fasting,         OFFICE/OUTPT VISIT,EST,LEVL III      (N18.31) Stage 3a chronic kidney disease (H)  Comment:  Controlled no change in treatment plan  Plan: BASIC METABOLIC PANEL, Albumin Random Urine         Quantitative with Creat Ratio, Hemoglobin,         OFFICE/OUTPT VISIT,EST,LEVL III    (I10) Essential hypertension  Comment:  Controlled no change in treatment plan  Plan: amLODIPine (NORVASC) 5 MG tablet, lisinopril         (ZESTRIL) 5 MG tablet, metoprolol tartrate         (LOPRESSOR) 50 MG tablet, triamterene-HCTZ         (MAXZIDE-25) 37.5-25 MG tablet, OFFICE/OUTPT         VISIT,EST,LEVL III      (M35.3) Polymyalgia rheumatica (H)  Comment:  Controlled no change in treatment plan  Plan: OFFICE/OUTPT VISIT,EST,LEVL III    (I48.20) Chronic atrial fibrillation (H)  Comment:  Controlled no change in treatment plan  Plan: OFFICE/OUTPT VISIT,EST,LEVL III        No follow-ups on file.    NADER Treviño CNP  [facility]    Janna Adler is a 83 year old, presenting for the following health issues:  No chief complaint on file.      HPI     Hypertension Follow-up      Do you check your blood pressure regularly outside of the clinic? No     Are you following a low salt diet? Yes    Are your blood pressures ever more than 140 on the top number (systolic) OR more   than 90 on the bottom number (diastolic), for example 140/90? No    Annual Wellness Visit    Patient has been advised of split billing requirements and indicates understanding: Yes     Are you in the first 12 months of your Medicare Part B coverage?   "No    Physical Health:    In general, how would you rate your overall physical health? good    Outside of work, how many days during the week do you exercise?6-7 days/week    Outside of work, approximately how many minutes a day do you exercise?15-30 minutes    If you drink alcohol do you typically have >3 drinks per day or >7 drinks per week? No    Do you usually eat at least 4 servings of fruit and vegetables a day, include whole grains & fiber and avoid regularly eating high fat or \"junk\" foods? Yes    Do you have any problems taking medications regularly? No    Do you have any side effects from medications? none    Needs assistance for the following daily activities: no assistance needed    Which of the following safety concerns are present in your home?  none identified     Hearing impairment: Yes, in right ear    In the past 6 months, have you been bothered by leaking of urine? no    Mental Health:    In general, how would you rate your overall mental or emotional health? good  PHQ-2 Score:      Do you feel safe in your environment? Yes    Have you ever done Advance Care Planning? (For example, a Health Directive, POLST, or a discussion with a medical provider or your loved ones about your wishes)? Yes, advance care planning is on file.    Fall risk:  Fallen 2 or more times in the past year?: No  Any fall with injury in the past year?: No    Cognitive Screenin) Repeat 3 items (Leader, Season, Table)    2) Clock draw: ABNORMAL .  3) 3 item recall: Recalls NO objects   Results: 0 items recalled: PROBABLE COGNITIVE IMPAIRMENT, **INFORM PROVIDER**    Reviewed with patient dose not want formal testing has a supportive family that helps her out   Mini-CogTM Copyright CRYSTAL Traylor. Licensed by the author for use in [facility]; reprinted with permission ([email or web address]@.[email or web address]). All rights reserved.      Do you have sleep apnea, excessive snoring or daytime drowsiness?: no    Current providers sharing in care for " "this patient include:   Patient Care Team:  Elizabeth Rogers APRN CNP as PCP - General (Family Medicine)  Oswaldo Cedillo MD as Assigned PCP    Patient has been advised of split billing requirements and indicates understanding: Yes          Review of Systems   Constitutional, HEENT, cardiovascular, pulmonary, gi and gu systems are negative, except as otherwise noted.      Objective    /74   Pulse 59   Temp 97.7  F (36.5  C) (Tympanic)   Resp 16   Ht 1.499 m (4' 11\")   Wt 53.5 kg (118 lb)   SpO2 99%   BMI 23.83 kg/m    Body mass index is 23.83 kg/m .  Physical Exam   GENERAL: healthy, alert and no distress  EYES: Eyes grossly normal to inspection, PERRL and conjunctivae and sclerae normal  HENT: ear canals and TM's normal, nose and mouth without ulcers or lesions  NECK: no adenopathy, no asymmetry, masses, or scars and thyroid normal to palpation  RESP: lungs clear to auscultation - no rales, rhonchi or wheezes  CV: regular rate and rhythm, normal S1 S2, no S3 or S4, no murmur, click or rub, no peripheral edema and peripheral pulses strong  MS: no gross musculoskeletal defects noted, no edema  SKIN: no suspicious lesions or rashes  NEURO: Normal strength and tone, mentation intact and speech normal  PSYCH: mentation appears normal, affect normal/bright    Results for orders placed or performed in visit on 03/20/23   Hemoglobin     Status: Normal   Result Value Ref Range    Hemoglobin 12.2 11.7 - 15.7 g/dL   INR point of care     Status: Abnormal   Result Value Ref Range    INR 1.8 (H) 0.9 - 1.1    Narrative    This test is intended for monitoring Coumadin therapy. Results are not accurate in patients with prolonged INR due to factor deficiency.                  "

## 2023-03-20 NOTE — PROGRESS NOTES
ANTICOAGULATION MANAGEMENT     Nayely Kay 83 year old female is on warfarin with subtherapeutic INR result. (Goal INR 2.0-3.0)    Recent labs: (last 7 days)     03/20/23  1034   INR 1.8*       ASSESSMENT       Source(s): Chart Review and Patient/Caregiver Call       Warfarin doses taken: Warfarin taken as instructed    Diet: Increased greens/vitamin K in diet; plans to resume previous intake    New illness, injury, or hospitalization: No    Medication/supplement changes: None noted    Signs or symptoms of bleeding or clotting: No    Previous INR: Therapeutic last 2(+) visits    Additional findings: None     PLAN     Recommended plan for temporary change(s) affecting INR     Dosing Instructions: booster dose then continue your current warfarin dose with next INR in 2 weeks       Resume normal intake of greens (vitamin K)    Summary  As of 3/20/2023    Full warfarin instructions:  3/20: 7.5 mg; Otherwise 5 mg every Mon; 2.5 mg all other days   Next INR check:  4/3/2023             Telephone call with Nayely who verbalizes understanding and agrees to plan    Lab visit scheduled    Education provided:     Please call back if any changes to your diet, medications or how you've been taking warfarin    Symptom monitoring: monitoring for clotting signs and symptoms    Plan made per [facility] anticoagulation protocol    Luz Elena Barrios RN  Anticoagulation Clinic  3/20/2023    _______________________________________________________________________     Anticoagulation Episode Summary     Current INR goal:  2.0-3.0   TTR:  98.5 % (1 y)   Target end date:  Indefinite   Send INR reminders to:  [facility]    Indications    Chronic atrial fibrillation (H) [I48.20]  Long term current use of anticoagulants with INR goal of 2.0-3.0 [Z79.01]           Comments:           Anticoagulation Care Providers     Provider Role Specialty Phone number    Ulysses Baker MD Referring Family Medicine [phone]    Oswaldo Cedillo  MD Nikhil Referring Family Medicine [phone]    Haritha Whiting PA-C Referring Family Medicine [phone]        Voicemail left for patient to return call to Anticoagulation Clinic - [phone]. Dosing instructions not given to patient.  Tentative plan: consider 2 week recheck.  Luz Elena Barrios RN on 3/20/2023 at 12:17 PM

## 2023-04-03 ENCOUNTER — TELEPHONE (OUTPATIENT)
Dept: FAMILY MEDICINE | Facility: CLINIC | Age: 83
End: 2023-04-03

## 2023-04-03 ENCOUNTER — LAB (OUTPATIENT)
Dept: LAB | Facility: CLINIC | Age: 83
End: 2023-04-03
Payer: COMMERCIAL

## 2023-04-03 ENCOUNTER — ANTICOAGULATION THERAPY VISIT (OUTPATIENT)
Dept: ANTICOAGULATION | Facility: CLINIC | Age: 83
End: 2023-04-03

## 2023-04-03 ENCOUNTER — ALLIED HEALTH/NURSE VISIT (OUTPATIENT)
Dept: FAMILY MEDICINE | Facility: CLINIC | Age: 83
End: 2023-04-03
Payer: COMMERCIAL

## 2023-04-03 VITALS
WEIGHT: 118 LBS | RESPIRATION RATE: 16 BRPM | HEIGHT: 59 IN | HEART RATE: 64 BPM | SYSTOLIC BLOOD PRESSURE: 132 MMHG | BODY MASS INDEX: 23.79 KG/M2 | DIASTOLIC BLOOD PRESSURE: 76 MMHG

## 2023-04-03 DIAGNOSIS — I48.20 CHRONIC ATRIAL FIBRILLATION (H): ICD-10-CM

## 2023-04-03 DIAGNOSIS — Z79.01 LONG TERM CURRENT USE OF ANTICOAGULANTS WITH INR GOAL OF 2.0-3.0: ICD-10-CM

## 2023-04-03 DIAGNOSIS — I10 ESSENTIAL HYPERTENSION: Primary | ICD-10-CM

## 2023-04-03 DIAGNOSIS — I48.20 CHRONIC ATRIAL FIBRILLATION (H): Primary | ICD-10-CM

## 2023-04-03 LAB — INR BLD: 2.4 (ref 0.9–1.1)

## 2023-04-03 PROCEDURE — 99207 PR NO CHARGE NURSE ONLY: CPT

## 2023-04-03 PROCEDURE — 36416 COLLJ CAPILLARY BLOOD SPEC: CPT

## 2023-04-03 PROCEDURE — 85610 PROTHROMBIN TIME: CPT

## 2023-04-03 NOTE — PROGRESS NOTES
Nayely Kay is a 83 year old year old patient who comes in today for a Blood Pressure check because of ongoing blood pressure monitoring.  Vital Signs as repeated by /76 pulse 64  Patient is taking medication as prescribed  Patient is tolerating medications well.  Patient is not monitoring Blood Pressure at home.   Current complaints: none  Disposition:  patient to continue with the same medication.  BP next INR check  Nicolette Granger RN

## 2023-04-03 NOTE — PROGRESS NOTES
ANTICOAGULATION MANAGEMENT     Nayely Kay 83 year old female is on warfarin with therapeutic INR result. (Goal INR 2.0-3.0)    Recent labs: (last 7 days)     04/03/23  1403   INR 2.4*       ASSESSMENT       Source(s): Chart Review and Patient/Caregiver Call       Warfarin doses taken: Warfarin taken as instructed    Diet: No new diet changes identified    New illness, injury, or hospitalization: No    Medication/supplement changes: None noted    Signs or symptoms of bleeding or clotting: No    Previous INR: Subtherapeutic    Additional findings: Patient has questions related to her other lab work, states she was told to come back for labs next Monday (including INR). Patient does not need another INR in one week, will send TE to care team to clarify when patient needs to return for other blood work.          PLAN     Recommended plan for no diet, medication or health factor changes affecting INR     Dosing Instructions: Continue your current warfarin dose with next INR in 3 weeks       Summary  As of 4/3/2023    Full warfarin instructions:  5 mg every Mon; 2.5 mg all other days   Next INR check:  5/1/2023             Telephone call with Nayely who verbalizes understanding and agrees to plan    Lab visit scheduled    Education provided:     Contact [phone]  with any changes, questions or concerns.     Plan made per [facility] anticoagulation protocol    Jeannie Gaspar, RN  Anticoagulation Clinic  4/3/2023    _______________________________________________________________________     Anticoagulation Episode Summary     Current INR goal:  2.0-3.0   TTR:  97.9 % (1 y)   Target end date:  Indefinite   Send INR reminders to:  [facility]    Indications    Chronic atrial fibrillation (H) [I48.20]  Long term current use of anticoagulants with INR goal of 2.0-3.0 [Z79.01]           Comments:           Anticoagulation Care Providers     Provider Role Specialty Phone number    Ulysses Baker MD Referring  Family Medicine [phone]    Oswaldo Cedillo MD Referring Family Medicine [phone]    Haritha Whiting PA-C Referring Family Medicine [phone]

## 2023-04-03 NOTE — TELEPHONE ENCOUNTER
Can someone please reach out to Nayely and discuss when she needs to come back for labs? I spoke with her regarding her INR result today and she states she was told to come back in a week for another INR and other labs.     Patient does not need another INR in a week, I scheduled her out 3 weeks per our protocol as INR in range. She has many questions regarding her kidney function/labs.     Please follow up with patient when able.     Thank you,     Jeannie Gaspar RN, BSN  [facility]  [phone]

## 2023-04-03 NOTE — TELEPHONE ENCOUNTER
I discussed all of this in clinic with her when I checked her BP today.  I discussed CKD and INR and stress with her again.  Not sure she really understands and will not worry, as I know she will worry  15 minutes on phone.  I think she will be OK.  Nicolette Granger RN

## 2023-04-24 ENCOUNTER — TELEPHONE (OUTPATIENT)
Dept: FAMILY MEDICINE | Facility: CLINIC | Age: 83
End: 2023-04-24

## 2023-04-24 ENCOUNTER — ANTICOAGULATION THERAPY VISIT (OUTPATIENT)
Dept: ANTICOAGULATION | Facility: CLINIC | Age: 83
End: 2023-04-24

## 2023-04-24 ENCOUNTER — LAB (OUTPATIENT)
Dept: LAB | Facility: CLINIC | Age: 83
End: 2023-04-24
Payer: COMMERCIAL

## 2023-04-24 ENCOUNTER — ALLIED HEALTH/NURSE VISIT (OUTPATIENT)
Dept: FAMILY MEDICINE | Facility: CLINIC | Age: 83
End: 2023-04-24
Payer: COMMERCIAL

## 2023-04-24 VITALS — SYSTOLIC BLOOD PRESSURE: 128 MMHG | HEART RATE: 60 BPM | DIASTOLIC BLOOD PRESSURE: 64 MMHG

## 2023-04-24 DIAGNOSIS — Z79.01 LONG TERM CURRENT USE OF ANTICOAGULANTS WITH INR GOAL OF 2.0-3.0: ICD-10-CM

## 2023-04-24 DIAGNOSIS — I48.20 CHRONIC ATRIAL FIBRILLATION (H): ICD-10-CM

## 2023-04-24 DIAGNOSIS — I10 ESSENTIAL HYPERTENSION: Primary | ICD-10-CM

## 2023-04-24 DIAGNOSIS — I48.20 CHRONIC ATRIAL FIBRILLATION (H): Primary | ICD-10-CM

## 2023-04-24 LAB — INR BLD: 3.4 (ref 0.9–1.1)

## 2023-04-24 PROCEDURE — 36416 COLLJ CAPILLARY BLOOD SPEC: CPT

## 2023-04-24 PROCEDURE — 85610 PROTHROMBIN TIME: CPT

## 2023-04-24 PROCEDURE — 99207 PR NO CHARGE NURSE ONLY: CPT

## 2023-04-24 NOTE — PROGRESS NOTES
Nayely Kay is a 83 year old year old patient who comes in today for a Blood Pressure check because of ongoing blood pressure monitoring.  Vital Signs as repeated by RN P 60 /64  Patient is taking medication as prescribed  Patient is tolerating medications well.  Patient is not monitoring Blood Pressure at home.  Average readings if yes are N/A  Current complaints: none  Disposition:  patient to continue with the same medication and patient reminded to call as needed. BP check with next INR.    Julie Behrendt RN

## 2023-04-24 NOTE — PROGRESS NOTES
ANTICOAGULATION MANAGEMENT     Nayely Kay 83 year old female is on warfarin with supratherapeutic INR result. (Goal INR 2.0-3.0)    Recent labs: (last 7 days)     04/24/23  1205   INR 3.4*       ASSESSMENT       Source(s): Chart Review and Patient/Caregiver Call       Warfarin doses taken: Warfarin taken as instructed    Diet: Decreased greens/vitamin K in diet; ongoing change    Medication/supplement changes: None noted    New illness, injury, or hospitalization: No    Signs or symptoms of bleeding or clotting: No    Previous result: Therapeutic last visit; previously outside of goal range    Additional findings: None         PLAN     Recommended plan for ongoing change(s) affecting INR     Dosing Instructions: decrease your warfarin dose (12.5% change) with next INR in 10 days       Summary  As of 4/24/2023    Full warfarin instructions:  4/24: 2.5 mg; Otherwise 2.5 mg every day   Next INR check:  5/5/2023             Telephone call with Nayely who verbalizes understanding and agrees to plan    Lab visit scheduled    Education provided:     Please call back if any changes to your diet, medications or how you've been taking warfarin    Dietary considerations: importance of consistent vitamin K intake    Contact [phone]  with any changes, questions or concerns.     Plan made per [facility] anticoagulation protocol    Flaquita Neal RN  Anticoagulation Clinic  4/24/2023    _______________________________________________________________________     Anticoagulation Episode Summary     Current INR goal:  2.0-3.0   TTR:  95.6 % (1 y)   Target end date:  Indefinite   Send INR reminders to:  [facility]    Indications    Chronic atrial fibrillation (H) [I48.20]  Long term current use of anticoagulants with INR goal of 2.0-3.0 [Z79.01]           Comments:           Anticoagulation Care Providers     Provider Role Specialty Phone number    Ulysses Baker MD Referring Family Medicine [phone]    Mamadou  Oswaldo Tejeda MD Referring Family Medicine [phone]    Haritha Whiting PA-C Referring Family Medicine [phone]

## 2023-05-05 ENCOUNTER — ANTICOAGULATION THERAPY VISIT (OUTPATIENT)
Dept: ANTICOAGULATION | Facility: CLINIC | Age: 83
End: 2023-05-05

## 2023-05-05 ENCOUNTER — LAB (OUTPATIENT)
Dept: LAB | Facility: CLINIC | Age: 83
End: 2023-05-05
Payer: COMMERCIAL

## 2023-05-05 DIAGNOSIS — I48.20 CHRONIC ATRIAL FIBRILLATION (H): ICD-10-CM

## 2023-05-05 DIAGNOSIS — I48.20 CHRONIC ATRIAL FIBRILLATION (H): Primary | ICD-10-CM

## 2023-05-05 DIAGNOSIS — Z79.01 LONG TERM CURRENT USE OF ANTICOAGULANTS WITH INR GOAL OF 2.0-3.0: ICD-10-CM

## 2023-05-05 LAB — INR BLD: 2.7 (ref 0.9–1.1)

## 2023-05-05 PROCEDURE — 85610 PROTHROMBIN TIME: CPT

## 2023-05-05 PROCEDURE — 36416 COLLJ CAPILLARY BLOOD SPEC: CPT

## 2023-05-05 NOTE — PROGRESS NOTES
Pt called inr nurse back, unable to reach nurse. Please call pt back to discuss recent inr results.

## 2023-05-05 NOTE — PROGRESS NOTES
ANTICOAGULATION MANAGEMENT     Nayely Kay 83 year old female is on warfarin with therapeutic INR result. (Goal INR 2.0-3.0)    Recent labs: (last 7 days)     05/05/23  1130   INR 2.7*       ASSESSMENT       Source(s): Chart Review and Patient/Caregiver Call       Warfarin doses taken: Warfarin taken as instructed    Diet: No new diet changes identified    Medication/supplement changes: None noted    New illness, injury, or hospitalization: No    Signs or symptoms of bleeding or clotting: No    Previous result: Supratherapeutic    Additional findings: None         PLAN     Recommended plan for no diet, medication or health factor changes affecting INR     Dosing Instructions: Continue your current warfarin dose with next INR in 3 weeks       Summary  As of 5/5/2023    Full warfarin instructions:  2.5 mg every day   Next INR check:  5/26/2023             Telephone call with Nayely who verbalizes understanding and agrees to plan    Lab visit scheduled    Education provided:     Goal range and lab monitoring: goal range and significance of current result    Plan made per [facility] anticoagulation protocol    France Ashraf RN  Anticoagulation Clinic  5/5/2023    _______________________________________________________________________     Anticoagulation Episode Summary     Current INR goal:  2.0-3.0   TTR:  93.9 % (1 y)   Target end date:  Indefinite   Send INR reminders to:  [facility]    Indications    Chronic atrial fibrillation (H) [I48.20]  Long term current use of anticoagulants with INR goal of 2.0-3.0 [Z79.01]           Comments:           Anticoagulation Care Providers     Provider Role Specialty Phone number    Ulysses Baker MD Referring Family Medicine [phone]    Oswaldo Cedillo MD Referring Family Medicine [phone]    Haritha Whiting PA-C Referring Family Medicine [phone]

## 2023-05-05 NOTE — PROGRESS NOTES
ANTICOAGULATION MANAGEMENT     Nayely Kay 83 year old female is on warfarin with therapeutic INR result. (Goal INR 2.0-3.0)    Recent labs: (last 7 days)     05/05/23  1130   INR 2.7*       ASSESSMENT       Source(s): Chart Review    Previous INR was Supratherapeutic    Medication, diet, health changes since last INR chart reviewed; none identified             PLAN     Unable to reach Nayely today.    LMTCB    Follow up required to confirm warfarin dose taken and assess for changes    France Ashraf RN  Anticoagulation Clinic  5/5/2023

## 2023-05-10 ENCOUNTER — HOSPITAL ENCOUNTER (EMERGENCY)
Facility: CLINIC | Age: 83
Discharge: HOME | End: 2023-05-10
Attending: FAMILY MEDICINE | Admitting: FAMILY MEDICINE
Payer: COMMERCIAL

## 2023-05-10 ENCOUNTER — TELEPHONE (OUTPATIENT)
Dept: FAMILY MEDICINE | Facility: CLINIC | Age: 83
End: 2023-05-10
Payer: COMMERCIAL

## 2023-05-10 ENCOUNTER — NURSE TRIAGE (OUTPATIENT)
Dept: FAMILY MEDICINE | Facility: CLINIC | Age: 83
End: 2023-05-10
Payer: COMMERCIAL

## 2023-05-10 VITALS
RESPIRATION RATE: 18 BRPM | HEIGHT: 59 IN | DIASTOLIC BLOOD PRESSURE: 70 MMHG | HEART RATE: 78 BPM | SYSTOLIC BLOOD PRESSURE: 168 MMHG | OXYGEN SATURATION: 97 % | TEMPERATURE: 98.2 F | WEIGHT: 115 LBS | BODY MASS INDEX: 23.18 KG/M2

## 2023-05-10 DIAGNOSIS — K62.5 RECTAL BLEEDING: ICD-10-CM

## 2023-05-10 LAB
ANION GAP SERPL CALCULATED.3IONS-SCNC: 13 MMOL/L (ref 7–15)
BASOPHILS # BLD AUTO: 0.1 10E3/UL (ref 0–0.2)
BASOPHILS NFR BLD AUTO: 1 %
BUN SERPL-MCNC: 27.4 MG/DL (ref 8–23)
CALCIUM SERPL-MCNC: 10.8 MG/DL (ref 8.8–10.2)
CHLORIDE SERPL-SCNC: 101 MMOL/L (ref 98–107)
CREAT SERPL-MCNC: 1.22 MG/DL (ref 0.51–0.95)
DEPRECATED HCO3 PLAS-SCNC: 26 MMOL/L (ref 22–29)
EOSINOPHIL # BLD AUTO: 0.1 10E3/UL (ref 0–0.7)
EOSINOPHIL NFR BLD AUTO: 2 %
ERYTHROCYTE [DISTWIDTH] IN BLOOD BY AUTOMATED COUNT: 14.1 % (ref 10–15)
GFR SERPL CREATININE-BSD FRML MDRD: 44 ML/MIN/1.73M2
GLUCOSE SERPL-MCNC: 101 MG/DL (ref 70–99)
HCT VFR BLD AUTO: 40.3 % (ref 35–47)
HGB BLD-MCNC: 13.2 G/DL (ref 11.7–15.7)
HOLD SPECIMEN: NORMAL
HOLD SPECIMEN: NORMAL
IMM GRANULOCYTES # BLD: 0 10E3/UL
IMM GRANULOCYTES NFR BLD: 0 %
INR PPP: 2.11 (ref 0.85–1.15)
LYMPHOCYTES # BLD AUTO: 2 10E3/UL (ref 0.8–5.3)
LYMPHOCYTES NFR BLD AUTO: 25 %
MCH RBC QN AUTO: 29.9 PG (ref 26.5–33)
MCHC RBC AUTO-ENTMCNC: 32.8 G/DL (ref 31.5–36.5)
MCV RBC AUTO: 91 FL (ref 78–100)
MONOCYTES # BLD AUTO: 0.7 10E3/UL (ref 0–1.3)
MONOCYTES NFR BLD AUTO: 9 %
NEUTROPHILS # BLD AUTO: 4.9 10E3/UL (ref 1.6–8.3)
NEUTROPHILS NFR BLD AUTO: 63 %
NRBC # BLD AUTO: 0 10E3/UL
NRBC BLD AUTO-RTO: 0 /100
PLATELET # BLD AUTO: 271 10E3/UL (ref 150–450)
POTASSIUM SERPL-SCNC: 3.8 MMOL/L (ref 3.4–5.3)
RBC # BLD AUTO: 4.42 10E6/UL (ref 3.8–5.2)
SODIUM SERPL-SCNC: 140 MMOL/L (ref 136–145)
WBC # BLD AUTO: 7.8 10E3/UL (ref 4–11)

## 2023-05-10 PROCEDURE — 85610 PROTHROMBIN TIME: CPT | Performed by: FAMILY MEDICINE

## 2023-05-10 PROCEDURE — 99283 EMERGENCY DEPT VISIT LOW MDM: CPT | Performed by: FAMILY MEDICINE

## 2023-05-10 PROCEDURE — 36415 COLL VENOUS BLD VENIPUNCTURE: CPT | Performed by: FAMILY MEDICINE

## 2023-05-10 PROCEDURE — 80048 BASIC METABOLIC PNL TOTAL CA: CPT | Performed by: FAMILY MEDICINE

## 2023-05-10 PROCEDURE — 85025 COMPLETE CBC W/AUTO DIFF WBC: CPT | Performed by: FAMILY MEDICINE

## 2023-05-10 ASSESSMENT — ACTIVITIES OF DAILY LIVING (ADL)
ADLS_ACUITY_SCORE: 35
ADLS_ACUITY_SCORE: 33

## 2023-05-10 NOTE — TELEPHONE ENCOUNTER
"Pt calling to report bleeding from her hemorrhoids.   Pt reports there is a lot of blood just keeps bleeding bright red blood everywhere  Pt is on warfarin.     Pt doesn't think it is bloody stools.   Pt is wanting to go to UC- not sure if they have the equipment to stop the bleeding.     Adv pt should be seen in ED instead.     Pt verbalized understanding.     Stephy GODINEZ RN     Reason for Disposition    MODERATE rectal bleeding (small blood clots, passing blood without stool, or toilet water turns red) more than once a day    Answer Assessment - Initial Assessment Questions  1. APPEARANCE of BLOOD: \"What color is it?\" \"Is it passed separately, on the surface of the stool, or mixed in with the stool?\"       Bright red- bleeding w/out having stool   2. AMOUNT: \"How much blood was passed?\"       A lot   3. FREQUENCY: \"How many times has blood been passed with the stools?\"       Since last night   4. ONSET: \"When was the blood first seen in the stools?\" (Days or weeks)       Yesterday/today   5. DIARRHEA: \"Is there also some diarrhea?\" If Yes, ask: \"How many diarrhea stools in the past 24 hours?\"       No   6. CONSTIPATION: \"Do you have constipation?\" If Yes, ask: \"How bad is it?\"      No   7. RECURRENT SYMPTOMS: \"Have you had blood in your stools before?\" If Yes, ask: \"When was the last time?\" and \"What happened that time?\"         8. BLOOD THINNERS: \"Do you take any blood thinners?\" (e.g., Coumadin/warfarin, Pradaxa/dabigatran, aspirin)      Warfarin   9. OTHER SYMPTOMS: \"Do you have any other symptoms?\"  (e.g., abdomen pain, vomiting, dizziness, fever)        10. PREGNANCY: \"Is there any chance you are pregnant?\" \"When was your last menstrual period?\"    Protocols used: RECTAL BLEEDING-A-[place]      "

## 2023-05-10 NOTE — ED TRIAGE NOTES
"Pt reports having hemorrhoids that started bleeding this am. Pt reports being on blood thinners. Blood was bright red and a \"large\" amount, pt changed pad prior to arrival. Pt denied light headed or dizzy.      Triage Assessment     Row Name 05/10/23 8662       Triage Assessment (Adult)    Airway WDL WDL       Respiratory WDL    Respiratory WDL WDL       Skin Circulation/Temperature WDL    Skin Circulation/Temperature WDL WDL       Cardiac WDL    Cardiac WDL WDL       Peripheral/Neurovascular WDL    Peripheral Neurovascular WDL WDL       Cognitive/Neuro/Behavioral WDL    Cognitive/Neuro/Behavioral WDL WDL              "

## 2023-05-10 NOTE — ED NOTES
States thinks had a hemorrhoid burst, had bright red blood from hemorrhoid, states no longer bleeding, only concern she has is if she should still take her coumadin tonight, states bought all the things to care for her hemorrhoid

## 2023-05-10 NOTE — TELEPHONE ENCOUNTER
General Call      Reason for Call:  Hemorrhoids    What are your questions or concerns:  Pt calling because she has hemorrhoids that started bleeding today, she stated she would like to speak with a nurse to discuss what she can put on them to help.    Pt was triaged earlier this morning and was advised to go to ER, pt has not gone, wants to speak with a nurse.      Okay to leave a detailed message?: Yes at Home number on file [phone] (home)

## 2023-05-10 NOTE — TELEPHONE ENCOUNTER
S-(situation): I talked with Nayely.  She says she had bright red blood in stool this morning. Lasted just a bit.  States this happens about twice a year and goes away.  Denies being constipated.  No fever, nausea, vomiting or diarrhea.  Not bleeding now.     B-(background): on warfarin.  She declines going to the ER now.    A-(assessment): rectal bleeding, possibly hemorroids    R-(recommendations): Advised to go to the ER if reoccurs.  Nicolette Granger RN

## 2023-05-11 ENCOUNTER — DOCUMENTATION ONLY (OUTPATIENT)
Dept: ANTICOAGULATION | Facility: CLINIC | Age: 83
End: 2023-05-11
Payer: COMMERCIAL

## 2023-05-11 ENCOUNTER — PATIENT OUTREACH (OUTPATIENT)
Dept: FAMILY MEDICINE | Facility: CLINIC | Age: 83
End: 2023-05-11
Payer: COMMERCIAL

## 2023-05-11 NOTE — PROGRESS NOTES
ANTICOAGULATION  MANAGEMENT: Discharge Review    Nayely Kay chart reviewed for anticoagulation continuity of care    Emergency room visit on 5/10/23 for rectal bleeding.    Discharge disposition: Home    Results:    Recent labs: (last 7 days)     05/05/23  1130 05/10/23  1845   INR 2.7* 2.11*     Anticoagulation inpatient management:     not applicable     Anticoagulation discharge instructions:     Warfarin dosing: home regimen continued   Bridging: No   INR goal change: No      Medication changes affecting anticoagulation: No    Additional factors affecting anticoagulation: No - per ED note: At this time the source of the bleeding is uncertain but she is not showing evidence of further bleeding and has normal vital signs and labs and so is safe for discharge with expectant management. She is on warfarin for stroke prophylaxis in the setting of atrial fibrillation and her INR is therapeutic.  Her vital signs are normal to slightly hypertensive and her CBC is normal with no signs of anemia.  I discussed with her that at this time she can continue on her warfarin but if she has more bleeding she should hold the warfarin and come back to the emergency department.      PLAN     No adjustment to anticoagulation plan needed    Patient not contacted    No adjustment to Anticoagulation Calendar was required    Neha Parra RN

## 2023-05-11 NOTE — TELEPHONE ENCOUNTER
Spoke with pt who denies further rectal bleeding.   Last colonoscopy 2017 with polyp removal- neg path.  Considering colonoscopy if any recurrent rectal bleeding but at this time states will monitor.  Advise F/U with PCP as needed.  NOVA Rosales RN

## 2023-05-11 NOTE — DISCHARGE INSTRUCTIONS
I placed a referral for you to go see Dr. Gil in the surgery clinic who will perform endoscopy.  If you have recurrence of bleeding, do not take your warfarin and come to the emergency department.

## 2023-05-11 NOTE — ED PROVIDER NOTES
History     Chief Complaint   Patient presents with     Hemorrhoids     On blood thinners     HPI  Nayely Kay is a 83 year old female who comes in with an episode of bright red bleeding per rectum that occurred this morning with a bowel movement.  She noticed red blood in the toilet bowl.  She said that at most this was about a quarter of a cup and did not fill up the bowl completely.  She attributed to hemorrhoidal bleeding.  She said she has had hemorrhoids in the past.  She is never had hemorrhoid surgery.  She came in because she did not know what to do with her warfarin.  She takes warfarin for history of atrial fibrillation.  She is not currently experiencing any other bleeding manifestations including no hemoptysis, hematemesis, hematuria, epistaxis.  Her INRs have been therapeutic.  Her last colonoscopy was in 2017 and she had a couple of small polyps biopsied.  She did not have hemorrhoids or diverticula at that time.    Allergies:  Allergies   Allergen Reactions     Diltiazem Hcl Er Beads Rash       Problem List:    Patient Active Problem List    Diagnosis Date Noted     Stage 3a chronic kidney disease (H) 01/04/2022     Priority: Medium     abnormal creatinine since 2014 off and on.  4/18/2022:positive MAC        Long term current use of anticoagulants with INR goal of 2.0-3.0 10/26/2021     Priority: Medium     Elevated bilirubin 07/15/2019     Priority: Medium     7/15/2019:Possible Gilbert's.  Persistent elevated bili-uncongugated.        Tubular adenoma 07/18/2017     Priority: Medium     7/13/2017 colonoscopy with two small polyps 2 and 5mm.  One a tubular adenoma.  PLAN: consider follow-up in 5 years.        Essential hypertension with goal blood pressure less than 140/90 05/04/2017     Priority: Medium     Ganglion cyst 10/11/2013     Priority: Medium     Advanced directives, counseling/discussion 10/05/2012     Priority: Medium     Patient states has Advance Directive and will bring in a  copy to clinic. 10/5/2012          AK (actinic keratosis) 10/05/2012     Priority: Medium     Eczema 04/05/2012     Priority: Medium     Family history of breast cancer 10/03/2011     Priority: Medium     We discussed this; there are two cousins and two aunts with breast cancer. She will do the mammograms regularly until age 79.        Osteopenia 04/08/2011     Priority: Medium     DEXA April, 2011; right hip has T score of -1.3.        HYPERLIPIDEMIA LDL GOAL <130 10/31/2010     Priority: Medium     Polymyalgia rheumatica (H) 06/04/2008     Priority: Medium     Iron deficiency anemia 06/04/2008     Priority: Medium     Headache 09/07/2005     Priority: Medium     Migraine.  3/14/2018:Has not been a recent problem.         Chronic atrial fibrillation (H) 09/07/2005     Priority: Medium        Past Medical History:    Past Medical History:   Diagnosis Date     Atrial fibrillation (H)      Migraine, unspecified, without mention of intractable migraine without mention of status migrainosus      Squamous cell carcinoma      Unspecified essential hypertension      Unspecified tinnitus        Past Surgical History:    Past Surgical History:   Procedure Laterality Date     COLONOSCOPY  2009     FLEXIBLE SIGMOIDOSCOPY  9/2004     SURGICAL HISTORY OF -       tubal     SURGICAL HISTORY OF -       oral teeth       Family History:    Family History   Problem Relation Age of Onset     Heart Disease Mother         Valvular disease- age 55.     Cerebrovascular Disease Father         age 75.     Neurologic Disorder Daughter         MS     Circulatory Brother         joe hanna-cleaned out     Breast Cancer Maternal Aunt        Social History:  Marital Status:   [5]  Social History     Tobacco Use     Smoking status: Never     Smokeless tobacco: Never   Vaping Use     Vaping status: Never Used   Substance Use Topics     Alcohol use: No     Drug use: No        Medications:    amLODIPine (NORVASC) 5 MG tablet  ascorbic acid  "(VITAMIN C) 250 MG CHEW  ASPIRIN NOT PRESCRIBED (INTENTIONAL)  calcium carb 1250 mg, 500 mg Wain[place],/vitamin D 200 units (OSCAL WITH D) 500-200 MG-UNIT per tablet  lisinopril (ZESTRIL) 5 MG tablet  metoprolol tartrate (LOPRESSOR) 50 MG tablet  Multiple Vitamin (MULTIVITAMIN) per tablet  NEW MED  nitroGLYcerin (NITROSTAT) 0.4 MG sublingual tablet  triamterene-HCTZ (MAXZIDE-25) 37.5-25 MG tablet  warfarin ANTICOAGULANT (COUMADIN) 5 MG tablet      Review of Systems  All other systems are reviewed and are negative    Physical Exam   BP: (!) 168/70  Pulse: 78  Temp: 98.2  F (36.8  C)  Resp: 18  Height: 149.9 cm (4' 11\")  Weight: 52.2 kg (115 lb)  SpO2: 97 %      Physical Exam     Nursing note and vitals were reviewed.  Constitutional: Awake and alert, adequately nourished and developed appearing 83-year-old in no apparent discomfort, who does not appear acutely ill, and who answers questions appropriately and cooperates with examination.  HEENT: Voice quality is normal.  PERRL EOMI.   Pulmonary/Chest: Breathing is unlabored.   Abdomen: Soft, nontender, no HSM or masses rebound or guarding.  Neurological: Alert, oriented, thought content logical, coherent   Skin: Warm, dry, no rashes.  Psychiatric: Affect broad and appropriate.  Rectal examination: There is some skin tags in the anoderm but no hemorrhoids are present.  There are some dried blood on the anoderm.  Digital rectal exam is without tenderness or mass and there is a small amount of yellowish stool present but no blood is present.    ED Course                 Procedures              Critical Care time:  none               Results for orders placed or performed during the hospital encounter of 05/10/23 (from the past 24 hour(s))   [place] Draw    Narrative    The following orders were created for panel order [place] Draw.  Procedure                               Abnormality         Status                     ---------                               -----------         " ------                     Extra Green Top (Lithium...[[id number]]                      In process                 Extra Purple Top Tube[[id number]]                            In process                   Please view results for these tests on the individual orders.   INR   Result Value Ref Range    INR 2.11 (H) 0.85 - 1.15   CBC with platelets differential    Narrative    The following orders were created for panel order CBC with platelets differential.  Procedure                               Abnormality         Status                     ---------                               -----------         ------                     CBC with platelets and d...[[id number]]                      Final result                 Please view results for these tests on the individual orders.   Basic metabolic panel   Result Value Ref Range    Sodium 140 136 - 145 mmol/L    Potassium 3.8 3.4 - 5.3 mmol/L    Chloride 101 98 - 107 mmol/L    Carbon Dioxide (CO2) 26 22 - 29 mmol/L    Anion Gap 13 7 - 15 mmol/L    Urea Nitrogen 27.4 (H) 8.0 - 23.0 mg/dL    Creatinine 1.22 (H) 0.51 - 0.95 mg/dL    Calcium 10.8 (H) 8.8 - 10.2 mg/dL    Glucose 101 (H) 70 - 99 mg/dL    GFR Estimate 44 (L) >60 mL/min/1.73m2   CBC with platelets and differential   Result Value Ref Range    WBC Count 7.8 4.0 - 11.0 10e3/uL    RBC Count 4.42 3.80 - 5.20 10e6/uL    Hemoglobin 13.2 11.7 - 15.7 g/dL    Hematocrit 40.3 35.0 - 47.0 %    MCV 91 78 - 100 fL    MCH 29.9 26.5 - 33.0 pg    MCHC 32.8 31.5 - 36.5 g/dL    RDW 14.1 10.0 - 15.0 %    Platelet Count 271 150 - 450 10e3/uL    % Neutrophils 63 %    % Lymphocytes 25 %    % Monocytes 9 %    % Eosinophils 2 %    % Basophils 1 %    % Immature Granulocytes 0 %    NRBCs per 100 WBC 0 <1 /100    Absolute Neutrophils 4.9 1.6 - 8.3 10e3/uL    Absolute Lymphocytes 2.0 0.8 - 5.3 10e3/uL    Absolute Monocytes 0.7 0.0 - 1.3 10e3/uL    Absolute Eosinophils 0.1 0.0 - 0.7 10e3/uL    Absolute Basophils 0.1 0.0 - 0.2 10e3/uL    Absolute  Immature Granulocytes 0.0 <=0.4 10e3/uL    Absolute NRBCs 0.0 10e3/uL     *Note: Due to a large number of results and/or encounters for the requested time period, some results have not been displayed. A complete set of results can be found in Results Review.       Medications - No data to display    Assessments & Plan (with Medical Decision Making)     83-year-old female presented with an episode of rectal bleeding as described above.  This sounds like a minor amount of bleeding.  She attributes it to hemorrhoids but she had no evidence of hemorrhoids colonoscopy 5 years ago and has not on physical exam today.  She has not had any bleeding since then including during her time the emergency department and no blood is present on rectal examination.  She is on warfarin for stroke prophylaxis in the setting of atrial fibrillation and her INR is therapeutic.  Her vital signs are normal to slightly hypertensive and her CBC is normal with no signs of anemia.  I discussed with her that at this time she can continue on her warfarin but if she has more bleeding she should hold the warfarin and come back to the emergency department.  She will follow-up in the general surgery clinic and see Dr. Gil who will perform a anoscopy.  If hemorrhoids are not seen then she will consider colonoscopy for which she would be due.  I reiterated to the patient that if she has more bleeding she should hold her warfarin and return to the emergency department.  At this time the source of the bleeding is uncertain but she is not showing evidence of further bleeding and has normal vital signs and labs and so is safe for discharge with expectant management.  She expressed understanding and her questions were answered.    I have reviewed the nursing notes.    I have reviewed the findings, diagnosis, plan and need for follow up with the patient.           New Prescriptions    No medications on file       Final diagnoses:   Rectal bleeding        5/10/2023   [facility] EMERGENCY DEPT     Julio Martin MD  05/10/23 1940

## 2023-05-23 ENCOUNTER — OFFICE VISIT (OUTPATIENT)
Dept: FAMILY MEDICINE | Facility: CLINIC | Age: 83
End: 2023-05-23
Payer: COMMERCIAL

## 2023-05-23 VITALS
SYSTOLIC BLOOD PRESSURE: 137 MMHG | OXYGEN SATURATION: 97 % | RESPIRATION RATE: 20 BRPM | TEMPERATURE: 97 F | BODY MASS INDEX: 23.39 KG/M2 | WEIGHT: 116 LBS | DIASTOLIC BLOOD PRESSURE: 64 MMHG | HEART RATE: 61 BPM | HEIGHT: 59 IN

## 2023-05-23 DIAGNOSIS — Z12.11 SCREEN FOR COLON CANCER: ICD-10-CM

## 2023-05-23 DIAGNOSIS — K62.5 RECTAL BLEEDING: Primary | ICD-10-CM

## 2023-05-23 PROCEDURE — 99212 OFFICE O/P EST SF 10 MIN: CPT | Performed by: NURSE PRACTITIONER

## 2023-05-23 ASSESSMENT — PAIN SCALES - GENERAL: PAINLEVEL: NO PAIN (0)

## 2023-05-23 NOTE — PROGRESS NOTES
"  Assessment & Plan     (K62.5) Rectal bleeding  (primary encounter diagnosis)  Comment: symptoms have resolved is due for her screening colonoscopy recomed patient have this completed.    Plan:     (Z12.11) Screen for colon cancer  Comment:   Plan: Colonoscopy Screening  Referral           MED REC REQUIRED  Post Medication Reconciliation Status:       See Patient Instructions    NADER Treviño CNP  [facility]    Janna Adler is a 83 year old, presenting for the following health issues:  ER F/U        5/23/2023     8:49 AM   Additional Questions   Roomed by Daylin Andrade     hospitals     ED/UC Followup:    Facility:  [facility] Emergency Department  Date of visit: 5/10/23  Reason for visit: hemorrhoids  Current Status: Patient states that things have been better. She hasn't had anymore bleeding.        Review of Systems   Constitutional, HEENT, cardiovascular, pulmonary, gi and gu systems are negative, except as otherwise noted.      Objective    BP (!) 144/80 (BP Location: Right arm, Cuff Size: Adult Regular)   Pulse 61   Temp 97  F (36.1  C) (Tympanic)   Resp 20   Ht 1.499 m (4' 11.02\")   Wt 52.6 kg (116 lb)   SpO2 97%   BMI 23.42 kg/m    Body mass index is 23.42 kg/m .  Physical Exam   GENERAL: healthy, alert and no distress  EYES: Eyes grossly normal to inspection, PERRL and conjunctivae and sclerae normal  RESP: lungs clear to auscultation - no rales, rhonchi or wheezes  CV: regular rate and rhythm, normal S1 S2, no S3 or S4, no murmur, click or rub, no peripheral edema and peripheral pulses strong  ABDOMEN: soft, nontender, no hepatosplenomegaly, no masses and bowel sounds normal  MS: no gross musculoskeletal defects noted, no edema  SKIN: no suspicious lesions or rashes  NEURO: Normal strength and tone, mentation intact and speech normal  PSYCH: mentation appears normal, affect normal/bright    No results found for any visits on " 05/23/23.

## 2023-05-26 ENCOUNTER — TELEPHONE (OUTPATIENT)
Dept: FAMILY MEDICINE | Facility: CLINIC | Age: 83
End: 2023-05-26

## 2023-05-26 ENCOUNTER — ANTICOAGULATION THERAPY VISIT (OUTPATIENT)
Dept: ANTICOAGULATION | Facility: CLINIC | Age: 83
End: 2023-05-26

## 2023-05-26 ENCOUNTER — ALLIED HEALTH/NURSE VISIT (OUTPATIENT)
Dept: FAMILY MEDICINE | Facility: CLINIC | Age: 83
End: 2023-05-26
Payer: COMMERCIAL

## 2023-05-26 ENCOUNTER — LAB (OUTPATIENT)
Dept: LAB | Facility: CLINIC | Age: 83
End: 2023-05-26
Payer: COMMERCIAL

## 2023-05-26 VITALS — SYSTOLIC BLOOD PRESSURE: 132 MMHG | HEART RATE: 56 BPM | DIASTOLIC BLOOD PRESSURE: 64 MMHG

## 2023-05-26 DIAGNOSIS — Z79.01 LONG TERM CURRENT USE OF ANTICOAGULANTS WITH INR GOAL OF 2.0-3.0: ICD-10-CM

## 2023-05-26 DIAGNOSIS — I48.20 CHRONIC ATRIAL FIBRILLATION (H): ICD-10-CM

## 2023-05-26 DIAGNOSIS — I48.20 CHRONIC ATRIAL FIBRILLATION (H): Primary | ICD-10-CM

## 2023-05-26 DIAGNOSIS — I10 ESSENTIAL HYPERTENSION WITH GOAL BLOOD PRESSURE LESS THAN 140/90: Primary | ICD-10-CM

## 2023-05-26 LAB — INR BLD: 2.5 (ref 0.9–1.1)

## 2023-05-26 PROCEDURE — 85610 PROTHROMBIN TIME: CPT

## 2023-05-26 PROCEDURE — 36416 COLLJ CAPILLARY BLOOD SPEC: CPT

## 2023-05-26 PROCEDURE — 99207 PR NO CHARGE NURSE ONLY: CPT

## 2023-05-26 NOTE — TELEPHONE ENCOUNTER
Nayely Kay is a 83 year old year old patient who comes in today for a Blood Pressure check because of ongoing blood pressure monitoring.  Vital Signs as repeated by RN /84 P 56, recheck /64 P 56.  Patient is taking medication as prescribed  Patient is tolerating medications well.  Patient is not monitoring Blood Pressure at home.  Average readings if yes are N/A.  Current complaints: none  Disposition:  patient to continue with the same medication, continue with monthly BP checks at the clinic and notify care team if BP readings consistently > 140/90.    Julie Behrendt RN

## 2023-05-26 NOTE — PROGRESS NOTES
ANTICOAGULATION MANAGEMENT     Nayely Kay 83 year old female is on warfarin with therapeutic INR result. (Goal INR 2.0-3.0)    Recent labs: (last 7 days)     05/26/23  0925   INR 2.5*       ASSESSMENT       Source(s): Chart Review    Previous INR was Therapeutic last 2(+) visits    Medication, diet, health changes since last INR chart reviewed; none identified             PLAN     Unable to reach Nayely today.    LMTCB    Follow up required to confirm warfarin dose taken and assess for changes    France Ashraf RN  Anticoagulation Clinic  5/26/2023

## 2023-05-26 NOTE — PROGRESS NOTES
ANTICOAGULATION MANAGEMENT     Nayely Kay 83 year old female is on warfarin with therapeutic INR result. (Goal INR 2.0-3.0)    Recent labs: (last 7 days)     05/26/23  0925   INR 2.5*       ASSESSMENT       Source(s): Chart Review and Patient/Caregiver Call       Warfarin doses taken: Warfarin taken as instructed    Diet: No new diet changes identified    Medication/supplement changes: None noted    New illness, injury, or hospitalization: Yes: 5/10 ED visit for rectal bleeding    Signs or symptoms of bleeding or clotting: Yes: rectal bleeding seen for in ED and clinic    Previous result: Therapeutic last 2(+) visits    Additional findings: None         PLAN     Recommended plan for no diet, medication or health factor changes affecting INR     Dosing Instructions: Continue your current warfarin dose with next INR in 4 weeks       Summary  As of 5/26/2023    Full warfarin instructions:  2.5 mg every day   Next INR check:  6/23/2023             Telephone call with Nayely who verbalizes understanding and agrees to plan    Lab visit scheduled    Education provided:     Goal range and lab monitoring: goal range and significance of current result    Plan made per [facility] anticoagulation protocol    France Ashraf RN  Anticoagulation Clinic  5/26/2023    _______________________________________________________________________     Anticoagulation Episode Summary     Current INR goal:  2.0-3.0   TTR:  93.9 % (1 y)   Target end date:  Indefinite   Send INR reminders to:  [facility]    Indications    Chronic atrial fibrillation (H) [I48.20]  Long term current use of anticoagulants with INR goal of 2.0-3.0 [Z79.01]           Comments:           Anticoagulation Care Providers     Provider Role Specialty Phone number    Ulysses Baker MD Referring Family Medicine [phone]    Oswaldo Cedillo MD Referring Family Medicine [phone]    Haritha Whiting PA-C Referring Family Medicine [phone]

## 2023-06-23 ENCOUNTER — ANTICOAGULATION THERAPY VISIT (OUTPATIENT)
Dept: ANTICOAGULATION | Facility: CLINIC | Age: 83
End: 2023-06-23

## 2023-06-23 ENCOUNTER — LAB (OUTPATIENT)
Dept: LAB | Facility: CLINIC | Age: 83
End: 2023-06-23
Payer: COMMERCIAL

## 2023-06-23 ENCOUNTER — ALLIED HEALTH/NURSE VISIT (OUTPATIENT)
Dept: FAMILY MEDICINE | Facility: CLINIC | Age: 83
End: 2023-06-23
Payer: COMMERCIAL

## 2023-06-23 VITALS — HEART RATE: 60 BPM | DIASTOLIC BLOOD PRESSURE: 60 MMHG | SYSTOLIC BLOOD PRESSURE: 120 MMHG

## 2023-06-23 DIAGNOSIS — I48.20 CHRONIC ATRIAL FIBRILLATION (H): Primary | ICD-10-CM

## 2023-06-23 DIAGNOSIS — Z79.01 LONG TERM CURRENT USE OF ANTICOAGULANTS WITH INR GOAL OF 2.0-3.0: ICD-10-CM

## 2023-06-23 DIAGNOSIS — I48.20 CHRONIC ATRIAL FIBRILLATION (H): ICD-10-CM

## 2023-06-23 DIAGNOSIS — I10 ESSENTIAL HYPERTENSION WITH GOAL BLOOD PRESSURE LESS THAN 140/90: Primary | ICD-10-CM

## 2023-06-23 LAB — INR BLD: 2.3 (ref 0.9–1.1)

## 2023-06-23 PROCEDURE — 99207 PR NO CHARGE NURSE ONLY: CPT

## 2023-06-23 PROCEDURE — 36416 COLLJ CAPILLARY BLOOD SPEC: CPT

## 2023-06-23 PROCEDURE — 85610 PROTHROMBIN TIME: CPT

## 2023-06-23 NOTE — PROGRESS NOTES
Nayely Kay is a 83 year old year old patient who comes in today for an INR check and Blood Pressure check because of ongoing blood pressure monitoring.  Vital Signs : /60 P 60  Patient is taking medication as prescribed  Patient is tolerating medications well.  Patient is not monitoring Blood Pressure at home.    Current complaints: none  Disposition:  patient to continue with the same medication  Nayely is asking about her immunizations as well. She is due for Zoster, she recalls having a shinrex in the past. Advised to ask pharmacy. She will get Covid booster later. She is asking about the measles vaccine. Should've been given as a child.   Kellee PALACIOS RN

## 2023-06-23 NOTE — PROGRESS NOTES
ANTICOAGULATION MANAGEMENT     Nayely Kay 83 year old female is on warfarin with therapeutic INR result. (Goal INR 2.0-3.0)    Recent labs: (last 7 days)     06/23/23  0934   INR 2.3*       ASSESSMENT       Source(s): Chart Review and Patient/Caregiver Call       Warfarin doses taken: Warfarin taken as instructed    Diet: No new diet changes identified    Medication/supplement changes: None noted    New illness, injury, or hospitalization: No    Signs or symptoms of bleeding or clotting: No    Previous result: Therapeutic last 2(+) visits    Additional findings: None         PLAN     Recommended plan for no diet, medication or health factor changes affecting INR     Dosing Instructions: Continue your current warfarin dose with next INR in 4 weeks       Summary  As of 6/23/2023    Full warfarin instructions:  2.5 mg every day   Next INR check:  7/21/2023             Telephone call with Nayely who verbalizes understanding and agrees to plan    Lab visit scheduled    Education provided:     Please call back if any changes to your diet, medications or how you've been taking warfarin    Contact [phone]  with any changes, questions or concerns.     Plan made per [facility] anticoagulation protocol    Flaquita Neal RN  Anticoagulation Clinic  6/23/2023    _______________________________________________________________________     Anticoagulation Episode Summary     Current INR goal:  2.0-3.0   TTR:  93.9 % (1 y)   Target end date:  Indefinite   Send INR reminders to:  [facility]    Indications    Chronic atrial fibrillation (H) [I48.20]  Long term current use of anticoagulants with INR goal of 2.0-3.0 [Z79.01]           Comments:           Anticoagulation Care Providers     Provider Role Specialty Phone number    Ulysses Baker MD Referring Family Medicine [phone]    Oswaldo Cedillo MD Referring Family Medicine [phone]    Haritha Whiting PA-C Referring Family Medicine [phone]

## 2023-07-21 ENCOUNTER — ALLIED HEALTH/NURSE VISIT (OUTPATIENT)
Dept: FAMILY MEDICINE | Facility: CLINIC | Age: 83
End: 2023-07-21
Payer: COMMERCIAL

## 2023-07-21 ENCOUNTER — LAB (OUTPATIENT)
Dept: LAB | Facility: CLINIC | Age: 83
End: 2023-07-21
Payer: COMMERCIAL

## 2023-07-21 ENCOUNTER — ANTICOAGULATION THERAPY VISIT (OUTPATIENT)
Dept: ANTICOAGULATION | Facility: CLINIC | Age: 83
End: 2023-07-21

## 2023-07-21 ENCOUNTER — TELEPHONE (OUTPATIENT)
Dept: FAMILY MEDICINE | Facility: CLINIC | Age: 83
End: 2023-07-21

## 2023-07-21 VITALS — DIASTOLIC BLOOD PRESSURE: 70 MMHG | SYSTOLIC BLOOD PRESSURE: 130 MMHG | HEART RATE: 60 BPM

## 2023-07-21 DIAGNOSIS — I48.20 CHRONIC ATRIAL FIBRILLATION (H): Primary | ICD-10-CM

## 2023-07-21 DIAGNOSIS — Z79.01 LONG TERM CURRENT USE OF ANTICOAGULANTS WITH INR GOAL OF 2.0-3.0: ICD-10-CM

## 2023-07-21 DIAGNOSIS — I10 ESSENTIAL HYPERTENSION WITH GOAL BLOOD PRESSURE LESS THAN 140/90: Primary | ICD-10-CM

## 2023-07-21 DIAGNOSIS — I48.20 CHRONIC ATRIAL FIBRILLATION (H): ICD-10-CM

## 2023-07-21 LAB — INR BLD: 1.9 (ref 0.9–1.1)

## 2023-07-21 PROCEDURE — 36416 COLLJ CAPILLARY BLOOD SPEC: CPT

## 2023-07-21 PROCEDURE — 85610 PROTHROMBIN TIME: CPT

## 2023-07-21 PROCEDURE — 99207 PR NO CHARGE NURSE ONLY: CPT

## 2023-07-21 NOTE — TELEPHONE ENCOUNTER
Nayely Kay is a 83 year old year old patient who comes in today for a Blood Pressure check because of ongoing blood pressure monitoring.  Vital Signs as repeated by RN /70  Patient is taking medication as prescribed  Patient is tolerating medications well.  Patient is not monitoring Blood Pressure at home.  Average readings if yes are N/A  Current complaints: none  Disposition:  patient to continue with the same, notify care team if BP consistently > 130/70 medication and patient reminded to call as needed.    Julie Behrendt RN

## 2023-07-21 NOTE — PROGRESS NOTES
ANTICOAGULATION MANAGEMENT     Nayely Kay 83 year old female is on warfarin with subtherapeutic INR result. (Goal INR 2.0-3.0)    Recent labs: (last 7 days)     07/21/23  0939   INR 1.9*       ASSESSMENT       Source(s): Chart Review       Warfarin doses taken: Warfarin taken as instructed    Diet: No new diet changes identified    Medication/supplement changes: None noted    New illness, injury, or hospitalization: No    Signs or symptoms of bleeding or clotting: No    Previous result: Subtherapeutic    Additional findings: None         PLAN     Recommended plan for no diet, medication or health factor changes affecting INR     Dosing Instructions: Continue your current warfarin dose with next INR in 2 weeks       Summary  As of 7/21/2023    Full warfarin instructions:  2.5 mg every day   Next INR check:  8/4/2023             Telephone call with Nayely who verbalizes understanding and agrees to plan    Lab visit scheduled    Education provided:     Please call back if any changes to your diet, medications or how you've been taking warfarin    Plan made per [facility] anticoagulation protocol    Marjorie Faulkner, RN  Anticoagulation Clinic  7/21/2023    _______________________________________________________________________     Anticoagulation Episode Summary     Current INR goal:  2.0-3.0   TTR:  92.0 % (1 y)   Target end date:  Indefinite   Send INR reminders to:  [facility]    Indications    Chronic atrial fibrillation (H) [I48.20]  Long term current use of anticoagulants with INR goal of 2.0-3.0 [Z79.01]           Comments:           Anticoagulation Care Providers     Provider Role Specialty Phone number    Ulysses Baker MD Referring Family Medicine [phone]    Oswaldo Cedillo MD Referring Family Medicine [phone]    Haritha Whiting PA-C Referring Family Medicine [phone]

## 2023-07-31 ENCOUNTER — TELEPHONE (OUTPATIENT)
Dept: FAMILY MEDICINE | Facility: CLINIC | Age: 83
End: 2023-07-31

## 2023-07-31 ENCOUNTER — ANTICOAGULATION THERAPY VISIT (OUTPATIENT)
Dept: ANTICOAGULATION | Facility: CLINIC | Age: 83
End: 2023-07-31

## 2023-07-31 ENCOUNTER — ALLIED HEALTH/NURSE VISIT (OUTPATIENT)
Dept: FAMILY MEDICINE | Facility: CLINIC | Age: 83
End: 2023-07-31
Payer: COMMERCIAL

## 2023-07-31 ENCOUNTER — LAB (OUTPATIENT)
Dept: LAB | Facility: CLINIC | Age: 83
End: 2023-07-31
Payer: COMMERCIAL

## 2023-07-31 VITALS — HEART RATE: 60 BPM | SYSTOLIC BLOOD PRESSURE: 120 MMHG | DIASTOLIC BLOOD PRESSURE: 58 MMHG

## 2023-07-31 DIAGNOSIS — Z79.01 LONG TERM CURRENT USE OF ANTICOAGULANTS WITH INR GOAL OF 2.0-3.0: ICD-10-CM

## 2023-07-31 DIAGNOSIS — Z01.30 BP CHECK: Primary | ICD-10-CM

## 2023-07-31 DIAGNOSIS — I48.20 CHRONIC ATRIAL FIBRILLATION (H): Primary | ICD-10-CM

## 2023-07-31 DIAGNOSIS — I48.20 CHRONIC ATRIAL FIBRILLATION (H): ICD-10-CM

## 2023-07-31 DIAGNOSIS — Z01.30 BLOOD PRESSURE CHECK: Primary | ICD-10-CM

## 2023-07-31 LAB — INR BLD: 1.8 (ref 0.9–1.1)

## 2023-07-31 PROCEDURE — 36416 COLLJ CAPILLARY BLOOD SPEC: CPT

## 2023-07-31 PROCEDURE — 99207 PR NO CHARGE NURSE ONLY: CPT

## 2023-07-31 PROCEDURE — 85610 PROTHROMBIN TIME: CPT

## 2023-07-31 RX ORDER — WARFARIN SODIUM 2.5 MG/1
TABLET ORAL
Qty: 32 TABLET | Refills: 0 | Status: SHIPPED | OUTPATIENT
Start: 2023-07-31 | End: 2023-09-13

## 2023-07-31 NOTE — TELEPHONE ENCOUNTER
Nayely Kay is a 83 year old year old patient who comes in today for a Blood Pressure check because of ongoing blood pressure monitoring. She gets her BP checked when she is in the clinic for INR lab.   Vital Signs as repeated by /58 p 60, repeat 120/60  Patient is taking medication as prescribed  Patient is tolerating medications well.  Patient is not monitoring Blood Pressure at home.   Current complaints: none  Forwarded to provider as GRACIE Larios RN

## 2023-07-31 NOTE — PROGRESS NOTES
ANTICOAGULATION MANAGEMENT     Nayely Kay 83 year old female is on warfarin with subtherapeutic INR result. (Goal INR 2.0-3.0)    Recent labs: (last 7 days)     07/31/23  1124   INR 1.8*       ASSESSMENT     Source(s): Chart Review and Patient/Caregiver Call     Warfarin doses taken: Warfarin taken as instructed  Diet:  Dose was decreased end of May, she's been trying to decrease her greens  Medication/supplement changes: None noted  New illness, injury, or hospitalization: No  Signs or symptoms of bleeding or clotting: No  Previous result: Subtherapeutic  Additional findings:  2.5 mg tablets ordered for smaller dosing changes.       PLAN     Recommended plan for no diet, medication or health factor changes affecting INR     Dosing Instructions: Increase your warfarin dose (7.1% change) with next INR in 2 weeks       Summary  As of 7/31/2023      Full warfarin instructions:  3.75 mg every Fri; 2.5 mg all other days   Next INR check:  8/16/2023               Telephone call with Nayely who verbalizes understanding and agrees to plan and who agrees to plan and repeated back plan correctly    Lab visit scheduled    Education provided:   Taking warfarin: warfarin tablet strength change; remove and/or discard previous strength from medication supply  Dietary considerations: importance of consistent vitamin K intake  Symptom monitoring: monitoring for clotting signs and symptoms, monitoring for stroke signs and symptoms, and when to seek medical attention/emergency care    Plan made per [facility] anticoagulation protocol    Cindy Ardon RN  Anticoagulation Clinic  7/31/2023    _______________________________________________________________________     Anticoagulation Episode Summary       Current INR goal:  2.0-3.0   TTR:  89.3 % (1 y)   Target end date:  Indefinite   Send INR reminders to:  [facility]    Indications    Chronic atrial fibrillation (H) [I48.20]  Long term current use of anticoagulants with INR  goal of 2.0-3.0 [Z79.01]             Comments:               Anticoagulation Care Providers       Provider Role Specialty Phone number    Ulysses Baker MD Referring Family Medicine [phone]    Oswaldo Cedillo MD Referring Family Medicine [phone]    Haritha Whiting PA-C Referring Family Medicine [phone]

## 2023-08-03 NOTE — PROGRESS NOTES
Nayely Kay is a 78 year old female who comes in today for a Blood Pressure check because of ongoing blood pressure monitoring.    Vitals as recorded, a regular cuff was used.    Patient is taking medication as prescribed  Patient is tolerating medications well.  Patient is not monitoring Blood Pressure at home.     Current complaints: none    Disposition: patient to continue with the same medication  Nicolette Granger RN       No

## 2023-08-04 ENCOUNTER — TELEPHONE (OUTPATIENT)
Dept: FAMILY MEDICINE | Facility: CLINIC | Age: 83
End: 2023-08-04
Payer: COMMERCIAL

## 2023-08-04 NOTE — TELEPHONE ENCOUNTER
General Call      Reason for Call:  Patient wants to discuss medication and dosing. Please call.     What are your questions or concerns:      Date of last appointment with provider:     Okay to leave a detailed message?: Yes at Home number on file [phone] (home)

## 2023-08-04 NOTE — TELEPHONE ENCOUNTER
Patient has not picked up her 2.5 mg tablets yet. Advised her to do that today and then take 3.75 mg on Fridays and 2.5 mg all other days of the week. Also advised her to discard her 5 mg tablets so she doesn't get them mixed up. Patient verbalized understanding and has no further questions or concerns.    France Ashraf RN   [facility]

## 2023-08-22 ENCOUNTER — DOCUMENTATION ONLY (OUTPATIENT)
Dept: ANTICOAGULATION | Facility: CLINIC | Age: 83
End: 2023-08-22

## 2023-08-22 ENCOUNTER — ANTICOAGULATION THERAPY VISIT (OUTPATIENT)
Dept: ANTICOAGULATION | Facility: CLINIC | Age: 83
End: 2023-08-22

## 2023-08-22 ENCOUNTER — LAB (OUTPATIENT)
Dept: LAB | Facility: CLINIC | Age: 83
End: 2023-08-22
Payer: COMMERCIAL

## 2023-08-22 ENCOUNTER — ALLIED HEALTH/NURSE VISIT (OUTPATIENT)
Dept: FAMILY MEDICINE | Facility: CLINIC | Age: 83
End: 2023-08-22
Payer: COMMERCIAL

## 2023-08-22 VITALS — HEART RATE: 64 BPM | DIASTOLIC BLOOD PRESSURE: 80 MMHG | SYSTOLIC BLOOD PRESSURE: 138 MMHG

## 2023-08-22 DIAGNOSIS — Z01.30 BLOOD PRESSURE CHECK: Primary | ICD-10-CM

## 2023-08-22 DIAGNOSIS — Z79.01 LONG TERM CURRENT USE OF ANTICOAGULANTS WITH INR GOAL OF 2.0-3.0: ICD-10-CM

## 2023-08-22 DIAGNOSIS — I48.20 CHRONIC ATRIAL FIBRILLATION (H): ICD-10-CM

## 2023-08-22 DIAGNOSIS — I48.20 CHRONIC ATRIAL FIBRILLATION (H): Primary | ICD-10-CM

## 2023-08-22 LAB — INR BLD: 2.9 (ref 0.9–1.1)

## 2023-08-22 PROCEDURE — 85610 PROTHROMBIN TIME: CPT

## 2023-08-22 PROCEDURE — 99207 PR NO CHARGE NURSE ONLY: CPT

## 2023-08-22 PROCEDURE — 36416 COLLJ CAPILLARY BLOOD SPEC: CPT

## 2023-08-22 NOTE — PROGRESS NOTES
ANTICOAGULATION CLINIC REFERRAL RENEWAL REQUEST       An annual renewal order is required for all patients referred to [facility].?  Please review and sign the pended referral order for Nayely Kay.       ANTICOAGULATION SUMMARY      Warfarin indication(s)   Atrial Fibrillation    Mechanical heart valve present?  NO       Current goal range   INR: 2.0-3.0     Goal appropriate for indication? Goal INR 2-3, standard for indication(s) above     Time in Therapeutic Range (TTR)  (Goal > 60%) 88.2%       Office visit with referring provider's group within last year yes on 5-       Flaquita Neal RN  [facility]

## 2023-08-22 NOTE — PROGRESS NOTES
"ANTICOAGULATION MANAGEMENT     Nayely Kay 83 year old female is on warfarin with therapeutic INR result. (Goal INR 2.0-3.0)    Recent labs: (last 7 days)     08/22/23  1410   INR 2.9*       ASSESSMENT     Source(s): Chart Review and Patient/Caregiver Call     Warfarin doses taken:  cannot verify dose taken  Diet: No new diet changes identified  Medication/supplement changes: None noted  New illness, injury, or hospitalization: No  Signs or symptoms of bleeding or clotting: No  Previous result: Subtherapeutic  Additional findings:  patient has been using 5 mg tabs still and also 2.5 mg. She said her pharmacist told her she \"could use either\". Patient thought they were interchangable. It is not clear which days she took which strength or if she took a full or half tablet of it. Writer advised again to discard the 5 mg tablets and only use 2.5 mg going forward       PLAN     Recommended plan for temporary change(s) affecting INR     Dosing Instructions:  using only greens tabs, take 3.75 mg Fridays; 2.5 mg ROW  with next INR in 9 days       Summary  As of 8/22/2023      Full warfarin instructions:  3.75 mg every Fri; 2.5 mg all other days   Next INR check:  8/31/2023               Telephone call with Nayely who verbalizes understanding and agrees to plan    Lab visit scheduled    Education provided:   Please call back if any changes to your diet, medications or how you've been taking warfarin  Taking warfarin: prescribed tablet strength and color, importance of following [facility] instructions vs instructions on the prescription bottle, Importance of taking warfarin as instructed, and warfarin tablet strength change; remove and/or discard previous strength from medication supply  Contact [phone]  with any changes, questions or concerns.     Plan made per [facility] anticoagulation protocol    Flaquita Neal RN  Anticoagulation Clinic  8/22/2023    _______________________________________________________________________ "     Anticoagulation Episode Summary       Current INR goal:  2.0-3.0   TTR:  88.2 % (1 y)   Target end date:  Indefinite   Send INR reminders to:  [facility]    Indications    Chronic atrial fibrillation (H) [I48.20]  Long term current use of anticoagulants with INR goal of 2.0-3.0 [Z79.01]             Comments:               Anticoagulation Care Providers       Provider Role Specialty Phone number    Ulysses Baker MD Referring Family Medicine [phone]    Oswaldo Cedillo MD Referring Family Medicine [phone]    Haritha Whiting PA-C Referring [facility] Medicine [phone]

## 2023-08-22 NOTE — PROGRESS NOTES
Nayely Kay is a 83 year old year old patient who comes in today for a Blood Pressure check because of ongoing blood pressure monitoring.  Vital Signs as repeated by RN   Patient is taking medication as prescribed  Patient is tolerating medications well.  Patient is not monitoring Blood Pressure at home.  Average readings if yes are   Current complaints: none  Disposition:  patient to continue with the same medication

## 2023-08-31 ENCOUNTER — LAB (OUTPATIENT)
Dept: LAB | Facility: CLINIC | Age: 83
End: 2023-08-31
Payer: COMMERCIAL

## 2023-08-31 ENCOUNTER — ANTICOAGULATION THERAPY VISIT (OUTPATIENT)
Dept: ANTICOAGULATION | Facility: CLINIC | Age: 83
End: 2023-08-31

## 2023-08-31 DIAGNOSIS — I48.20 CHRONIC ATRIAL FIBRILLATION (H): ICD-10-CM

## 2023-08-31 DIAGNOSIS — I48.20 CHRONIC ATRIAL FIBRILLATION (H): Primary | ICD-10-CM

## 2023-08-31 DIAGNOSIS — Z79.01 LONG TERM CURRENT USE OF ANTICOAGULANTS WITH INR GOAL OF 2.0-3.0: ICD-10-CM

## 2023-08-31 LAB — INR BLD: 2.4 (ref 0.9–1.1)

## 2023-08-31 PROCEDURE — 36416 COLLJ CAPILLARY BLOOD SPEC: CPT

## 2023-08-31 PROCEDURE — 85610 PROTHROMBIN TIME: CPT

## 2023-08-31 NOTE — PROGRESS NOTES
ANTICOAGULATION MANAGEMENT     Nayely Kay 83 year old female is on warfarin with therapeutic INR result. (Goal INR 2.0-3.0)    Recent labs: (last 7 days)     08/31/23  1051   INR 2.4*       ASSESSMENT     Source(s): Chart Review and Patient/Caregiver Call     Warfarin doses taken: Warfarin taken as instructed  Diet: No new diet changes identified  Medication/supplement changes: None noted  New illness, injury, or hospitalization: No  Signs or symptoms of bleeding or clotting: No  Previous result: Therapeutic last visit; previously outside of goal range  Additional findings: None       PLAN     Recommended plan for no diet, medication or health factor changes affecting INR     Dosing Instructions: Continue your current warfarin dose with next INR in 3 weeks       Summary  As of 8/31/2023      Full warfarin instructions:  3.75 mg every Fri; 2.5 mg all other days   Next INR check:  9/21/2023               Telephone call with Nayely who verbalizes understanding and agrees to plan    Lab visit scheduled    Education provided:   Please call back if any changes to your diet, medications or how you've been taking warfarin    Plan made per [facility] anticoagulation protocol    Marjorie Faulkner, RN  Anticoagulation Clinic  8/31/2023    _______________________________________________________________________     Anticoagulation Episode Summary       Current INR goal:  2.0-3.0   TTR:  88.2 % (1 y)   Target end date:  Indefinite   Send INR reminders to:  [facility]    Indications    Chronic atrial fibrillation (H) [I48.20]  Long term current use of anticoagulants with INR goal of 2.0-3.0 [Z79.01]             Comments:               Anticoagulation Care Providers       Provider Role Specialty Phone number    Ulysses Baker MD Referring Family Medicine [phone]    Oswaldo Cedillo MD Referring Family Medicine [phone]    Haritha Whiting PA-C Referring Family Medicine [phone]    Tiff Muñiz  NADER Torres [facility] Family Medicine [phone]

## 2023-09-13 DIAGNOSIS — Z79.01 LONG TERM CURRENT USE OF ANTICOAGULANTS WITH INR GOAL OF 2.0-3.0: ICD-10-CM

## 2023-09-13 DIAGNOSIS — I48.20 CHRONIC ATRIAL FIBRILLATION (H): ICD-10-CM

## 2023-09-13 RX ORDER — WARFARIN SODIUM 2.5 MG/1
TABLET ORAL
Qty: 95 TABLET | Refills: 1 | Status: SHIPPED | OUTPATIENT
Start: 2023-09-13 | End: 2024-03-25

## 2023-09-13 NOTE — TELEPHONE ENCOUNTER
ANTICOAGULATION MANAGEMENT:  Medication Refill    Anticoagulation Summary  As of 8/31/2023      Warfarin maintenance plan:  3.75 mg (2.5 mg x 1.5) every Fri; 2.5 mg (2.5 mg x 1) all other days   Next INR check:  9/21/2023   Target end date:  Indefinite    Indications    Chronic atrial fibrillation (H) [I48.20]  Long term current use of anticoagulants with INR goal of 2.0-3.0 [Z79.01]                 Anticoagulation Care Providers       Provider Role Specialty Phone number    Ulysses Baker MD Referring Family Medicine [phone]    Oswaldo Cedillo MD Referring [facility] [phone]    Haritha Whiting PA-C Referring Family Medicine [phone]    Tiff Muñiz APRN CNP Referring [facility] [phone]            Refill Criteria    Visit with referring provider/group: Meets criteria: office visit within referring provider group in the last 1 year on 5/23/23    [facility] referral signed last signed: 08/22/2023; within last year: Yes    Lab monitoring not exceeding 2 weeks overdue: Yes    Nayely meets all criteria for refill. Rx instructions and quantity match patient's current dosing plan. Warfarin 90 day supply with 1 refill granted per [facility] protocol     Jeannie Gasapr RN  Anticoagulation Clinic

## 2023-09-21 ENCOUNTER — ANTICOAGULATION THERAPY VISIT (OUTPATIENT)
Dept: ANTICOAGULATION | Facility: CLINIC | Age: 83
End: 2023-09-21

## 2023-09-21 ENCOUNTER — ALLIED HEALTH/NURSE VISIT (OUTPATIENT)
Dept: FAMILY MEDICINE | Facility: CLINIC | Age: 83
End: 2023-09-21
Payer: COMMERCIAL

## 2023-09-21 ENCOUNTER — LAB (OUTPATIENT)
Dept: LAB | Facility: CLINIC | Age: 83
End: 2023-09-21
Payer: COMMERCIAL

## 2023-09-21 DIAGNOSIS — I48.20 CHRONIC ATRIAL FIBRILLATION (H): ICD-10-CM

## 2023-09-21 DIAGNOSIS — I48.20 CHRONIC ATRIAL FIBRILLATION (H): Primary | ICD-10-CM

## 2023-09-21 DIAGNOSIS — Z01.30 BLOOD PRESSURE CHECK: Primary | ICD-10-CM

## 2023-09-21 DIAGNOSIS — Z79.01 LONG TERM CURRENT USE OF ANTICOAGULANTS WITH INR GOAL OF 2.0-3.0: ICD-10-CM

## 2023-09-21 LAB — INR BLD: 2.3 (ref 0.9–1.1)

## 2023-09-21 PROCEDURE — 99207 PR NO CHARGE NURSE ONLY: CPT

## 2023-09-21 PROCEDURE — 85610 PROTHROMBIN TIME: CPT

## 2023-09-21 PROCEDURE — 36416 COLLJ CAPILLARY BLOOD SPEC: CPT

## 2023-09-21 NOTE — PROGRESS NOTES
Nayely Kay is a 83 year old year old patient who comes in today for a Blood Pressure check because of ongoing blood pressure monitoring.  Vital Signs as repeated by /60  Patient is taking medication as prescribed  Patient is tolerating medications well.  Patient is not monitoring Blood Pressure at home.    Current complaints: none  Disposition:  patient to continue with the same medication

## 2023-09-21 NOTE — PROGRESS NOTES
ANTICOAGULATION MANAGEMENT     Nayely Kay 83 year old female is on warfarin with therapeutic INR result. (Goal INR 2.0-3.0)    Recent labs: (last 7 days)     09/21/23  0903   INR 2.3*       ASSESSMENT     Source(s): Chart Review and Patient/Caregiver Call     Warfarin doses taken: Warfarin taken as instructed  Diet: No new diet changes identified  Medication/supplement changes: None noted  New illness, injury, or hospitalization: No  Signs or symptoms of bleeding or clotting: No  Previous result: Therapeutic last 2(+) visits  Additional findings: None       PLAN     Recommended plan for no diet, medication or health factor changes affecting INR     Dosing Instructions: Continue your current warfarin dose with next INR in 4 weeks       Summary  As of 9/21/2023      Full warfarin instructions:  3.75 mg every Fri; 2.5 mg all other days   Next INR check:  10/19/2023               Telephone call with Nayely who verbalizes understanding and agrees to plan    Lab visit scheduled    Education provided:   Contact [phone]  with any changes, questions or concerns.     Plan made per [facility] anticoagulation protocol    Marlin Nunn RN  Anticoagulation Clinic  9/21/2023    _______________________________________________________________________     Anticoagulation Episode Summary       Current INR goal:  2.0-3.0   TTR:  88.2 % (1 y)   Target end date:  Indefinite   Send INR reminders to:  [facility]    Indications    Chronic atrial fibrillation (H) [I48.20]  Long term current use of anticoagulants with INR goal of 2.0-3.0 [Z79.01]             Comments:               Anticoagulation Care Providers       Provider Role Specialty Phone number    Ulysses Baker MD Referring Family Medicine [phone]    Oswaldo Cedillo MD Referring Family Medicine [phone]    Haritha Whiting PA-C Referring Family Medicine [phone]    Tiff Muñiz APRN CNP Referring Family Medicine [phone]

## 2023-10-23 ENCOUNTER — LAB (OUTPATIENT)
Dept: LAB | Facility: CLINIC | Age: 83
End: 2023-10-23
Payer: COMMERCIAL

## 2023-10-23 ENCOUNTER — ALLIED HEALTH/NURSE VISIT (OUTPATIENT)
Dept: FAMILY MEDICINE | Facility: CLINIC | Age: 83
End: 2023-10-23
Payer: COMMERCIAL

## 2023-10-23 ENCOUNTER — ANTICOAGULATION THERAPY VISIT (OUTPATIENT)
Dept: ANTICOAGULATION | Facility: CLINIC | Age: 83
End: 2023-10-23

## 2023-10-23 VITALS — DIASTOLIC BLOOD PRESSURE: 78 MMHG | RESPIRATION RATE: 16 BRPM | SYSTOLIC BLOOD PRESSURE: 134 MMHG | HEART RATE: 64 BPM

## 2023-10-23 DIAGNOSIS — I10 ESSENTIAL HYPERTENSION WITH GOAL BLOOD PRESSURE LESS THAN 140/90: ICD-10-CM

## 2023-10-23 DIAGNOSIS — I48.20 CHRONIC ATRIAL FIBRILLATION (H): ICD-10-CM

## 2023-10-23 DIAGNOSIS — Z01.30 BLOOD PRESSURE CHECK: Primary | ICD-10-CM

## 2023-10-23 DIAGNOSIS — Z79.01 LONG TERM CURRENT USE OF ANTICOAGULANTS WITH INR GOAL OF 2.0-3.0: ICD-10-CM

## 2023-10-23 DIAGNOSIS — I48.20 CHRONIC ATRIAL FIBRILLATION (H): Primary | ICD-10-CM

## 2023-10-23 LAB — INR BLD: 2 (ref 0.9–1.1)

## 2023-10-23 PROCEDURE — 85610 PROTHROMBIN TIME: CPT

## 2023-10-23 PROCEDURE — 36416 COLLJ CAPILLARY BLOOD SPEC: CPT

## 2023-10-23 PROCEDURE — 99207 PR NO CHARGE NURSE ONLY: CPT

## 2023-10-23 NOTE — PROGRESS NOTES
ANTICOAGULATION MANAGEMENT     Nayely Kay 83 year old female is on warfarin with therapeutic INR result. (Goal INR 2.0-3.0)    Recent labs: (last 7 days)     10/23/23  1025   INR 2.0*       ASSESSMENT     Source(s): Chart Review and Patient/Caregiver Call     Warfarin doses taken: Warfarin taken as instructed  Diet: No new diet changes identified  Medication/supplement changes: None noted  New illness, injury, or hospitalization: No  Signs or symptoms of bleeding or clotting: No  Previous result: Therapeutic last 2(+) visits  Additional findings: None     PLAN     Recommended plan for no diet, medication or health factor changes affecting INR     Dosing Instructions: Continue your current warfarin dose with next INR in 4 weeks       Summary  As of 10/23/2023      Full warfarin instructions:  3.75 mg every Fri; 2.5 mg all other days   Next INR check:  11/20/2023               Telephone call with Nayely who verbalizes understanding and agrees to plan    Lab visit scheduled    Education provided:   Please call back if any changes to your diet, medications or how you've been taking warfarin    Plan made per [facility] anticoagulation protocol    Luz Elena Barrios RN  Anticoagulation Clinic  10/23/2023    _______________________________________________________________________     Anticoagulation Episode Summary       Current INR goal:  2.0-3.0   TTR:  88.2% (1 y)   Target end date:  Indefinite   Send INR reminders to:  [facility]    Indications    Chronic atrial fibrillation (H) [I48.20]  Long term current use of anticoagulants with INR goal of 2.0-3.0 [Z79.01]             Comments:               Anticoagulation Care Providers       Provider Role Specialty Phone number    Ulsyses Baker MD Referring Family Medicine [phone]    Oswaldo Cedillo MD Referring Family Medicine [phone]    Haritha Whiting PA-C Referring Family Medicine [phone]    Tiff Muñiz APRN CNP Referring Family  Medicine [phone]

## 2023-10-23 NOTE — PROGRESS NOTES
Nayely Kay is a 83 year old year old patient who comes in today for a Blood Pressure check because of ongoing blood pressure monitoring.  Vital Signs as repeated by /78 and pulse 64  Patient is taking medication as prescribed  Patient is tolerating medications well.  Patient is monitoring Blood Pressure at home.   Current complaints: none  Disposition:  BP check after next INR check  Nicolette Granger RN

## 2023-11-06 ENCOUNTER — HOSPITAL ENCOUNTER (OUTPATIENT)
Facility: CLINIC | Age: 83
End: 2023-11-06
Attending: OPHTHALMOLOGY | Admitting: OPHTHALMOLOGY
Payer: COMMERCIAL

## 2023-11-20 ENCOUNTER — ANTICOAGULATION THERAPY VISIT (OUTPATIENT)
Dept: ANTICOAGULATION | Facility: CLINIC | Age: 83
End: 2023-11-20

## 2023-11-20 ENCOUNTER — ALLIED HEALTH/NURSE VISIT (OUTPATIENT)
Dept: FAMILY MEDICINE | Facility: CLINIC | Age: 83
End: 2023-11-20
Payer: COMMERCIAL

## 2023-11-20 ENCOUNTER — LAB (OUTPATIENT)
Dept: LAB | Facility: CLINIC | Age: 83
End: 2023-11-20
Payer: COMMERCIAL

## 2023-11-20 VITALS — HEART RATE: 72 BPM | DIASTOLIC BLOOD PRESSURE: 74 MMHG | SYSTOLIC BLOOD PRESSURE: 132 MMHG | RESPIRATION RATE: 16 BRPM

## 2023-11-20 DIAGNOSIS — Z01.30 BLOOD PRESSURE CHECK: Primary | ICD-10-CM

## 2023-11-20 DIAGNOSIS — I48.20 CHRONIC ATRIAL FIBRILLATION (H): ICD-10-CM

## 2023-11-20 DIAGNOSIS — Z01.30 BP CHECK: ICD-10-CM

## 2023-11-20 DIAGNOSIS — Z79.01 LONG TERM CURRENT USE OF ANTICOAGULANTS WITH INR GOAL OF 2.0-3.0: ICD-10-CM

## 2023-11-20 DIAGNOSIS — I48.20 CHRONIC ATRIAL FIBRILLATION (H): Primary | ICD-10-CM

## 2023-11-20 LAB — INR BLD: 2.1 (ref 0.9–1.1)

## 2023-11-20 PROCEDURE — 85610 PROTHROMBIN TIME: CPT

## 2023-11-20 PROCEDURE — 36416 COLLJ CAPILLARY BLOOD SPEC: CPT

## 2023-11-20 PROCEDURE — 99207 PR NO CHARGE NURSE ONLY: CPT

## 2023-11-20 NOTE — PROGRESS NOTES
Nayely Kay is a 83 year old year old patient who comes in today for a Blood Pressure check because of ongoing blood pressure monitoring.  Vital Signs as repeated by /74 and pulse 72  Patient is taking medication as prescribed  Patient is tolerating medications well.  Patient is not monitoring Blood Pressure at home.    Current complaints: none  Disposition:  patient to continue with the same medication  BP check in a month  Toenails trimmed with out incident.  Nicolette Granger RN

## 2023-11-20 NOTE — PROGRESS NOTES
ANTICOAGULATION MANAGEMENT     Nayely Kay 83 year old female is on warfarin with therapeutic INR result. (Goal INR 2.0-3.0)    Recent labs: (last 7 days)     11/20/23  1027   INR 2.1*       ASSESSMENT     Source(s): Chart Review and Patient/Caregiver Call     Warfarin doses taken: Warfarin taken as instructed  Diet: No new diet changes identified  Medication/supplement changes: None noted  New illness, injury, or hospitalization: No  Signs or symptoms of bleeding or clotting: No  Previous result: Therapeutic last 2(+) visits  Additional findings: None       PLAN     Recommended plan for no diet, medication or health factor changes affecting INR     Dosing Instructions: Continue your current warfarin dose with next INR in 4 weeks       Summary  As of 11/20/2023      Full warfarin instructions:  3.75 mg every Fri; 2.5 mg all other days   Next INR check:  12/18/2023               Telephone call with Nayely who verbalizes understanding and agrees to plan    Lab visit scheduled    Education provided:   Contact [phone]  with any changes, questions or concerns.     Plan made per [facility] anticoagulation protocol    Jeannie Gaspar RN  Anticoagulation Clinic  11/20/2023    _______________________________________________________________________     Anticoagulation Episode Summary       Current INR goal:  2.0-3.0   TTR:  88.2% (1 y)   Target end date:  Indefinite   Send INR reminders to:  [facility]    Indications    Chronic atrial fibrillation (H) [I48.20]  Long term current use of anticoagulants with INR goal of 2.0-3.0 [Z79.01]             Comments:               Anticoagulation Care Providers       Provider Role Specialty Phone number    Ulysses Baker MD Referring Family Medicine [phone]    Oswaldo Cedillo MD Referring Family Medicine [phone]    Haritha Whiting PA-C Referring Family Medicine [phone]    Tiff Muñiz APRN CNP Referring Family Medicine [phone]

## 2023-12-18 ENCOUNTER — ANTICOAGULATION THERAPY VISIT (OUTPATIENT)
Dept: ANTICOAGULATION | Facility: CLINIC | Age: 83
End: 2023-12-18

## 2023-12-18 ENCOUNTER — ALLIED HEALTH/NURSE VISIT (OUTPATIENT)
Dept: FAMILY MEDICINE | Facility: CLINIC | Age: 83
End: 2023-12-18
Payer: COMMERCIAL

## 2023-12-18 ENCOUNTER — LAB (OUTPATIENT)
Dept: LAB | Facility: CLINIC | Age: 83
End: 2023-12-18
Payer: COMMERCIAL

## 2023-12-18 VITALS — HEART RATE: 58 BPM | DIASTOLIC BLOOD PRESSURE: 59 MMHG | RESPIRATION RATE: 16 BRPM | SYSTOLIC BLOOD PRESSURE: 123 MMHG

## 2023-12-18 DIAGNOSIS — I48.20 CHRONIC ATRIAL FIBRILLATION (H): ICD-10-CM

## 2023-12-18 DIAGNOSIS — I10 ESSENTIAL HYPERTENSION WITH GOAL BLOOD PRESSURE LESS THAN 140/90: ICD-10-CM

## 2023-12-18 DIAGNOSIS — Z79.01 LONG TERM CURRENT USE OF ANTICOAGULANTS WITH INR GOAL OF 2.0-3.0: ICD-10-CM

## 2023-12-18 DIAGNOSIS — I48.20 CHRONIC ATRIAL FIBRILLATION (H): Primary | ICD-10-CM

## 2023-12-18 DIAGNOSIS — Z01.30 BLOOD PRESSURE CHECK: Primary | ICD-10-CM

## 2023-12-18 LAB — INR BLD: 2 (ref 0.9–1.1)

## 2023-12-18 PROCEDURE — 85610 PROTHROMBIN TIME: CPT

## 2023-12-18 PROCEDURE — 36416 COLLJ CAPILLARY BLOOD SPEC: CPT

## 2023-12-18 PROCEDURE — 99207 PR NO CHARGE NURSE ONLY: CPT

## 2023-12-18 NOTE — PROGRESS NOTES
ANTICOAGULATION MANAGEMENT     Nayely Kay 83 year old female is on warfarin with therapeutic INR result. (Goal INR 2.0-3.0)    Recent labs: (last 7 days)     12/18/23  1035   INR 2.0*       ASSESSMENT     Source(s): Chart Review and Patient/Caregiver Call     Warfarin doses taken: Warfarin taken as instructed  Diet: No new diet changes identified  Medication/supplement changes: None noted  New illness, injury, or hospitalization: No  Signs or symptoms of bleeding or clotting: No  Previous result: Therapeutic last 2(+) visits  Additional findings: None       PLAN     Recommended plan for no diet, medication or health factor changes affecting INR     Dosing Instructions: Continue your current warfarin dose with next INR in 4 weeks       Summary  As of 12/18/2023      Full warfarin instructions:  3.75 mg every Fri; 2.5 mg all other days   Next INR check:  1/15/2024               Telephone call with Nayely who verbalizes understanding and agrees to plan    Lab visit scheduled    Education provided:   Please call back if any changes to your diet, medications or how you've been taking warfarin  Goal range and lab monitoring: goal range and significance of current result, Importance of therapeutic range, and Importance of following up at instructed interval    Plan made per [facility] anticoagulation protocol    Elsy Klein RN  Anticoagulation Clinic  12/18/2023    _______________________________________________________________________     Anticoagulation Episode Summary       Current INR goal:  2.0-3.0   TTR:  88.2% (1 y)   Target end date:  Indefinite   Send INR reminders to:  [facility]    Indications    Chronic atrial fibrillation (H) [I48.20]  Long term current use of anticoagulants with INR goal of 2.0-3.0 [Z79.01]             Comments:               Anticoagulation Care Providers       Provider Role Specialty Phone number    Ulysses Baker MD Referring Family Medicine [phone]    Oswaldo Cedillo  MD Nikhil Referring Family Medicine [phone]    Haritha Whiting PA-C Referring Family Medicine [phone]    Tiff Muñiz APRN CNP Referring Family Medicine [phone]

## 2023-12-18 NOTE — PROGRESS NOTES
Nayely Kay is a 83 year old year old patient who comes in today for a Blood Pressure check because of ongoing blood pressure monitoring.  Vital Signs as repeated by /65 and 5 minutes later 123/59. Pulse 58  Patient is taking medication as prescribed  Patient is tolerating medications well.  Patient is not monitoring Blood Pressure at home.  Current complaints: none  Disposition:  patient to continue with the same medication.  Nicolette Granger RN

## 2024-01-15 ENCOUNTER — ANTICOAGULATION THERAPY VISIT (OUTPATIENT)
Dept: ANTICOAGULATION | Facility: CLINIC | Age: 84
End: 2024-01-15

## 2024-01-15 ENCOUNTER — ALLIED HEALTH/NURSE VISIT (OUTPATIENT)
Dept: FAMILY MEDICINE | Facility: CLINIC | Age: 84
End: 2024-01-15
Payer: COMMERCIAL

## 2024-01-15 ENCOUNTER — LAB (OUTPATIENT)
Dept: LAB | Facility: CLINIC | Age: 84
End: 2024-01-15
Payer: COMMERCIAL

## 2024-01-15 VITALS — RESPIRATION RATE: 16 BRPM | HEART RATE: 64 BPM | SYSTOLIC BLOOD PRESSURE: 138 MMHG | DIASTOLIC BLOOD PRESSURE: 66 MMHG

## 2024-01-15 DIAGNOSIS — I48.20 CHRONIC ATRIAL FIBRILLATION (H): Primary | ICD-10-CM

## 2024-01-15 DIAGNOSIS — I48.20 CHRONIC ATRIAL FIBRILLATION (H): ICD-10-CM

## 2024-01-15 DIAGNOSIS — I10 ESSENTIAL HYPERTENSION WITH GOAL BLOOD PRESSURE LESS THAN 140/90: ICD-10-CM

## 2024-01-15 DIAGNOSIS — Z01.30 BLOOD PRESSURE CHECK: Primary | ICD-10-CM

## 2024-01-15 DIAGNOSIS — Z79.01 LONG TERM CURRENT USE OF ANTICOAGULANTS WITH INR GOAL OF 2.0-3.0: ICD-10-CM

## 2024-01-15 LAB — INR BLD: 2.7 (ref 0.9–1.1)

## 2024-01-15 PROCEDURE — 85610 PROTHROMBIN TIME: CPT

## 2024-01-15 PROCEDURE — 99207 PR NO CHARGE NURSE ONLY: CPT

## 2024-01-15 PROCEDURE — 36416 COLLJ CAPILLARY BLOOD SPEC: CPT

## 2024-01-15 NOTE — PROGRESS NOTES
ANTICOAGULATION MANAGEMENT     Nayely Kay 83 year old female is on warfarin with therapeutic INR result. (Goal INR 2.0-3.0)    Recent labs: (last 7 days)     01/15/24  1032   INR 2.7*       ASSESSMENT     Source(s): Chart Review and Patient/Caregiver Call     Warfarin doses taken: Warfarin taken as instructed  Diet: No new diet changes identified  Medication/supplement changes: None noted  New illness, injury, or hospitalization: No  Signs or symptoms of bleeding or clotting: No  Previous result: Therapeutic last 2(+) visits  Additional findings: None       PLAN     Recommended plan for no diet, medication or health factor changes and recent heart valve replacement last 10 weeks affecting INR     Dosing Instructions: Continue your current warfarin dose with next INR in 4 weeks       Summary  As of 1/15/2024      Full warfarin instructions:  3.75 mg every Fri; 2.5 mg all other days   Next INR check:  2/12/2024               Telephone call with Nayely who verbalizes understanding and agrees to plan    Lab visit scheduled    Education provided:   Please call back if any changes to your diet, medications or how you've been taking warfarin    Plan made per [facility] anticoagulation protocol    Flori Patel RN  Anticoagulation Clinic  1/15/2024    _______________________________________________________________________     Anticoagulation Episode Summary       Current INR goal:  2.0-3.0   TTR:  88.2% (1 y)   Target end date:  Indefinite   Send INR reminders to:  [facility]    Indications    Chronic atrial fibrillation (H) [I48.20]  Long term current use of anticoagulants with INR goal of 2.0-3.0 [Z79.01]             Comments:               Anticoagulation Care Providers       Provider Role Specialty Phone number    Ulysses Baker MD Referring Family Medicine [phone]    Oswaldo Cedillo MD Referring Family Medicine [phone]    Haritha hWiting PA-C Referring Family Medicine  [phone]    Tiff Muñiz APRN [facility] Family Medicine [phone]

## 2024-01-15 NOTE — PROGRESS NOTES
Nayely Kay is a 83 year old year old patient who comes in today for a Blood Pressure check because of ongoing blood pressure monitoring.  Vital Signs as repeated by /66.  Pulse 64  Patient is taking medication as prescribed  Patient is tolerating medications well.  Patient is not monitoring Blood Pressure at home.    Current complaints: none  Disposition:  patient to continue with the same medication and BP check monthly  Nicolette Granger RN

## 2024-02-15 ENCOUNTER — ALLIED HEALTH/NURSE VISIT (OUTPATIENT)
Dept: FAMILY MEDICINE | Facility: CLINIC | Age: 84
End: 2024-02-15
Payer: COMMERCIAL

## 2024-02-15 ENCOUNTER — LAB (OUTPATIENT)
Dept: LAB | Facility: CLINIC | Age: 84
End: 2024-02-15
Payer: COMMERCIAL

## 2024-02-15 ENCOUNTER — ANTICOAGULATION THERAPY VISIT (OUTPATIENT)
Dept: ANTICOAGULATION | Facility: CLINIC | Age: 84
End: 2024-02-15

## 2024-02-15 VITALS — HEART RATE: 64 BPM | DIASTOLIC BLOOD PRESSURE: 60 MMHG | SYSTOLIC BLOOD PRESSURE: 124 MMHG

## 2024-02-15 DIAGNOSIS — Z01.30 BLOOD PRESSURE CHECK: Primary | ICD-10-CM

## 2024-02-15 DIAGNOSIS — I48.20 CHRONIC ATRIAL FIBRILLATION (H): Primary | ICD-10-CM

## 2024-02-15 DIAGNOSIS — Z79.01 LONG TERM CURRENT USE OF ANTICOAGULANTS WITH INR GOAL OF 2.0-3.0: ICD-10-CM

## 2024-02-15 DIAGNOSIS — I48.20 CHRONIC ATRIAL FIBRILLATION (H): ICD-10-CM

## 2024-02-15 LAB — INR BLD: 2.1 (ref 0.9–1.1)

## 2024-02-15 PROCEDURE — 36416 COLLJ CAPILLARY BLOOD SPEC: CPT

## 2024-02-15 PROCEDURE — 85610 PROTHROMBIN TIME: CPT

## 2024-02-15 PROCEDURE — 99207 PR NO CHARGE NURSE ONLY: CPT

## 2024-02-15 NOTE — PROGRESS NOTES
ANTICOAGULATION MANAGEMENT     Nayely Kay 83 year old female is on warfarin with therapeutic INR result. (Goal INR 2.0-3.0)    Recent labs: (last 7 days)     02/15/24  1320   INR 2.1*       ASSESSMENT     Source(s): Chart Review and Patient/Caregiver Call     Warfarin doses taken: Warfarin taken as instructed  Diet: No new diet changes identified  Medication/supplement changes: None noted  New illness, injury, or hospitalization: No  Signs or symptoms of bleeding or clotting: No  Previous result: Therapeutic last 2(+) visits  Additional findings: None       PLAN     Recommended plan for no diet, medication or health factor changes affecting INR     Dosing Instructions: Continue your current warfarin dose with next INR in 4 weeks       Summary  As of 2/15/2024      Full warfarin instructions:  3.75 mg every Fri; 2.5 mg all other days   Next INR check:  3/11/2024               Telephone call with Nayely who verbalizes understanding and agrees to plan    Lab visit scheduled    Education provided:   Contact [phone]  with any changes, questions or concerns.     Plan made per [facility] anticoagulation protocol    Jeannie Gaspar RN  Anticoagulation Clinic  2/15/2024    _______________________________________________________________________     Anticoagulation Episode Summary       Current INR goal:  2.0-3.0   TTR:  88.2% (1 y)   Target end date:  Indefinite   Send INR reminders to:  [facility]    Indications    Chronic atrial fibrillation (H) [I48.20]  Long term current use of anticoagulants with INR goal of 2.0-3.0 [Z79.01]             Comments:               Anticoagulation Care Providers       Provider Role Specialty Phone number    Ulysses Baker MD Referring Family Medicine [phone]    Oswaldo Cedillo MD Referring Family Medicine [phone]    Haritha Whiting PA-C Referring Family Medicine [phone]    Tiff Yarbrough APRN CNP Referring Family Medicine [phone]

## 2024-03-05 ENCOUNTER — ANCILLARY PROCEDURE (OUTPATIENT)
Dept: MAMMOGRAPHY | Facility: CLINIC | Age: 84
End: 2024-03-05
Payer: COMMERCIAL

## 2024-03-05 DIAGNOSIS — Z12.31 VISIT FOR SCREENING MAMMOGRAM: ICD-10-CM

## 2024-03-05 PROCEDURE — 77063 BREAST TOMOSYNTHESIS BI: CPT | Mod: TC | Performed by: RADIOLOGY

## 2024-03-05 PROCEDURE — 77067 SCR MAMMO BI INCL CAD: CPT | Mod: TC | Performed by: RADIOLOGY

## 2024-03-11 ENCOUNTER — LAB (OUTPATIENT)
Dept: LAB | Facility: CLINIC | Age: 84
End: 2024-03-11
Payer: COMMERCIAL

## 2024-03-11 ENCOUNTER — ANTICOAGULATION THERAPY VISIT (OUTPATIENT)
Dept: ANTICOAGULATION | Facility: CLINIC | Age: 84
End: 2024-03-11

## 2024-03-11 ENCOUNTER — ALLIED HEALTH/NURSE VISIT (OUTPATIENT)
Dept: FAMILY MEDICINE | Facility: CLINIC | Age: 84
End: 2024-03-11
Payer: COMMERCIAL

## 2024-03-11 VITALS — DIASTOLIC BLOOD PRESSURE: 68 MMHG | SYSTOLIC BLOOD PRESSURE: 124 MMHG | RESPIRATION RATE: 16 BRPM | HEART RATE: 58 BPM

## 2024-03-11 DIAGNOSIS — Z79.01 LONG TERM CURRENT USE OF ANTICOAGULANTS WITH INR GOAL OF 2.0-3.0: ICD-10-CM

## 2024-03-11 DIAGNOSIS — Z01.30 BLOOD PRESSURE CHECK: Primary | ICD-10-CM

## 2024-03-11 DIAGNOSIS — I48.20 CHRONIC ATRIAL FIBRILLATION (H): Primary | ICD-10-CM

## 2024-03-11 DIAGNOSIS — I10 ESSENTIAL HYPERTENSION: ICD-10-CM

## 2024-03-11 DIAGNOSIS — I48.20 CHRONIC ATRIAL FIBRILLATION (H): ICD-10-CM

## 2024-03-11 LAB — INR BLD: 1.6 (ref 0.9–1.1)

## 2024-03-11 PROCEDURE — 36416 COLLJ CAPILLARY BLOOD SPEC: CPT

## 2024-03-11 PROCEDURE — 99207 PR NO CHARGE NURSE ONLY: CPT

## 2024-03-11 PROCEDURE — 85610 PROTHROMBIN TIME: CPT

## 2024-03-11 NOTE — PROGRESS NOTES
ANTICOAGULATION MANAGEMENT     Nayely Kay 83 year old female is on warfarin with subtherapeutic INR result. (Goal INR 2.0-3.0)    Recent labs: (last 7 days)     03/11/24  0939   INR 1.6*       ASSESSMENT     Source(s): Chart Review and Patient/Caregiver Call     Warfarin doses taken: Missed dose(s) may be affecting INR  Diet: No new diet changes identified  Medication/supplement changes: None noted  New illness, injury, or hospitalization: No  Signs or symptoms of bleeding or clotting: Yes: patient had bleeding hemorrhoids so she decided to skip a dose due to that  Previous result: Therapeutic last 2(+) visits  Additional findings: None       PLAN     Recommended plan for temporary change(s) affecting INR     Dosing Instructions: booster dose then continue your current warfarin dose with next INR in 10 days       Summary  As of 3/11/2024      Full warfarin instructions:  3/11: 3.75 mg; Otherwise 3.75 mg every Fri; 2.5 mg all other days   Next INR check:  3/22/2024               Telephone call with Nayely who verbalizes understanding and agrees to plan    Lab visit scheduled    Education provided:   Symptom monitoring: monitoring for bleeding signs and symptoms, monitoring for clotting signs and symptoms, and when to seek medical attention/emergency care  Importance of notifying anticoagulation clinic for: changes in medications; a sooner lab recheck maybe needed and diarrhea, nausea/vomiting, reduced intake, cold/flu, and/or infections; a sooner lab recheck maybe needed  Contact [phone]  with any changes, questions or concerns.     Plan made per [facility] anticoagulation protocol    Jeannie Gaspar RN  Anticoagulation Clinic  3/11/2024    _______________________________________________________________________     Anticoagulation Episode Summary       Current INR goal:  2.0-3.0   TTR:  82.7% (1 y)   Target end date:  Indefinite   Send INR reminders to:  [facility]    Indications    Chronic atrial  fibrillation (H) [I48.20]  Long term current use of anticoagulants with INR goal of 2.0-3.0 [Z79.01]             Comments:               Anticoagulation Care Providers       Provider Role Specialty Phone number    Ulysses Baker MD Referring Family Medicine     Oswaldo Cedillo MD Referring [facility] [phone]    Haritha Whitign PA-C Referring [facility] [phone]    Tiff Yarbrough DNP Referring [facility] [phone]

## 2024-03-11 NOTE — PROGRESS NOTES
Nayely Kay is a 83 year old year old patient who comes in today for a Blood Pressure check because of ongoing blood pressure monitoring.  Vital Signs as repeated by /68 pulse 58  Patient is taking medication as prescribed  Patient is tolerating medications well.  Patient is not monitoring Blood Pressure at home.    Current complaints: none  Disposition:  patient to continue with the same medication  Nicolette Granger RN      Toenails trimmed with out incident  Nicolette Granger RN

## 2024-03-22 ENCOUNTER — LAB (OUTPATIENT)
Dept: LAB | Facility: CLINIC | Age: 84
End: 2024-03-22
Payer: COMMERCIAL

## 2024-03-22 ENCOUNTER — ANTICOAGULATION THERAPY VISIT (OUTPATIENT)
Dept: ANTICOAGULATION | Facility: CLINIC | Age: 84
End: 2024-03-22

## 2024-03-22 ENCOUNTER — OFFICE VISIT (OUTPATIENT)
Dept: FAMILY MEDICINE | Facility: CLINIC | Age: 84
End: 2024-03-22
Payer: COMMERCIAL

## 2024-03-22 VITALS
TEMPERATURE: 97.1 F | WEIGHT: 116 LBS | HEIGHT: 59 IN | SYSTOLIC BLOOD PRESSURE: 129 MMHG | DIASTOLIC BLOOD PRESSURE: 64 MMHG | BODY MASS INDEX: 23.39 KG/M2 | RESPIRATION RATE: 24 BRPM | HEART RATE: 63 BPM | OXYGEN SATURATION: 99 %

## 2024-03-22 DIAGNOSIS — I48.20 CHRONIC ATRIAL FIBRILLATION (H): Primary | ICD-10-CM

## 2024-03-22 DIAGNOSIS — I10 ESSENTIAL HYPERTENSION: ICD-10-CM

## 2024-03-22 DIAGNOSIS — Z79.01 LONG TERM CURRENT USE OF ANTICOAGULANTS WITH INR GOAL OF 2.0-3.0: ICD-10-CM

## 2024-03-22 DIAGNOSIS — M54.50 ACUTE BILATERAL LOW BACK PAIN WITHOUT SCIATICA: Primary | ICD-10-CM

## 2024-03-22 DIAGNOSIS — I48.20 CHRONIC ATRIAL FIBRILLATION (H): ICD-10-CM

## 2024-03-22 DIAGNOSIS — N18.31 STAGE 3A CHRONIC KIDNEY DISEASE (H): ICD-10-CM

## 2024-03-22 DIAGNOSIS — E78.5 HYPERLIPIDEMIA LDL GOAL <130: ICD-10-CM

## 2024-03-22 DIAGNOSIS — M35.3 POLYMYALGIA RHEUMATICA (H): ICD-10-CM

## 2024-03-22 LAB
ANION GAP SERPL CALCULATED.3IONS-SCNC: 11 MMOL/L (ref 7–15)
BUN SERPL-MCNC: 35.5 MG/DL (ref 8–23)
CALCIUM SERPL-MCNC: 10.1 MG/DL (ref 8.8–10.2)
CHLORIDE SERPL-SCNC: 100 MMOL/L (ref 98–107)
CHOLEST SERPL-MCNC: 158 MG/DL
CREAT SERPL-MCNC: 1.25 MG/DL (ref 0.51–0.95)
CREAT UR-MCNC: 124.8 MG/DL
DEPRECATED HCO3 PLAS-SCNC: 26 MMOL/L (ref 22–29)
EGFRCR SERPLBLD CKD-EPI 2021: 42 ML/MIN/1.73M2
FASTING STATUS PATIENT QL REPORTED: NORMAL
GLUCOSE SERPL-MCNC: 111 MG/DL (ref 70–99)
HDLC SERPL-MCNC: 64 MG/DL
HGB BLD-MCNC: 12.7 G/DL (ref 11.7–15.7)
INR BLD: 4.8 (ref 0.9–1.1)
LDLC SERPL CALC-MCNC: 83 MG/DL
MICROALBUMIN UR-MCNC: 95.1 MG/L
MICROALBUMIN/CREAT UR: 76.2 MG/G CR (ref 0–25)
NONHDLC SERPL-MCNC: 94 MG/DL
POTASSIUM SERPL-SCNC: 4.3 MMOL/L (ref 3.4–5.3)
SODIUM SERPL-SCNC: 137 MMOL/L (ref 135–145)
TRIGL SERPL-MCNC: 56 MG/DL

## 2024-03-22 PROCEDURE — 99214 OFFICE O/P EST MOD 30 MIN: CPT | Performed by: NURSE PRACTITIONER

## 2024-03-22 PROCEDURE — 82570 ASSAY OF URINE CREATININE: CPT | Performed by: NURSE PRACTITIONER

## 2024-03-22 PROCEDURE — 85610 PROTHROMBIN TIME: CPT

## 2024-03-22 PROCEDURE — 36416 COLLJ CAPILLARY BLOOD SPEC: CPT

## 2024-03-22 PROCEDURE — 36415 COLL VENOUS BLD VENIPUNCTURE: CPT | Performed by: NURSE PRACTITIONER

## 2024-03-22 PROCEDURE — 82043 UR ALBUMIN QUANTITATIVE: CPT | Performed by: NURSE PRACTITIONER

## 2024-03-22 PROCEDURE — 85018 HEMOGLOBIN: CPT | Performed by: NURSE PRACTITIONER

## 2024-03-22 PROCEDURE — 80048 BASIC METABOLIC PNL TOTAL CA: CPT | Performed by: NURSE PRACTITIONER

## 2024-03-22 PROCEDURE — 80061 LIPID PANEL: CPT | Performed by: NURSE PRACTITIONER

## 2024-03-22 RX ORDER — RESPIRATORY SYNCYTIAL VIRUS VACCINE 120MCG/0.5
0.5 KIT INTRAMUSCULAR ONCE
Qty: 1 EACH | Refills: 0 | Status: CANCELLED | OUTPATIENT
Start: 2024-03-22 | End: 2024-03-22

## 2024-03-22 RX ORDER — METOPROLOL TARTRATE 50 MG
75 TABLET ORAL 2 TIMES DAILY
Qty: 270 TABLET | Refills: 3 | Status: SHIPPED | OUTPATIENT
Start: 2024-03-22

## 2024-03-22 RX ORDER — LISINOPRIL 5 MG/1
5 TABLET ORAL DAILY
Qty: 90 TABLET | Refills: 3 | Status: SHIPPED | OUTPATIENT
Start: 2024-03-22

## 2024-03-22 RX ORDER — TRIAMTERENE/HYDROCHLOROTHIAZID 37.5-25 MG
TABLET ORAL
Qty: 90 TABLET | Refills: 3 | Status: SHIPPED | OUTPATIENT
Start: 2024-03-22

## 2024-03-22 RX ORDER — AMLODIPINE BESYLATE 5 MG/1
5 TABLET ORAL DAILY
Qty: 90 TABLET | Refills: 3 | Status: SHIPPED | OUTPATIENT
Start: 2024-03-22

## 2024-03-22 ASSESSMENT — PAIN SCALES - GENERAL: PAINLEVEL: NO PAIN (0)

## 2024-03-22 NOTE — PATIENT INSTRUCTIONS
Acute bilateral low back pain without sciatica  Acute, x 3 days.  Was feeling in legs up until today.  Pain is much better.  Has been doing a lot of lifting, packing and moving things in preparation of moving.  Does report probably overdoing things.  No red flags noted on exam.  Symptoms likely related to overuse.  Recommend patient continue to use heating pad, can also alternate with ice.  Can take Tylenol 650 mg up to 3 times daily over the next few days, would not continue long-term.  Advised patient to continue moving through range of motion exercises as shown in office.  If symptoms or not improved in approximately 1 week, patient to notify provider would consider lumbar x-ray.    Polymyalgia rheumatica (H24)  Chronic history, may be partially component to low back pain and leg pain as above.  However, has been stable otherwise.    Chronic atrial fibrillation (H)  Chronic, has been stable.  INR checked today was elevated.  Patient to await update from anticoagulation clinic.    Essential hypertension  Chronic, stable.  Blood pressure below target goal in office today.  Taking medications as prescribed.  Continue without any changes.  - amLODIPine (NORVASC) 5 MG tablet; Take 1 tablet (5 mg) by mouth daily  - lisinopril (ZESTRIL) 5 MG tablet; Take 1 tablet (5 mg) by mouth daily  - metoprolol tartrate (LOPRESSOR) 50 MG tablet; Take 1.5 tablets (75 mg) by mouth 2 times daily  - triamterene-HCTZ (MAXZIDE-25) 37.5-25 MG tablet; TAKE 1/2 TABLET BY MOUTH TWICE A DAY    Stage 3a chronic kidney disease (H)  Chronic, stable.  Recheck labs.  No changes.  - Albumin Random Urine Quantitative with Creat Ratio; Future  - Hemoglobin; Future  - Basic metabolic panel  (Ca, Cl, CO2, Creat, Gluc, K, Na, BUN); Future    Hyperlipidemia LDL goal <130  Chronic, recheck labs.  No changes.  - Lipid panel reflex to direct LDL Non-fasting; Future

## 2024-03-22 NOTE — PROGRESS NOTES
Assessment & Plan     Acute bilateral low back pain without sciatica  Acute, x 3 days.  Was feeling in legs up until today.  Pain is much better.  Has been doing a lot of lifting, packing and moving things in preparation of moving.  Does report probably overdoing things.  No red flags noted on exam.  Symptoms likely related to overuse.  Recommend patient continue to use heating pad, can also alternate with ice.  Can take Tylenol 650 mg up to 3 times daily over the next few days, would not continue long-term.  Advised patient to continue moving through range of motion exercises as shown in office.  If symptoms or not improved in approximately 1 week, patient to notify provider would consider lumbar x-ray.    Polymyalgia rheumatica (H24)  Chronic history, may be partially component to low back pain and leg pain as above.  However, has been stable otherwise.    Chronic atrial fibrillation (H)  Chronic, has been stable.  INR checked today was elevated.  Patient to await update from anticoagulation clinic.    Essential hypertension  Chronic, stable.  Blood pressure below target goal in office today.  Taking medications as prescribed.  Continue without any changes.  - amLODIPine (NORVASC) 5 MG tablet; Take 1 tablet (5 mg) by mouth daily  - lisinopril (ZESTRIL) 5 MG tablet; Take 1 tablet (5 mg) by mouth daily  - metoprolol tartrate (LOPRESSOR) 50 MG tablet; Take 1.5 tablets (75 mg) by mouth 2 times daily  - triamterene-HCTZ (MAXZIDE-25) 37.5-25 MG tablet; TAKE 1/2 TABLET BY MOUTH TWICE A DAY    Stage 3a chronic kidney disease (H)  Chronic, stable.  Recheck labs.  No changes.  - Albumin Random Urine Quantitative with Creat Ratio; Future  - Hemoglobin; Future  - Basic metabolic panel  (Ca, Cl, CO2, Creat, Gluc, K, Na, BUN); Future    Hyperlipidemia LDL goal <130  Chronic, recheck labs.  No changes.  - Lipid panel reflex to direct LDL Non-fasting; Future        See Patient Instructions    Janna Alder is a 84 year  old, presenting for the following health issues:  Hypertension, Pain (Back. Hip and leg pain, history of polymyalgia/), and INR Followup (Abnormal today, patient will get a call from the anti-coag clinic)    HPI   Musculoskeletal problem/pain    Duration: few days  Description  Location: hips, low back and legs  Intensity:  better today but yesterday was bad  Accompanying signs and symptoms: radiation of pain to both legs  History  Previous similar problem: YES- polymyalgia  Previous evaluation:  none  Precipitating or alleviating factors:  Trauma or overuse: no   Aggravating factors include: none  Therapies tried and outcome: nothing   Patient is planning to move in with her daughter    Has history of PMR  Last month had extreme hemorrhoids, but these are better  Pain in legs are gone, but still having back   Lifting more recently as she is packing to move   No urinary concerns  Having normal bowel movement's now       Hypertension Follow-up    Do you check your blood pressure regularly outside of the clinic? No   Are you following a low salt diet? No  Are your blood pressures ever more than 140 on the top number (systolic) OR more   than 90 on the bottom number (diastolic), for example 140/90? No  How many servings of fruits and vegetables do you eat daily?  2-3  On average, how many sweetened beverages do you drink each day (Examples: soda, juice, sweet tea, etc.  Do NOT count diet or artificially sweetened beverages)?   0  How many days per week do you exercise enough to make your heart beat faster? 3 or less  How many minutes a day do you exercise enough to make your heart beat faster? 9 or less walking a lot and still works once per week  How many days per week do you miss taking your medication? 0     A-fib   Has been doing well       Review of Systems  Constitutional, neuro, ENT, endocrine, pulmonary, cardiac, gastrointestinal, genitourinary, musculoskeletal, integument and psychiatric systems are negative,  "except as otherwise noted.      Objective    /64   Pulse 63   Temp 97.1  F (36.2  C)   Resp 24   Ht 1.499 m (4' 11\")   Wt 52.6 kg (116 lb)   LMP  (LMP Unknown)   SpO2 99%   Breastfeeding No   BMI 23.43 kg/m    Body mass index is 23.43 kg/m .  Physical Exam   GENERAL: alert and no distress  NECK: no adenopathy, no asymmetry, masses, or scars  RESP: lungs clear to auscultation - no rales, rhonchi or wheezes  CV: regular rate and rhythm, normal S1 S2, no S3 or S4, no murmur, click or rub, no peripheral edema  ABDOMEN: soft, nontender, no hepatosplenomegaly, no masses and bowel sounds normal  MS: no gross musculoskeletal defects noted, no edema, normal strength to bilateral lower extremities, normal range of motion of lumbar spine, no tenderness of lumbar spine or paraspinal lumbar muscles  NEURO: Normal strength and tone, mentation intact and speech normal  PSYCH: mentation appears normal, affect normal/bright    Diagnostic Test Results:  Labs reviewed in Epic  Pending        Signed Electronically by: Tiff Johnson, DNP, APRN-CNP       Chart documentation with Dragon Voice recognition Software. Although reviewed after completion, some words and grammatical errors may remain.   "

## 2024-03-22 NOTE — PROGRESS NOTES
ANTICOAGULATION MANAGEMENT     Nayely Kay 84 year old female is on warfarin with supratherapeutic INR result. (Goal INR 2.0-3.0)    Recent labs: (last 7 days)     03/22/24  0752   INR 4.8*       ASSESSMENT     Source(s): Chart Review and Patient/Caregiver Call     Warfarin doses taken: Warfarin taken as instructed  Diet: No new diet changes identified  Medication/supplement changes: None noted  New illness, injury, or hospitalization: back pain   Signs or symptoms of bleeding or clotting:  having some bleeding Hemorid.  Previous result: Subtherapeutic  Additional findings: None        PLAN     Recommended plan for ongoing change(s) affecting INR     Dosing Instructions: hold dose then decrease your warfarin dose (6.7% change) with next INR in 1 week       Summary  As of 3/22/2024      Full warfarin instructions:  3/22: Hold; Otherwise 2.5 mg every day   Next INR check:  3/27/2024               Telephone call with Nayely who verbalizes understanding and agrees to plan    Lab visit scheduled    Education provided:   Please call back if any changes to your diet, medications or how you've been taking warfarin    Plan made per [facility] anticoagulation protocol    Flori Patel, RN  Anticoagulation Clinic  3/22/2024    _______________________________________________________________________     Anticoagulation Episode Summary       Current INR goal:  2.0-3.0   TTR:  82.1% (1 y)   Target end date:  Indefinite   Send INR reminders to:  [facility]    Indications    Chronic atrial fibrillation (H) [I48.20]  Long term current use of anticoagulants with INR goal of 2.0-3.0 [Z79.01]             Comments:               Anticoagulation Care Providers       Provider Role Specialty Phone number    Ulysses Baker MD Referring Family Medicine     Oswaldo Cedillo MD Referring Family Medicine [phone]    Haritha Whiting PA-C Referring Family Medicine [phone]    Tiff Yarbrough DNP Referring  Family Medicine [phone]

## 2024-03-25 RX ORDER — WARFARIN SODIUM 2.5 MG/1
TABLET ORAL
Qty: 95 TABLET | Refills: 1 | Status: SHIPPED | OUTPATIENT
Start: 2024-03-25 | End: 2024-10-07

## 2024-03-25 NOTE — TELEPHONE ENCOUNTER
ANTICOAGULATION MANAGEMENT:  Medication Refill    Anticoagulation Summary  As of 3/22/2024      Warfarin maintenance plan:  2.5 mg (2.5 mg x 1) every day   Next INR check:  3/27/2024   Target end date:  Indefinite    Indications    Chronic atrial fibrillation (H) [I48.20]  Long term current use of anticoagulants with INR goal of 2.0-3.0 [Z79.01]                 Anticoagulation Care Providers       Provider Role Specialty Phone number    Ulysses Baker MD Referring [facility]     [place], Oswaldo Tejeda MD Referring [facility] [phone]    Haritha Whiting PA-C Referring [facility] [phone]    Tiff Yarbrough DNP Referring [facility] [phone]            Refill Criteria    Visit with referring provider/group: Meets criteria: office visit within referring provider group in the last 1 year on 3/22/24    [facility] referral last signed: 08/22/2023; within last year: Yes    Lab monitoring not exceeding 2 weeks overdue: Yes    Nayely meets all criteria for refill. Rx instructions and quantity supplied updated to match patient's current dosing plan. Warfarin 90 day supply with 1 refill granted per [facility] protocol     Jeannie Gaspar RN  Anticoagulation Clinic

## 2024-03-28 ENCOUNTER — LAB (OUTPATIENT)
Dept: LAB | Facility: CLINIC | Age: 84
End: 2024-03-28
Payer: COMMERCIAL

## 2024-03-28 ENCOUNTER — ANTICOAGULATION THERAPY VISIT (OUTPATIENT)
Dept: ANTICOAGULATION | Facility: CLINIC | Age: 84
End: 2024-03-28

## 2024-03-28 ENCOUNTER — ALLIED HEALTH/NURSE VISIT (OUTPATIENT)
Dept: FAMILY MEDICINE | Facility: CLINIC | Age: 84
End: 2024-03-28
Payer: COMMERCIAL

## 2024-03-28 VITALS — HEART RATE: 68 BPM | SYSTOLIC BLOOD PRESSURE: 138 MMHG | DIASTOLIC BLOOD PRESSURE: 70 MMHG

## 2024-03-28 DIAGNOSIS — Z79.01 LONG TERM CURRENT USE OF ANTICOAGULANTS WITH INR GOAL OF 2.0-3.0: ICD-10-CM

## 2024-03-28 DIAGNOSIS — I48.20 CHRONIC ATRIAL FIBRILLATION (H): ICD-10-CM

## 2024-03-28 DIAGNOSIS — I10 ESSENTIAL HYPERTENSION: Primary | ICD-10-CM

## 2024-03-28 DIAGNOSIS — I48.20 CHRONIC ATRIAL FIBRILLATION (H): Primary | ICD-10-CM

## 2024-03-28 LAB — INR BLD: 6.5 (ref 0.9–1.1)

## 2024-03-28 PROCEDURE — 36416 COLLJ CAPILLARY BLOOD SPEC: CPT

## 2024-03-28 PROCEDURE — 99207 PR NO CHARGE NURSE ONLY: CPT

## 2024-03-28 PROCEDURE — 85610 PROTHROMBIN TIME: CPT

## 2024-03-28 NOTE — PROGRESS NOTES
ANTICOAGULATION MANAGEMENT     Nayely Kay 84 year old female is on warfarin with supratherapeutic INR result. (Goal INR 2.0-3.0)    Recent labs: (last 7 days)     03/28/24  1335   INR 6.5*       ASSESSMENT     Source(s): Chart Review and Patient/Caregiver Call     Warfarin doses taken: Warfarin taken as instructed  Diet: No new diet changes identified  Medication/supplement changes: None noted  New illness, injury, or hospitalization: Yes: still dealing with back/sciatic pain. Also in the process of moving which is very stressful.  Signs or symptoms of bleeding or clotting: No  Previous result: Supratherapeutic  Additional findings: None       PLAN     Recommended plan for ongoing change(s) affecting INR     Dosing Instructions: hold 2 doses then decrease your warfarin dose (14.3% change) with next INR in 4 days       Summary  As of 3/28/2024      Full warfarin instructions:  3/28: Hold; 3/29: Hold; Otherwise 1.25 mg every Mon, Fri; 2.5 mg all other days   Next INR check:  4/1/2024               Telephone call with Nayely who verbalizes understanding and agrees to plan and who agrees to plan and repeated back plan correctly    Lab visit scheduled    Education provided:   Symptom monitoring: monitoring for bleeding signs and symptoms, when to seek medical attention/emergency care, and if you hit your head or have a bad fall seek emergency care  Contact [phone]  with any changes, questions or concerns.     Plan made per [facility] anticoagulation protocol    Jeannie Gaspar, RN  Anticoagulation Clinic  3/28/2024    _______________________________________________________________________     Anticoagulation Episode Summary       Current INR goal:  2.0-3.0   TTR:  81.0% (1 y)   Target end date:  Indefinite   Send INR reminders to:  [facility]    Indications    Chronic atrial fibrillation (H) [I48.20]  Long term current use of anticoagulants with INR goal of 2.0-3.0 [Z79.01]             Comments:                Anticoagulation Care Providers       Provider Role Specialty Phone number    Ulysses Baker MD Referring Family Medicine     [place], Oswaldo Tejeda MD Referring Family Medicine [phone]    Haritha Whiting PA-C Referring Family Medicine [phone]    Tiff Yarbrough DNP Referring Family Medicine [phone]

## 2024-03-28 NOTE — PROGRESS NOTES
Nayely Kay is a 84 year old year old patient who comes in today for a Blood Pressure check because of ongoing blood pressure monitoring.  Vital Signs as repeated by /70  Patient is taking medication as prescribed  Patient is tolerating medications well.  Patient is not monitoring Blood Pressure at home.  Average readings if yes are   Current complaints: none  Disposition:  continue same medication, will recheck at next INR appointment

## 2024-03-29 ENCOUNTER — TELEPHONE (OUTPATIENT)
Dept: FAMILY MEDICINE | Facility: CLINIC | Age: 84
End: 2024-03-29
Payer: COMMERCIAL

## 2024-03-29 NOTE — TELEPHONE ENCOUNTER
Writer called patient back. She stated she had a message to call PCP office. I do not see any TE regarding calling PCP from yesterday but did tell Nayely to give that clinic a call back.    Flaquita Neal RN, BSN, PHN

## 2024-03-29 NOTE — TELEPHONE ENCOUNTER
Patient Returning Call    Reason for call:  INR    Information relayed to patient:  N/A    Patient has additional questions:  NO      Okay to leave a detailed message?: No

## 2024-04-01 ENCOUNTER — ALLIED HEALTH/NURSE VISIT (OUTPATIENT)
Dept: FAMILY MEDICINE | Facility: CLINIC | Age: 84
End: 2024-04-01
Payer: COMMERCIAL

## 2024-04-01 ENCOUNTER — LAB (OUTPATIENT)
Dept: LAB | Facility: CLINIC | Age: 84
End: 2024-04-01
Payer: COMMERCIAL

## 2024-04-01 ENCOUNTER — ANTICOAGULATION THERAPY VISIT (OUTPATIENT)
Dept: ANTICOAGULATION | Facility: CLINIC | Age: 84
End: 2024-04-01

## 2024-04-01 VITALS — SYSTOLIC BLOOD PRESSURE: 110 MMHG | RESPIRATION RATE: 16 BRPM | HEART RATE: 76 BPM | DIASTOLIC BLOOD PRESSURE: 52 MMHG

## 2024-04-01 DIAGNOSIS — I48.20 CHRONIC ATRIAL FIBRILLATION (H): ICD-10-CM

## 2024-04-01 DIAGNOSIS — Z79.01 LONG TERM CURRENT USE OF ANTICOAGULANTS WITH INR GOAL OF 2.0-3.0: ICD-10-CM

## 2024-04-01 DIAGNOSIS — I10 ESSENTIAL HYPERTENSION: Primary | ICD-10-CM

## 2024-04-01 DIAGNOSIS — I48.20 CHRONIC ATRIAL FIBRILLATION (H): Primary | ICD-10-CM

## 2024-04-01 LAB — INR BLD: 2.4 (ref 0.9–1.1)

## 2024-04-01 PROCEDURE — 85610 PROTHROMBIN TIME: CPT

## 2024-04-01 PROCEDURE — 99207 PR NO CHARGE NURSE ONLY: CPT

## 2024-04-01 PROCEDURE — 36416 COLLJ CAPILLARY BLOOD SPEC: CPT

## 2024-04-01 NOTE — PROGRESS NOTES
BP Readings from Last 6 Encounters:   04/01/24 110/52   03/28/24 138/70   03/22/24 129/64   03/11/24 124/68   02/15/24 124/60   01/15/24 138/66     Nayely Kay is a 84 year old year old patient who comes in today for a Blood Pressure check because of ongoing blood pressure monitoring.  Vital Signs as repeated by /52 and pulse 76  Patient is taking medication as prescribed  Patient is tolerating medications well.  Patient is not monitoring Blood Pressure at home.   Current complaints: none  Disposition:  patient to continue with the same medication..  BP check when here for INR draw.  Nicolette Granger RN

## 2024-04-01 NOTE — PROGRESS NOTES
ANTICOAGULATION MANAGEMENT     Nayely Kay 84 year old female is on warfarin with therapeutic INR result. (Goal INR 2.0-3.0)    Recent labs: (last 7 days)     04/01/24  1431   INR 2.4*       ASSESSMENT     Source(s): Chart Review and Patient/Caregiver Call     Warfarin doses taken: Held for supra therapeutic INR  recently which may be affecting INR  Patient is unsure what dose she took Sat and Sun  Diet: No new diet changes identified  Medication/supplement changes: None noted  New illness, injury, or hospitalization: No  Signs or symptoms of bleeding or clotting: No  Previous result: Supratherapeutic  Additional findings:  increase in pain and stress noted 3/28  - dose decreased     PLAN     Recommended plan for temporary change(s) and ongoing change(s) affecting INR     Dosing Instructions: Continue your current warfarin dose with next INR in 4 days (due to patient unsure what she took Sat/Sun)    Patient expressed confusion - offered to discuss with a family member and patient declined. Patient read the instructions back to RN.    Discussed using one calendar each phone call for warfarin dosing    Summary  As of 4/1/2024      Full warfarin instructions:  1.25 mg every Sun, Thu; 2.5 mg all other days   Next INR check:  4/5/2024               Telephone call with Nayely who verbalizes understanding and agrees to plan - time +10 minutes on the phone    Lab visit scheduled    Education provided:   Please call back if any changes to your diet, medications or how you've been taking warfarin  Symptom monitoring: monitoring for bleeding signs and symptoms    Plan made per [facility] protocol    Luz Elena Barrios RN  Anticoagulation Clinic  4/1/2024    _______________________________________________________________________     Anticoagulation Episode Summary       Current INR goal:  2.0-3.0   TTR:  80.1% (1 y)   Target end date:  Indefinite   Send INR reminders to:  [facility]    Indications    Chronic  atrial fibrillation (H) [I48.20]  Long term current use of anticoagulants with INR goal of 2.0-3.0 [Z79.01]             Comments:               Anticoagulation Care Providers       Provider Role Specialty Phone number    Ulysses Baker MD Referring Family Medicine     Oswaldo Cedillo APRN CNP Referring [facility] [phone]    Haritha Whiting PA-C Referring [facility] [phone]    Tiff Yarbrough DNP Referring [facility] [phone]

## 2024-04-05 ENCOUNTER — LAB (OUTPATIENT)
Dept: LAB | Facility: CLINIC | Age: 84
End: 2024-04-05
Payer: COMMERCIAL

## 2024-04-05 ENCOUNTER — ALLIED HEALTH/NURSE VISIT (OUTPATIENT)
Dept: FAMILY MEDICINE | Facility: CLINIC | Age: 84
End: 2024-04-05
Payer: COMMERCIAL

## 2024-04-05 ENCOUNTER — ANTICOAGULATION THERAPY VISIT (OUTPATIENT)
Dept: ANTICOAGULATION | Facility: CLINIC | Age: 84
End: 2024-04-05

## 2024-04-05 VITALS — DIASTOLIC BLOOD PRESSURE: 60 MMHG | SYSTOLIC BLOOD PRESSURE: 116 MMHG | HEART RATE: 72 BPM

## 2024-04-05 DIAGNOSIS — I48.20 CHRONIC ATRIAL FIBRILLATION (H): Primary | ICD-10-CM

## 2024-04-05 DIAGNOSIS — I10 ESSENTIAL HYPERTENSION WITH GOAL BLOOD PRESSURE LESS THAN 140/90: Primary | ICD-10-CM

## 2024-04-05 DIAGNOSIS — Z79.01 LONG TERM CURRENT USE OF ANTICOAGULANTS WITH INR GOAL OF 2.0-3.0: ICD-10-CM

## 2024-04-05 DIAGNOSIS — I48.20 CHRONIC ATRIAL FIBRILLATION (H): ICD-10-CM

## 2024-04-05 LAB — INR BLD: 3.3 (ref 0.9–1.1)

## 2024-04-05 PROCEDURE — 85610 PROTHROMBIN TIME: CPT

## 2024-04-05 PROCEDURE — 36416 COLLJ CAPILLARY BLOOD SPEC: CPT

## 2024-04-05 PROCEDURE — 99207 PR NO CHARGE NURSE ONLY: CPT

## 2024-04-05 NOTE — PROGRESS NOTES
Nayely Kay is a 84 year old year old patient who comes in today for a Blood Pressure check because of ongoing blood pressure monitoring.  BP Readings from Last 6 Encounters:   04/05/24 116/60   04/01/24 110/52   03/28/24 138/70   03/22/24 129/64   03/11/24 124/68   02/15/24 124/60      HR 72  Patient  taking medication as prescribed  Patient  tolerating medications well.  Patient  monitoring Blood Pressure at home.    Current complaints: none  Disposition:  patient to continue with the same medication  NOVA Rosales RN

## 2024-04-05 NOTE — PROGRESS NOTES
ANTICOAGULATION MANAGEMENT     Nayely Kay 84 year old female is on warfarin with supratherapeutic INR result. (Goal INR 2.0-3.0)    Recent labs: (last 7 days)     04/05/24  1327   INR 3.3*       ASSESSMENT     Source(s): Chart Review and Patient/Caregiver Call     Warfarin doses taken: Previous hold for elevated INR may be effecting the INR today.  Diet: No new diet changes identified  Medication/supplement changes: None noted  New illness, injury, or hospitalization: Yes: Chronic pain continues.  Signs or symptoms of bleeding or clotting: No  Previous result: Therapeutic last visit; previously outside of goal range  Additional findings: None       PLAN     Recommended plan for ongoing change(s) affecting INR     Dosing Instructions: decrease your warfarin dose (8.3% change) with next INR in 1 week       Summary  As of 4/5/2024      Full warfarin instructions:  1.25 mg every Sun, Tue, Thu; 2.5 mg all other days   Next INR check:  4/11/2024               Telephone call with Nayely who verbalizes understanding and agrees to plan    Lab visit scheduled    Education provided:   Contact [phone]  with any changes, questions or concerns.     Plan made per [facility]agulation protocol    Renetta WINCHESTER RN  Anticoagulation Clinic  4/5/2024    _______________________________________________________________________     Anticoagulation Episode Summary       Current INR goal:  2.0-3.0   TTR:  79.7% (1 y)   Target end date:  Indefinite   Send INR reminders to:  [facility]    Indications    Chronic atrial fibrillation (H) [I48.20]  Long term current use of anticoagulants with INR goal of 2.0-3.0 [Z79.01]             Comments:               Anticoagulation Care Providers       Provider Role Specialty Phone number    Ulysses Baker MD Referring Family Medicine     Oswaldo Cedillo APRN CNP Referring Family Medicine [phone]    Haritha Whiting PA-C Referring Family Medicine [phone]    [place]  Tiff Torres, [facility] [phone]

## 2024-04-11 ENCOUNTER — LAB (OUTPATIENT)
Dept: LAB | Facility: CLINIC | Age: 84
End: 2024-04-11
Payer: COMMERCIAL

## 2024-04-11 ENCOUNTER — ANTICOAGULATION THERAPY VISIT (OUTPATIENT)
Dept: ANTICOAGULATION | Facility: CLINIC | Age: 84
End: 2024-04-11

## 2024-04-11 DIAGNOSIS — Z79.01 LONG TERM CURRENT USE OF ANTICOAGULANTS WITH INR GOAL OF 2.0-3.0: ICD-10-CM

## 2024-04-11 DIAGNOSIS — I48.20 CHRONIC ATRIAL FIBRILLATION (H): ICD-10-CM

## 2024-04-11 DIAGNOSIS — I48.20 CHRONIC ATRIAL FIBRILLATION (H): Primary | ICD-10-CM

## 2024-04-11 LAB — INR BLD: 3.1 (ref 0.9–1.1)

## 2024-04-11 PROCEDURE — 36415 COLL VENOUS BLD VENIPUNCTURE: CPT

## 2024-04-11 PROCEDURE — 85610 PROTHROMBIN TIME: CPT

## 2024-04-11 NOTE — PROGRESS NOTES
ANTICOAGULATION MANAGEMENT     Nayely Kay 84 year old female is on warfarin with supratherapeutic INR result. (Goal INR 2.0-3.0)    Recent labs: (last 7 days)     04/11/24  0855   INR 3.1*       ASSESSMENT     Source(s): Chart Review and Patient/Caregiver Call     Warfarin doses taken: Warfarin taken as instructed  Diet: No new diet changes identified  Medication/supplement changes: None noted  New illness, injury, or hospitalization: No  Signs or symptoms of bleeding or clotting: No  Previous result: Supratherapeutic  Additional findings:  swelling in ankles the last two days but pt is also sleeping with her legs slightly hanging down below the level of her heart. Advised to sleep with legs slightly elevated and see if that helps with swelling. If not, make an appt with PCP.       PLAN     Recommended plan for no diet, medication or health factor changes affecting INR     Dosing Instructions: decrease your warfarin dose (9.1% change) with next INR in 1 week       Summary  As of 4/11/2024      Full warfarin instructions:  2.5 mg every Mon, Wed, Sat; 1.25 mg all other days   Next INR check:  4/18/2024               Telephone call with Nayely who verbalizes understanding and agrees to plan    Lab visit scheduled    Education provided:   Please call back if any changes to your diet, medications or how you've been taking warfarin  Contact [phone]  with any changes, questions or concerns.     Plan made per [facility] anticoagulation protocol    Flaquita Neal RN  Anticoagulation Clinic  4/11/2024    _______________________________________________________________________     Anticoagulation Episode Summary       Current INR goal:  2.0-3.0   TTR:  78.1% (1 y)   Target end date:  Indefinite   Send INR reminders to:  [facility]    Indications    Chronic atrial fibrillation (H) [I48.20]  Long term current use of anticoagulants with INR goal of 2.0-3.0 [Z79.01]             Comments:               Anticoagulation  Care Providers       Provider Role Specialty Phone number    Ulysses Baker MD Referring Family Medicine     [place], NADER Zhang CNP Referring Family Medicine [phone]    Haritha Whiting PA-C Referring Family Medicine [phone]    Tiff Yarbrough DNP Referring Family Medicine [phone]

## 2024-04-18 ENCOUNTER — ANTICOAGULATION THERAPY VISIT (OUTPATIENT)
Dept: ANTICOAGULATION | Facility: CLINIC | Age: 84
End: 2024-04-18

## 2024-04-18 ENCOUNTER — LAB (OUTPATIENT)
Dept: LAB | Facility: CLINIC | Age: 84
End: 2024-04-18
Payer: COMMERCIAL

## 2024-04-18 ENCOUNTER — ALLIED HEALTH/NURSE VISIT (OUTPATIENT)
Dept: FAMILY MEDICINE | Facility: CLINIC | Age: 84
End: 2024-04-18
Payer: COMMERCIAL

## 2024-04-18 VITALS — SYSTOLIC BLOOD PRESSURE: 122 MMHG | HEART RATE: 74 BPM | RESPIRATION RATE: 16 BRPM | DIASTOLIC BLOOD PRESSURE: 60 MMHG

## 2024-04-18 DIAGNOSIS — I10 ESSENTIAL HYPERTENSION WITH GOAL BLOOD PRESSURE LESS THAN 140/90: Primary | ICD-10-CM

## 2024-04-18 DIAGNOSIS — I48.20 CHRONIC ATRIAL FIBRILLATION (H): Primary | ICD-10-CM

## 2024-04-18 DIAGNOSIS — I48.20 CHRONIC ATRIAL FIBRILLATION (H): ICD-10-CM

## 2024-04-18 DIAGNOSIS — Z01.30 BLOOD PRESSURE CHECK: ICD-10-CM

## 2024-04-18 DIAGNOSIS — Z79.01 LONG TERM CURRENT USE OF ANTICOAGULANTS WITH INR GOAL OF 2.0-3.0: ICD-10-CM

## 2024-04-18 LAB — INR BLD: 2 (ref 0.9–1.1)

## 2024-04-18 PROCEDURE — 36416 COLLJ CAPILLARY BLOOD SPEC: CPT

## 2024-04-18 PROCEDURE — 85610 PROTHROMBIN TIME: CPT

## 2024-04-18 PROCEDURE — 99207 PR NO CHARGE NURSE ONLY: CPT

## 2024-04-18 NOTE — PROGRESS NOTES
Nayely Kay is a 84 year old year old patient who comes in today for a Blood Pressure check because of ongoing blood pressure monitoring.  Vital Signs as repeated by /60  Patient is taking medication as prescribed  Patient is tolerating medications well.  Patient is not monitoring Blood Pressure at home.    Current complaints: none  Disposition:  patient to continue with the same medication.  BP check with INR blood draw  Nicolette Granger RN

## 2024-04-18 NOTE — PROGRESS NOTES
ANTICOAGULATION MANAGEMENT     Nayely Kay 84 year old female is on warfarin with therapeutic INR result. (Goal INR 2.0-3.0)    Recent labs: (last 7 days)     04/18/24  0923   INR 2.0*       ASSESSMENT     Source(s): Chart Review  Previous INR was Supratherapeutic  Medication, diet, health changes since last INR chart reviewed; none identified         PLAN     Unable to reach Nayely today.    LMTCB    Follow up required to confirm warfarin dose taken and assess for changes    France Ashraf RN  Anticoagulation Clinic  4/18/2024

## 2024-04-18 NOTE — PROGRESS NOTES
ANTICOAGULATION MANAGEMENT     Nayely Kay 84 year old female is on warfarin with therapeutic INR result. (Goal INR 2.0-3.0)    Recent labs: (last 7 days)     04/18/24  0923   INR 2.0*       ASSESSMENT     Source(s): Chart Review and Patient/Caregiver Call     Warfarin doses taken: Warfarin taken as instructed  Diet: No new diet changes identified  Medication/supplement changes: None noted  New illness, injury, or hospitalization: No  Signs or symptoms of bleeding or clotting: No  Previous result: Supratherapeutic  Additional findings: None       PLAN     Recommended plan for no diet, medication or health factor changes affecting INR     Dosing Instructions: Continue your current warfarin dose with next INR in 2 weeks       Summary  As of 4/18/2024      Full warfarin instructions:  2.5 mg every Mon, Wed, Sat; 1.25 mg all other days   Next INR check:  5/2/2024               Telephone call with Nayely who verbalizes understanding and agrees to plan    Lab visit scheduled    Education provided:   Goal range and lab monitoring: goal range and significance of current result    Plan made per [facility] anticoagulation protocol    France Ashraf RN  Anticoagulation Clinic  4/18/2024    _______________________________________________________________________     Anticoagulation Episode Summary       Current INR goal:  2.0-3.0   TTR:  78.7% (1 y)   Target end date:  Indefinite   Send INR reminders to:  [facility]    Indications    Chronic atrial fibrillation (H) [I48.20]  Long term current use of anticoagulants with INR goal of 2.0-3.0 [Z79.01]             Comments:               Anticoagulation Care Providers       Provider Role Specialty Phone number    Ulysses Baker MD Referring Family Medicine     Oswaldo Cedillo APRN CNP Referring Family Medicine [phone]    Haritha Whiting PA-C Referring Family Medicine [phone]    Tiff Yarbrough APRN CNP Referring [facility] Medicine [phone]

## 2024-05-02 ENCOUNTER — ANTICOAGULATION THERAPY VISIT (OUTPATIENT)
Dept: ANTICOAGULATION | Facility: CLINIC | Age: 84
End: 2024-05-02

## 2024-05-02 ENCOUNTER — LAB (OUTPATIENT)
Dept: LAB | Facility: CLINIC | Age: 84
End: 2024-05-02
Payer: COMMERCIAL

## 2024-05-02 DIAGNOSIS — I48.20 CHRONIC ATRIAL FIBRILLATION (H): Primary | ICD-10-CM

## 2024-05-02 DIAGNOSIS — I48.20 CHRONIC ATRIAL FIBRILLATION (H): ICD-10-CM

## 2024-05-02 DIAGNOSIS — Z79.01 LONG TERM CURRENT USE OF ANTICOAGULANTS WITH INR GOAL OF 2.0-3.0: ICD-10-CM

## 2024-05-02 LAB — INR BLD: 1.8 (ref 0.9–1.1)

## 2024-05-02 PROCEDURE — 85610 PROTHROMBIN TIME: CPT

## 2024-05-02 PROCEDURE — 36416 COLLJ CAPILLARY BLOOD SPEC: CPT

## 2024-05-02 NOTE — PROGRESS NOTES
ANTICOAGULATION MANAGEMENT     Nayely aKy 84 year old female is on warfarin with subtherapeutic INR result. (Goal INR 2.0-3.0)    Recent labs: (last 7 days)     05/02/24  0936   INR 1.8*       ASSESSMENT     Source(s): Chart Review and Patient/Caregiver Call     Warfarin doses taken: Warfarin taken as instructed  Diet: No new diet changes identified  Medication/supplement changes: None noted  New illness, injury, or hospitalization: No  Signs or symptoms of bleeding or clotting: No  Previous result: Therapeutic last visit; previously outside of goal range  Additional findings: None but dosing reduced two weeks ago       PLAN     Recommended plan for no diet, medication or health factor changes affecting INR     Dosing Instructions: Increase your warfarin dose (10% change) with next INR in 10 days       Summary  As of 5/2/2024      Full warfarin instructions:  1.25 mg every Sun, Tue, Fri; 2.5 mg all other days   Next INR check:  5/13/2024               Telephone call with Nayely who verbalizes understanding and agrees to plan    Lab visit scheduled    Education provided:   Please call back if any changes to your diet, medications or how you've been taking warfarin  Contact [phone]  with any changes, questions or concerns.     Plan made per [facility] anticoagulation protocol    Flaquita Neal RN  Anticoagulation Clinic  5/2/2024    _______________________________________________________________________     Anticoagulation Episode Summary       Current INR goal:  2.0-3.0   TTR:  78.1% (1 y)   Target end date:  Indefinite   Send INR reminders to:  [facility]    Indications    Chronic atrial fibrillation (H) [I48.20]  Long term current use of anticoagulants with INR goal of 2.0-3.0 [Z79.01]             Comments:               Anticoagulation Care Providers       Provider Role Specialty Phone number    Ulysses Baker MD Referring Family Medicine     [place], NADER Zhang CNP Referring Family  Medicine [phone]    Haritha Whiting PA-C Referring Family Medicine [phone]    Tiff Yarbrough APRN CNP Referring Family Medicine [phone]

## 2024-05-13 ENCOUNTER — ANTICOAGULATION THERAPY VISIT (OUTPATIENT)
Dept: ANTICOAGULATION | Facility: CLINIC | Age: 84
End: 2024-05-13

## 2024-05-13 ENCOUNTER — LAB (OUTPATIENT)
Dept: LAB | Facility: CLINIC | Age: 84
End: 2024-05-13
Payer: COMMERCIAL

## 2024-05-13 ENCOUNTER — ALLIED HEALTH/NURSE VISIT (OUTPATIENT)
Dept: FAMILY MEDICINE | Facility: CLINIC | Age: 84
End: 2024-05-13
Payer: COMMERCIAL

## 2024-05-13 VITALS — RESPIRATION RATE: 16 BRPM | DIASTOLIC BLOOD PRESSURE: 60 MMHG | SYSTOLIC BLOOD PRESSURE: 122 MMHG

## 2024-05-13 DIAGNOSIS — Z79.01 LONG TERM CURRENT USE OF ANTICOAGULANTS WITH INR GOAL OF 2.0-3.0: ICD-10-CM

## 2024-05-13 DIAGNOSIS — Z00.00 MEDICARE ANNUAL WELLNESS VISIT, SUBSEQUENT: Primary | ICD-10-CM

## 2024-05-13 DIAGNOSIS — I48.20 CHRONIC ATRIAL FIBRILLATION (H): ICD-10-CM

## 2024-05-13 DIAGNOSIS — I48.20 CHRONIC ATRIAL FIBRILLATION (H): Primary | ICD-10-CM

## 2024-05-13 LAB — INR BLD: 1.7 (ref 0.9–1.1)

## 2024-05-13 PROCEDURE — 36416 COLLJ CAPILLARY BLOOD SPEC: CPT

## 2024-05-13 PROCEDURE — 99207 PR NO CHARGE NURSE ONLY: CPT

## 2024-05-13 PROCEDURE — 85610 PROTHROMBIN TIME: CPT

## 2024-05-13 NOTE — PROGRESS NOTES
ANTICOAGULATION MANAGEMENT     Nayely Kay 84 year old female is on warfarin with subtherapeutic INR result. (Goal INR 2.0-3.0)    Recent labs: (last 7 days)     05/13/24  1130   INR 1.7*       ASSESSMENT     Source(s): Chart Review and Patient/Caregiver Call     Warfarin doses taken: Warfarin taken as instructed  Diet: Increased greens/vitamin K in diet; plans to resume previous intake  Medication/supplement changes: None noted  New illness, injury, or hospitalization: No  Signs or symptoms of bleeding or clotting: No  Previous result: Subtherapeutic - dose previously increased  Additional findings: None     PLAN     Recommended plan for temporary change(s) affecting INR     Dosing Instructions: booster dose then continue your current warfarin dose with next INR in 2 weeks       Summary  As of 5/13/2024      Full warfarin instructions:  5/13: 3.75 mg; Otherwise 1.25 mg every Sun, Tue, Fri; 2.5 mg all other days   Next INR check:  5/27/2024               Telephone call with Nayely who verbalizes understanding and agrees to plan    Lab visit scheduled    Education provided:   Please call back if any changes to your diet, medications or how you've been taking warfarin  Symptom monitoring: monitoring for clotting signs and symptoms and monitoring for stroke signs and symptoms    Plan made per [facility] anticoagulation protocol    Luz Elena Barrios RN  Anticoagulation Clinic  5/13/2024    _______________________________________________________________________     Anticoagulation Episode Summary       Current INR goal:  2.0-3.0   TTR:  75.1% (1 y)   Target end date:  Indefinite   Send INR reminders to:  [facility]    Indications    Chronic atrial fibrillation (H) [I48.20]  Long term current use of anticoagulants with INR goal of 2.0-3.0 [Z79.01]             Comments:               Anticoagulation Care Providers       Provider Role Specialty Phone number    Ulysses Baker MD Referring Family Medicine     [place]  NADER Zhang CNP Referring Family Medicine [phone]    Haritha Whiting PA-C Referring Family Medicine [phone]    Tiff Yarbrough APRN CNP Referring Family Medicine [phone]

## 2024-05-20 ENCOUNTER — ANESTHESIA EVENT (OUTPATIENT)
Dept: SURGERY | Facility: CLINIC | Age: 84
End: 2024-05-20
Payer: COMMERCIAL

## 2024-05-20 ASSESSMENT — ENCOUNTER SYMPTOMS: DYSRHYTHMIAS: 1

## 2024-05-20 NOTE — ANESTHESIA PREPROCEDURE EVALUATION
Anesthesia Pre-Procedure Evaluation    Patient: Nayely Kay   MRN: [id number] : 1940        Procedure : Procedure(s):  Cataract Extraction with Intraocular Lens Placement          Past Medical History:   Diagnosis Date    Atrial fibrillation (H)     Migraine, unspecified, without mention of intractable migraine without mention of status migrainosus     Migraine HA    Squamous cell carcinoma     Unspecified essential hypertension     Unspecified tinnitus       Past Surgical History:   Procedure Laterality Date    COLONOSCOPY  2009    FLEXIBLE SIGMOIDOSCOPY  2004    SURGICAL HISTORY OF -       tubal    SURGICAL HISTORY OF -       oral teeth      Allergies   Allergen Reactions    Diltiazem Hcl Er Beads Rash      Social History     Tobacco Use    Smoking status: Never    Smokeless tobacco: Never   Substance Use Topics    Alcohol use: No      Wt Readings from Last 1 Encounters:   24 52.6 kg (116 lb)        Anesthesia Evaluation   Pt has had prior anesthetic. Type: General and MAC.        ROS/MED HX  ENT/Pulmonary: Comment: Tinnitus        Neurologic:     (+)      migraines,                          Cardiovascular:     (+) Dyslipidemia hypertension- -   -  - -   Taking blood thinners                     dysrhythmias, a-fib,             METS/Exercise Tolerance:     Hematologic:     (+)      anemia,          Musculoskeletal:       GI/Hepatic:       Renal/Genitourinary:     (+) renal disease, type: CRI,            Endo:       Psychiatric/Substance Use:       Infectious Disease:       Malignancy:   (+) Malignancy, History of Skin.Skin CA Remission status post Surgery.      Other:          Physical Exam    Airway  airway exam normal           Respiratory Devices and Support         Dental       (+) Minor Abnormalities - some fillings, tiny chips      Cardiovascular   cardiovascular exam normal          Pulmonary   pulmonary exam normal                OUTSIDE LABS:  CBC:   Lab Results   Component Value Date  "   WBC 7.8 05/10/2023    WBC 6.7 04/18/2022    HGB 12.7 03/22/2024    HGB 13.2 05/10/2023    HCT 40.3 05/10/2023    HCT 39.4 04/18/2022     05/10/2023     04/18/2022     BMP:   Lab Results   Component Value Date     03/22/2024     05/10/2023    POTASSIUM 4.3 03/22/2024    POTASSIUM 3.8 05/10/2023    CHLORIDE 100 03/22/2024    CHLORIDE 101 05/10/2023    CO2 26 03/22/2024    CO2 26 05/10/2023    BUN 35.5 (H) 03/22/2024    BUN 27.4 (H) 05/10/2023    CR 1.25 (H) 03/22/2024    CR 1.22 (H) 05/10/2023     (H) 03/22/2024     (H) 05/10/2023     COAGS:   Lab Results   Component Value Date    PTT 51 (H) 06/03/2010    INR 1.7 (H) 05/13/2024     POC: No results found for: \"BGM\", \"HCG\", \"HCGS\"  HEPATIC:   Lab Results   Component Value Date    ALBUMIN 4.0 07/27/2020    PROTTOTAL 8.0 07/27/2020    ALT 25 07/27/2020    AST 31 07/27/2020    GGT 38 06/18/2019    ALKPHOS 128 07/27/2020    BILITOTAL 1.7 (H) 07/27/2020     OTHER:   Lab Results   Component Value Date    HAIDER 10.1 03/22/2024    MAG 1.8 06/20/2006    TSH 2.76 07/05/2010    T4 1.03 07/05/2010     (H) 05/23/2008       Anesthesia Plan    ASA Status:  3    NPO Status:  NPO Appropriate    Anesthesia Type: MAC.      Maintenance: Balanced.        Consents    Anesthesia Plan(s) and associated risks, benefits, and realistic alternatives discussed. Questions answered and patient/representative(s) expressed understanding.     - Discussed:     - Discussed with:  Patient            Postoperative Care            Comments:               NADER Gurrola CRNA    I have reviewed the pertinent notes and labs in the chart from the past 30 days and (re)examined the patient.  Any updates or changes from those notes are reflected in this note.            # Coagulation Defect: INR = 1.7 (Ref range: 0.9 - 1.1) and/or PTT = N/A, will monitor for bleeding       "

## 2024-05-24 ENCOUNTER — TELEPHONE (OUTPATIENT)
Dept: ANTICOAGULATION | Facility: CLINIC | Age: 84
End: 2024-05-24
Payer: COMMERCIAL

## 2024-05-24 ENCOUNTER — ANESTHESIA (OUTPATIENT)
Dept: SURGERY | Facility: CLINIC | Age: 84
End: 2024-05-24
Payer: COMMERCIAL

## 2024-05-24 ENCOUNTER — HOSPITAL ENCOUNTER (OUTPATIENT)
Facility: CLINIC | Age: 84
Discharge: HOME | End: 2024-05-24
Attending: OPHTHALMOLOGY | Admitting: OPHTHALMOLOGY
Payer: COMMERCIAL

## 2024-05-24 VITALS
WEIGHT: 116 LBS | HEART RATE: 60 BPM | OXYGEN SATURATION: 99 % | SYSTOLIC BLOOD PRESSURE: 157 MMHG | HEIGHT: 59 IN | RESPIRATION RATE: 16 BRPM | BODY MASS INDEX: 23.39 KG/M2 | TEMPERATURE: 97.9 F | DIASTOLIC BLOOD PRESSURE: 79 MMHG

## 2024-05-24 DIAGNOSIS — Z79.01 LONG TERM CURRENT USE OF ANTICOAGULANTS WITH INR GOAL OF 2.0-3.0: ICD-10-CM

## 2024-05-24 DIAGNOSIS — I48.20 CHRONIC ATRIAL FIBRILLATION (H): Primary | ICD-10-CM

## 2024-05-24 PROCEDURE — 250N000011 HC RX IP 250 OP 636: Performed by: OPHTHALMOLOGY

## 2024-05-24 PROCEDURE — 761N000008 HC RECOVERY CATRACT PACKAGE: Performed by: OPHTHALMOLOGY

## 2024-05-24 PROCEDURE — V2632 POST CHMBR INTRAOCULAR LENS: HCPCS | Performed by: OPHTHALMOLOGY

## 2024-05-24 PROCEDURE — 370N000004 HC ANESTHESIA CATARACT PACKAGE: Performed by: OPHTHALMOLOGY

## 2024-05-24 PROCEDURE — 360N000007 HC CATARACT SURGICAL PACKAGE: Performed by: OPHTHALMOLOGY

## 2024-05-24 PROCEDURE — 250N000011 HC RX IP 250 OP 636: Performed by: NURSE ANESTHETIST, CERTIFIED REGISTERED

## 2024-05-24 PROCEDURE — 258N000003 HC RX IP 258 OP 636: Performed by: NURSE ANESTHETIST, CERTIFIED REGISTERED

## 2024-05-24 PROCEDURE — 250N000009 HC RX 250: Performed by: OPHTHALMOLOGY

## 2024-05-24 RX ORDER — BALANCED SALT SOLUTION 6.4; .75; .48; .3; 3.9; 1.7 MG/ML; MG/ML; MG/ML; MG/ML; MG/ML; MG/ML
SOLUTION OPHTHALMIC PRN
Status: DISCONTINUED | OUTPATIENT
Start: 2024-05-24 | End: 2024-05-24 | Stop reason: HOSPADM

## 2024-05-24 RX ORDER — SODIUM CHLORIDE, SODIUM LACTATE, POTASSIUM CHLORIDE, CALCIUM CHLORIDE 600; 310; 30; 20 MG/100ML; MG/100ML; MG/100ML; MG/100ML
INJECTION, SOLUTION INTRAVENOUS CONTINUOUS
Status: DISCONTINUED | OUTPATIENT
Start: 2024-05-24 | End: 2024-05-24 | Stop reason: HOSPADM

## 2024-05-24 RX ORDER — PROPARACAINE HYDROCHLORIDE 5 MG/ML
SOLUTION/ DROPS OPHTHALMIC PRN
Status: DISCONTINUED | OUTPATIENT
Start: 2024-05-24 | End: 2024-05-24 | Stop reason: HOSPADM

## 2024-05-24 RX ORDER — TROPICAMIDE 10 MG/ML
1 SOLUTION/ DROPS OPHTHALMIC
Status: COMPLETED | OUTPATIENT
Start: 2024-05-24 | End: 2024-05-24

## 2024-05-24 RX ORDER — SODIUM CHLORIDE, SODIUM LACTATE, POTASSIUM CHLORIDE, CALCIUM CHLORIDE 600; 310; 30; 20 MG/100ML; MG/100ML; MG/100ML; MG/100ML
INJECTION, SOLUTION INTRAVENOUS CONTINUOUS
Status: CANCELLED | OUTPATIENT
Start: 2024-05-24

## 2024-05-24 RX ORDER — LIDOCAINE 40 MG/G
CREAM TOPICAL
Status: DISCONTINUED | OUTPATIENT
Start: 2024-05-24 | End: 2024-05-24 | Stop reason: HOSPADM

## 2024-05-24 RX ADMIN — CYCLOPENTOLATE HYDROCHLORIDE AND PHENYLEPHRINE HYDROCHLORIDE 1 DROP: 2; 10 SOLUTION/ DROPS OPHTHALMIC at 08:34

## 2024-05-24 RX ADMIN — TROPICAMIDE 1 DROP: 10 SOLUTION/ DROPS OPHTHALMIC at 08:39

## 2024-05-24 RX ADMIN — TROPICAMIDE 1 DROP: 10 SOLUTION/ DROPS OPHTHALMIC at 08:34

## 2024-05-24 RX ADMIN — SODIUM CHLORIDE, POTASSIUM CHLORIDE, SODIUM LACTATE AND CALCIUM CHLORIDE: 600; 310; 30; 20 INJECTION, SOLUTION INTRAVENOUS at 08:47

## 2024-05-24 RX ADMIN — TROPICAMIDE 1 DROP: 10 SOLUTION/ DROPS OPHTHALMIC at 08:47

## 2024-05-24 RX ADMIN — CYCLOPENTOLATE HYDROCHLORIDE AND PHENYLEPHRINE HYDROCHLORIDE 1 DROP: 2; 10 SOLUTION/ DROPS OPHTHALMIC at 08:47

## 2024-05-24 RX ADMIN — MIDAZOLAM 2 MG: 1 INJECTION INTRAMUSCULAR; INTRAVENOUS at 09:37

## 2024-05-24 RX ADMIN — CYCLOPENTOLATE HYDROCHLORIDE AND PHENYLEPHRINE HYDROCHLORIDE 1 DROP: 2; 10 SOLUTION/ DROPS OPHTHALMIC at 08:39

## 2024-05-24 ASSESSMENT — ACTIVITIES OF DAILY LIVING (ADL)
ADLS_ACUITY_SCORE: 35

## 2024-05-24 NOTE — TELEPHONE ENCOUNTER
"ANTICOAGULATION  MANAGEMENT: Discharge Review    Nayely Kay chart reviewed for anticoagulation continuity of care    Hospital Admission on 5/24/24 for cataract extraction with intraocular lens placement .    Discharge disposition: Home    Results:    No results for input(s): \"INR\", \"[id number]\", \"F2\", \"ALMWH\", \"AAUFH\" in the last 168 hours.  Anticoagulation inpatient management:     not applicable     Anticoagulation discharge instructions:     Warfarin dosing: home regimen continued   Bridging: No   INR goal change: No      Medication changes affecting anticoagulation: No    Additional factors affecting anticoagulation: No     PLAN     No adjustment to anticoagulation plan needed    Spoke with male - no changes notes and patient can call [facility] if further changes were made  - INR 5/28 scheduled previously     Anticoagulation Calendar updated    Luz Elena Barrios RN   "

## 2024-05-24 NOTE — ANESTHESIA CARE TRANSFER NOTE
Patient: Nayely Kay    Procedure: Procedure(s):  Cataract Extraction with Intraocular Lens Placement       Diagnosis: Nuclear sclerosis of right eye [H25.11]  Diagnosis Additional Information: No value filed.    Anesthesia Type:   MAC     Note:    Oropharynx: oropharynx clear of all foreign objects and spontaneously breathing  Level of Consciousness: awake  Oxygen Supplementation: room air    Independent Airway: airway patency satisfactory and stable  Dentition: dentition unchanged  Vital Signs Stable: post-procedure vital signs reviewed and stable  Report to RN Given: handoff report given  Patient transferred to: Phase II    Handoff Report: Identifed the Patient, Identified the Reponsible Provider, Reviewed the pertinent medical history, Discussed the surgical course, Reviewed Intra-OP anesthesia mangement and issues during anesthesia, Set expectations for post-procedure period and Allowed opportunity for questions and acknowledgement of understanding      Vitals:  Vitals Value Taken Time   BP     Temp     Pulse     Resp     SpO2         Electronically Signed By: NADER Dolan CRNA  May 24, 2024  9:58 AM

## 2024-05-24 NOTE — OP NOTE
OPHTHALMOLOGY OPERATIVE NOTE    PATIENT: Nayely Kay  DATE OF SURGERY: 5/24/2024  PREOPERATIVE DIAGNOSIS:  Senile Nuclear Cataract, Right eye  POSTOPERATIVE DIAGNOSIS:  Senile Nuclear Cataract, Right eye  OPERATIVE PROCEDURE:  Phacoemulsification with placement of intraocular lens  SURGEON:  Jareth Stoll MD  ANESTHESIA:  Topical / MAC  EBL:  None  SPECIMENS:  None  COMPLICATIONS:  None    PROCEDURE:  The patient was brought to the operating room at [facility].  The right eye was prepped and draped in the usual fashion for cataract surgery.  A wire lid speculum was inserted.  A super sharp blade was used to make a paracentesis at the 11 O'clock position.  The super sharp blade was used to make a partial thickness temporal groove, which was 3 mm in length.  0.8 mL of non-preserved epi-Shugarcaine was injected into the anterior chamber.  Viscoelastic was used to inflate the anterior chamber through a cannula.  A 2.5 mm microkeratome was used to make a temporal clear corneal incision in a two-plane fashion.  A cystotome needle and forceps were used to make a capsulorrhexis.  Hydrodissection and hydrodelineation were performed with Balance Salt Solution.  The lens was then phacoemulsified and removed without complications.  The cortical material was removed with bimanual irrigation and aspiration.  The capsular bag was filled with viscoelastic.  A posterior chamber intraocular lens, preselected and recorded, was folded and inserted into the capsular bag.  The viscoelastic was removed with the irrigation and aspiration tip.  Balanced Salt Solution with Vigamox, 150mg/0.1mL, was used to refill the anterior chamber.  The wounds were checked for water tightness and required no suture.  The wire lid speculum was removed.  The patient's right eye was cleaned and a drop of each post-operative drop was placed, followed by a hoffman shield.  The patient tolerated the procedure well, and there  were no complications.      Jareth Stoll MD

## 2024-05-24 NOTE — ANESTHESIA POSTPROCEDURE EVALUATION
Patient: Nayely Kay    Procedure: Procedure(s):  Cataract Extraction with Intraocular Lens Placement       Anesthesia Type:  MAC    Note:  Disposition: Outpatient   Postop Pain Control: Uneventful            Sign Out: Well controlled pain   PONV: No   Neuro/Psych: Uneventful            Sign Out: Acceptable/Baseline neuro status   Airway/Respiratory: Uneventful            Sign Out: Acceptable/Baseline resp. status   CV/Hemodynamics: Uneventful            Sign Out: Acceptable CV status; No obvious hypovolemia; No obvious fluid overload   Other NRE: NONE   DID A NON-ROUTINE EVENT OCCUR? No           Last vitals:  Vitals:    05/24/24 0804   BP: (!) 146/76   Pulse: 55   Resp: 15   Temp: 36.7  C (98  F)   SpO2: 100%       Electronically Signed By: NADER Dolan CRNA  May 24, 2024  9:58 AM

## 2024-05-28 ENCOUNTER — LAB (OUTPATIENT)
Dept: LAB | Facility: CLINIC | Age: 84
End: 2024-05-28
Payer: COMMERCIAL

## 2024-05-28 ENCOUNTER — ANTICOAGULATION THERAPY VISIT (OUTPATIENT)
Dept: ANTICOAGULATION | Facility: CLINIC | Age: 84
End: 2024-05-28

## 2024-05-28 ENCOUNTER — ALLIED HEALTH/NURSE VISIT (OUTPATIENT)
Dept: FAMILY MEDICINE | Facility: CLINIC | Age: 84
End: 2024-05-28

## 2024-05-28 VITALS — SYSTOLIC BLOOD PRESSURE: 134 MMHG | DIASTOLIC BLOOD PRESSURE: 68 MMHG

## 2024-05-28 DIAGNOSIS — I48.20 CHRONIC ATRIAL FIBRILLATION (H): ICD-10-CM

## 2024-05-28 DIAGNOSIS — I48.20 CHRONIC ATRIAL FIBRILLATION (H): Primary | ICD-10-CM

## 2024-05-28 DIAGNOSIS — Z79.01 LONG TERM CURRENT USE OF ANTICOAGULANTS WITH INR GOAL OF 2.0-3.0: ICD-10-CM

## 2024-05-28 DIAGNOSIS — Z01.30 BLOOD PRESSURE CHECK: Primary | ICD-10-CM

## 2024-05-28 LAB — INR BLD: 1.3 (ref 0.9–1.1)

## 2024-05-28 PROCEDURE — 36416 COLLJ CAPILLARY BLOOD SPEC: CPT

## 2024-05-28 PROCEDURE — 99207 PR NO CHARGE NURSE ONLY: CPT | Performed by: NURSE PRACTITIONER

## 2024-05-28 PROCEDURE — 85610 PROTHROMBIN TIME: CPT

## 2024-05-28 NOTE — PROGRESS NOTES
Nayely Kay was evaluated at [facility] on May 28, 2024 at which time her blood pressure was:    BP Readings from Last 1 Encounters:   05/28/24 134/68     No data recorded      Reviewed lifestyle modifications for blood pressure control and reduction: including making healthy food choices, managing weight, getting regular exercise, smoking cessation, reducing alcohol consumption, monitoring blood pressure regularly.     Symptoms: None    BP Goal:< 140/90 mmHg    BP Assessment:  BP at goal    Potential Reasons for BP too high: NA - Not applicable    BP Follow-Up Plan: Recheck BP in 6 months at pharmacy    Recommendation to Provider: Nayely is at goal today. Recommend recheck in 6 months     Note completed by: Stormy Soriano RPH

## 2024-05-28 NOTE — PROGRESS NOTES
ANTICOAGULATION MANAGEMENT     Nayely Kay 84 year old female is on warfarin with subtherapeutic INR result. (Goal INR 2.0-3.0)    Recent labs: (last 7 days)     05/28/24  1012   INR 1.3*       ASSESSMENT     Source(s): Chart Review and Patient/Caregiver Call     Warfarin doses taken: Warfarin taken as instructed  Diet: No new diet changes identified  Medication/supplement changes: None noted  New illness, injury, or hospitalization: Yes: had cataract removed 5/24/24  Signs or symptoms of bleeding or clotting: No  Previous result: Subtherapeutic  Additional findings: None       PLAN     Recommended plan for no diet, medication or health factor changes affecting INR     Dosing Instructions: Increase your warfarin dose (18.2% change) with next INR in 1 week       Summary  As of 5/28/2024      Full warfarin instructions:  1.25 mg every Fri; 2.5 mg all other days   Next INR check:  6/4/2024               Telephone call with Nayely who verbalizes understanding and agrees to plan    Lab visit scheduled    Education provided:   Goal range and lab monitoring: goal range and significance of current result    Plan made per [facility] anticoagulation protocol    France Ashraf RN  Anticoagulation Clinic  5/28/2024    _______________________________________________________________________     Anticoagulation Episode Summary       Current INR goal:  2.0-3.0   TTR:  71.0% (1 y)   Target end date:  Indefinite   Send INR reminders to:  [facility]    Indications    Chronic atrial fibrillation (H) [I48.20]  Long term current use of anticoagulants with INR goal of 2.0-3.0 [Z79.01]             Comments:               Anticoagulation Care Providers       Provider Role Specialty Phone number    Ulysses Baker MD Referring Family Medicine     Oswaldo Cedillo APRN CNP Referring Family Medicine [phone]    Haritha Whiting PA-C Referring Family Medicine [phone]    Tiff Yarbrough APRN CNP Referring Family  Medicine [phone]

## 2024-06-04 ENCOUNTER — ANTICOAGULATION THERAPY VISIT (OUTPATIENT)
Dept: ANTICOAGULATION | Facility: CLINIC | Age: 84
End: 2024-06-04

## 2024-06-04 ENCOUNTER — ALLIED HEALTH/NURSE VISIT (OUTPATIENT)
Dept: FAMILY MEDICINE | Facility: CLINIC | Age: 84
End: 2024-06-04

## 2024-06-04 ENCOUNTER — LAB (OUTPATIENT)
Dept: LAB | Facility: CLINIC | Age: 84
End: 2024-06-04
Payer: COMMERCIAL

## 2024-06-04 VITALS — SYSTOLIC BLOOD PRESSURE: 138 MMHG | DIASTOLIC BLOOD PRESSURE: 66 MMHG

## 2024-06-04 DIAGNOSIS — I10 ESSENTIAL HYPERTENSION: Primary | ICD-10-CM

## 2024-06-04 DIAGNOSIS — Z79.01 LONG TERM CURRENT USE OF ANTICOAGULANTS WITH INR GOAL OF 2.0-3.0: ICD-10-CM

## 2024-06-04 DIAGNOSIS — I48.20 CHRONIC ATRIAL FIBRILLATION (H): ICD-10-CM

## 2024-06-04 DIAGNOSIS — I48.20 CHRONIC ATRIAL FIBRILLATION (H): Primary | ICD-10-CM

## 2024-06-04 LAB — INR BLD: 1.5 (ref 0.9–1.1)

## 2024-06-04 PROCEDURE — 99207 PR NO CHARGE NURSE ONLY: CPT | Performed by: NURSE PRACTITIONER

## 2024-06-04 PROCEDURE — 36416 COLLJ CAPILLARY BLOOD SPEC: CPT

## 2024-06-04 PROCEDURE — 85610 PROTHROMBIN TIME: CPT

## 2024-06-04 NOTE — PROGRESS NOTES
Nayely Kay was evaluated at [facility] on June 4, 2024 at which time her blood pressure was:    BP Readings from Last 3 Encounters:   06/04/24 138/66   05/28/24 134/68   05/24/24 (!) 157/79     Pulse Readings from Last 3 Encounters:   05/24/24 60   04/18/24 74   04/05/24 72       Reviewed lifestyle modifications for blood pressure control and reduction: including making healthy food choices, managing weight, getting regular exercise, smoking cessation, reducing alcohol consumption, monitoring blood pressure regularly.     Symptoms: None    BP Goal:< 140/90 mmHg    BP Assessment:  BP at goal    Potential Reasons for BP too high: NA - Not applicable    BP Follow-Up Plan: Recheck BP in 6 months at pharmacy    Recommendation to Provider: Continue current medication regimen    Note completed by:  Claudia Estrada, PharmD  Staff Pharmacist  [facility]  [phone]

## 2024-06-04 NOTE — PROGRESS NOTES
ANTICOAGULATION MANAGEMENT     Nayely Kay 84 year old female is on warfarin with subtherapeutic INR result. (Goal INR 2.0-3.0)    Recent labs: (last 7 days)     06/04/24  1325   INR 1.5*       ASSESSMENT     Source(s): Chart Review  Previous INR was Subtherapeutic  Medication, diet, health changes since last INR chart reviewed; none identified         PLAN     Unable to reach Nayely today.    No instructions provided. Unable to leave voicemail.    Follow up required to confirm warfarin dose taken and assess for changes and discuss out of range result     Flaquita Neal RN  Anticoagulation Clinic  6/4/2024       Patient/Caregiver provided printed discharge information.

## 2024-06-05 ENCOUNTER — TELEPHONE (OUTPATIENT)
Dept: ANTICOAGULATION | Facility: CLINIC | Age: 84
End: 2024-06-05
Payer: COMMERCIAL

## 2024-06-05 NOTE — PROGRESS NOTES
Attempted to reach patient and still states number not in service.  Does not have a [employer] account. Patient did call in to the clinic earlier today. Writer called back and received message that number is not in service.    Marlin Nunn RN  [facility] Anticoagulation [facility]

## 2024-06-05 NOTE — TELEPHONE ENCOUNTER
See [facility] encounter.    Marlin Nunn RN  [facility] Anticoagulation [facility]

## 2024-06-05 NOTE — PROGRESS NOTES
Attempted to reach patient and number listed for patient and her daughter is not in service.  Did attempt to reach her bother listed as emergency contact and his voicemail is full. Unable to leave a message.    Marlin Nunn RN  [facility] Anticoagulation Clinic

## 2024-06-05 NOTE — TELEPHONE ENCOUNTER
Attempted to reach patient and says # not is service.    Marlin Nunn RN  [facility] Anticoagulation [facility]

## 2024-06-05 NOTE — PROGRESS NOTES
ANTICOAGULATION MANAGEMENT     Nayely Kay 84 year old female is on warfarin with subtherapeutic INR result. (Goal INR 2.0-3.0)    Recent labs: (last 7 days)     06/04/24  1325   INR 1.5*       ASSESSMENT     Source(s): Chart Review and Patient/Caregiver Call     Warfarin doses taken: Warfarin taken as instructed  Diet: No new diet changes identified states she is pretty consistent with green intake   Medication/supplement changes: None noted  New illness, injury, or hospitalization: No  Signs or symptoms of bleeding or clotting: No  Previous result: Subtherapeutic  Additional findings:  [facility] staff has been trying to reach out to patient and her number states that it is not in service, patient called into the clinic and she was calling from her phone, number was verified and is correct, she is unsure why it would say that.  She was advised that when she comes back next week that she will reach out to us around 1 PM if she does not hear from us prior to that. She will also check on her phone line.        PLAN     Recommended plan for no diet, medication or health factor changes affecting INR     Dosing Instructions: Increase your warfarin dose (15% change) with next INR in 10 days   wanted a morning appointment and this was the best day that would work for her     Summary  As of 6/4/2024      Full warfarin instructions:  6/5: 3.75 mg; Otherwise 3.75 mg every Wed; 2.5 mg all other days   Next INR check:  6/14/2024               Telephone call with Nayely who verbalizes understanding and agrees to plan    Lab visit scheduled    Education provided:   Goal range and lab monitoring: goal range and significance of current result  Dietary considerations: importance of consistent vitamin K intake  Contact [phone]  with any changes, questions or concerns.     Plan made per [facility] anticoagulation protocol    Marlin Nunn, RN  Anticoagulation  Clinic  6/5/2024    _______________________________________________________________________     Anticoagulation Episode Summary       Current INR goal:  2.0-3.0   TTR:  69.0% (1 y)   Target end date:  Indefinite   Send INR reminders to:  [facility]    Indications    Chronic atrial fibrillation (H) [I48.20]  Long term current use of anticoagulants with INR goal of 2.0-3.0 [Z79.01]             Comments:               Anticoagulation Care Providers       Provider Role Specialty Phone number    Ulysses Baker MD Referring Family Medicine     Oswaldo Cedillo APRN CNP Referring Family Medicine [phone]    Haritha Whiting PA-C Referring Family Medicine [phone]    Tiff Yarbrough APRN CNP Referring Family Medicine [phone]

## 2024-06-05 NOTE — TELEPHONE ENCOUNTER
General Call    Contacts         Type Contact Phone/Fax    06/05/2024 11:13 AM CDT Phone (Incoming) Nayely Kay (Self) [phone] (H)     Returning call from INR care team          Reason for Call:  INR    What are your questions or concerns:  Returning call from INR care team    If unable to answer the first time, please call back right away    Date of last appointment with provider: N/A    Okay to leave a detailed message?: No at Home number on file [phone] ([place])

## 2024-06-06 ENCOUNTER — TRANSFERRED RECORDS (OUTPATIENT)
Dept: HEALTH INFORMATION MANAGEMENT | Facility: CLINIC | Age: 84
End: 2024-06-06
Payer: COMMERCIAL

## 2024-06-14 ENCOUNTER — ALLIED HEALTH/NURSE VISIT (OUTPATIENT)
Dept: FAMILY MEDICINE | Facility: CLINIC | Age: 84
End: 2024-06-14

## 2024-06-14 ENCOUNTER — ANTICOAGULATION THERAPY VISIT (OUTPATIENT)
Dept: ANTICOAGULATION | Facility: CLINIC | Age: 84
End: 2024-06-14

## 2024-06-14 ENCOUNTER — LAB (OUTPATIENT)
Dept: LAB | Facility: CLINIC | Age: 84
End: 2024-06-14
Payer: COMMERCIAL

## 2024-06-14 VITALS — SYSTOLIC BLOOD PRESSURE: 138 MMHG | DIASTOLIC BLOOD PRESSURE: 72 MMHG

## 2024-06-14 DIAGNOSIS — I48.20 CHRONIC ATRIAL FIBRILLATION (H): Primary | ICD-10-CM

## 2024-06-14 DIAGNOSIS — I10 ESSENTIAL HYPERTENSION WITH GOAL BLOOD PRESSURE LESS THAN 140/90: Primary | ICD-10-CM

## 2024-06-14 DIAGNOSIS — I48.20 CHRONIC ATRIAL FIBRILLATION (H): ICD-10-CM

## 2024-06-14 DIAGNOSIS — Z79.01 LONG TERM CURRENT USE OF ANTICOAGULANTS WITH INR GOAL OF 2.0-3.0: ICD-10-CM

## 2024-06-14 LAB — INR BLD: 3.5 (ref 0.9–1.1)

## 2024-06-14 PROCEDURE — 99207 PR NO CHARGE NURSE ONLY: CPT | Performed by: NURSE PRACTITIONER

## 2024-06-14 PROCEDURE — 36416 COLLJ CAPILLARY BLOOD SPEC: CPT

## 2024-06-14 PROCEDURE — 85610 PROTHROMBIN TIME: CPT

## 2024-06-14 NOTE — PROGRESS NOTES
ANTICOAGULATION MANAGEMENT     Nayely Kay 84 year old female is on warfarin with supratherapeutic INR result. (Goal INR 2.0-3.0)    Recent labs: (last 7 days)     06/14/24  1036   INR 3.5*       ASSESSMENT     Source(s): Chart Review and Patient/Caregiver Call     Warfarin doses taken: Warfarin taken as instructed  Diet: No new diet changes identified  Medication/supplement changes: None noted  New illness, injury, or hospitalization: No  Signs or symptoms of bleeding or clotting: No  Previous result: Subtherapeutic  Additional findings: None       PLAN     Recommended plan for no diet, medication or health factor changes affecting INR     Dosing Instructions: hold dose then decrease your warfarin dose (6.7% change) with next INR in 2 weeks       Summary  As of 6/14/2024      Full warfarin instructions:  6/14: Hold; Otherwise 2.5 mg every day   Next INR check:  6/27/2024               Telephone call with Nayely who verbalizes understanding and agrees to plan    Lab visit scheduled    Education provided:   Goal range and lab monitoring: goal range and significance of current result    Plan made per [facility] anticoagulation protocol    France Ashraf RN  Anticoagulation Clinic  6/14/2024    _______________________________________________________________________     Anticoagulation Episode Summary       Current INR goal:  2.0-3.0   TTR:  67.7% (1 y)   Target end date:  Indefinite   Send INR reminders to:  [facility]    Indications    Chronic atrial fibrillation (H) [I48.20]  Long term current use of anticoagulants with INR goal of 2.0-3.0 [Z79.01]             Comments:               Anticoagulation Care Providers       Provider Role Specialty Phone number    Ulysses Baker MD Referring Family Medicine     Oswaldo Cedillo APRN CNP Referring Family Medicine [phone]    Haritha Whiting PA-C Referring Family Medicine [phone]    Tiff Yarbrough APRN CNP Referring Family Medicine  [phone]

## 2024-06-14 NOTE — PROGRESS NOTES
Nayely Kay was evaluated at [facility] on June 14, 2024 at which time her blood pressure was:    BP Readings from Last 1 Encounters:   06/14/24 138/72     No data recorded      Reviewed lifestyle modifications for blood pressure control and reduction: including making healthy food choices, managing weight, getting regular exercise, smoking cessation, reducing alcohol consumption, monitoring blood pressure regularly.     Symptoms: None    BP Goal:< 140/90 mmHg    BP Assessment:  BP at goal    Potential Reasons for BP too high: NA - Not applicable    BP Follow-Up Plan: Recheck BP in 6 months at pharmacy    Recommendation to Provider: Continue with current plan     Note completed by: Jory Lyons RPH

## 2024-06-27 ENCOUNTER — ANTICOAGULATION THERAPY VISIT (OUTPATIENT)
Dept: ANTICOAGULATION | Facility: CLINIC | Age: 84
End: 2024-06-27

## 2024-06-27 ENCOUNTER — LAB (OUTPATIENT)
Dept: LAB | Facility: CLINIC | Age: 84
End: 2024-06-27
Payer: COMMERCIAL

## 2024-06-27 ENCOUNTER — ALLIED HEALTH/NURSE VISIT (OUTPATIENT)
Dept: FAMILY MEDICINE | Facility: CLINIC | Age: 84
End: 2024-06-27

## 2024-06-27 ENCOUNTER — ANESTHESIA EVENT (OUTPATIENT)
Dept: SURGERY | Facility: CLINIC | Age: 84
End: 2024-06-27
Payer: COMMERCIAL

## 2024-06-27 VITALS — DIASTOLIC BLOOD PRESSURE: 60 MMHG | SYSTOLIC BLOOD PRESSURE: 138 MMHG

## 2024-06-27 DIAGNOSIS — Z79.01 LONG TERM CURRENT USE OF ANTICOAGULANTS WITH INR GOAL OF 2.0-3.0: ICD-10-CM

## 2024-06-27 DIAGNOSIS — I48.20 CHRONIC ATRIAL FIBRILLATION (H): ICD-10-CM

## 2024-06-27 DIAGNOSIS — I48.20 CHRONIC ATRIAL FIBRILLATION (H): Primary | ICD-10-CM

## 2024-06-27 DIAGNOSIS — I10 ESSENTIAL HYPERTENSION WITH GOAL BLOOD PRESSURE LESS THAN 140/90: Primary | ICD-10-CM

## 2024-06-27 LAB — INR BLD: 2.7 (ref 0.9–1.1)

## 2024-06-27 PROCEDURE — 99207 PR NO CHARGE NURSE ONLY: CPT | Performed by: NURSE PRACTITIONER

## 2024-06-27 PROCEDURE — 85610 PROTHROMBIN TIME: CPT

## 2024-06-27 PROCEDURE — 36416 COLLJ CAPILLARY BLOOD SPEC: CPT

## 2024-06-27 ASSESSMENT — ENCOUNTER SYMPTOMS: DYSRHYTHMIAS: 1

## 2024-06-27 NOTE — ANESTHESIA PREPROCEDURE EVALUATION
Anesthesia Pre-Procedure Evaluation    Patient: Nayely Kay   MRN: [id number] : 1940        Procedure : Procedure(s):  cataract extraction with intraocular lens placement          Past Medical History:   Diagnosis Date     Atrial fibrillation (H)      Migraine, unspecified, without mention of intractable migraine without mention of status migrainosus     Migraine HA     Squamous cell carcinoma      Unspecified essential hypertension      Unspecified tinnitus       Past Surgical History:   Procedure Laterality Date     COLONOSCOPY  2009     FLEXIBLE SIGMOIDOSCOPY  2004     PHACOEMULSIFICATION WITH STANDARD INTRAOCULAR LENS IMPLANT Right 2024    Procedure: Cataract Extraction with Intraocular Lens Placement;  Surgeon: Jareth Stoll MD;  Location: [facility]     SURGICAL HISTORY OF -       tubal     SURGICAL HISTORY OF -       oral teeth      Allergies   Allergen Reactions     Diltiazem Hcl Er Beads Rash      Social History     Tobacco Use     Smoking status: Never     Smokeless tobacco: Never   Substance Use Topics     Alcohol use: No      Wt Readings from Last 1 Encounters:   24 52.6 kg (116 lb)        Anesthesia Evaluation   Pt has had prior anesthetic. Type: General and MAC.    No history of anesthetic complications       ROS/MED HX  ENT/Pulmonary: Comment: Tinnitus      (+)     OSVALDO risk factors,  hypertension,                                 Neurologic:     (+)      migraines,                          Cardiovascular:     (+) Dyslipidemia hypertension- -   -  - -   Taking blood thinners                     dysrhythmias, a-fib,        Previous cardiac testing     METS/Exercise Tolerance:     Hematologic:  - neg hematologic  ROS     Musculoskeletal: Comment: rheumatoid  (+)  arthritis,             GI/Hepatic:  - neg GI/hepatic ROS     Renal/Genitourinary:     (+) renal disease, type: CRI, Pt does not require dialysis,           Endo:  - neg endo ROS     Psychiatric/Substance Use:  - neg  "psychiatric ROS     Infectious Disease:  - neg infectious disease ROS     Malignancy:   (+) Malignancy, History of Skin.Skin CA Remission status post Surgery.      Other:  - neg other ROS          Physical Exam    Airway  airway exam normal           Respiratory Devices and Support         Dental       (+) Removable bridges or other hardware      Cardiovascular          Rhythm and rate: irregular and normal     Pulmonary   pulmonary exam normal              OUTSIDE LABS:  CBC:   Lab Results   Component Value Date    WBC 7.8 05/10/2023    WBC 6.7 04/18/2022    HGB 12.7 03/22/2024    HGB 13.2 05/10/2023    HCT 40.3 05/10/2023    HCT 39.4 04/18/2022     05/10/2023     04/18/2022     BMP:   Lab Results   Component Value Date     03/22/2024     05/10/2023    POTASSIUM 4.3 03/22/2024    POTASSIUM 3.8 05/10/2023    CHLORIDE 100 03/22/2024    CHLORIDE 101 05/10/2023    CO2 26 03/22/2024    CO2 26 05/10/2023    BUN 35.5 (H) 03/22/2024    BUN 27.4 (H) 05/10/2023    CR 1.25 (H) 03/22/2024    CR 1.22 (H) 05/10/2023     (H) 03/22/2024     (H) 05/10/2023     COAGS:   Lab Results   Component Value Date    PTT 51 (H) 06/03/2010    INR 3.5 (H) 06/14/2024     POC: No results found for: \"BGM\", \"HCG\", \"HCGS\"  HEPATIC:   Lab Results   Component Value Date    ALBUMIN 4.0 07/27/2020    PROTTOTAL 8.0 07/27/2020    ALT 25 07/27/2020    AST 31 07/27/2020    GGT 38 06/18/2019    ALKPHOS 128 07/27/2020    BILITOTAL 1.7 (H) 07/27/2020     OTHER:   Lab Results   Component Value Date    HAIDER 10.1 03/22/2024    MAG 1.8 06/20/2006    TSH 2.76 07/05/2010    T4 1.03 07/05/2010     (H) 05/23/2008       Anesthesia Plan    ASA Status:  3    NPO Status:  NPO Appropriate    Anesthesia Type: MAC.     - Reason for MAC: straight local not clinically adequate   Induction: Intravenous.           Consents    Anesthesia Plan(s) and associated risks, benefits, and realistic alternatives discussed. Questions answered " and patient/representative(s) expressed understanding.     - Discussed: Risks, Benefits and Alternatives for BOTH SEDATION and the PROCEDURE were discussed     - Discussed with:  Patient, Other (See Comment) (daughter)      - Extended Intubation/Ventilatory Support Discussed: No.      - Patient is DNR/DNI Status: No     Use of blood products discussed: No .     Postoperative Care            Comments:               NADER Guillen CRNA    I have reviewed the pertinent notes and labs in the chart from the past 30 days and (re)examined the patient.  Any updates or changes from those notes are reflected in this note.            # Coagulation Defect: INR = 3.5 (Ref range: 0.9 - 1.1) and/or PTT = N/A, will monitor for bleeding

## 2024-06-27 NOTE — PROGRESS NOTES
Nayely Kay was evaluated at [facility] on June 27, 2024 at which time her blood pressure was:    BP Readings from Last 3 Encounters:   06/27/24 138/60   06/14/24 138/72   06/04/24 138/66     Pulse Readings from Last 3 Encounters:   05/24/24 60   04/18/24 74   04/05/24 72       Reviewed lifestyle modifications for blood pressure control and reduction: including making healthy food choices, managing weight, getting regular exercise, smoking cessation, reducing alcohol consumption, monitoring blood pressure regularly.     Symptoms: None    BP Goal:< 140/90 mmHg    BP Assessment:  BP at goal    Potential Reasons for BP too high: NA - Not applicable    BP Follow-Up Plan: Recheck BP in 6 months at pharmacy    Recommendation to Provider: Continue current medication regimen.    Note completed by:  Claudia Estrada, PharmD  Staff Pharmacist  [facility]  [phone]

## 2024-06-27 NOTE — PROGRESS NOTES
ANTICOAGULATION MANAGEMENT     Nayely Kay 84 year old female is on warfarin with therapeutic INR result. (Goal INR 2.0-3.0)    Recent labs: (last 7 days)     06/27/24  1417   INR 2.7*       ASSESSMENT     Source(s): Chart Review and Patient/Caregiver Call     Warfarin doses taken: Warfarin taken as instructed  Diet: No new diet changes identified  Medication/supplement changes: None noted  New illness, injury, or hospitalization: No  Signs or symptoms of bleeding or clotting: No  Previous result: Supratherapeutic  Additional findings: Upcoming surgery/procedure cataract surgery tomorrow 6/28/24  No changed to dosing per patient     PLAN     Recommended plan for no diet, medication or health factor changes affecting INR     Dosing Instructions: Continue your current warfarin dose with next INR in 2 weeks       Summary  As of 6/27/2024      Full warfarin instructions:  2.5 mg every day   Next INR check:  7/10/2024               Telephone call with Nayely who verbalizes understanding and agrees to plan    Check at provider office visit    Education provided: Please call back if any changes to your diet, medications or how you've been taking warfarin    Plan made per [facility] anticoagulation protocol    Luz Elena Barrios RN  Anticoagulation Clinic  6/27/2024    _______________________________________________________________________     Anticoagulation Episode Summary       Current INR goal:  2.0-3.0   TTR:  65.5% (1 y)   Target end date:  Indefinite   Send INR reminders to:  [facility]    Indications    Chronic atrial fibrillation (H) [I48.20]  Long term current use of anticoagulants with INR goal of 2.0-3.0 [Z79.01]             Comments:               Anticoagulation Care Providers       Provider Role Specialty Phone number    Ulysses Baker MD Referring Family Medicine     Oswaldo Cedillo APRN CNP Referring Family Medicine [phone]    Haritha Whiting PA-C Referring Family Medicine  [phone]    Tiff Yarbrough APRN [facility] [phone]

## 2024-06-28 ENCOUNTER — DOCUMENTATION ONLY (OUTPATIENT)
Dept: ANTICOAGULATION | Facility: CLINIC | Age: 84
End: 2024-06-28
Payer: COMMERCIAL

## 2024-06-28 ENCOUNTER — ANESTHESIA (OUTPATIENT)
Dept: SURGERY | Facility: CLINIC | Age: 84
End: 2024-06-28
Payer: COMMERCIAL

## 2024-06-28 ENCOUNTER — HOSPITAL ENCOUNTER (OUTPATIENT)
Facility: CLINIC | Age: 84
Discharge: HOME | End: 2024-06-28
Attending: OPHTHALMOLOGY | Admitting: OPHTHALMOLOGY
Payer: COMMERCIAL

## 2024-06-28 VITALS
HEART RATE: 53 BPM | SYSTOLIC BLOOD PRESSURE: 123 MMHG | OXYGEN SATURATION: 99 % | BODY MASS INDEX: 24.22 KG/M2 | DIASTOLIC BLOOD PRESSURE: 91 MMHG | TEMPERATURE: 97.6 F | WEIGHT: 120 LBS | RESPIRATION RATE: 16 BRPM

## 2024-06-28 PROCEDURE — 250N000009 HC RX 250: Performed by: OPHTHALMOLOGY

## 2024-06-28 PROCEDURE — 370N000004 HC ANESTHESIA CATARACT PACKAGE: Performed by: OPHTHALMOLOGY

## 2024-06-28 PROCEDURE — 250N000009 HC RX 250

## 2024-06-28 PROCEDURE — 250N000011 HC RX IP 250 OP 636: Performed by: OPHTHALMOLOGY

## 2024-06-28 PROCEDURE — 258N000003 HC RX IP 258 OP 636

## 2024-06-28 PROCEDURE — 761N000008 HC RECOVERY CATRACT PACKAGE: Performed by: OPHTHALMOLOGY

## 2024-06-28 PROCEDURE — V2632 POST CHMBR INTRAOCULAR LENS: HCPCS | Performed by: OPHTHALMOLOGY

## 2024-06-28 PROCEDURE — 250N000011 HC RX IP 250 OP 636: Performed by: NURSE ANESTHETIST, CERTIFIED REGISTERED

## 2024-06-28 PROCEDURE — 360N000007 HC CATARACT SURGICAL PACKAGE: Performed by: OPHTHALMOLOGY

## 2024-06-28 RX ORDER — DEXAMETHASONE SODIUM PHOSPHATE 4 MG/ML
4 INJECTION, SOLUTION INTRA-ARTICULAR; INTRALESIONAL; INTRAMUSCULAR; INTRAVENOUS; SOFT TISSUE
Status: DISCONTINUED | OUTPATIENT
Start: 2024-06-28 | End: 2024-06-28 | Stop reason: HOSPADM

## 2024-06-28 RX ORDER — BALANCED SALT SOLUTION 6.4; .75; .48; .3; 3.9; 1.7 MG/ML; MG/ML; MG/ML; MG/ML; MG/ML; MG/ML
SOLUTION OPHTHALMIC PRN
Status: DISCONTINUED | OUTPATIENT
Start: 2024-06-28 | End: 2024-06-28 | Stop reason: HOSPADM

## 2024-06-28 RX ORDER — TROPICAMIDE 10 MG/ML
1 SOLUTION/ DROPS OPHTHALMIC
Status: COMPLETED | OUTPATIENT
Start: 2024-06-28 | End: 2024-06-28

## 2024-06-28 RX ORDER — NALOXONE HYDROCHLORIDE 0.4 MG/ML
0.1 INJECTION, SOLUTION INTRAMUSCULAR; INTRAVENOUS; SUBCUTANEOUS
Status: DISCONTINUED | OUTPATIENT
Start: 2024-06-28 | End: 2024-06-28 | Stop reason: HOSPADM

## 2024-06-28 RX ORDER — LIDOCAINE HYDROCHLORIDE 20 MG/ML
JELLY TOPICAL PRN
Status: DISCONTINUED | OUTPATIENT
Start: 2024-06-28 | End: 2024-06-28 | Stop reason: HOSPADM

## 2024-06-28 RX ORDER — SODIUM CHLORIDE, SODIUM LACTATE, POTASSIUM CHLORIDE, CALCIUM CHLORIDE 600; 310; 30; 20 MG/100ML; MG/100ML; MG/100ML; MG/100ML
INJECTION, SOLUTION INTRAVENOUS CONTINUOUS
Status: DISCONTINUED | OUTPATIENT
Start: 2024-06-28 | End: 2024-06-28 | Stop reason: HOSPADM

## 2024-06-28 RX ORDER — ONDANSETRON 2 MG/ML
4 INJECTION INTRAMUSCULAR; INTRAVENOUS EVERY 30 MIN PRN
Status: DISCONTINUED | OUTPATIENT
Start: 2024-06-28 | End: 2024-06-28 | Stop reason: HOSPADM

## 2024-06-28 RX ORDER — SODIUM CHLORIDE, SODIUM LACTATE, POTASSIUM CHLORIDE, CALCIUM CHLORIDE 600; 310; 30; 20 MG/100ML; MG/100ML; MG/100ML; MG/100ML
INJECTION, SOLUTION INTRAVENOUS CONTINUOUS
Status: CANCELLED | OUTPATIENT
Start: 2024-06-28

## 2024-06-28 RX ORDER — LIDOCAINE 40 MG/G
CREAM TOPICAL
Status: DISCONTINUED | OUTPATIENT
Start: 2024-06-28 | End: 2024-06-28 | Stop reason: HOSPADM

## 2024-06-28 RX ORDER — PROPARACAINE HYDROCHLORIDE 5 MG/ML
SOLUTION/ DROPS OPHTHALMIC PRN
Status: DISCONTINUED | OUTPATIENT
Start: 2024-06-28 | End: 2024-06-28 | Stop reason: HOSPADM

## 2024-06-28 RX ORDER — ONDANSETRON 4 MG/1
4 TABLET, ORALLY DISINTEGRATING ORAL EVERY 30 MIN PRN
Status: DISCONTINUED | OUTPATIENT
Start: 2024-06-28 | End: 2024-06-28 | Stop reason: HOSPADM

## 2024-06-28 RX ADMIN — LIDOCAINE HYDROCHLORIDE 0.2 ML: 10 INJECTION, SOLUTION EPIDURAL; INFILTRATION; INTRACAUDAL; PERINEURAL at 09:03

## 2024-06-28 RX ADMIN — TROPICAMIDE 1 DROP: 10 SOLUTION/ DROPS OPHTHALMIC at 08:52

## 2024-06-28 RX ADMIN — TROPICAMIDE 1 DROP: 10 SOLUTION/ DROPS OPHTHALMIC at 08:46

## 2024-06-28 RX ADMIN — CYCLOPENTOLATE HYDROCHLORIDE AND PHENYLEPHRINE HYDROCHLORIDE 1 DROP: 2; 10 SOLUTION/ DROPS OPHTHALMIC at 08:52

## 2024-06-28 RX ADMIN — CYCLOPENTOLATE HYDROCHLORIDE AND PHENYLEPHRINE HYDROCHLORIDE 1 DROP: 2; 10 SOLUTION/ DROPS OPHTHALMIC at 08:46

## 2024-06-28 RX ADMIN — TROPICAMIDE 1 DROP: 10 SOLUTION/ DROPS OPHTHALMIC at 09:02

## 2024-06-28 RX ADMIN — CYCLOPENTOLATE HYDROCHLORIDE AND PHENYLEPHRINE HYDROCHLORIDE 1 DROP: 2; 10 SOLUTION/ DROPS OPHTHALMIC at 09:02

## 2024-06-28 RX ADMIN — SODIUM CHLORIDE, POTASSIUM CHLORIDE, SODIUM LACTATE AND CALCIUM CHLORIDE: 600; 310; 30; 20 INJECTION, SOLUTION INTRAVENOUS at 09:03

## 2024-06-28 RX ADMIN — MIDAZOLAM 1 MG: 1 INJECTION INTRAMUSCULAR; INTRAVENOUS at 09:56

## 2024-06-28 RX ADMIN — MIDAZOLAM 1 MG: 1 INJECTION INTRAMUSCULAR; INTRAVENOUS at 09:58

## 2024-06-28 ASSESSMENT — ACTIVITIES OF DAILY LIVING (ADL)
ADLS_ACUITY_SCORE: 20
ADLS_ACUITY_SCORE: 20

## 2024-06-28 NOTE — OP NOTE
CATARACT OPERATIVE NOTE    PATIENT: Nayely Kay  DATE OF SURGERY: 6/28/2024  PREOPERATIVE DIAGNOSIS:  Senile Nuclear Cataract, Left eye  POSTOPERATIVE DIAGNOSIS:  Senile Nuclear Cataract, Left eye  OPERATIVE PROCEDURE:  Phacoemulsification and placement of intraocular lens  SURGEON:  Jareth Stoll MD  ANESTHESIA:  Topical / MAC  EBL:  None  SPECIMENS:  None  COMPLICATIONS:  None    PROCEDURE:  The patient was brought to the operating room at [facility].  The left eye was prepped and draped in the usual fashion for cataract surgery.  A wire lid speculum was inserted.  A super sharp blade was used to make a paracentesis at the 5 O'clock position.  The super sharp blade was used to make a partial thickness temporal groove, which was 3 mm in length.  0.8 mL of non-preserved epi-Shugarcaine was injected into the anterior chamber.  Viscoelastic was used to inflate the anterior chamber through a cannula.  A 2.5 mm microkeratome was used to make a temporal clear corneal incision in a two-plane fashion.  A cystotome needle and forceps were used to make a capsulorrhexis.  Hydrodissection and hydrodelineation were performed with Balance Salt Solution.  The lens was then phacoemulsified and removed without complications.  The cortical material was removed with bimanual irrigation and aspiration.  The capsular bag was filled with viscoelastic.  A posterior chamber intraocular lens, preselected and recorded, was folded and inserted into the capsular bag.  The viscoelastic was removed with the irrigation and aspiration tip.  Balanced Salt Solution with Vigamox, 150mg/0.1mL, was used to refill the anterior chamber.  The wounds were checked for water tightness and required no suture.  The wire lid speculum was removed.  The patient's left eye was cleaned and a drop of each post-operative drop was placed, followed by a hoffman shield.  The patient tolerated the procedure well, and there were no  complications.      Jareth Stoll MD

## 2024-06-28 NOTE — PROGRESS NOTES
ANTICOAGULATION  MANAGEMENT: Discharge Review    Nayely Kay chart reviewed for anticoagulation continuity of care    Outpatient surgery/procedure on 6/28/24 for cataract surgery.    Discharge disposition: Home    Results:    Recent labs: (last 7 days)     06/27/24  1417   INR 2.7*     Anticoagulation inpatient management:     not applicable     Anticoagulation discharge instructions:     Warfarin dosing: home regimen continued   Bridging: No   INR goal change: No      Medication changes affecting anticoagulation: No    Additional factors affecting anticoagulation: No     PLAN     No adjustment to anticoagulation plan needed    Patient not contacted    No adjustment to Anticoagulation Calendar was required    Neha Parra RN

## 2024-06-28 NOTE — ANESTHESIA CARE TRANSFER NOTE
Patient: Nayely Kay    Procedure: Procedure(s):  cataract extraction with intraocular lens placement left eye       Diagnosis: Nuclear sclerosis of left eye [H25.12]  Diagnosis Additional Information: No value filed.    Anesthesia Type:   No value filed.     Note:    Oropharynx: oropharynx clear of all foreign objects  Level of Consciousness: awake  Oxygen Supplementation: room air    Independent Airway: airway patency satisfactory and stable  Dentition: dentition unchanged  Vital Signs Stable: post-procedure vital signs reviewed and stable  Report to RN Given: handoff report given  Patient transferred to: Phase II    Handoff Report: Identifed the Patient, Identified the Reponsible Provider, Reviewed the pertinent medical history, Discussed the surgical course, Reviewed Intra-OP anesthesia mangement and issues during anesthesia, Set expectations for post-procedure period and Allowed opportunity for questions and acknowledgement of understanding      Vitals:  Vitals Value Taken Time   BP     Temp     Pulse     Resp     SpO2         Electronically Signed By: NADER Vanegas CRNA  June 28, 2024  10:29 AM

## 2024-06-28 NOTE — ANESTHESIA POSTPROCEDURE EVALUATION
Patient: Nayely Kay    Procedure: Procedure(s):  cataract extraction with intraocular lens placement left eye       Anesthesia Type:  MAC    Note:  Disposition: Outpatient   Postop Pain Control: Uneventful            Sign Out: Well controlled pain   PONV: No   Neuro/Psych: Uneventful            Sign Out: Acceptable/Baseline neuro status   Airway/Respiratory: Uneventful            Sign Out: Acceptable/Baseline resp. status   CV/Hemodynamics: Uneventful            Sign Out: Acceptable CV status; No obvious hypovolemia; No obvious fluid overload   Other NRE: NONE   DID A NON-ROUTINE EVENT OCCUR? No           Last vitals:  Vitals Value Taken Time   BP     Temp     Pulse     Resp     SpO2         Electronically Signed By: NADER Vanegas CRNA  June 28, 2024  10:30 AM

## 2024-07-01 ENCOUNTER — TELEPHONE (OUTPATIENT)
Dept: FAMILY MEDICINE | Facility: CLINIC | Age: 84
End: 2024-07-01
Payer: COMMERCIAL

## 2024-07-01 NOTE — TELEPHONE ENCOUNTER
General Call    Contacts       Contact Date/Time Type Contact Phone/Fax    07/01/2024 08:20 AM CDT Phone (Incoming) Nayely Kay (Self) [phone] (H)          Reason for Call:  questions related to inr lab appt & if changes to dosage are needed.    What are your questions or concerns:  pt states she did not hear back from acn nurse team after recent inr so was wondering if she needed to make any changes. Also if she would be able to wait until her upcoming pc clinic appt to get labs done.   Writer did let pt know inr lab notes were added to upcoming appt    Date of last appointment with provider:     Okay to leave a detailed message?: Yes at [phone]

## 2024-07-01 NOTE — TELEPHONE ENCOUNTER
Spoke with Nayely and she just wanted to verify she can get her INR done at her office visit on 7/10/24. Explained she can do that and was already on notes for appointment.

## 2024-07-10 ENCOUNTER — ANTICOAGULATION THERAPY VISIT (OUTPATIENT)
Dept: ANTICOAGULATION | Facility: CLINIC | Age: 84
End: 2024-07-10

## 2024-07-10 ENCOUNTER — DOCUMENTATION ONLY (OUTPATIENT)
Dept: ANTICOAGULATION | Facility: CLINIC | Age: 84
End: 2024-07-10

## 2024-07-10 ENCOUNTER — OFFICE VISIT (OUTPATIENT)
Dept: FAMILY MEDICINE | Facility: CLINIC | Age: 84
End: 2024-07-10
Payer: COMMERCIAL

## 2024-07-10 VITALS
WEIGHT: 109 LBS | OXYGEN SATURATION: 93 % | HEART RATE: 62 BPM | BODY MASS INDEX: 21.97 KG/M2 | RESPIRATION RATE: 18 BRPM | HEIGHT: 59 IN | TEMPERATURE: 97.8 F | SYSTOLIC BLOOD PRESSURE: 138 MMHG | DIASTOLIC BLOOD PRESSURE: 82 MMHG

## 2024-07-10 DIAGNOSIS — Z79.01 LONG TERM CURRENT USE OF ANTICOAGULANTS WITH INR GOAL OF 2.0-3.0: ICD-10-CM

## 2024-07-10 DIAGNOSIS — I48.20 CHRONIC ATRIAL FIBRILLATION (H): Primary | ICD-10-CM

## 2024-07-10 DIAGNOSIS — I48.20 CHRONIC ATRIAL FIBRILLATION (H): ICD-10-CM

## 2024-07-10 DIAGNOSIS — Z00.00 ENCOUNTER FOR MEDICARE ANNUAL WELLNESS EXAM: Primary | ICD-10-CM

## 2024-07-10 LAB — INR BLD: 1.8 (ref 0.9–1.1)

## 2024-07-10 PROCEDURE — 36416 COLLJ CAPILLARY BLOOD SPEC: CPT | Performed by: NURSE PRACTITIONER

## 2024-07-10 PROCEDURE — G0439 PPPS, SUBSEQ VISIT: HCPCS | Performed by: NURSE PRACTITIONER

## 2024-07-10 PROCEDURE — 85610 PROTHROMBIN TIME: CPT | Performed by: NURSE PRACTITIONER

## 2024-07-10 RX ORDER — KETOROLAC TROMETHAMINE 5 MG/ML
SOLUTION OPHTHALMIC
COMMUNITY
Start: 2024-06-06

## 2024-07-10 RX ORDER — PREDNISOLONE ACETATE 10 MG/ML
SUSPENSION/ DROPS OPHTHALMIC
COMMUNITY
Start: 2024-06-29

## 2024-07-10 SDOH — HEALTH STABILITY: PHYSICAL HEALTH: ON AVERAGE, HOW MANY MINUTES DO YOU ENGAGE IN EXERCISE AT THIS LEVEL?: 30 MIN

## 2024-07-10 SDOH — HEALTH STABILITY: PHYSICAL HEALTH: ON AVERAGE, HOW MANY DAYS PER WEEK DO YOU ENGAGE IN MODERATE TO STRENUOUS EXERCISE (LIKE A BRISK WALK)?: 7 DAYS

## 2024-07-10 ASSESSMENT — PAIN SCALES - GENERAL: PAINLEVEL: NO PAIN (0)

## 2024-07-10 ASSESSMENT — SOCIAL DETERMINANTS OF HEALTH (SDOH): HOW OFTEN DO YOU GET TOGETHER WITH FRIENDS OR RELATIVES?: MORE THAN THREE TIMES A WEEK

## 2024-07-10 NOTE — PROGRESS NOTES
Preventive Care Visit  [facility]  Tiff VILLAGRAN, Alex DNP,, NADER CNP, Family Medicine  Jul 10, 2024      Assessment & Plan     Encounter for Medicare annual wellness exam  Very pleasant 84-year-old female in for annual Medicare wellness examination.  No acute concerns today.    Patient has been advised of split billing requirements and indicates understanding: Yes        Counseling  Appropriate preventive services were discussed with this patient, including applicable screening as appropriate for fall prevention, nutrition, physical activity, Tobacco-use cessation, weight loss and cognition.  Checklist reviewing preventive services available has been given to the patient.  Reviewed patient's diet, addressing concerns and/or questions.   The patient was instructed to see the dentist every 6 months.       See Patient Instructions    Janna Adler is a 84 year old, presenting for the following:  Wellness Visit and Health Maintenance (Discuss colon cancer screening. )        7/10/2024     6:47 AM   Additional Questions   Roomed by Meghna CLARK   Accompanied by self         7/10/2024     6:47 AM   Patient Reported Additional Medications   Patient reports taking the following new medications no new meds     Health Care Directive  Patient has a Health Care Directive on file  Advance care planning document is on file and is current.    HPI      Hypertension Follow-up    Do you check your blood pressure regularly outside of the clinic? No   Are you following a low salt diet? Yes  Are your blood pressures ever more than 140 on the top number (systolic) OR more   than 90 on the bottom number (diastolic), for example 140/90? No          7/10/2024   General Health   How would you rate your overall physical health? Good   Feel stress (tense, anxious, or unable to sleep) Not at all            7/10/2024   Nutrition   Diet: Low salt            7/10/2024   Exercise   Days per week of moderate/strenous exercise  7 days   Average minutes spent exercising at this level 30 min            7/10/2024   Social Factors   Frequency of gathering with friends or relatives More than three times a week   Worry food won't last until get money to buy more No   Food not last or not have enough money for food? No   Do you have housing? (Housing is defined as stable permanent housing and does not include staying ouside in a car, in a tent, in an abandoned building, in an overnight shelter, or couch-surfing.) Yes   Are you worried about losing your housing? No   Lack of transportation? No   Unable to get utilities (heat,electricity)? No            7/10/2024   Fall Risk   Fallen 2 or more times in the past year? No   Trouble with walking or balance? No             7/10/2024   Activities of Daily Living- Home Safety   Needs help with the following daily activites None of the above   Safety concerns in the home None of the above            7/10/2024   Dental   Dentist two times every year? (!) NO            7/10/2024   Hearing Screening   Hearing concerns? None of the above            7/10/2024   Driving Risk Screening   Patient/family members have concerns about driving No            7/10/2024   General Alertness/Fatigue Screening   Have you been more tired than usual lately? No            7/10/2024   Urinary Incontinence Screening   Bothered by leaking urine in past 6 months No            7/10/2024   TB Screening   Were you born outside of the [place]? No            Today's PHQ-2 Score:       7/10/2024     6:59 AM   PHQ-2 ( 1999 [employer])   Q1: Little interest or pleasure in doing things 0   Q2: Feeling down, depressed or hopeless 0   PHQ-2 Score 0   Q1: Little interest or pleasure in doing things Not at all   Q2: Feeling down, depressed or hopeless Not at all   PHQ-2 Score 0           7/10/2024   Substance Use   Alcohol more than 3/day or more than 7/wk No   Do you have a current opioid prescription? No   How severe/bad is pain from 1 to 10? 0/10  (No Pain)   Do you use any other substances recreationally? No        Social History     Tobacco Use    Smoking status: Never    Smokeless tobacco: Never   Vaping Use    Vaping status: Never Used   Substance Use Topics    Alcohol use: No    Drug use: No           3/5/2024   LAST FHS-7 RESULTS   1st degree relative breast or ovarian cancer No   Any relative bilateral breast cancer No   Any male have breast cancer No   Any ONE woman have BOTH breast AND ovarian cancer No   Any woman with breast cancer before 50yrs No   2 or more relatives with breast AND/OR ovarian cancer No   2 or more relatives with breast AND/OR bowel cancer No           Mammogram Screening - After age 74- determine frequency with patient based on health status, life expectancy and patient goals            Reviewed and updated as needed this visit by Provider                    Past Medical History:   Diagnosis Date    Atrial fibrillation (H)     Migraine, unspecified, without mention of intractable migraine without mention of status migrainosus     Migraine HA    Squamous cell carcinoma     Unspecified essential hypertension     Unspecified tinnitus      Past Surgical History:   Procedure Laterality Date    COLONOSCOPY  2009    FLEXIBLE SIGMOIDOSCOPY  9/2004    PHACOEMULSIFICATION WITH STANDARD INTRAOCULAR LENS IMPLANT Right 5/24/2024    Procedure: Cataract Extraction with Intraocular Lens Placement;  Surgeon: Jareth Stoll MD;  Location: [facility]    PHACOEMULSIFICATION WITH STANDARD INTRAOCULAR LENS IMPLANT Left 6/28/2024    Procedure: cataract extraction with intraocular lens placement left eye;  Surgeon: Jareth Stoll MD;  Location: [facility]    SURGICAL HISTORY OF -       tubal    SURGICAL HISTORY OF -       oral teeth     OB History   No obstetric history on file.     Current providers sharing in care for this patient include:  Patient Care Team:  Tiff Yarbrough APRN CNP as PCP - General (Family Medicine)  Elizabeth Rogers  "NADER Hernandez CNP as Assigned PCP    The following health maintenance items are reviewed in Epic and correct as of today:  Health Maintenance   Topic Date Due    RSV VACCINE (Pregnancy & 60+) (1 - 1-dose 60+ series) Never done    COLORECTAL CANCER SCREENING  07/13/2022    COVID-19 Vaccine (7 - 2023-24 season) 02/18/2024    MEDICARE ANNUAL WELLNESS VISIT  03/20/2024    ZOSTER IMMUNIZATION (2 of 2) 11/16/2023    INFLUENZA VACCINE (1) 09/01/2024    BMP  03/22/2025    LIPID  03/22/2025    MICROALBUMIN  03/22/2025    ANNUAL REVIEW OF HM ORDERS  03/22/2025    HEMOGLOBIN  03/22/2025    FALL RISK ASSESSMENT  07/10/2025    DTAP/TDAP/TD IMMUNIZATION (2 - Td or Tdap) 05/04/2027    ADVANCE CARE PLANNING  03/20/2028    DEXA  09/17/2028    PHQ-2 (once per calendar year)  Completed    Pneumococcal Vaccine: 65+ Years  Completed    URINALYSIS  Completed    IPV IMMUNIZATION  Aged Out    HPV IMMUNIZATION  Aged Out    MENINGITIS IMMUNIZATION  Aged Out    RSV MONOCLONAL ANTIBODY  Aged Out         Review of Systems  Constitutional, HEENT, cardiovascular, pulmonary, gi and gu systems are negative, except as otherwise noted.     Objective    Exam  /82   Pulse 62   Temp 97.8  F (36.6  C) (Tympanic)   Resp 18   Ht 1.499 m (4' 11\")   Wt 49.4 kg (109 lb)   LMP  (LMP Unknown)   SpO2 93%   BMI 22.02 kg/m     Estimated body mass index is 22.02 kg/m  as calculated from the following:    Height as of this encounter: 1.499 m (4' 11\").    Weight as of this encounter: 49.4 kg (109 lb).    Physical Exam  GENERAL: alert and no distress  HENT: ear canals and TM's normal, nose and mouth without ulcers or lesions  NECK: no adenopathy, no asymmetry, masses, or scars  RESP: lungs clear to auscultation - no rales, rhonchi or wheezes  CV: Irregular rate, normal S1 S2, no S3 or S4, no murmur, click or rub, no peripheral edema  ABDOMEN: soft, nontender, no hepatosplenomegaly, no masses and bowel sounds normal  MS: no gross musculoskeletal defects " noted, no edema  SKIN: Raised, scaling plaque on right temple and right cheek consistent with actinic keratosis  NEURO: Normal strength and tone, mentation intact and speech normal  PSYCH: mentation appears normal, affect normal/bright         7/10/2024   Mini Cog   Clock Draw Score 2 Normal   3 Item Recall 3 objects recalled   Mini Cog Total Score 5                 Signed Electronically by: Alex Stoner DNP,, NADER CNP      Chart documentation with Dragon Voice recognition Software. Although reviewed after completion, some words and grammatical errors may remain.

## 2024-07-10 NOTE — PROGRESS NOTES
ANTICOAGULATION CLINIC REFERRAL RENEWAL REQUEST       An annual renewal order is required for all patients referred to [facility].?  Please review and sign the pended referral order for Nayely Kay.       ANTICOAGULATION SUMMARY      Warfarin indication(s)   Atrial Fibrillation    Mechanical heart valve present?  NO       Current goal range   INR: 2.0-3.0     Goal appropriate for indication? Goal INR 2-3, standard for indication(s) above     Time in Therapeutic Range (TTR)  (Goal > 60%) 64.7%       Office visit with referring provider's group within last year yes on 7-       Flaquita Neal RN  [facility]

## 2024-07-10 NOTE — PATIENT INSTRUCTIONS
Patient Education   Preventive Care Advice   This is general advice given by our system to help you stay healthy. However, your care team may have specific advice just for you. Please talk to your care team about your preventive care needs.  Nutrition  Eat 5 or more servings of fruits and vegetables each day.  Try wheat bread, brown rice and whole grain pasta (instead of white bread, rice, and pasta).  Get enough calcium and vitamin D. Check the label on foods and aim for 100% of the RDA (recommended daily allowance).  Lifestyle  Exercise at least 150 minutes each week  (30 minutes a day, 5 days a week).  Do muscle strengthening activities 2 days a week. These help control your weight and prevent disease.  No smoking.  Wear sunscreen to prevent skin cancer.  Have a dental exam and cleaning every 6 months.  Yearly exams  See your health care team every year to talk about:  Any changes in your health.  Any medicines your care team has prescribed.  Preventive care, family planning, and ways to prevent chronic diseases.  Shots (vaccines)   HPV shots (up to age 26), if you've never had them before.  Hepatitis B shots (up to age 59), if you've never had them before.  COVID-19 shot: Get this shot when it's due.  Flu shot: Get a flu shot every year.  Tetanus shot: Get a tetanus shot every 10 years.  Pneumococcal, hepatitis A, and RSV shots: Ask your care team if you need these based on your risk.  Shingles shot (for age 50 and up)  General health tests  Diabetes screening:  Starting at age 35, Get screened for diabetes at least every 3 years.  If you are younger than age 35, ask your care team if you should be screened for diabetes.  Cholesterol test: At age 39, start having a cholesterol test every 5 years, or more often if advised.  Bone density scan (DEXA): At age 50, ask your care team if you should have this scan for osteoporosis (brittle bones).  Hepatitis C: Get tested at least once in your life.  STIs (sexually  transmitted infections)  Before age 24: Ask your care team if you should be screened for STIs.  After age 24: Get screened for STIs if you're at risk. You are at risk for STIs (including HIV) if:  You are sexually active with more than one person.  You don't use condoms every time.  You or a partner was diagnosed with a sexually transmitted infection.  If you are at risk for HIV, ask about PrEP medicine to prevent HIV.  Get tested for HIV at least once in your life, whether you are at risk for HIV or not.  Cancer screening tests  Cervical cancer screening: If you have a cervix, begin getting regular cervical cancer screening tests starting at age 21.  Breast cancer scan (mammogram): If you've ever had breasts, begin having regular mammograms starting at age 40. This is a scan to check for breast cancer.  Colon cancer screening: It is important to start screening for colon cancer at age 45.  Have a colonoscopy test every 10 years (or more often if you're at risk) Or, ask your provider about stool tests like a FIT test every year or Cologuard test every 3 years.  To learn more about your testing options, visit:   .  For help making a decision, visit:   https://bit.ly/et06827.  Prostate cancer screening test: If you have a prostate, ask your care team if a prostate cancer screening test (PSA) at age 55 is right for you.  Lung cancer screening: If you are a current or former smoker ages 50 to 80, ask your care team if ongoing lung cancer screenings are right for you.  For informational purposes only. Not to replace the advice of your health care provider. Copyright   2023 [employer]. All rights reserved. Clinically reviewed by the [facility] Program. [employer] [id number] - REV 01/24.

## 2024-07-10 NOTE — PROGRESS NOTES
ANTICOAGULATION MANAGEMENT     Nayely Kay 84 year old female is on warfarin with subtherapeutic INR result. (Goal INR 2.0-3.0)    Recent labs: (last 7 days)     07/10/24  0746   INR 1.8*       ASSESSMENT     Source(s): Chart Review and Patient/Caregiver Call     Warfarin doses taken: Warfarin taken as instructed  Diet: No new diet changes identified  Medication/supplement changes: None noted  New illness, injury, or hospitalization: No  Signs or symptoms of bleeding or clotting: No  Previous result: Therapeutic last visit; previously outside of goal range  Additional findings: None       PLAN     Recommended plan for no diet, medication or health factor changes affecting INR     Dosing Instructions: Continue your current warfarin dose with next INR in 2 weeks       Summary  As of 7/10/2024      Full warfarin instructions:  2.5 mg every day   Next INR check:  7/24/2024               Telephone call with Nayely who verbalizes understanding and agrees to plan    Lab visit scheduled    Education provided: Please call back if any changes to your diet, medications or how you've been taking warfarin  Dietary considerations: importance of consistent vitamin K intake and impact of vitamin K foods on INR  Contact [phone] with any changes, questions or concerns.     Plan made per [facility] anticoagulation protocol    Flaquita Neal, RN  Anticoagulation Clinic  7/10/2024    _______________________________________________________________________     Anticoagulation Episode Summary       Current INR goal:  2.0-3.0   TTR:  64.7% (1 y)   Target end date:  Indefinite   Send INR reminders to:  [facility]    Indications    Chronic atrial fibrillation (H) [I48.20]  Long term current use of anticoagulants with INR goal of 2.0-3.0 [Z79.01]             Comments:               Anticoagulation Care Providers       Provider Role Specialty Phone number    Ulysses Baker MD Referring Family Medicine     Oswaldo Cedillo  APRN CNP Referring Family Medicine [phone]    Haritha Whiting PA-C Referring Family Medicine [phone]    Tiff Yarbrough APRN CNP Referring Family Medicine [phone]

## 2024-07-11 ENCOUNTER — TELEPHONE (OUTPATIENT)
Dept: FAMILY MEDICINE | Facility: CLINIC | Age: 84
End: 2024-07-11
Payer: COMMERCIAL

## 2024-07-11 DIAGNOSIS — L98.9 SKIN LESION: Primary | ICD-10-CM

## 2024-07-11 NOTE — TELEPHONE ENCOUNTER
"Per OV note 7/10/2  \"SKIN: Raised, scaling plaque on right temple and right cheek consistent with actinic keratosis \"    Pended Derm referral for consideration.    Nicci Louise RN on 7/11/2024 at 12:58 PM    "

## 2024-07-11 NOTE — TELEPHONE ENCOUNTER
Order/Referral Request    Who is requesting: patient    Orders being requested: dermatology    Reason service is needed/diagnosis: body check due to history of skin cancer    When are orders needed by: asap    Has this been discussed with Provider: Yes-- however no referral was given. Unable to schedule without referral. Time frame is next year or referral possible appt this year.     Does patient have a preference on a Group/Provider/Facility? [facility]    Does patient have an appointment scheduled?: No    Where to send orders: Place orders within Epic    Okay to leave a detailed message?: Yes at Cell number on file:    [phone]-- daughter, phone not phi on file. Did give verbal at time of message.

## 2024-07-24 ENCOUNTER — ANTICOAGULATION THERAPY VISIT (OUTPATIENT)
Dept: ANTICOAGULATION | Facility: CLINIC | Age: 84
End: 2024-07-24

## 2024-07-24 ENCOUNTER — LAB (OUTPATIENT)
Dept: LAB | Facility: CLINIC | Age: 84
End: 2024-07-24
Payer: COMMERCIAL

## 2024-07-24 DIAGNOSIS — I48.20 CHRONIC ATRIAL FIBRILLATION (H): ICD-10-CM

## 2024-07-24 DIAGNOSIS — I48.20 CHRONIC ATRIAL FIBRILLATION (H): Primary | ICD-10-CM

## 2024-07-24 DIAGNOSIS — Z79.01 LONG TERM CURRENT USE OF ANTICOAGULANTS WITH INR GOAL OF 2.0-3.0: ICD-10-CM

## 2024-07-24 LAB — INR BLD: 2.3 (ref 0.9–1.1)

## 2024-07-24 PROCEDURE — 85610 PROTHROMBIN TIME: CPT

## 2024-07-24 PROCEDURE — 36416 COLLJ CAPILLARY BLOOD SPEC: CPT

## 2024-07-24 NOTE — PROGRESS NOTES
ANTICOAGULATION MANAGEMENT     Nayely Kay 84 year old female is on warfarin with therapeutic INR result. (Goal INR 2.0-3.0)    Recent labs: (last 7 days)     07/24/24  0820   INR 2.3*       ASSESSMENT     Source(s): Chart Review and Patient/Caregiver Call     Warfarin doses taken: Warfarin taken as instructed  Diet: No new diet changes identified  Medication/supplement changes: None noted  New illness, injury, or hospitalization: No  Signs or symptoms of bleeding or clotting: No  Previous result: Subtherapeutic  Additional findings: None     PLAN     Recommended plan for no diet, medication or health factor changes affecting INR     Dosing Instructions: Continue your current warfarin dose with next INR in 3 weeks       Summary  As of 7/24/2024      Full warfarin instructions:  2.5 mg every day   Next INR check:  8/14/2024               Telephone call with Nayely who verbalizes understanding and agrees to plan    Lab visit scheduled    Education provided: Please call back if any changes to your diet, medications or how you've been taking warfarin    Plan made per [facility] anticoagulation protocol    Luz Elena Barrios RN  Anticoagulation Clinic  7/24/2024    _______________________________________________________________________     Anticoagulation Episode Summary       Current INR goal:  2.0-3.0   TTR:  66.0% (1 y)   Target end date:  Indefinite   Send INR reminders to:  [facility]    Indications    Chronic atrial fibrillation (H) [I48.20]  Long term current use of anticoagulants with INR goal of 2.0-3.0 [Z79.01]             Comments:               Anticoagulation Care Providers       Provider Role Specialty Phone number    Ulysses Baker MD Referring Family Medicine     Oswaldo Cedillo APRN CNP Referring Family Medicine [phone]    Haritha Whiting PA-C Referring Family Medicine [phone]    Tiff Yarbrough APRN CNP Referring Family Medicine [phone]

## 2024-07-25 ENCOUNTER — ALLIED HEALTH/NURSE VISIT (OUTPATIENT)
Dept: FAMILY MEDICINE | Facility: CLINIC | Age: 84
End: 2024-07-25
Payer: COMMERCIAL

## 2024-07-25 VITALS — SYSTOLIC BLOOD PRESSURE: 130 MMHG | DIASTOLIC BLOOD PRESSURE: 78 MMHG

## 2024-07-25 DIAGNOSIS — Z01.30 BP CHECK: Primary | ICD-10-CM

## 2024-07-25 PROCEDURE — 99207 PR NO CHARGE NURSE ONLY: CPT | Performed by: NURSE PRACTITIONER

## 2024-07-25 NOTE — PROGRESS NOTES
Nayely Kay was evaluated at [facility] on July 25, 2024 at which time her blood pressure was:    BP Readings from Last 1 Encounters:   07/25/24 130/78     No data recorded      Reviewed lifestyle modifications for blood pressure control and reduction: including making healthy food choices, managing weight, getting regular exercise, smoking cessation, reducing alcohol consumption, monitoring blood pressure regularly.     Symptoms: None    BP Goal:< 140/90 mmHg    BP Assessment:  BP at goal    Potential Reasons for BP too high: NA - Not applicable    BP Follow-Up Plan: Recheck BP in 6 months at pharmacy    Recommendation to Provider: ---    Note completed by: Derrek Whalen RPH

## 2024-08-14 ENCOUNTER — ANTICOAGULATION THERAPY VISIT (OUTPATIENT)
Dept: ANTICOAGULATION | Facility: CLINIC | Age: 84
End: 2024-08-14

## 2024-08-14 ENCOUNTER — ALLIED HEALTH/NURSE VISIT (OUTPATIENT)
Dept: FAMILY MEDICINE | Facility: CLINIC | Age: 84
End: 2024-08-14

## 2024-08-14 ENCOUNTER — LAB (OUTPATIENT)
Dept: LAB | Facility: CLINIC | Age: 84
End: 2024-08-14
Payer: COMMERCIAL

## 2024-08-14 VITALS — DIASTOLIC BLOOD PRESSURE: 70 MMHG | SYSTOLIC BLOOD PRESSURE: 136 MMHG

## 2024-08-14 DIAGNOSIS — Z79.01 LONG TERM CURRENT USE OF ANTICOAGULANTS WITH INR GOAL OF 2.0-3.0: ICD-10-CM

## 2024-08-14 DIAGNOSIS — I48.20 CHRONIC ATRIAL FIBRILLATION (H): Primary | ICD-10-CM

## 2024-08-14 DIAGNOSIS — I10 ESSENTIAL HYPERTENSION WITH GOAL BLOOD PRESSURE LESS THAN 140/90: Primary | ICD-10-CM

## 2024-08-14 DIAGNOSIS — I48.20 CHRONIC ATRIAL FIBRILLATION (H): ICD-10-CM

## 2024-08-14 LAB — INR BLD: 2.1 (ref 0.9–1.1)

## 2024-08-14 PROCEDURE — 99207 PR NO CHARGE NURSE ONLY: CPT | Performed by: NURSE PRACTITIONER

## 2024-08-14 PROCEDURE — 36416 COLLJ CAPILLARY BLOOD SPEC: CPT

## 2024-08-14 PROCEDURE — 85610 PROTHROMBIN TIME: CPT

## 2024-08-14 NOTE — PROGRESS NOTES
Nayely Kay was evaluated at [facility] on August 14, 2024 at which time her blood pressure was:    BP Readings from Last 1 Encounters:   08/14/24 136/70     No data recorded      Reviewed lifestyle modifications for blood pressure control and reduction: including making healthy food choices, managing weight, getting regular exercise, smoking cessation, reducing alcohol consumption, monitoring blood pressure regularly.     Symptoms: None    BP Goal:< 140/90 mmHg    BP Assessment:  BP at goal    Potential Reasons for BP too high: NA - Not applicable    BP Follow-Up Plan: Recheck BP in 6 months at pharmacy    Recommendation to Provider: Continue current therapy    Note completed by: Olivier Hoyos RPH

## 2024-08-14 NOTE — PROGRESS NOTES
ANTICOAGULATION MANAGEMENT     Nayely Kay 84 year old female is on warfarin with therapeutic INR result. (Goal INR 2.0-3.0)    Recent labs: (last 7 days)     08/14/24  1047   INR 2.1*       ASSESSMENT     Source(s): Chart Review and Patient/Caregiver Call     Warfarin doses taken: Less warfarin taken than planned which may be affecting INR  Diet: No new diet changes identified  Medication/supplement changes: None noted  New illness, injury, or hospitalization: No  Signs or symptoms of bleeding or clotting: pt may have scratched herself yesterday - may have had some bleeding. Pt described having a small amount of blood on her panty liner. No further concerns or bleeding since yesterday.  Previous result: Therapeutic last visit; previously outside of goal range  Additional findings: None     PLAN     Recommended plan for temporary change(s) affecting INR     Dosing Instructions: Continue your current warfarin dose with next INR in 2 weeks       Summary  As of 8/14/2024      Full warfarin instructions:  2.5 mg every day   Next INR check:  8/28/2024               Telephone call with Nayely who verbalizes understanding and agrees to plan    Lab visit scheduled    Education provided: Please call back if any changes to your diet, medications or how you've been taking warfarin  Symptom monitoring: monitoring for bleeding signs and symptoms and when to seek medical attention/emergency care    Plan made per [facility] anticoagulation protocol    Luz Elena Barrios RN  Anticoagulation Clinic  8/14/2024    _______________________________________________________________________     Anticoagulation Episode Summary       Current INR goal:  2.0-3.0   TTR:  68.9% (1 y)   Target end date:  Indefinite   Send INR reminders to:  [facility]    Indications    Chronic atrial fibrillation (H) [I48.20]  Long term current use of anticoagulants with INR goal of 2.0-3.0 [Z79.01]             Comments:               Anticoagulation Care  Providers       Provider Role Specialty Phone number    Ulysses Baker MD Referring Family Medicine     Mamadou, NADER Zhang CNP Referring Family Medicine [phone]    Haritha Whiting PA-C Referring Family Medicine [phone]    Tiff Yarbrough APRN CNP Referring Family Medicine [phone]

## 2024-08-28 ENCOUNTER — LAB (OUTPATIENT)
Dept: LAB | Facility: CLINIC | Age: 84
End: 2024-08-28
Payer: COMMERCIAL

## 2024-08-28 ENCOUNTER — ALLIED HEALTH/NURSE VISIT (OUTPATIENT)
Dept: FAMILY MEDICINE | Facility: CLINIC | Age: 84
End: 2024-08-28

## 2024-08-28 ENCOUNTER — ANTICOAGULATION THERAPY VISIT (OUTPATIENT)
Dept: ANTICOAGULATION | Facility: CLINIC | Age: 84
End: 2024-08-28

## 2024-08-28 VITALS — SYSTOLIC BLOOD PRESSURE: 126 MMHG | DIASTOLIC BLOOD PRESSURE: 72 MMHG

## 2024-08-28 DIAGNOSIS — Z79.01 LONG TERM CURRENT USE OF ANTICOAGULANTS WITH INR GOAL OF 2.0-3.0: ICD-10-CM

## 2024-08-28 DIAGNOSIS — I10 ESSENTIAL HYPERTENSION WITH GOAL BLOOD PRESSURE LESS THAN 140/90: Primary | ICD-10-CM

## 2024-08-28 DIAGNOSIS — I48.20 CHRONIC ATRIAL FIBRILLATION (H): ICD-10-CM

## 2024-08-28 DIAGNOSIS — I48.20 CHRONIC ATRIAL FIBRILLATION (H): Primary | ICD-10-CM

## 2024-08-28 LAB — INR BLD: 2.3 (ref 0.9–1.1)

## 2024-08-28 PROCEDURE — 99207 PR NO CHARGE NURSE ONLY: CPT | Performed by: NURSE PRACTITIONER

## 2024-08-28 PROCEDURE — 36416 COLLJ CAPILLARY BLOOD SPEC: CPT

## 2024-08-28 PROCEDURE — 85610 PROTHROMBIN TIME: CPT

## 2024-08-28 NOTE — PROGRESS NOTES
Nayely Kay was evaluated at [facility] on August 28, 2024 at which time her blood pressure was:    BP Readings from Last 1 Encounters:   08/28/24 126/72     No data recorded      Reviewed lifestyle modifications for blood pressure control and reduction: including making healthy food choices, managing weight, getting regular exercise, smoking cessation, reducing alcohol consumption, monitoring blood pressure regularly.     Symptoms: None    BP Goal:< 140/90 mmHg    BP Assessment:  BP at goal    Potential Reasons for BP too high: NA - Not applicable    BP Follow-Up Plan: Recheck BP in 6 months at pharmacy    Recommendation to Provider: Continue current therapy    Note completed by: Olivier Hoyos RPH

## 2024-08-28 NOTE — PROGRESS NOTES
ANTICOAGULATION MANAGEMENT     Nayely Kay 84 year old female is on warfarin with therapeutic INR result. (Goal INR 2.0-3.0)    Recent labs: (last 7 days)     08/28/24  1043   INR 2.3*       ASSESSMENT     Source(s): Chart Review and Patient/Caregiver Call     Warfarin doses taken: Warfarin taken as instructed  Diet: No new diet changes identified  Medication/supplement changes: None noted  New illness, injury, or hospitalization: No  Signs or symptoms of bleeding or clotting: No  Previous result: Therapeutic last 2(+) visits  Additional findings: None       PLAN     Recommended plan for no diet, medication or health factor changes affecting INR     Dosing Instructions: Continue your current warfarin dose with next INR in 4 weeks       Summary  As of 8/28/2024      Full warfarin instructions:  2.5 mg every day   Next INR check:  9/25/2024               Telephone call with Nayely who verbalizes understanding and agrees to plan    Lab visit scheduled    Education provided: Goal range and lab monitoring: goal range and significance of current result    Plan made per [facility] anticoagulation protocol    France Ashraf RN  Anticoagulation Clinic  8/28/2024    _______________________________________________________________________     Anticoagulation Episode Summary       Current INR goal:  2.0-3.0   TTR:  68.9% (1 y)   Target end date:  Indefinite   Send INR reminders to:  [facility]    Indications    Chronic atrial fibrillation (H) [I48.20]  Long term current use of anticoagulants with INR goal of 2.0-3.0 [Z79.01]             Comments:               Anticoagulation Care Providers       Provider Role Specialty Phone number    Ulysses Baker MD Referring Family Medicine     Oswaldo Cedillo APRN CNP Referring Family Medicine [phone]    Haritha Whiting PA-C Referring Family Medicine [phone]    Tiff Yarbrough APRN CNP Referring Family Medicine [phone]

## 2024-09-11 ENCOUNTER — OFFICE VISIT (OUTPATIENT)
Dept: DERMATOLOGY | Facility: CLINIC | Age: 84
End: 2024-09-11
Attending: NURSE PRACTITIONER
Payer: COMMERCIAL

## 2024-09-11 DIAGNOSIS — D22.9 NEVUS: Primary | ICD-10-CM

## 2024-09-11 DIAGNOSIS — L82.1 SEBORRHEIC KERATOSIS: ICD-10-CM

## 2024-09-11 DIAGNOSIS — Z86.007 HISTORY OF SQUAMOUS CELL CARCINOMA IN SITU (SCCIS): ICD-10-CM

## 2024-09-11 DIAGNOSIS — D18.01 ANGIOMA OF SKIN: ICD-10-CM

## 2024-09-11 DIAGNOSIS — L57.0 ACTINIC KERATOSIS: ICD-10-CM

## 2024-09-11 DIAGNOSIS — L81.4 LENTIGO: ICD-10-CM

## 2024-09-11 PROCEDURE — 99203 OFFICE O/P NEW LOW 30 MIN: CPT | Mod: 25 | Performed by: PHYSICIAN ASSISTANT

## 2024-09-11 PROCEDURE — 17003 DESTRUCT PREMALG LES 2-14: CPT | Performed by: PHYSICIAN ASSISTANT

## 2024-09-11 PROCEDURE — 17000 DESTRUCT PREMALG LESION: CPT | Performed by: PHYSICIAN ASSISTANT

## 2024-09-11 NOTE — LETTER
9/11/2024      Nayely Kay  [address] [address]      Dear Colleague,    Thank you for referring your patient, Nayely Kay, to the [facility]. Please see a copy of my visit note below.    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   ROS of the following were done and are negative: Constitutional, Eyes, Ears, Nose,   Mouth, Throat, Cardiovascular, Respiratory, GI, Genitourinary, Musculoskeletal,   Psychiatric, Endocrine, Allergic/Immunologic.    PHYSICAL EXAM:   LMP  (LMP Unknown)   Skin exam performed as follows: Type 2 skin. Mood appropriate  Alert and Oriented X 3. Well developed, well nourished in no distress.  General appearance: Normal  Head including face: Normal  Eyes: conjunctiva and lids: Normal  Mouth: Lips, teeth, gums: Normal  Neck: Normal  Chest-breast/axillae: Normal  Back: Normal  Extremities: digits/nails (clubbing): Normal  Eccrine and Apocrine glands: Normal  Right upper extremity: Normal  Left upper extremity: Normal  Right lower extremity: Normal  Left lower extremity: Normal  Skin: Scalp and body hair: See below    Pt deferred exam of breasts, groin, buttocks: YES - lower legs and feet not checked due to compression stockings (pt preference)    Other physical findings:  1. Multiple pigmented macules on extremities and  trunk  2. Multiple pigmented macules on face, trunk and extremities  3. Multiple vascular papules on trunk, arms and legs  4. Multiple scattered keratotic plaques  5. Pink gritty papule on the left upper cheek x 1, left nasal tip x 1       Except as noted above, no other signs of skin cancer or melanoma.     ASSESSMENT/PLAN:   Benign Full skin cancer screening today. . Patient with history of SCIS  Advised on monthly self exams and 1 year  Patient Education: Appropriate brochures given.    Multiple benign appearing melanocytic nevi on arms, legs and trunk. Discussed ABCDEs of melanoma and sunscreen.   Multiple lentigos on arms, legs and trunk. Advised benign, no treatment needed.  Multiple scattered angiomas. Advised benign, no treatment needed.   Seborrheic keratosis on arms, legs and trunk. Advised benign, no treatment needed.  Actinic keratosis on the left upper cheek x 1, left nasal tip x 1. As precancerous, cryosurgery performed. Advised on blistering and post-op care. Advised if not resolved in 1-2 months to return for evaluation            Follow-up: yearly/PRN sooner    1.) Patient was asked about new and changing moles. YES  2.) Patient received a complete physical skin examination: YES  3.) Patient was counseled to perform a monthly self skin examination: YES  Scribed By: Gale Antony, MS, PAKAY      Again, thank you for allowing me to participate in the care of your patient.        Sincerely,        Gale Antony PA-C

## 2024-09-11 NOTE — PROGRESS NOTES
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   ROS of the following were done and are negative: Constitutional, Eyes, Ears, Nose,   Mouth, Throat, Cardiovascular, Respiratory, GI, Genitourinary, Musculoskeletal,   Psychiatric, Endocrine, Allergic/Immunologic.    PHYSICAL EXAM:   LMP  (LMP Unknown)   Skin exam performed as follows: Type 2 skin. Mood appropriate  Alert and Oriented X 3. Well developed, well nourished in no distress.  General appearance: Normal  Head including face: Normal  Eyes: conjunctiva and lids: Normal  Mouth: Lips, teeth, gums: Normal  Neck: Normal  Chest-breast/axillae: Normal  Back: Normal  Extremities: digits/nails (clubbing): Normal  Eccrine and Apocrine glands: Normal  Right upper extremity: Normal  Left upper extremity: Normal  Right lower extremity: Normal  Left lower extremity: Normal  Skin: Scalp and body hair: See below    Pt deferred exam of breasts, groin, buttocks: YES - lower legs and feet not checked due to compression stockings (pt preference)    Other physical findings:  1. Multiple pigmented macules on extremities and trunk  2. Multiple pigmented macules on face, trunk and extremities  3. Multiple vascular papules on trunk, arms and legs  4. Multiple scattered keratotic plaques  5. Pink gritty papule on the left upper cheek x 1, left nasal tip x 1        Except as noted above, no other signs of skin cancer or melanoma.     ASSESSMENT/PLAN:   Benign Full skin cancer screening today. . Patient with history of SCIS  Advised on monthly self exams and 1 year  Patient Education: Appropriate brochures given.    Multiple benign appearing melanocytic nevi on arms, legs and trunk. Discussed ABCDEs of melanoma and sunscreen.   Multiple lentigos on arms, legs and trunk. Advised benign, no treatment needed.  Multiple scattered angiomas. Advised benign, no treatment needed.   Seborrheic keratosis on arms, legs and trunk. Advised benign, no treatment needed.  Actinic keratosis on the left upper cheek x 1, left nasal tip x 1. As precancerous, cryosurgery performed. Advised on blistering and post-op care. Advised if not resolved in 1-2 months to return for evaluation            Follow-up: yearly/PRN sooner    1.) Patient was asked about new and changing moles. YES  2.) Patient received a complete physical skin examination: YES  3.) Patient was counseled to perform a monthly self skin examination: YES  Scribed By: Gale Antony, MS, PA-C

## 2024-09-25 ENCOUNTER — IMMUNIZATION (OUTPATIENT)
Dept: FAMILY MEDICINE | Facility: CLINIC | Age: 84
End: 2024-09-25
Payer: COMMERCIAL

## 2024-09-25 ENCOUNTER — ANTICOAGULATION THERAPY VISIT (OUTPATIENT)
Dept: ANTICOAGULATION | Facility: CLINIC | Age: 84
End: 2024-09-25

## 2024-09-25 ENCOUNTER — LAB (OUTPATIENT)
Dept: LAB | Facility: CLINIC | Age: 84
End: 2024-09-25
Payer: COMMERCIAL

## 2024-09-25 DIAGNOSIS — I48.20 CHRONIC ATRIAL FIBRILLATION (H): ICD-10-CM

## 2024-09-25 DIAGNOSIS — Z79.01 LONG TERM CURRENT USE OF ANTICOAGULANTS WITH INR GOAL OF 2.0-3.0: ICD-10-CM

## 2024-09-25 DIAGNOSIS — I48.20 CHRONIC ATRIAL FIBRILLATION (H): Primary | ICD-10-CM

## 2024-09-25 LAB — INR BLD: 2.1 (ref 0.9–1.1)

## 2024-09-25 PROCEDURE — G0008 ADMIN INFLUENZA VIRUS VAC: HCPCS

## 2024-09-25 PROCEDURE — 36416 COLLJ CAPILLARY BLOOD SPEC: CPT

## 2024-09-25 PROCEDURE — 85610 PROTHROMBIN TIME: CPT

## 2024-09-25 PROCEDURE — 90662 IIV NO PRSV INCREASED AG IM: CPT

## 2024-09-25 NOTE — PROGRESS NOTES
ANTICOAGULATION MANAGEMENT     Nayely Kay 84 year old female is on warfarin with therapeutic INR result. (Goal INR 2.0-3.0)    Recent labs: (last 7 days)     09/25/24  0827   INR 2.1*       ASSESSMENT     Source(s): Chart Review and Patient/Caregiver Call     Warfarin doses taken: Warfarin taken as instructed  Diet: No new diet changes identified  Medication/supplement changes: None noted  New illness, injury, or hospitalization: No  Signs or symptoms of bleeding or clotting: No  Previous result: Therapeutic last 2(+) visits  Additional findings: None       PLAN     Recommended plan for no diet, medication or health factor changes affecting INR     Dosing Instructions: Continue your current warfarin dose with next INR in 4 weeks       Summary  As of 9/25/2024      Full warfarin instructions:  2.5 mg every day   Next INR check:  10/23/2024               Telephone call with Nayely who verbalizes understanding and agrees to plan    Lab visit scheduled    Education provided: Goal range and lab monitoring: goal range and significance of current result    Plan made per [facility] anticoagulation protocol    France Ashraf RN  9/25/2024  Anticoagulation Clinic  [employer] for routing messages: nataliya [facility] patient phone line: [phone]        _______________________________________________________________________     Anticoagulation Episode Summary       Current INR goal:  2.0-3.0   TTR:  68.9% (1 y)   Target end date:  Indefinite   Send INR reminders to:  [facility]    Indications    Chronic atrial fibrillation (H) [I48.20]  Long term current use of anticoagulants with INR goal of 2.0-3.0 [Z79.01]             Comments:               Anticoagulation Care Providers       Provider Role Specialty Phone number    Ulysses Baker MD Referring Family Medicine     Oswaldo Cedillo APRN CNP Referring Family Medicine [phone]    Haritha Whiting PA-C Referring Family Medicine [phone]     Tiff Yarbrough APRN CNP Referring Family Medicine [phone]

## 2024-10-07 DIAGNOSIS — Z79.01 LONG TERM CURRENT USE OF ANTICOAGULANTS WITH INR GOAL OF 2.0-3.0: ICD-10-CM

## 2024-10-07 DIAGNOSIS — I48.20 CHRONIC ATRIAL FIBRILLATION (H): ICD-10-CM

## 2024-10-07 RX ORDER — WARFARIN SODIUM 2.5 MG/1
TABLET ORAL
Qty: 95 TABLET | Refills: 1 | Status: SHIPPED | OUTPATIENT
Start: 2024-10-07 | End: 2024-10-09

## 2024-10-07 NOTE — TELEPHONE ENCOUNTER
ANTICOAGULATION MANAGEMENT:  Medication Refill    Anticoagulation Summary  As of 9/25/2024      Warfarin maintenance plan:  2.5 mg (2.5 mg x 1) every day   Next INR check:  10/23/2024   Target end date:  Indefinite    Indications    Chronic atrial fibrillation (H) [I48.20]  Long term current use of anticoagulants with INR goal of 2.0-3.0 [Z79.01]                 Anticoagulation Care Providers       Provider Role Specialty Phone number    Ulysses Baker MD Referring [facility] Medicine     Oswaldo Cedillo APRN CNP Referring [facility] [phone]    Haritha Whiting PA-C Referring [facility] [phone]    Tiff Yarbrough APRN CNP Referring [facility] [phone]            Refill Criteria    Visit with referring provider/group: Meets criteria: visit within referring provider group in the last 15 months on 6/24    [facility] referral last signed: 07/10/2024; within last year: Yes    Lab monitoring not exceeding 2 weeks overdue: Yes    Nayely meets all criteria for refill. Rx instructions and quantity match patient's current dosing plan. Warfarin 90 day supply with 1 refill granted per [facility] protocol     Jeannie Gaspar, RN  Anticoagulation Clinic

## 2024-10-07 NOTE — TELEPHONE ENCOUNTER
Pending Prescriptions:                       Disp   Refills    JANTOVEN ANTICOAGULANT 2.5 MG tablet [Phar*95 tab*1        Sig: TAKE ONE TABLET (2.5 MG) DAILY OR AS DIRECTED BY           [facility]    Routing refill request to provider for review/approval because:  Routing to [facility] for review.    Cici Rush RN  [facility]

## 2024-10-08 DIAGNOSIS — I48.20 CHRONIC ATRIAL FIBRILLATION (H): ICD-10-CM

## 2024-10-08 DIAGNOSIS — Z79.01 LONG TERM CURRENT USE OF ANTICOAGULANTS WITH INR GOAL OF 2.0-3.0: ICD-10-CM

## 2024-10-09 RX ORDER — WARFARIN SODIUM 2.5 MG/1
TABLET ORAL
Qty: 95 TABLET | Refills: 1 | Status: SHIPPED | OUTPATIENT
Start: 2024-10-09

## 2024-10-09 NOTE — TELEPHONE ENCOUNTER
ANTICOAGULATION MANAGEMENT:  Medication Refill    Anticoagulation Summary  As of 9/25/2024      Warfarin maintenance plan:  2.5 mg (2.5 mg x 1) every day   Next INR check:  10/23/2024   Target end date:  Indefinite    Indications    Chronic atrial fibrillation (H) [I48.20]  Long term current use of anticoagulants with INR goal of 2.0-3.0 [Z79.01]                 Anticoagulation Care Providers       Provider Role Specialty Phone number    Ulysses Baker MD Referring [facility] Medicine     Oswaldo Cedillo APRN CNP Referring [facility] [phone]    Haritha Whiting PA-C Referring [facility] [phone]    Tiff Yarbrough APRN CNP Referring [facility] [phone]            Refill Criteria    Visit with referring provider/group: Meets criteria: visit within referring provider group in the last 15 months on 7/10/24    [facility] referral last signed: 07/10/2024; within last year: Yes    Lab monitoring not exceeding 2 weeks overdue: No    Nayely meets all criteria for refill. Rx instructions and quantity supplied updated to match patient's current dosing plan. Warfarin 90 day supply with 1 refill granted per [facility] protocol     Luz Elena Barrios RN  Anticoagulation Clinic

## 2024-10-23 ENCOUNTER — ANTICOAGULATION THERAPY VISIT (OUTPATIENT)
Dept: ANTICOAGULATION | Facility: CLINIC | Age: 84
End: 2024-10-23

## 2024-10-23 ENCOUNTER — LAB (OUTPATIENT)
Dept: LAB | Facility: CLINIC | Age: 84
End: 2024-10-23
Payer: COMMERCIAL

## 2024-10-23 ENCOUNTER — ALLIED HEALTH/NURSE VISIT (OUTPATIENT)
Dept: FAMILY MEDICINE | Facility: CLINIC | Age: 84
End: 2024-10-23
Payer: COMMERCIAL

## 2024-10-23 ENCOUNTER — TELEPHONE (OUTPATIENT)
Dept: FAMILY MEDICINE | Facility: CLINIC | Age: 84
End: 2024-10-23

## 2024-10-23 VITALS — DIASTOLIC BLOOD PRESSURE: 64 MMHG | SYSTOLIC BLOOD PRESSURE: 120 MMHG | HEART RATE: 60 BPM

## 2024-10-23 DIAGNOSIS — Z79.01 LONG TERM CURRENT USE OF ANTICOAGULANTS WITH INR GOAL OF 2.0-3.0: ICD-10-CM

## 2024-10-23 DIAGNOSIS — I48.20 CHRONIC ATRIAL FIBRILLATION (H): ICD-10-CM

## 2024-10-23 DIAGNOSIS — I10 ESSENTIAL HYPERTENSION: Primary | ICD-10-CM

## 2024-10-23 DIAGNOSIS — I48.20 CHRONIC ATRIAL FIBRILLATION (H): Primary | ICD-10-CM

## 2024-10-23 LAB — INR BLD: 1.8 (ref 0.9–1.1)

## 2024-10-23 PROCEDURE — 36416 COLLJ CAPILLARY BLOOD SPEC: CPT

## 2024-10-23 PROCEDURE — 85610 PROTHROMBIN TIME: CPT

## 2024-10-23 PROCEDURE — 99207 PR NO CHARGE NURSE ONLY: CPT

## 2024-10-23 NOTE — PROGRESS NOTES
Nayely Kay is a 84 year old year old patient who comes in today for a Blood Pressure check because of ongoing blood pressure monitoring.  Vital Signs as repeated by RN /64 P 60  Patient is taking medication as prescribed  Patient is tolerating medications well.  Patient is not monitoring Blood Pressure at home.  Average readings if yes are N/A  Current complaints: none  Disposition:  BP/P readings routed to Tiff Johnson CNP for review/advise, check BP weekly notifying care team of readings consistently > 140/90 or < 100/60, patient to continue with the same medication and patient reminded to call as needed.    Julie Behrendt RN

## 2024-10-23 NOTE — PROGRESS NOTES
ANTICOAGULATION MANAGEMENT     Nayely Kay 84 year old female is on warfarin with subtherapeutic INR result. (Goal INR 2.0-3.0)    Recent labs: (last 7 days)     10/23/24  0806   INR 1.8*       ASSESSMENT     Source(s): Chart Review  Previous INR was Therapeutic last 2(+) visits  Medication, diet, health changes since last INR chart reviewed; none identified         PLAN     Unable to reach Nayely today.    LMTCB    Follow up required to confirm warfarin dose taken and assess for changes and discuss out of range result     France Ashraf RN  10/23/2024  Anticoagulation Clinic  [facility] for routing messages: nataliya PERE[facility] patient phone line: [phone]

## 2024-10-23 NOTE — PROGRESS NOTES
ANTICOAGULATION MANAGEMENT     Nayely Kay 84 year old female is on warfarin with subtherapeutic INR result. (Goal INR 2.0-3.0)    Recent labs: (last 7 days)     10/23/24  0806   INR 1.8*       ASSESSMENT     Source(s): Chart Review and Patient/Caregiver Call     Warfarin doses taken: Missed dose(s) may be affecting INR but cannot remember the day  Diet: No new diet changes identified  Medication/supplement changes: None noted  New illness, injury, or hospitalization: No  Signs or symptoms of bleeding or clotting: No  Previous result: Therapeutic last 2(+) visits  Additional findings: None       PLAN     Recommended plan for temporary change(s) affecting INR     Dosing Instructions: Continue your current warfarin dose with next INR in 2 weeks       Summary  As of 10/23/2024      Full warfarin instructions:  2.5 mg every day   Next INR check:  11/6/2024               Telephone call with Nayely who verbalizes understanding and agrees to plan    Lab visit scheduled    Education provided: Goal range and lab monitoring: goal range and significance of current result    Plan made per [facility] anticoagulation protocol    France Ashraf RN  10/23/2024  Anticoagulation Clinic  [employer] for routing messages: nataliya [facility] patient phone line: [phone]        _______________________________________________________________________     Anticoagulation Episode Summary       Current INR goal:  2.0-3.0   TTR:  63.8% (1 y)   Target end date:  Indefinite   Send INR reminders to:  [facility]    Indications    Chronic atrial fibrillation (H) [I48.20]  Long term current use of anticoagulants with INR goal of 2.0-3.0 [Z79.01]             Comments:  --             Anticoagulation Care Providers       Provider Role Specialty Phone number    Ulysses Baker MD Referring Family Medicine     Oswaldo Cedillo APRN CNP Referring Family Medicine [phone]    Haritha Whiting PA-C Referring Family  Medicine [phone]    Tfif Yarbrough APRN CNP Referring Family Medicine [phone]

## 2024-11-06 ENCOUNTER — LAB (OUTPATIENT)
Dept: LAB | Facility: CLINIC | Age: 84
End: 2024-11-06
Payer: COMMERCIAL

## 2024-11-06 ENCOUNTER — ALLIED HEALTH/NURSE VISIT (OUTPATIENT)
Dept: FAMILY MEDICINE | Facility: CLINIC | Age: 84
End: 2024-11-06

## 2024-11-06 ENCOUNTER — ANTICOAGULATION THERAPY VISIT (OUTPATIENT)
Dept: ANTICOAGULATION | Facility: CLINIC | Age: 84
End: 2024-11-06

## 2024-11-06 VITALS — DIASTOLIC BLOOD PRESSURE: 62 MMHG | SYSTOLIC BLOOD PRESSURE: 138 MMHG

## 2024-11-06 DIAGNOSIS — I48.20 CHRONIC ATRIAL FIBRILLATION (H): ICD-10-CM

## 2024-11-06 DIAGNOSIS — Z79.01 LONG TERM CURRENT USE OF ANTICOAGULANTS WITH INR GOAL OF 2.0-3.0: ICD-10-CM

## 2024-11-06 DIAGNOSIS — I10 ESSENTIAL HYPERTENSION: Primary | ICD-10-CM

## 2024-11-06 DIAGNOSIS — I48.20 CHRONIC ATRIAL FIBRILLATION (H): Primary | ICD-10-CM

## 2024-11-06 LAB — INR BLD: 2 (ref 0.9–1.1)

## 2024-11-06 PROCEDURE — 36416 COLLJ CAPILLARY BLOOD SPEC: CPT

## 2024-11-06 PROCEDURE — 85610 PROTHROMBIN TIME: CPT

## 2024-11-06 PROCEDURE — 99207 PR NO CHARGE NURSE ONLY: CPT | Performed by: NURSE PRACTITIONER

## 2024-11-06 NOTE — PROGRESS NOTES
Nayely Kay was evaluated at [facility] on November 6, 2024 at which time her blood pressure was:    BP Readings from Last 3 Encounters:   11/06/24 138/62   10/23/24 120/64   08/28/24 126/72     Pulse Readings from Last 3 Encounters:   10/23/24 60   07/10/24 62   06/28/24 53       Reviewed lifestyle modifications for blood pressure control and reduction: including making healthy food choices, managing weight, getting regular exercise, smoking cessation, reducing alcohol consumption, monitoring blood pressure regularly.     Symptoms: None    BP Goal:< 140/90 mmHg    BP Assessment:  BP at goal    Potential Reasons for BP too high: NA - Not applicable    BP Follow-Up Plan: Recheck BP in 6 months at pharmacy    Recommendation to Provider: Continue current medication regimen     Note completed by:  Claudia Estrada, PharmD  Staff Pharmacist  [facility]  [phone]       Satisfactory

## 2024-11-06 NOTE — PROGRESS NOTES
ANTICOAGULATION MANAGEMENT     Nayely Kay 84 year old female is on warfarin with therapeutic INR result. (Goal INR 2.0-3.0)    Recent labs: (last 7 days)     11/06/24  1028   INR 2.0*       ASSESSMENT     Source(s): Chart Review and Patient/Caregiver Call     Warfarin doses taken: Warfarin taken as instructed  Diet: No new diet changes identified  Medication/supplement changes: None noted  New illness, injury, or hospitalization: No  Signs or symptoms of bleeding or clotting: No  Previous result: Subtherapeutic  Additional findings: None       PLAN     Recommended plan for no diet, medication or health factor changes affecting INR     Dosing Instructions: Continue your current warfarin dose with next INR in 4 weeks       Summary  As of 11/6/2024      Full warfarin instructions:  2.5 mg every day   Next INR check:  12/4/2024               Telephone call with Nayely who verbalizes understanding and agrees to plan    Lab visit scheduled    Education provided: None required    Plan made per [facility] anticoagulation protocol    Renetta WINCHESTER RN  11/6/2024  Anticoagulation Clinic  [employer] for routing messages: nataliya [facility] patient phone line: [phone]        _______________________________________________________________________     Anticoagulation Episode Summary       Current INR goal:  2.0-3.0   TTR:  59.9% (1 y)   Target end date:  Indefinite   Send INR reminders to:  [facility]    Indications    Chronic atrial fibrillation (H) [I48.20]  Long term current use of anticoagulants with INR goal of 2.0-3.0 [Z79.01]             Comments:  --             Anticoagulation Care Providers       Provider Role Specialty Phone number    Ulysses Baker MD Referring Family Medicine     Oswaldo Cedillo APRN CNP Referring Family Medicine [phone]    Haritha Whiting PA-C Referring Family Medicine [phone]    Tiff Yarbrough APRN CNP Referring [facility] Medicine [phone]

## 2024-12-04 ENCOUNTER — ANTICOAGULATION THERAPY VISIT (OUTPATIENT)
Dept: ANTICOAGULATION | Facility: CLINIC | Age: 84
End: 2024-12-04

## 2024-12-04 ENCOUNTER — ALLIED HEALTH/NURSE VISIT (OUTPATIENT)
Dept: FAMILY MEDICINE | Facility: CLINIC | Age: 84
End: 2024-12-04

## 2024-12-04 ENCOUNTER — TELEPHONE (OUTPATIENT)
Dept: FAMILY MEDICINE | Facility: CLINIC | Age: 84
End: 2024-12-04

## 2024-12-04 ENCOUNTER — LAB (OUTPATIENT)
Dept: LAB | Facility: CLINIC | Age: 84
End: 2024-12-04
Payer: COMMERCIAL

## 2024-12-04 VITALS — SYSTOLIC BLOOD PRESSURE: 120 MMHG | DIASTOLIC BLOOD PRESSURE: 86 MMHG

## 2024-12-04 DIAGNOSIS — Z79.01 LONG TERM CURRENT USE OF ANTICOAGULANTS WITH INR GOAL OF 2.0-3.0: ICD-10-CM

## 2024-12-04 DIAGNOSIS — Z01.30 BP CHECK: Primary | ICD-10-CM

## 2024-12-04 DIAGNOSIS — I48.20 CHRONIC ATRIAL FIBRILLATION (H): Primary | ICD-10-CM

## 2024-12-04 DIAGNOSIS — I48.20 CHRONIC ATRIAL FIBRILLATION (H): ICD-10-CM

## 2024-12-04 LAB — INR BLD: 2.3 (ref 0.9–1.1)

## 2024-12-04 PROCEDURE — 85610 PROTHROMBIN TIME: CPT

## 2024-12-04 PROCEDURE — 99207 PR NO CHARGE NURSE ONLY: CPT | Performed by: NURSE PRACTITIONER

## 2024-12-04 PROCEDURE — 36416 COLLJ CAPILLARY BLOOD SPEC: CPT

## 2024-12-04 NOTE — PROGRESS NOTES
ANTICOAGULATION MANAGEMENT     Nayely Kay 84 year old female is on warfarin with therapeutic INR result. (Goal INR 2.0-3.0)    Recent labs: (last 7 days)     12/04/24  0905   INR 2.3*       ASSESSMENT     Source(s): Chart Review and Patient/Caregiver Call     Warfarin doses taken: Warfarin taken as instructed  Diet: No new diet changes identified  Medication/supplement changes: None noted  New illness, injury, or hospitalization: No  Signs or symptoms of bleeding or clotting: No  Previous result: Therapeutic last visit; previously outside of goal range  Additional findings: None       PLAN     Recommended plan for no diet, medication or health factor changes affecting INR     Dosing Instructions: Continue your current warfarin dose with next INR in 4 weeks       Summary  As of 12/4/2024      Full warfarin instructions:  2.5 mg every day   Next INR check:  12/30/2024               Telephone call with Nayely who verbalizes understanding and agrees to plan    Lab visit scheduled    Education provided: Please call back if any changes to your diet, medications or how you've been taking warfarin    Plan made per [facility] anticoagulation protocol    Marjorie Faulkner RN  12/4/2024  Anticoagulation Clinic  [employer] for routing messages: nataliya [facility] patient phone line: [phone]        _______________________________________________________________________     Anticoagulation Episode Summary       Current INR goal:  2.0-3.0   TTR:  59.9% (1 y)   Target end date:  Indefinite   Send INR reminders to:  [facility]    Indications    Chronic atrial fibrillation (H) [I48.20]  Long term current use of anticoagulants with INR goal of 2.0-3.0 [Z79.01]             Comments:  --             Anticoagulation Care Providers       Provider Role Specialty Phone number    Ulysses Baker MD Referring Family Medicine     Oswaldo Cedillo APRN CNP Referring Family Medicine [phone]    [place]  Haritha EDMOND PA-C Referring Family Medicine [phone]    Tiff Yarbrough APRN CNP Referring Family Medicine [phone]

## 2024-12-04 NOTE — PROGRESS NOTES
Nayely Kay was evaluated at [facility] on December 4, 2024 at which time her blood pressure was:    BP Readings from Last 3 Encounters:   12/04/24 120/86   11/06/24 138/62   10/23/24 120/64     Pulse Readings from Last 3 Encounters:   10/23/24 60   07/10/24 62   06/28/24 53       Reviewed lifestyle modifications for blood pressure control and reduction: including making healthy food choices, managing weight, getting regular exercise, smoking cessation, reducing alcohol consumption, monitoring blood pressure regularly.     Symptoms: None    BP Goal:< 140/90 mmHg    BP Assessment:  BP at goal    Potential Reasons for BP too high: NA - Not applicable    BP Follow-Up Plan: Recheck BP in 6 months at pharmacy    Recommendation to Provider: ---     Note completed by:     Thank you,    [place]rek [place] [address]  Phone: [phone]  [email or web address]

## 2024-12-10 ENCOUNTER — TELEPHONE (OUTPATIENT)
Dept: FAMILY MEDICINE | Facility: CLINIC | Age: 84
End: 2024-12-10
Payer: COMMERCIAL

## 2024-12-10 DIAGNOSIS — I48.20 CHRONIC ATRIAL FIBRILLATION (H): Primary | ICD-10-CM

## 2024-12-10 NOTE — TELEPHONE ENCOUNTER
I would recommend her being seen sooner if she is having pains and/or symptoms.  If she does not have any further symptoms, can wait to see me in January.  Will put in a cardiology referral so she can at least get on their schedule.    Thanks,  Tiff Johnson, DNP, APRN-CNP

## 2024-12-10 NOTE — TELEPHONE ENCOUNTER
Daughter called back and asked for referral to cardiology regarding a fib, please see other telephone note for further direction.    If referral was placed or recommendation from [facility]

## 2024-12-10 NOTE — TELEPHONE ENCOUNTER
Reason for Call:  Appointment Request    Patient requesting this type of appt:  Office Visit    Requested provider: Tiff Yarbrough    Reason patient unable to be scheduled: Not within requested timeframe    When does patient want to be seen/preferred time: 3-7 days    Comments: Wants to discuss medication, her heart and other issues    Okay to leave a detailed message?: Yes at Home number on file [phone] (home)    Call taken on 12/10/2024 at 7:16 AM by Jody Pickett

## 2024-12-10 NOTE — TELEPHONE ENCOUNTER
"S-(situation): Contacted Nayely in regards to her request to see cardiology. She reports feeling fine today. She thought about seeing Tiff Rodriguezlaura but wonders if a cardiology referral would be ok.     B-(background): saw a cardiologist 10-15 years ago \"for something\"   History of afib, HTN    A-(assessment): Nayely experienced a few seconds of  \"just a medium pain\" last night around 9 PM. It was located on her right side, \"below the heart on my ribs\". She stated, \"It could be stress\". She denies having pain like that before. Denies having any radiating pain, did not break out in a sweat. Denies feeling dizzy, lightheaded, short of breath and no weakness, numbness, tingling, difficulty word finding or any other abnormal symptom. No recent medication changes. She does not check her BP or pulse at home. She did fall when turning around a few days ago with no loss of consciousness. She was reaching to turn on a light switch and could not reach it losing her balance. She hit her head but was able to get right back up independently and her daughter was there to witness this. She denies injury.     R-(recommendations): Nayely has an appt with PCP on 01/03/25. Anything you would do before then Tiff?  Thank you, Kellee PALACIOS RN    "

## 2024-12-10 NOTE — TELEPHONE ENCOUNTER
Order/Referral Request    Who is requesting: patient     Orders being requested: cardiology referral    Reason service is needed/diagnosis: a fib diagnosis    When are orders needed by: asap     Has this been discussed with Provider: Yes  some what at last wellness check    Does patient have a preference on a Group/Provider/Facility? [place]    Does patient have an appointment scheduled?: Yes: if needed with pcp but asking for referral     Where to send orders: Place orders within Epic    Okay to leave a detailed message?: Yes at call daughter gladys

## 2024-12-30 ENCOUNTER — LAB (OUTPATIENT)
Dept: LAB | Facility: CLINIC | Age: 84
End: 2024-12-30
Payer: COMMERCIAL

## 2024-12-30 ENCOUNTER — ANTICOAGULATION THERAPY VISIT (OUTPATIENT)
Dept: ANTICOAGULATION | Facility: CLINIC | Age: 84
End: 2024-12-30

## 2024-12-30 DIAGNOSIS — I48.20 CHRONIC ATRIAL FIBRILLATION (H): ICD-10-CM

## 2024-12-30 DIAGNOSIS — Z79.01 LONG TERM CURRENT USE OF ANTICOAGULANTS WITH INR GOAL OF 2.0-3.0: ICD-10-CM

## 2024-12-30 DIAGNOSIS — I48.20 CHRONIC ATRIAL FIBRILLATION (H): Primary | ICD-10-CM

## 2024-12-30 LAB — INR BLD: 2.9 (ref 0.9–1.1)

## 2024-12-30 PROCEDURE — 85610 PROTHROMBIN TIME: CPT

## 2024-12-30 PROCEDURE — 36416 COLLJ CAPILLARY BLOOD SPEC: CPT

## 2024-12-30 NOTE — PROGRESS NOTES
ANTICOAGULATION MANAGEMENT     Nayely Kay 84 year old female is on warfarin with therapeutic INR result. (Goal INR 2.0-3.0)    Recent labs: (last 7 days)     12/30/24  1622   INR 2.9*       ASSESSMENT     Source(s): Chart Review and Patient/Caregiver Call     Warfarin doses taken: Warfarin taken as instructed  Diet: No new diet changes identified  Medication/supplement changes: None noted  New illness, injury, or hospitalization: No  Signs or symptoms of bleeding or clotting: No  Previous result: Therapeutic last 2(+) visits  Additional findings: None       PLAN     Recommended plan for no diet, medication or health factor changes affecting INR     Dosing Instructions: Continue your current warfarin dose with next INR in 4 weeks       Summary  As of 12/30/2024      Full warfarin instructions:  2.5 mg every day   Next INR check:  1/27/2025               Telephone call with Nayely who verbalizes understanding and agrees to plan    Lab visit scheduled    Education provided: Contact [phone] with any changes, questions or concerns.     Plan made per [facility] anticoagulation protocol    Jeannie Gaspar RN  12/30/2024  Anticoagulation Clinic  [employer] for routing messages: nataliya [facility] patient phone line: [phone]        _______________________________________________________________________     Anticoagulation Episode Summary       Current INR goal:  2.0-3.0   TTR:  60.0% (1 y)   Target end date:  Indefinite   Send INR reminders to:  [facility]    Indications    Chronic atrial fibrillation (H) [I48.20]  Long term current use of anticoagulants with INR goal of 2.0-3.0 [Z79.01]             Comments:  --             Anticoagulation Care Providers       Provider Role Specialty Phone number    Ulysses Baker MD Referring Family Medicine     Oswaldo Cedillo APRN CNP Referring Family Medicine [phone]    Haritha Whiting PA-C Referring [facility] Medicine [phone]     Tiff Yarbrough APRN [facility] Referring Family Medicine [phone]

## 2025-01-08 ENCOUNTER — APPOINTMENT (OUTPATIENT)
Dept: ULTRASOUND IMAGING | Facility: CLINIC | Age: 85
End: 2025-01-08
Attending: EMERGENCY MEDICINE
Payer: COMMERCIAL

## 2025-01-08 ENCOUNTER — APPOINTMENT (OUTPATIENT)
Dept: GENERAL RADIOLOGY | Facility: CLINIC | Age: 85
End: 2025-01-08
Attending: EMERGENCY MEDICINE
Payer: COMMERCIAL

## 2025-01-08 ENCOUNTER — HOSPITAL ENCOUNTER (OUTPATIENT)
Facility: CLINIC | Age: 85
Setting detail: OBSERVATION
Discharge: HOME | End: 2025-01-09
Attending: EMERGENCY MEDICINE | Admitting: INTERNAL MEDICINE
Payer: COMMERCIAL

## 2025-01-08 DIAGNOSIS — Z79.01 LONG TERM (CURRENT) USE OF ANTICOAGULANTS: ICD-10-CM

## 2025-01-08 DIAGNOSIS — R10.10 UPPER ABDOMINAL PAIN: ICD-10-CM

## 2025-01-08 DIAGNOSIS — I48.20 CHRONIC ATRIAL FIBRILLATION (H): Primary | ICD-10-CM

## 2025-01-08 DIAGNOSIS — Z79.01 LONG TERM CURRENT USE OF ANTICOAGULANTS WITH INR GOAL OF 2.0-3.0: ICD-10-CM

## 2025-01-08 DIAGNOSIS — R00.1 BRADYCARDIA: ICD-10-CM

## 2025-01-08 DIAGNOSIS — R07.9 NONSPECIFIC CHEST PAIN: ICD-10-CM

## 2025-01-08 LAB
ALBUMIN SERPL BCG-MCNC: 4.3 G/DL (ref 3.5–5.2)
ALP SERPL-CCNC: 123 U/L (ref 40–150)
ALT SERPL W P-5'-P-CCNC: 21 U/L (ref 0–50)
ANION GAP SERPL CALCULATED.3IONS-SCNC: 14 MMOL/L (ref 7–15)
APTT PPP: 39 SECONDS (ref 22–38)
AST SERPL W P-5'-P-CCNC: 30 U/L (ref 0–45)
BASOPHILS # BLD AUTO: 0.1 10E3/UL (ref 0–0.2)
BASOPHILS NFR BLD AUTO: 1 %
BILIRUB DIRECT SERPL-MCNC: 0.3 MG/DL (ref 0–0.3)
BILIRUB SERPL-MCNC: 1.6 MG/DL
BUN SERPL-MCNC: 35.1 MG/DL (ref 8–23)
CALCIUM SERPL-MCNC: 10 MG/DL (ref 8.8–10.4)
CHLORIDE SERPL-SCNC: 104 MMOL/L (ref 98–107)
CREAT SERPL-MCNC: 1.1 MG/DL (ref 0.51–0.95)
D DIMER PPP FEU-MCNC: 0.28 UG/ML FEU (ref 0–0.5)
EGFRCR SERPLBLD CKD-EPI 2021: 49 ML/MIN/1.73M2
EOSINOPHIL # BLD AUTO: 0.2 10E3/UL (ref 0–0.7)
EOSINOPHIL NFR BLD AUTO: 3 %
ERYTHROCYTE [DISTWIDTH] IN BLOOD BY AUTOMATED COUNT: 13.2 % (ref 10–15)
GLUCOSE SERPL-MCNC: 96 MG/DL (ref 70–99)
HCO3 SERPL-SCNC: 23 MMOL/L (ref 22–29)
HCT VFR BLD AUTO: 34 % (ref 35–47)
HGB BLD-MCNC: 11.5 G/DL (ref 11.7–15.7)
HOLD SPECIMEN: NORMAL
IMM GRANULOCYTES # BLD: 0 10E3/UL
IMM GRANULOCYTES NFR BLD: 1 %
INR PPP: 1.82 (ref 0.85–1.15)
INR PPP: 1.86 (ref 0.85–1.15)
LIPASE SERPL-CCNC: 28 U/L (ref 13–60)
LYMPHOCYTES # BLD AUTO: 1.4 10E3/UL (ref 0.8–5.3)
LYMPHOCYTES NFR BLD AUTO: 19 %
MCH RBC QN AUTO: 30.7 PG (ref 26.5–33)
MCHC RBC AUTO-ENTMCNC: 33.8 G/DL (ref 31.5–36.5)
MCV RBC AUTO: 91 FL (ref 78–100)
MONOCYTES # BLD AUTO: 0.6 10E3/UL (ref 0–1.3)
MONOCYTES NFR BLD AUTO: 9 %
NEUTROPHILS # BLD AUTO: 5.1 10E3/UL (ref 1.6–8.3)
NEUTROPHILS NFR BLD AUTO: 68 %
NRBC # BLD AUTO: 0 10E3/UL
NRBC BLD AUTO-RTO: 0 /100
PLATELET # BLD AUTO: 259 10E3/UL (ref 150–450)
POTASSIUM SERPL-SCNC: 3.5 MMOL/L (ref 3.4–5.3)
PROT SERPL-MCNC: 7.1 G/DL (ref 6.4–8.3)
RBC # BLD AUTO: 3.74 10E6/UL (ref 3.8–5.2)
SARS-COV-2 RNA RESP QL NAA+PROBE: NEGATIVE
SODIUM SERPL-SCNC: 141 MMOL/L (ref 135–145)
TROPONIN T SERPL HS-MCNC: 18 NG/L
TROPONIN T SERPL HS-MCNC: 19 NG/L
WBC # BLD AUTO: 7.5 10E3/UL (ref 4–11)

## 2025-01-08 PROCEDURE — G0378 HOSPITAL OBSERVATION PER HR: HCPCS

## 2025-01-08 PROCEDURE — 84155 ASSAY OF PROTEIN SERUM: CPT | Performed by: EMERGENCY MEDICINE

## 2025-01-08 PROCEDURE — 99285 EMERGENCY DEPT VISIT HI MDM: CPT | Mod: 25 | Performed by: EMERGENCY MEDICINE

## 2025-01-08 PROCEDURE — 250N000013 HC RX MED GY IP 250 OP 250 PS 637

## 2025-01-08 PROCEDURE — 93005 ELECTROCARDIOGRAM TRACING: CPT | Performed by: EMERGENCY MEDICINE

## 2025-01-08 PROCEDURE — 99222 1ST HOSP IP/OBS MODERATE 55: CPT

## 2025-01-08 PROCEDURE — 85610 PROTHROMBIN TIME: CPT | Performed by: EMERGENCY MEDICINE

## 2025-01-08 PROCEDURE — 85730 THROMBOPLASTIN TIME PARTIAL: CPT | Performed by: EMERGENCY MEDICINE

## 2025-01-08 PROCEDURE — 87635 SARS-COV-2 COVID-19 AMP PRB: CPT

## 2025-01-08 PROCEDURE — 36415 COLL VENOUS BLD VENIPUNCTURE: CPT | Performed by: EMERGENCY MEDICINE

## 2025-01-08 PROCEDURE — 93010 ELECTROCARDIOGRAM REPORT: CPT | Performed by: EMERGENCY MEDICINE

## 2025-01-08 PROCEDURE — 85379 FIBRIN DEGRADATION QUANT: CPT | Performed by: EMERGENCY MEDICINE

## 2025-01-08 PROCEDURE — 80048 BASIC METABOLIC PNL TOTAL CA: CPT | Performed by: EMERGENCY MEDICINE

## 2025-01-08 PROCEDURE — 85025 COMPLETE CBC W/AUTO DIFF WBC: CPT | Performed by: EMERGENCY MEDICINE

## 2025-01-08 PROCEDURE — 83690 ASSAY OF LIPASE: CPT | Performed by: EMERGENCY MEDICINE

## 2025-01-08 PROCEDURE — 76705 ECHO EXAM OF ABDOMEN: CPT

## 2025-01-08 PROCEDURE — 36415 COLL VENOUS BLD VENIPUNCTURE: CPT

## 2025-01-08 PROCEDURE — 250N000013 HC RX MED GY IP 250 OP 250 PS 637: Performed by: INTERNAL MEDICINE

## 2025-01-08 PROCEDURE — 82310 ASSAY OF CALCIUM: CPT | Performed by: EMERGENCY MEDICINE

## 2025-01-08 PROCEDURE — 71046 X-RAY EXAM CHEST 2 VIEWS: CPT

## 2025-01-08 PROCEDURE — 84075 ASSAY ALKALINE PHOSPHATASE: CPT | Performed by: EMERGENCY MEDICINE

## 2025-01-08 PROCEDURE — 85610 PROTHROMBIN TIME: CPT

## 2025-01-08 PROCEDURE — 84484 ASSAY OF TROPONIN QUANT: CPT | Performed by: EMERGENCY MEDICINE

## 2025-01-08 RX ORDER — TRIAMTERENE AND HYDROCHLOROTHIAZIDE 37.5; 25 MG/1; MG/1
1 TABLET ORAL
Status: DISCONTINUED | OUTPATIENT
Start: 2025-01-08 | End: 2025-01-09 | Stop reason: HOSPADM

## 2025-01-08 RX ORDER — METOPROLOL TARTRATE 25 MG/1
75 TABLET, FILM COATED ORAL 2 TIMES DAILY
Status: DISCONTINUED | OUTPATIENT
Start: 2025-01-08 | End: 2025-01-09 | Stop reason: HOSPADM

## 2025-01-08 RX ORDER — AMLODIPINE BESYLATE 5 MG/1
5 TABLET ORAL EVERY EVENING
Status: DISCONTINUED | OUTPATIENT
Start: 2025-01-08 | End: 2025-01-09 | Stop reason: HOSPADM

## 2025-01-08 RX ORDER — AMOXICILLIN 250 MG
1 CAPSULE ORAL 2 TIMES DAILY PRN
Status: DISCONTINUED | OUTPATIENT
Start: 2025-01-08 | End: 2025-01-09 | Stop reason: HOSPADM

## 2025-01-08 RX ORDER — WARFARIN SODIUM 2.5 MG/1
2.5 TABLET ORAL
Status: COMPLETED | OUTPATIENT
Start: 2025-01-08 | End: 2025-01-08

## 2025-01-08 RX ORDER — ONDANSETRON 4 MG/1
4 TABLET, ORALLY DISINTEGRATING ORAL EVERY 6 HOURS PRN
Status: DISCONTINUED | OUTPATIENT
Start: 2025-01-08 | End: 2025-01-09 | Stop reason: HOSPADM

## 2025-01-08 RX ORDER — ONDANSETRON 2 MG/ML
4 INJECTION INTRAMUSCULAR; INTRAVENOUS EVERY 6 HOURS PRN
Status: DISCONTINUED | OUTPATIENT
Start: 2025-01-08 | End: 2025-01-09 | Stop reason: HOSPADM

## 2025-01-08 RX ORDER — AMOXICILLIN 250 MG
2 CAPSULE ORAL 2 TIMES DAILY PRN
Status: DISCONTINUED | OUTPATIENT
Start: 2025-01-08 | End: 2025-01-09 | Stop reason: HOSPADM

## 2025-01-08 RX ORDER — PROCHLORPERAZINE MALEATE 5 MG/1
5 TABLET ORAL EVERY 6 HOURS PRN
Status: DISCONTINUED | OUTPATIENT
Start: 2025-01-08 | End: 2025-01-09 | Stop reason: HOSPADM

## 2025-01-08 RX ADMIN — TRIAMTERENE AND HYDROCHLOROTHIAZIDE 1 HALF-TAB: 37.5; 25 TABLET ORAL at 18:40

## 2025-01-08 RX ADMIN — AMLODIPINE BESYLATE 5 MG: 5 TABLET ORAL at 18:36

## 2025-01-08 RX ADMIN — WARFARIN SODIUM 2.5 MG: 2.5 TABLET ORAL at 18:36

## 2025-01-08 ASSESSMENT — ACTIVITIES OF DAILY LIVING (ADL)
ADLS_ACUITY_SCORE: 41
ADLS_ACUITY_SCORE: 41
ADLS_ACUITY_SCORE: 26
ADLS_ACUITY_SCORE: 41
ADLS_ACUITY_SCORE: 41
ADLS_ACUITY_SCORE: 34
ADLS_ACUITY_SCORE: 41
ADLS_ACUITY_SCORE: 34
ADLS_ACUITY_SCORE: 26
ADLS_ACUITY_SCORE: 26

## 2025-01-08 ASSESSMENT — ENCOUNTER SYMPTOMS
MYALGIAS: 0
COLOR CHANGE: 0
FEVER: 0
SHORTNESS OF BREATH: 0
COUGH: 0
HEADACHES: 0
FREQUENCY: 0
LIGHT-HEADEDNESS: 0
SORE THROAT: 0
CHILLS: 0
DYSURIA: 0
NAUSEA: 0
CONFUSION: 0
ADENOPATHY: 0
AGITATION: 0
VOMITING: 0
WEAKNESS: 0
ABDOMINAL PAIN: 0
DIARRHEA: 0
EYE DISCHARGE: 0

## 2025-01-08 ASSESSMENT — COLUMBIA-SUICIDE SEVERITY RATING SCALE - C-SSRS
2. HAVE YOU ACTUALLY HAD ANY THOUGHTS OF KILLING YOURSELF IN THE PAST MONTH?: NO
6. HAVE YOU EVER DONE ANYTHING, STARTED TO DO ANYTHING, OR PREPARED TO DO ANYTHING TO END YOUR LIFE?: NO
1. IN THE PAST MONTH, HAVE YOU WISHED YOU WERE DEAD OR WISHED YOU COULD GO TO SLEEP AND NOT WAKE UP?: NO

## 2025-01-08 NOTE — ED NOTES
"[facility]   Admission Handoff    The patient is Nayely Kay, 84 year old who arrived in the ED by CAR from home with a complaint of Chest Pain  . The patient's current symptoms are new and during this time the symptoms have decreased. In the ED, patient was diagnosed with   Final diagnoses:   Nonspecific chest pain         Needed?: No    Allergies:    Allergies   Allergen Reactions    Diltiazem Hcl Er Beads Rash       Past Medical Hx:   Past Medical History:   Diagnosis Date    Atrial fibrillation (H)     Migraine, unspecified, without mention of intractable migraine without mention of status migrainosus     Migraine HA    Squamous cell carcinoma     Unspecified essential hypertension     Unspecified tinnitus        Initial vitals were: BP: 108/67  Pulse: 85  Temp: (!) 96.3  F (35.7  C)  Resp: 18  Height: 147.3 cm (4' 10\")  Weight: 52.6 kg (116 lb)  SpO2: 100 %   Recent vital Signs: BP (!) 141/79   Pulse (!) 48   Temp (!) 96.3  F (35.7  C) (Axillary)   Resp 13   Ht 1.473 m (4' 10\")   Wt 52.6 kg (116 lb)   LMP  (LMP Unknown)   SpO2 98%   BMI 24.24 kg/m      Elimination Status: Continent: Yes and wears briefs     Activity Level: SBA w/ walker    Fall Status: Reason for falls risk:  Mobility  nonskid shoes/slippers when out of bed, arm band in place, patient and family education, and assistive device/personal items within reach    Baseline Mental status: WDL  Current Mental Status changes: at basesline    Infection present or suspected this encounter: no  Sepsis suspected: No    Isolation type: n/a    Bariatric equipment needed?: No    In the ED these meds were given: Medications - No data to display    Drips running?  No    Home pump  No    Current LDAs: Peripheral IV: Site Left upper forearm; Gauge 20  none     Results:   Labs/Imaging  Ordered and Resulted from Time of ED Arrival Up to the Time of Departure from the ED  Results for orders placed or performed during " the hospital encounter of 01/08/25 (from the past 24 hours)   CBC with Platelets & Differential    Narrative    The following orders were created for panel order CBC with Platelets & Differential.  Procedure                               Abnormality         Status                     ---------                               -----------         ------                     CBC with platelets and d...[[id number]]  Abnormal            Final result                 Please view results for these tests on the individual orders.   Basic metabolic panel   Result Value Ref Range    Sodium 141 135 - 145 mmol/L    Potassium 3.5 3.4 - 5.3 mmol/L    Chloride 104 98 - 107 mmol/L    Carbon Dioxide (CO2) 23 22 - 29 mmol/L    Anion Gap 14 7 - 15 mmol/L    Urea Nitrogen 35.1 (H) 8.0 - 23.0 mg/dL    Creatinine 1.10 (H) 0.51 - 0.95 mg/dL    GFR Estimate 49 (L) >60 mL/min/1.73m2    Calcium 10.0 8.8 - 10.4 mg/dL    Glucose 96 70 - 99 mg/dL   Troponin T, High Sensitivity   Result Value Ref Range    Troponin T, High Sensitivity 19 (H) <=14 ng/L   [facility] Draw    Narrative    The following orders were created for panel order [place] Draw.  Procedure                               Abnormality         Status                     ---------                               -----------         ------                     Extra Blue Top Tube[[id number]]                              Final result                 Please view results for these tests on the individual orders.   CBC with platelets and differential   Result Value Ref Range    WBC Count 7.5 4.0 - 11.0 10e3/uL    RBC Count 3.74 (L) 3.80 - 5.20 10e6/uL    Hemoglobin 11.5 (L) 11.7 - 15.7 g/dL    Hematocrit 34.0 (L) 35.0 - 47.0 %    MCV 91 78 - 100 fL    MCH 30.7 26.5 - 33.0 pg    MCHC 33.8 31.5 - 36.5 g/dL    RDW 13.2 10.0 - 15.0 %    Platelet Count 259 150 - 450 10e3/uL    % Neutrophils 68 %    % Lymphocytes 19 %    % Monocytes 9 %    % Eosinophils 3 %    % Basophils 1 %    % Immature Granulocytes 1 %     NRBCs per 100 WBC 0 <1 /100    Absolute Neutrophils 5.1 1.6 - 8.3 10e3/uL    Absolute Lymphocytes 1.4 0.8 - 5.3 10e3/uL    Absolute Monocytes 0.6 0.0 - 1.3 10e3/uL    Absolute Eosinophils 0.2 0.0 - 0.7 10e3/uL    Absolute Basophils 0.1 0.0 - 0.2 10e3/uL    Absolute Immature Granulocytes 0.0 <=0.4 10e3/uL    Absolute NRBCs 0.0 10e3/uL   Extra Blue Top Tube   Result Value Ref Range    Hold Specimen [facility]    Hepatic function panel   Result Value Ref Range    Protein Total 7.1 6.4 - 8.3 g/dL    Albumin 4.3 3.5 - 5.2 g/dL    Bilirubin Total 1.6 (H) <=1.2 mg/dL    Alkaline Phosphatase 123 40 - 150 U/L    AST 30 0 - 45 U/L    ALT 21 0 - 50 U/L    Bilirubin Direct 0.30 0.00 - 0.30 mg/dL   Lipase   Result Value Ref Range    Lipase 28 13 - 60 U/L   INR   Result Value Ref Range    INR 1.82 (H) 0.85 - 1.15   Partial thromboplastin time   Result Value Ref Range    aPTT 39 (H) 22 - 38 Seconds   D dimer quantitative   Result Value Ref Range    D-Dimer Quantitative 0.28 0.00 - 0.50 ug/mL FEU    Narrative    This D-dimer assay is intended for use in conjunction with a clinical pretest probability assessment model to exclude pulmonary embolism (PE) and deep venous thrombosis (DVT) in outpatients suspected of PE or DVT. The cut-off value is 0.50 ug/mL FEU.    For patients 50 years of age or older, the application of age-adjusted cut-off values for D-Dimer may increase the specificity without significant effect on sensitivity. The literature suggested calculation age adjusted cut-off in ug/L = age in years x 10 ug/L. The results in this laboratory are reported as ug/mL rather than ug/L. The calculation for age adjusted cut off in ug/mL= age in years x 0.01 ug/mL. For example, the cut off for a 76 year old male is 76 x 0.01 ug/mL = 0.76 ug/mL (760 ug/L).    SPARKLE Lepe et al. Age adjusted D-dimer cut-off levels to rule out pulmonary embolism: The ADJUST-PE Study. MELANIE 2014;311:2273-4855.; HJ Breanne et al. Diagnostic accuracy of  conventional or age adjusted D-dimer cutoff values in older patients with suspected venous thromboembolism. Systemic review and meta-analysis. BMJ 2013:346:f2492.   EKG 12-lead, tracing only   Result Value Ref Range    Systolic Blood Pressure  mmHg    Diastolic Blood Pressure  mmHg    Ventricular Rate 58 BPM    Atrial Rate  BPM    MI Interval  ms    QRS Duration 76 ms     ms    QTc 469 ms    P Axis  degrees    R AXIS 9 degrees    T Axis -46 degrees    Interpretation ECG       Atrial fibrillation with slow ventricular response  Septal infarct , age undetermined  Abnormal ECG  No previous ECGs available     XR Chest 2 Views    Narrative    EXAM: XR CHEST 2 VIEWS  LOCATION: [facility]  DATE: 01/08/2025    INDICATION: Chest pain.  COMPARISON: None.      Impression    IMPRESSION: Mild cardiomegaly. No acute infiltrates.     Troponin T, High Sensitivity   Result Value Ref Range    Troponin T, High Sensitivity 18 (H) <=14 ng/L   US Abdomen Limited    Narrative    EXAM: US ABDOMEN LIMITED  LOCATION: [facility]  DATE: 1/8/2025    INDICATION: epigastric pain  COMPARISON: None.  TECHNIQUE: Limited abdominal ultrasound.    FINDINGS:    GALLBLADDER: Possible trace gallbladder sludge. No gallstone or evidence of cholecystitis.    BILE DUCTS: No biliary dilatation. The common duct measures 2 mm.    LIVER: Normal parenchyma with smooth contour. No focal mass.    RIGHT KIDNEY: No hydronephrosis.    PANCREAS: The visualized portions are normal.    No ascites.      Impression    IMPRESSION:  1.  Possible trace gallbladder sludge. No gallstone or cholecystitis.  2.  Liver is unremarkable.         *Note: Due to a large number of results and/or encounters for the requested time period, some results have not been displayed. A complete set of results can be found in Results Review.       For the majority of the shift this patient's behavior was Green     Cardiac Rhythm: Atrial  rhythm  Pt needs tele? Yes  Skin/wound Issues:  Pt reports some skin/wounds on buttock/coccyx area-not observed/assessed by RN.     Code Status: Has ACP documents-upon discussion with admitting provider    Pain control: good    Nausea control: pt had none    Abnormal labs/tests/findings requiring intervention:     Patient tested for COVID 19 prior to admission: NO     OBS brochure/video discussed/provided to patient/family: Yes     Family present during ED course? Yes     Family Comments/Social Situation comments: Lives at home with daughter    Tasks needing completion: None    Marjorie Preciado, RN

## 2025-01-08 NOTE — MEDICATION SCRIBE - ADMISSION MEDICATION HISTORY
Medication Scribe Admission Medication History    Admission medication history is complete. The information provided in this note is only as accurate as the sources available at the time of the update.    Information Source(s): Patient via in-person    Pertinent Information: The patient was interviewed bedside with her daughter. We removed the eye drops, oscal and multivitamin from her list as the patient states she no longer takes them. We added Citrucel, calcium/magnesium/zinc and current warfarin dose per INR clinic.  Per INR Clinic:  Summary  As of 12/30/2024     Full warfarin instructions:  2.5 mg every day  Next INR check:  1/27/2025    Changes made to PTA medication list:  Added: Citrucel, calcium/magnesium/zinc  Deleted: eye drops(ketorolac and prednisolone), multivitamin and oscal  Changed: warfarin dose to 2.5mg once daily     Allergies reviewed with patient and updates made in EHR: yes    Medication History Completed By: JADA VICTORIA 1/8/2025 12:15 PM    PTA Med List   Medication Sig Note Last Dose/Taking    amLODIPine (NORVASC) 5 MG tablet Take 1 tablet (5 mg) by mouth daily  1/7/2025 at  6:00 PM    ascorbic acid (VITAMIN C) 250 MG CHEW Take 1 tablet by mouth daily.  1/7/2025 at  6:00 PM    Calcium-Magnesium-Zinc 333-133-5 MG TABS per tablet Take 1 tablet by mouth daily.  1/7/2025 Evening    methylcellulose (CITRUCEL) powder Take 1 teaspoonful by mouth daily.  1/7/2025 Evening    metoprolol tartrate (LOPRESSOR) 50 MG tablet Take 1.5 tablets (75 mg) by mouth 2 times daily  1/8/2025 at  6:00 AM    triamterene-HCTZ (MAXZIDE-25) 37.5-25 MG tablet TAKE 1/2 TABLET BY MOUTH TWICE A DAY  1/8/2025 at  6:00 AM    warfarin ANTICOAGULANT (JANTOVEN ANTICOAGULANT) 2.5 MG tablet Take 2.5 mg every day or As directed by Anticoagulation clinic 1/8/2025: Per INR Clinic:  Summary  As of 12/30/2024     Full warfarin instructions:  2.5 mg every day  Next INR check:  1/27/2025 1/7/2025 at  6:00 PM

## 2025-01-08 NOTE — ED NOTES
Observation Brochure and Video    Patient and family informed of observation status based on provider's order.  Observation brochure was given. Patient/Family stated understanding. Questions answered.  Marjorie Jaeger RN

## 2025-01-08 NOTE — ED TRIAGE NOTES
Patient reports chest pain started during the night. Pain is mid-sternal and does not radiate. At times, worse with activity. Intermittent dizziness. Hx of atrial fibrillation. On blood thinners.      Triage Assessment (Adult)       Row Name 01/08/25 0749          Triage Assessment    Airway WDL WDL        Respiratory WDL    Respiratory WDL WDL        Skin Circulation/Temperature WDL    Skin Circulation/Temperature WDL WDL        Cardiac WDL    Cardiac WDL X;chest pain     Cardiac Rhythm Atrial paced        Chest Pain Assessment    Chest Pain Location midsternal     Character pressure     Duration awoke in the middle of night and pain was present     Precipitating Factors activity     Alleviating Factors nothing     Chest Pain Intervention cardiac biomarkers drawn;cardiac monitoring continued;cardiac monitor placed;12-lead ECG obtained        Peripheral/Neurovascular WDL    Peripheral Neurovascular WDL WDL        Cognitive/Neuro/Behavioral WDL    Cognitive/Neuro/Behavioral WDL WDL

## 2025-01-08 NOTE — H&P
[facility]    History and Physical  Hospital Medicine       Date of Admission:  1/8/2025  Date of Service: 1/8/2025     Assessment & Plan   Nayely Kay is a 84 year old female with past medical history of tubular adenoma, eczema, HTN, chronic atrial fibrillation on anticoagulation, polymyalgia rheumatica, CKD3a now presents on 1/8/2025 with chest pain. She is being observed for stress test in AM.     Chest pain    Presents with sub-xiphoid & left-sided chest pressure & tightness beginning while at rest around midnight 1/8. No radiation, no dyspnea, no presyncope or palpitations. Pain has resolved at time of admission (1500 1/8) and patient feels back to baseline.     Troponin mildly elevated but stable (19 ?  18). EKG shows afib with U-waves. K WNL. D-dimer WNL (0.28). On warfarin, INR mildly subtherapeutic (INR 1.82).     CXR shows mild cardiomegaly without other acute change. Abdominal US shows possible gallbladder sludge without evidence for acute cholecystitis & unremarkable liver.     Concern for cardiac source of pain so planning for stress test in AM.   - Lexiscan stress test ordered for AM  - Low saturated fat diet NO CAFFEINE  - Continuous pulse ox  - Oxygen available PRN  - Continuous telemetry  - CBC & CMP in AM  - HOLD beta blocker for stress test in AM    Chronic atrial fibrillation   Long term current use of anticoagulants   Used to follow with cardiology, now mostly managing with PCP (last cardiology note from 2007).   Variable rates with intermittent bradycardia in ER. Not hypotensive, & denies palpitations.   - HOLD PTA metoprolol tartrate for stress test in AM  - Continue PTA warfarin with dosing per pharmacy    Essential hypertension  Normotensive to mildly hypertensive since presentation.   - HOLD PTA metoprolol tartrate 75mg BID for stress test in AM  - Continue PTA amlodipine 5mg daily   - Continue PTA triamterene-hydrochlorothiazide 18.75-12.5mg BID      Polymyalgia rheumatica  Previously managed with prednisone, but had been doing well per chart review & tapered off steroids. Denies myalgias on presentation.   - continue regular follow up with PCP for ongoing monitoring    Pressure injury on buttock  Noted on PCP clinic exam 1/3/2025.     Anemia  Mild, baseline hemoglobin 12.5 - 13.0, hemoglobin on presentation 11.5.   - CBC in AM    Elevated total bilirubin  Chronic, Tbili 1.6 on presentation. Remainder LFT's WNL, including direct bili 0.3.   - CMP in AM    Stage 3a chronic kidney disease  Baseline creatinine around 1.2, creatinine on presentation 1.10.  - BMP in AM    Clinically Significant Risk Factors Present on Admission                # Drug Induced Coagulation Defect: home medication list includes an anticoagulant medication    # Hypertension: Noted on problem list         Diet: Regular Diet Adult    DVT Prophylaxis: Warfarin  Mccallum Catheter: Not present  Code Status:  full code  Lines: PIV, telemetry    Disposition Plan   Medically Ready for Discharge: Anticipated Tomorrow     The patient's care was discussed with the Attending Physician, Dr. Gonzalez Cespedes, bedside RN, and the patient .    Savi Carlin PA-C        Primary Care Physician   Tiff Yarbrough [phone]    History is obtained from the patient, who is a rather poor historian, handoff from ER provider, and review of old records via the EMR.    History of Present Illness   Nayely Kay is a 84 year old female with past medical history of tubular adenoma, eczema, HTN, chronic atrial fibrillation on anticoagulation, polymyalgia rheumatica, CKD3a now presents on 1/8/2025 with chest pain.     Patient has a history of chest pain with known history of afib but she's unsure of any additional details.   Around midnight 1/8 she was sitting on the couch and noticed a chest pressure & tightness mostly in her xiphoid area & radiating to the left inferior chest / left upper quadrant. No  associated nausea. Unsure if she had dyspnea. Denies radiation to neck, arms, or back. Denies associated palpitations. She denies lightheadedness or dizziness. She is unsure how long the pain lasted, and if it has recurred at all since initial onset at midnight. At time of admission she is asymptomatic & feels back to baseline. She denies any falls, chest trauma, recent URI or flu-like illness. Denies LE edema. She does admit her memory is very poor, and she's not sure if any of the information she's providing is accurate.     Upon arrival to ER patient received lab and imaging workup. She was started on telemetry.     Review of Systems   Constitutional: denies weight loss  Eyes: denies changes in vision  HENT: denies changes in hearing  Respiratory:see HPI  Cardiovascular: see HPI  Gastroenterology: denies constipation, diarrhea  Genitourinary: denies dysuria  Integumentary: no new rashes or skin changes  Musculoskeletal: denies new muscle pain   Neuro: denies headaches, tremor    Past Medical History    Past Medical History:   Diagnosis Date    Atrial fibrillation (H)     Migraine, unspecified, without mention of intractable migraine without mention of status migrainosus     Migraine HA    Squamous cell carcinoma     Unspecified essential hypertension     Unspecified tinnitus      Past Surgical History   Past Surgical History:   Procedure Laterality Date    COLONOSCOPY  2009    FLEXIBLE SIGMOIDOSCOPY  9/2004    PHACOEMULSIFICATION WITH STANDARD INTRAOCULAR LENS IMPLANT Right 5/24/2024    Procedure: Cataract Extraction with Intraocular Lens Placement;  Surgeon: Jareth Stoll MD;  Location: [facility] OR    PHACOEMULSIFICATION WITH STANDARD INTRAOCULAR LENS IMPLANT Left 6/28/2024    Procedure: cataract extraction with intraocular lens placement left eye;  Surgeon: Jareth Stoll MD;  Location: [facility] OR    SURGICAL HISTORY OF -       tubal    SURGICAL HISTORY OF -       oral teeth      Prior to Admission  Medications   Prior to Admission Medications   Prescriptions Last Dose Informant Patient Reported? Taking?   ASPIRIN NOT PRESCRIBED (INTENTIONAL)  Self Yes No   Sig: due to coumadin   Calcium-Magnesium-Zinc 333-133-5 MG TABS per tablet 1/7/2025 Evening Self Yes Yes   Sig: Take 1 tablet by mouth daily.   amLODIPine (NORVASC) 5 MG tablet 1/7/2025 at  6:00 PM Self No Yes   Sig: Take 1 tablet (5 mg) by mouth daily   ascorbic acid (VITAMIN C) 250 MG CHEW 1/7/2025 at  6:00 PM Self Yes Yes   Sig: Take 1 tablet by mouth daily.   lisinopril (ZESTRIL) 5 MG tablet  Self No No   Sig: Take 1 tablet (5 mg) by mouth daily   methylcellulose (CITRUCEL) powder 1/7/2025 Evening Self Yes Yes   Sig: Take 1 teaspoonful by mouth daily.   metoprolol tartrate (LOPRESSOR) 50 MG tablet 1/8/2025 at  6:00 AM Self No Yes   Sig: Take 1.5 tablets (75 mg) by mouth 2 times daily   nitroGLYcerin (NITROSTAT) 0.4 MG sublingual tablet  Self No No   Sig: Place 1 tablet (0.4 mg) under the tongue every 5 minutes as needed for chest pain   Patient not taking: Reported on 1/3/2025   triamterene-HCTZ (MAXZIDE-25) 37.5-25 MG tablet 1/8/2025 at  6:00 AM Self No Yes   Sig: TAKE 1/2 TABLET BY MOUTH TWICE A DAY   warfarin ANTICOAGULANT (JANTOVEN ANTICOAGULANT) 2.5 MG tablet 1/7/2025 at  6:00 PM Self No Yes   Sig: Take 2.5 mg every day or As directed by Anticoagulation clinic      Facility-Administered Medications: None     Allergies   Allergies   Allergen Reactions    Diltiazem Hcl Er Beads Rash     Family History    Family History   Problem Relation Age of Onset    Heart Disease Mother         Valvular disease- age 55.    Cerebrovascular Disease Father         age 75.    Neurologic Disorder Daughter         MS    Circulatory Brother         joe hanna-cleaned out    Breast Cancer Maternal Aunt      Social History   Social History     Socioeconomic History    Marital status:      Physical Exam   BP (!) 141/79   Pulse (!) 48   Temp (!) 96.3  F (35.7  " C) (Axillary)   Resp 13   Ht 1.473 m (4' 10\")   Wt 52.6 kg (116 lb)   LMP  (LMP Unknown)   SpO2 98%   BMI 24.24 kg/m       Weight: 116 lbs 0 oz Body mass index is 24.24 kg/m .     Constitutional: Alert, oriented, cooperative, no apparent distress, appears nontoxic  Eyes: Eyes are clear  HENT: Oropharynx is clear. No evidence of cranial trauma.  Cardiovascular: Regular rate and rhythm, normal S1 and S2, and no murmur noted. JVP is mildly elevated. Radial pulse 2+. No pitting lower extremity edema.  Respiratory: Clear to auscultation bilaterally. Respirations unlabored on room air. Good air movement.   GI: Soft, non-tender, normal bowel sounds, non-distended.   Genitourinary: Deferred  Musculoskeletal: Normal muscle bulk, no obvious joint deformities.   Skin: Warm and dry, no rashes.   Neurologic: Neck supple.  is symmetric. Struggles with details of HPI. No tremor.     Data   Data reviewed today:     I have personally reviewed the following data over the past 24 hrs:    7.5  \   11.5 (L)   / 259     141 104 35.1 (H) /  96   3.5 23 1.10 (H) \     ALT: 21 AST: 30 AP: 123 TBILI: 1.6 (H)   ALB: 4.3 TOT PROTEIN: 7.1 LIPASE: 28     Trop: 18 (H) BNP: N/A     INR:  1.82 (H) PTT:  39 (H)   D-dimer:  0.28 Fibrinogen:  N/A       Recent Results (from the past 24 hours)   XR Chest 2 Views    Narrative    EXAM: XR CHEST 2 VIEWS  LOCATION: [facility]  DATE: 01/08/2025    INDICATION: Chest pain.  COMPARISON: None.      Impression    IMPRESSION: Mild cardiomegaly. No acute infiltrates.     US Abdomen Limited    Narrative    EXAM: US ABDOMEN LIMITED  LOCATION: [facility]  DATE: 1/8/2025    INDICATION: epigastric pain  COMPARISON: None.  TECHNIQUE: Limited abdominal ultrasound.    FINDINGS:    GALLBLADDER: Possible trace gallbladder sludge. No gallstone or evidence of cholecystitis.    BILE DUCTS: No biliary dilatation. The common duct measures 2 mm.    LIVER: Normal " parenchyma with smooth contour. No focal mass.    RIGHT KIDNEY: No hydronephrosis.    PANCREAS: The visualized portions are normal.    No ascites.      Impression    IMPRESSION:  1.  Possible trace gallbladder sludge. No gallstone or cholecystitis.  2.  Liver is unremarkable.

## 2025-01-08 NOTE — PHARMACY-ANTICOAGULATION SERVICE
Clinical Pharmacy - Warfarin Dosing Consult     Pharmacy has been consulted to manage this patient s warfarin therapy.  Indication: Atrial Fibrillation  Therapy Goal: INR 2-3  OP Anticoag Clinic: [facility]  Warfarin Prior to Admission: Yes  Warfarin PTA Regimen: 2.5 mg daily  Recent documented change in oral intake/nutrition: Unknown    INR   Date Value Ref Range Status   01/08/2025 1.82 (H) 0.85 - 1.15 Final   12/30/2024 2.9 (H) 0.9 - 1.1 Final       Recommend warfarin 2.5 mg today.  Pharmacy will monitor Nayely Kay daily and order warfarin doses to achieve specified goal.      Please contact pharmacy as soon as possible if the warfarin needs to be held for a procedure or if the warfarin goals change.      Thank you,   Malou Gill [facility] on 1/8/2025 at 3:04 PM

## 2025-01-08 NOTE — ED PROVIDER NOTES
History     Chief Complaint   Patient presents with    Chest Pain     HPI  Nayely Kay is a 84 year old female who has a history of atrial fibrillation who presents with lower substernal chest discomfort since around midnight this morning.  There is no radiation of the pain.  She has no fever.  She denies shortness of breath.  She has no bowel or bladder symptoms.  She has no cough.  She has no exposures.  She denies leg pain or swelling outside of the occasional charley horse.  She was seen in clinic on 1/3/2025.  I reviewed that visit.  She was seen for a rash on her buttocks.  She was recommended to use topical bacitracin.  She was advised to follow-up with cardiology for her chronic atrial fibrillation.    Allergies:  Allergies   Allergen Reactions    Diltiazem Hcl Er Beads Rash       Problem List:    Patient Active Problem List    Diagnosis Date Noted    Chest pain 01/08/2025     Priority: Medium    Stage 3a chronic kidney disease (H) 01/04/2022     Priority: Medium     abnormal creatinine since 2014 off and on.  4/18/2022:positive MAC       Long term current use of anticoagulants with INR goal of 2.0-3.0 10/26/2021     Priority: Medium    Elevated bilirubin 07/15/2019     Priority: Medium     7/15/2019:Possible Gilbert's.  Persistent elevated bili-uncongugated.       Tubular adenoma 07/18/2017     Priority: Medium     7/13/2017 colonoscopy with two small polyps 2 and 5mm.  One a tubular adenoma.  PLAN: consider follow-up in 5 years.       Essential hypertension with goal blood pressure less than 140/90 05/04/2017     Priority: Medium    Ganglion cyst 10/11/2013     Priority: Medium    AK (actinic keratosis) 10/05/2012     Priority: Medium    Eczema 04/05/2012     Priority: Medium    Family history of breast cancer 10/03/2011     Priority: Medium     We discussed this; there are two cousins and two aunts with breast cancer. She will do the mammograms regularly until age 79.       Osteopenia 04/08/2011      Priority: Medium     DEXA April, 2011; right hip has T score of -1.3.       Hyperlipidemia LDL goal <130 10/31/2010     Priority: Medium    Polymyalgia rheumatica 06/04/2008     Priority: Medium    Iron deficiency anemia 06/04/2008     Priority: Medium    Headache 09/07/2005     Priority: Medium     Migraine.  3/14/2018:Has not been a recent problem.        Chronic atrial fibrillation (H) 09/07/2005     Priority: Medium        Past Medical History:    Past Medical History:   Diagnosis Date    Atrial fibrillation (H)     Migraine, unspecified, without mention of intractable migraine without mention of status migrainosus     Squamous cell carcinoma     Unspecified essential hypertension     Unspecified tinnitus        Past Surgical History:    Past Surgical History:   Procedure Laterality Date    COLONOSCOPY  2009    FLEXIBLE SIGMOIDOSCOPY  9/2004    PHACOEMULSIFICATION WITH STANDARD INTRAOCULAR LENS IMPLANT Right 5/24/2024    Procedure: Cataract Extraction with Intraocular Lens Placement;  Surgeon: Jareth Stoll MD;  Location: [facility]    PHACOEMULSIFICATION WITH STANDARD INTRAOCULAR LENS IMPLANT Left 6/28/2024    Procedure: cataract extraction with intraocular lens placement left eye;  Surgeon: Jareth Stoll MD;  Location: [facility]    SURGICAL HISTORY OF -       tubal    SURGICAL HISTORY OF -       oral teeth       Family History:    Family History   Problem Relation Age of Onset    Heart Disease Mother         Valvular disease- age 55.    Cerebrovascular Disease Father         age 75.    Neurologic Disorder Daughter         MS    Circulatory Brother         joe hanna-cleaned out    Breast Cancer Maternal Aunt        Social History:  Marital Status:   [5]  Social History     Tobacco Use    Smoking status: Never    Smokeless tobacco: Never   Vaping Use    Vaping status: Never Used   Substance Use Topics    Alcohol use: No    Drug use: No        Medications:    No current outpatient  "medications on file.        Review of Systems   Constitutional:  Negative for chills and fever.   HENT:  Negative for congestion and sore throat.    Eyes:  Negative for discharge.   Respiratory:  Negative for cough and shortness of breath.    Cardiovascular:  Positive for chest pain. Negative for leg swelling.   Gastrointestinal:  Negative for abdominal pain, diarrhea, nausea and vomiting.   Genitourinary:  Negative for dysuria and frequency.   Musculoskeletal:  Negative for myalgias.   Skin:  Negative for color change and rash.   Neurological:  Negative for weakness, light-headedness and headaches.   Hematological:  Negative for adenopathy.   Psychiatric/Behavioral:  Negative for agitation, behavioral problems and confusion.        Physical Exam   BP: 108/67  Pulse: 85  Temp: (!) 96.3  F (35.7  C)  Resp: 18  Height: 147.3 cm (4' 10\")  Weight: 52.6 kg (116 lb)  SpO2: 100 %      Physical Exam  Constitutional:       General: She is not in acute distress.     Appearance: She is well-developed.   HENT:      Head: Normocephalic and atraumatic.   Eyes:      Conjunctiva/sclera: Conjunctivae normal.      Pupils: Pupils are equal, round, and reactive to light.   Neck:      Thyroid: No thyromegaly.      Trachea: No tracheal deviation.   Cardiovascular:      Rate and Rhythm: Regular rhythm. Bradycardia present.      Heart sounds: Normal heart sounds. No murmur heard.  Pulmonary:      Effort: Pulmonary effort is normal. No respiratory distress.      Breath sounds: Normal breath sounds. No wheezing.   Chest:      Chest wall: No tenderness.   Abdominal:      General: There is no distension.      Palpations: Abdomen is soft.      Tenderness: There is no abdominal tenderness.   Musculoskeletal:         General: No tenderness.      Cervical back: Normal range of motion and neck supple.   Skin:     General: Skin is warm.      Findings: No rash.   Neurological:      Mental Status: She is alert and oriented to person, place, and time. "      Sensory: No sensory deficit.   Psychiatric:         Behavior: Behavior normal.         ED Course        Procedures           An EKG was performed.  It was read by me at 7:58 AM.  It shows atrial fibrillation with a slow ventricular rate of 58.  There is slight ST flattening in lead II and V6.  There are no acute ST-T segment abnormalities.    Critical Care time:  none       Results for orders placed or performed during the hospital encounter of 01/08/25 (from the past 24 hours)   CBC with Platelets & Differential    Narrative    The following orders were created for panel order CBC with Platelets & Differential.  Procedure                               Abnormality         Status                     ---------                               -----------         ------                     CBC with platelets and d...[[id number]]  Abnormal            Final result                 Please view results for these tests on the individual orders.   Basic metabolic panel   Result Value Ref Range    Sodium 141 135 - 145 mmol/L    Potassium 3.5 3.4 - 5.3 mmol/L    Chloride 104 98 - 107 mmol/L    Carbon Dioxide (CO2) 23 22 - 29 mmol/L    Anion Gap 14 7 - 15 mmol/L    Urea Nitrogen 35.1 (H) 8.0 - 23.0 mg/dL    Creatinine 1.10 (H) 0.51 - 0.95 mg/dL    GFR Estimate 49 (L) >60 mL/min/1.73m2    Calcium 10.0 8.8 - 10.4 mg/dL    Glucose 96 70 - 99 mg/dL   Troponin T, High Sensitivity   Result Value Ref Range    Troponin T, High Sensitivity 19 (H) <=14 ng/L   [place] Draw    Narrative    The following orders were created for panel order [place] Draw.  Procedure                               Abnormality         Status                     ---------                               -----------         ------                     Extra Blue Top Tube[[id number]]                              Final result                 Please view results for these tests on the individual orders.   CBC with platelets and differential   Result Value Ref Range     WBC Count 7.5 4.0 - 11.0 10e3/uL    RBC Count 3.74 (L) 3.80 - 5.20 10e6/uL    Hemoglobin 11.5 (L) 11.7 - 15.7 g/dL    Hematocrit 34.0 (L) 35.0 - 47.0 %    MCV 91 78 - 100 fL    MCH 30.7 26.5 - 33.0 pg    MCHC 33.8 31.5 - 36.5 g/dL    RDW 13.2 10.0 - 15.0 %    Platelet Count 259 150 - 450 10e3/uL    % Neutrophils 68 %    % Lymphocytes 19 %    % Monocytes 9 %    % Eosinophils 3 %    % Basophils 1 %    % Immature Granulocytes 1 %    NRBCs per 100 WBC 0 <1 /100    Absolute Neutrophils 5.1 1.6 - 8.3 10e3/uL    Absolute Lymphocytes 1.4 0.8 - 5.3 10e3/uL    Absolute Monocytes 0.6 0.0 - 1.3 10e3/uL    Absolute Eosinophils 0.2 0.0 - 0.7 10e3/uL    Absolute Basophils 0.1 0.0 - 0.2 10e3/uL    Absolute Immature Granulocytes 0.0 <=0.4 10e3/uL    Absolute NRBCs 0.0 10e3/uL   Extra Blue Top Tube   Result Value Ref Range    Hold Specimen [facility]    Hepatic function panel   Result Value Ref Range    Protein Total 7.1 6.4 - 8.3 g/dL    Albumin 4.3 3.5 - 5.2 g/dL    Bilirubin Total 1.6 (H) <=1.2 mg/dL    Alkaline Phosphatase 123 40 - 150 U/L    AST 30 0 - 45 U/L    ALT 21 0 - 50 U/L    Bilirubin Direct 0.30 0.00 - 0.30 mg/dL   Lipase   Result Value Ref Range    Lipase 28 13 - 60 U/L   INR   Result Value Ref Range    INR 1.82 (H) 0.85 - 1.15   Partial thromboplastin time   Result Value Ref Range    aPTT 39 (H) 22 - 38 Seconds   D dimer quantitative   Result Value Ref Range    D-Dimer Quantitative 0.28 0.00 - 0.50 ug/mL FEU    Narrative    This D-dimer assay is intended for use in conjunction with a clinical pretest probability assessment model to exclude pulmonary embolism (PE) and deep venous thrombosis (DVT) in outpatients suspected of PE or DVT. The cut-off value is 0.50 ug/mL FEU.    For patients 50 years of age or older, the application of age-adjusted cut-off values for D-Dimer may increase the specificity without significant effect on sensitivity. The literature suggested calculation age adjusted cut-off in ug/L = age in years x 10  ug/L. The results in this laboratory are reported as ug/mL rather than ug/L. The calculation for age adjusted cut off in ug/mL= age in years x 0.01 ug/mL. For example, the cut off for a 76 year old male is 76 x 0.01 ug/mL = 0.76 ug/mL (760 ug/L).    M Sherley et al. Age adjusted D-dimer cut-off levels to rule out pulmonary embolism: The ADJUST-PE Study. MELANIE 2014;311:8840-8942.; HJ Breanne et al. Diagnostic accuracy of conventional or age adjusted D-dimer cutoff values in older patients with suspected venous thromboembolism. Systemic review and meta-analysis. BMJ 2013:346:f2492.   EKG 12-lead, tracing only   Result Value Ref Range    Systolic Blood Pressure  mmHg    Diastolic Blood Pressure  mmHg    Ventricular Rate 58 BPM    Atrial Rate  BPM    IA Interval  ms    QRS Duration 76 ms     ms    QTc 469 ms    P Axis  degrees    R AXIS 9 degrees    T Axis -46 degrees    Interpretation ECG       Atrial fibrillation with slow ventricular response  Septal infarct , age undetermined  Abnormal ECG  No previous ECGs available     XR Chest 2 Views    Narrative    EXAM: XR CHEST 2 VIEWS  LOCATION: [facility]  DATE: 01/08/2025    INDICATION: Chest pain.  COMPARISON: None.      Impression    IMPRESSION: Mild cardiomegaly. No acute infiltrates.     Troponin T, High Sensitivity   Result Value Ref Range    Troponin T, High Sensitivity 18 (H) <=14 ng/L   US Abdomen Limited    Narrative    EXAM: US ABDOMEN LIMITED  LOCATION: [facility]  DATE: 1/8/2025    INDICATION: epigastric pain  COMPARISON: None.  TECHNIQUE: Limited abdominal ultrasound.    FINDINGS:    GALLBLADDER: Possible trace gallbladder sludge. No gallstone or evidence of cholecystitis.    BILE DUCTS: No biliary dilatation. The common duct measures 2 mm.    LIVER: Normal parenchyma with smooth contour. No focal mass.    RIGHT KIDNEY: No hydronephrosis.    PANCREAS: The visualized portions are normal.    No  ascites.      Impression    IMPRESSION:  1.  Possible trace gallbladder sludge. No gallstone or cholecystitis.  2.  Liver is unremarkable.       INR   Result Value Ref Range    INR 1.86 (H) 0.85 - 1.15     *Note: Due to a large number of results and/or encounters for the requested time period, some results have not been displayed. A complete set of results can be found in Results Review.       Medications   amLODIPine (NORVASC) tablet 5 mg (has no administration in time range)   metoprolol tartrate (LOPRESSOR) tablet 75 mg ( Oral Automatically Held 1/11/25 7590)   Warfarin Dose Required Daily - Pharmacist Managed (has no administration in time range)   senna-docusate (SENOKOT-S/PERICOLACE) 8.6-50 MG per tablet 1 tablet (has no administration in time range)     Or   senna-docusate (SENOKOT-S/PERICOLACE) 8.6-50 MG per tablet 2 tablet (has no administration in time range)   ondansetron (ZOFRAN ODT) ODT tab 4 mg (has no administration in time range)     Or   ondansetron (ZOFRAN) injection 4 mg (has no administration in time range)   prochlorperazine (COMPAZINE) injection 5 mg (has no administration in time range)     Or   prochlorperazine (COMPAZINE) tablet 5 mg (has no administration in time range)   Patient is already receiving anticoagulation with heparin, enoxaparin (LOVENOX), warfarin (COUMADIN)  or other anticoagulant medication (has no administration in time range)   sodium chloride (PF) 0.9% PF flush 1-10 mL (has no administration in time range)   sodium chloride (PF) 0.9% PF flush 10 mL (has no administration in time range)   HOLD: Betablockers 24 hours prior to the procedure (has no administration in time range)   HOLD: dipyridamole (PERSANTINE) or aspirin/dipyridamole (AGGRENOX) 48 hours prior to the procedure (has no administration in time range)   HOLD: Phosphodiesterase-5 (PDE-5) inhibitor medications - Phosphodiesterase-5 (PDE-5) inhibitor medications - vardenafil (LEVITRA/STAXYN), sildenafil  (VIAGRA/REVATIO), tadalafil (CIALIS/ADCIRCA), avanafil (STENDRA) - 48 hours prior to the procedure (has no administration in time range)   IF patient diabetic - HOLD: ALL ORAL HYPOGLYCEMICS and include: glipizide, glyburide, glimepiride, gliclazide, metformin, any metformin containing medication, on day of the procedure (has no administration in time range)   triamterene-hydrochlorothiazide half-tab 18.75-12.5 mg (has no administration in time range)   warfarin ANTICOAGULANT (COUMADIN) tablet 2.5 mg (has no administration in time range)       Assessments & Plan (with Medical Decision Making)     I have reviewed the nursing notes.    I have reviewed the findings, diagnosis, plan and need for follow up with the patient.      84 year old female who has a history of atrial fibrillation who presents with lower substernal chest discomfort since around midnight this morning.  There is no radiation of the pain.  She has no fever.  She denies shortness of breath.  She has no bowel or bladder symptoms.  She has no cough.  She has no exposures.  She denies leg pain or swelling outside of the occasional charley horse.  She was seen in clinic on 1/3/2025.  I reviewed that visit.  She was seen for a rash on her buttocks.  She was recommended to use topical bacitracin.  She was advised to follow-up with cardiology for her chronic atrial fibrillation.    Patient really describes her pain from the lower chest to the upper abdominal area.  She will have blood work, EKG, and x-ray performed.    Patient CBC returned with a white count of 7.5 and hemoglobin of 11.5.    Basic metabolic profile was remarkable for a creatinine of 1.10 and GFR of 49.  This is actually improved from 9 months ago when it was 1.25 and 42.  LFTs were normal with the exception of a bilirubin of 1.6 which is consistent with past readings.    High-sensitivity troponin returned at 19.  A 2-hour delta troponin was repeated and was 18.    INR was 1.82 with an APTT of  39.  D-dimer returned at 0.28.    The right upper quadrant ultrasound was performed which showed possible trace gallbladder sludge without gallstones or cholecystitis.    Chest x-ray was performed and independently read by me.  It showed no evidence of pneumonia, pneumothorax, cardiomegaly, or pericardial effusion.    It is not completely clear as to the cause of her abdominal pain.  It does sound like in talking to her it happens more often than not.  She would benefit from stress testing, and plans will be for her to be admitted to the hospital for rule out myocardial infarction and stress testing.  PE is less likely with her being on Coumadin with a normal D-dimer.  Biliary colic is a possibility or gastritis/esophagitis.  I spoke with Gonzalez Cespedes MD who accepted the patient for observation admission.        Medical Decision Making  The patient's presentation was of high complexity (an acute health issue posing potential threat to life or bodily function).    The patient's evaluation involved:  review of 3+ test result(s) ordered prior to this encounter (see separate area of note for details)  ordering and/or review of 3+ test(s) in this encounter (see separate area of note for details)  independent interpretation of testing performed by another health professional (see separate area of note for details)  discussion of management or test interpretation with another health professional (see separate area of note for details)    The patient's management necessitated thank you.        Current Discharge Medication List          Final diagnoses:   Nonspecific chest pain       1/8/2025   [facility] EMERGENCY DEPT       Ulysses Mcdermott MD  01/08/25 [id number]

## 2025-01-09 ENCOUNTER — APPOINTMENT (OUTPATIENT)
Dept: NUCLEAR MEDICINE | Facility: CLINIC | Age: 85
End: 2025-01-09
Payer: COMMERCIAL

## 2025-01-09 ENCOUNTER — TELEPHONE (OUTPATIENT)
Dept: ANTICOAGULATION | Facility: CLINIC | Age: 85
End: 2025-01-09

## 2025-01-09 ENCOUNTER — APPOINTMENT (OUTPATIENT)
Dept: CARDIOLOGY | Facility: CLINIC | Age: 85
End: 2025-01-09
Payer: COMMERCIAL

## 2025-01-09 VITALS
BODY MASS INDEX: 24.35 KG/M2 | TEMPERATURE: 98 F | DIASTOLIC BLOOD PRESSURE: 52 MMHG | HEART RATE: 67 BPM | RESPIRATION RATE: 20 BRPM | OXYGEN SATURATION: 100 % | WEIGHT: 116 LBS | SYSTOLIC BLOOD PRESSURE: 113 MMHG | HEIGHT: 58 IN

## 2025-01-09 DIAGNOSIS — Z79.01 LONG TERM CURRENT USE OF ANTICOAGULANTS WITH INR GOAL OF 2.0-3.0: ICD-10-CM

## 2025-01-09 DIAGNOSIS — I48.20 CHRONIC ATRIAL FIBRILLATION (H): Primary | ICD-10-CM

## 2025-01-09 LAB
ALBUMIN SERPL BCG-MCNC: 4 G/DL (ref 3.5–5.2)
ALP SERPL-CCNC: 113 U/L (ref 40–150)
ALT SERPL W P-5'-P-CCNC: 17 U/L (ref 0–50)
ANION GAP SERPL CALCULATED.3IONS-SCNC: 14 MMOL/L (ref 7–15)
AST SERPL W P-5'-P-CCNC: 25 U/L (ref 0–45)
ATRIAL RATE - MUSE: NORMAL BPM
BILIRUB SERPL-MCNC: 1.4 MG/DL
BUN SERPL-MCNC: 39.9 MG/DL (ref 8–23)
CALCIUM SERPL-MCNC: 9.7 MG/DL (ref 8.8–10.4)
CHLORIDE SERPL-SCNC: 104 MMOL/L (ref 98–107)
CREAT SERPL-MCNC: 1.48 MG/DL (ref 0.51–0.95)
CV STRESS MAX HR HE: 70
DIASTOLIC BLOOD PRESSURE - MUSE: NORMAL MMHG
EGFRCR SERPLBLD CKD-EPI 2021: 34 ML/MIN/1.73M2
ERYTHROCYTE [DISTWIDTH] IN BLOOD BY AUTOMATED COUNT: 13.1 % (ref 10–15)
GLUCOSE SERPL-MCNC: 89 MG/DL (ref 70–99)
HCO3 SERPL-SCNC: 22 MMOL/L (ref 22–29)
HCT VFR BLD AUTO: 33.7 % (ref 35–47)
HGB BLD-MCNC: 11.3 G/DL (ref 11.7–15.7)
INR PPP: 1.78 (ref 0.85–1.15)
INTERPRETATION ECG - MUSE: NORMAL
MCH RBC QN AUTO: 30.6 PG (ref 26.5–33)
MCHC RBC AUTO-ENTMCNC: 33.5 G/DL (ref 31.5–36.5)
MCV RBC AUTO: 91 FL (ref 78–100)
NUC STRESS EJECTION FRACTION: 65 %
P AXIS - MUSE: NORMAL DEGREES
PLATELET # BLD AUTO: 253 10E3/UL (ref 150–450)
POTASSIUM SERPL-SCNC: 3.6 MMOL/L (ref 3.4–5.3)
PR INTERVAL - MUSE: NORMAL MS
PROT SERPL-MCNC: 6.6 G/DL (ref 6.4–8.3)
QRS DURATION - MUSE: 76 MS
QT - MUSE: 478 MS
QTC - MUSE: 469 MS
R AXIS - MUSE: 9 DEGREES
RATE PRESSURE PRODUCT: 7700
RBC # BLD AUTO: 3.69 10E6/UL (ref 3.8–5.2)
SODIUM SERPL-SCNC: 140 MMOL/L (ref 135–145)
STRESS ECHO BASELINE DIASTOLIC HE: 62
STRESS ECHO BASELINE HR: 63 BPM
STRESS ECHO BASELINE SYSTOLIC BP: 149
STRESS ECHO CALCULATED PERCENT HR: 52 %
STRESS ECHO LAST STRESS DIASTOLIC BP: 62
STRESS ECHO LAST STRESS SYSTOLIC BP: 110
STRESS ECHO TARGET HR: 135
STRESS/REST PERFUSION RATIO: 1.06
SYSTOLIC BLOOD PRESSURE - MUSE: NORMAL MMHG
T AXIS - MUSE: -46 DEGREES
VENTRICULAR RATE- MUSE: 58 BPM
WBC # BLD AUTO: 7.1 10E3/UL (ref 4–11)

## 2025-01-09 PROCEDURE — 343N000001 HC RX 343 MED OP 636

## 2025-01-09 PROCEDURE — 78452 HT MUSCLE IMAGE SPECT MULT: CPT

## 2025-01-09 PROCEDURE — 93017 CV STRESS TEST TRACING ONLY: CPT

## 2025-01-09 PROCEDURE — 36415 COLL VENOUS BLD VENIPUNCTURE: CPT

## 2025-01-09 PROCEDURE — 250N000013 HC RX MED GY IP 250 OP 250 PS 637

## 2025-01-09 PROCEDURE — 99239 HOSP IP/OBS DSCHRG MGMT >30: CPT | Performed by: INTERNAL MEDICINE

## 2025-01-09 PROCEDURE — A9502 TC99M TETROFOSMIN: HCPCS

## 2025-01-09 PROCEDURE — 85610 PROTHROMBIN TIME: CPT

## 2025-01-09 PROCEDURE — G0378 HOSPITAL OBSERVATION PER HR: HCPCS

## 2025-01-09 PROCEDURE — 80053 COMPREHEN METABOLIC PANEL: CPT

## 2025-01-09 PROCEDURE — 78452 HT MUSCLE IMAGE SPECT MULT: CPT | Mod: 26 | Performed by: INTERNAL MEDICINE

## 2025-01-09 PROCEDURE — 93018 CV STRESS TEST I&R ONLY: CPT | Performed by: INTERNAL MEDICINE

## 2025-01-09 PROCEDURE — 85027 COMPLETE CBC AUTOMATED: CPT

## 2025-01-09 PROCEDURE — 93016 CV STRESS TEST SUPVJ ONLY: CPT | Performed by: INTERNAL MEDICINE

## 2025-01-09 RX ORDER — WARFARIN SODIUM 3 MG/1
3 TABLET ORAL
Status: DISCONTINUED | OUTPATIENT
Start: 2025-01-09 | End: 2025-01-09 | Stop reason: HOSPADM

## 2025-01-09 RX ORDER — REGADENOSON 0.08 MG/ML
0.4 INJECTION, SOLUTION INTRAVENOUS ONCE
Status: COMPLETED | OUTPATIENT
Start: 2025-01-09 | End: 2025-01-09

## 2025-01-09 RX ORDER — WARFARIN SODIUM 2.5 MG/1
TABLET ORAL
Status: SHIPPED
Start: 2025-01-09

## 2025-01-09 RX ADMIN — TRIAMTERENE AND HYDROCHLOROTHIAZIDE 1 HALF-TAB: 37.5; 25 TABLET ORAL at 12:05

## 2025-01-09 RX ADMIN — REGADENOSON 0.4 MG: 0.08 INJECTION, SOLUTION INTRAVENOUS at 09:00

## 2025-01-09 RX ADMIN — TETROFOSMIN 8.6 MILLICURIE: 1.38 INJECTION, POWDER, LYOPHILIZED, FOR SOLUTION INTRAVENOUS at 07:26

## 2025-01-09 RX ADMIN — TETROFOSMIN 27.1 MILLICURIE: 1.38 INJECTION, POWDER, LYOPHILIZED, FOR SOLUTION INTRAVENOUS at 08:59

## 2025-01-09 ASSESSMENT — ACTIVITIES OF DAILY LIVING (ADL)
ADLS_ACUITY_SCORE: 34
ADLS_ACUITY_SCORE: 36
ADLS_ACUITY_SCORE: 34
ADLS_ACUITY_SCORE: 36
ADLS_ACUITY_SCORE: 34
ADLS_ACUITY_SCORE: 36
ADLS_ACUITY_SCORE: 36
ADLS_ACUITY_SCORE: 34
ADLS_ACUITY_SCORE: 34

## 2025-01-09 NOTE — PHARMACY-ANTICOAGULATION SERVICE
Clinical Pharmacy- Warfarin Discharge Note  This patient is currently on warfarin for the treatment of Atrial fibrillation.  INR Goal= 2-3  Expected length of therapy lifetime.    Warfarin PTA Regimen: 2.5 mg daily      Anticoagulation Dose History  More data exists         Latest Ref Rng & Units 9/25/2024 10/23/2024 11/6/2024 12/4/2024 12/30/2024 1/8/2025 1/9/2025   Recent Dosing and Labs   warfarin ANTICOAGULANT (COUMADIN) 2.5 MG tablet - - - - - - 2.5 mg, $Given -   INR 0.85 - 1.15 2.1  1.8  2.0  2.3  2.9  1.86  1.82  1.78       Details          Multiple values from one day are sorted in reverse-chronological order               Vitamin K doses administered during the last 7 days: none  FFP administered during the last 7 days: none  Recommend discharging the patient on a warfarin regimen of 3.75 mg (1.5 tablets) tonight (1/9/25), then continue 2.5 mg (1 tablet) every day.     The patient should have an INR checked  next week . (Patient states she will call INR clinic to move her current appointment).      Jyoti Blanchard RPH on 1/9/2025 at 2:04 PM

## 2025-01-09 NOTE — DISCHARGE INSTRUCTIONS
Warfarin Instructions:  INR today is 1.78  Please take 3.75 mg (1 and 1/2 tablets) tonight (1/9/25), then continue 2.5 mg (1 tablet) every day   Recommend having INR checked next week

## 2025-01-09 NOTE — TELEPHONE ENCOUNTER
ANTICOAGULATION  MANAGEMENT: Discharge Review    Nayely Kay chart reviewed for anticoagulation continuity of care    Hospital Admission on 1/8-1/9 for chest pain.    Discharge disposition: Home    Results:    Recent labs: (last 7 days)     01/08/25  0751 01/08/25  1615 01/09/25  0540   INR 1.82* 1.86* 1.78*     Anticoagulation inpatient management:     home regimen continued    Anticoagulation discharge instructions:     Warfarin dosing:  Per discharge note:  Warfarin Instructions:  INR today is 1.78  Please take 3.75 mg (1 and 1/2 tablets) tonight (1/9/25), then continue 2.5 mg (1 tablet) every day   Recommend having INR checked next week   Bridging: No   INR goal change: No      Medication changes affecting anticoagulation: No    Additional factors affecting anticoagulation: No     PLAN     Recommend to check INR on 1/13-1/15    Called patient's home number - son in law answered.     Voicemail left for patient to return call to Anticoagulation Clinic    Anticoagulation Calendar updated    Luz Elena Barrios RN  1/9/2025  Anticoagulation Clinic  [facility] for routing messages: nataliya PEREZ [place]  ACC patient phone line: [phone]

## 2025-01-09 NOTE — PLAN OF CARE
"[facility] ADMISSION NOTE    Patient admitted to room 2304 at approximately 1451 via cart from emergency room. Patient was accompanied by transport tech.     SBAR report received from EMERGENCY DEPARTMENT handoff note prior to patient arrival.     Patient ambulated to bed with stand-by assist. Patient alert and oriented X 4. The patient is not having any pain.  . Admission vital signs: Blood pressure 129/58, pulse 68, temperature 98.4  F (36.9  C), temperature source Oral, resp. rate 18, height 1.473 m (4' 10\"), weight 51.4 kg (113 lb 5.1 oz), SpO2 99%, not currently breastfeeding. Patient was oriented to plan of care, call light, bed controls, tv, telephone, bathroom, and visiting hours.     Risk Assessment    The following safety risks were identified during admission: fall. Yellow risk band applied: YES.     Skin Initial Assessment    This writer admitted this patient and completed a full skin assessment and Matthias score in the Adult PCS flowsheet.   Photo documentation of skin problem and/or wound competed via [employer] application (located under Media):  No    Appropriate interventions initiated as needed.     Secondary skin check completed by Arjun Golden RN.    Matthias Risk Assessment  Sensory Perception: 4-->no impairment  Moisture: 4-->rarely moist  Activity: 3-->walks occasionally  Mobility: 4-->no limitation  Nutrition: 3-->adequate  Friction and Shear: 3-->no apparent problem  Matthias Score: 21  Mattress: Standard gel/foam mattress (IsoFlex, Atmos Air, etc.)  Bed Frame: Standard width and length  Informed Refusal Interventions: No    Education    Patient has a [place] to Observation order: Yes  Observation education completed and documented: Yes (Per the EMERGENCY DEPARTMENT nurse note.)      Kyleigh Burnett RN    Goal Outcome Evaluation:      Plan of Care Reviewed With: patient    Overall Patient Progress: improvingOverall Patient Progress: improving    Outcome Evaluation: Patient denies any pain, nausea " or shortness of breath.

## 2025-01-09 NOTE — TELEPHONE ENCOUNTER
Pt returned call. Confirmed dosing and pt scheduled for Wednesday 1/15/25.  Patient verbalizes understanding and agrees to plan. No further questions or concerns.  Luz Elena Barrios RN on 1/9/2025 at 3:19 PM

## 2025-01-09 NOTE — PLAN OF CARE
[facility] DISCHARGE NOTE    Patient discharged to home at 2:08 PM via wheel chair. Accompanied by daughter and staff. Discharge instructions reviewed with patient, opportunity offered to ask questions. Prescriptions - None ordered for discharge. All belongings sent with patient.    Kyleigh Burnett RN    Goal Outcome Evaluation:      Plan of Care Reviewed With: patient

## 2025-01-09 NOTE — PROGRESS NOTES
Skin affirmation note    Admitting nurse completed full skin assessment, Matthias score and Matthias interventions. This writer agrees with the initial skin assessment findings.    Arjun Golden RN on 1/8/2025 at 7:10 PM

## 2025-01-09 NOTE — DISCHARGE SUMMARY
[facility]  Hospitalist Discharge Summary       Date of Admission:  1/8/2025  Date of Discharge:  January 9, 2025  Discharging Provider: Gonzalez Cespedes MD      Discharge Diagnoses     Noncardiac chest pain  Chronic atrial fibrillation   Long term current use of anticoagulants   Essential hypertension  Polymyalgia rheumatica  Pressure injury on buttock  Anemia  Elevated total bilirubin  Stage 3a chronic kidney disease  Drug Induced Coagulation Defect  Hypertension    Follow-ups Needed After Discharge   Follow-up Appointments       Follow-up and recommended labs and tests       Follow up with primary care provider, Tiff Johnson, within 7 days for hospital follow- up.  The following labs/tests are recommended: CBC and CMP.              Discharge Disposition   Discharged to home    Hospital Course   Nayely Kay is a 84 year old female with past medical history of tubular adenoma, eczema, HTN, chronic atrial fibrillation on anticoagulation, polymyalgia rheumatica, CKD3a now presents on 1/8/2025 with chest pain. She is being observed for stress test in AM.      Chest pain    Presents with sub-xiphoid & left-sided chest pressure & tightness beginning while at rest around midnight 1/8. No radiation, no dyspnea, no presyncope or palpitations. Pain has resolved at time of admission (1500 1/8) and patient feels back to baseline.     Troponin mildly elevated but stable (19 ?  18). EKG shows afib with U-waves. K WNL. D-dimer WNL (0.28). On warfarin, INR mildly subtherapeutic (INR 1.82).     CXR shows mild cardiomegaly without other acute change. Abdominal US shows possible gallbladder sludge without evidence for acute cholecystitis & unremarkable liver.     Concern for cardiac source of pain so planning for stress test in AM.   - Lexiscan stress test negative for fixed or inducible myocardial ischemia  - Continuous telemetry without significant arrhythmias  - Discharge to home     Chronic  atrial fibrillation   Long term current use of anticoagulants   Used to follow with cardiology, now mostly managing with PCP (last cardiology note from 2007).   Variable rates with intermittent bradycardia in ER. Not hypotensive, & denies palpitations.   - Continue PTA metoprolol  - Continue PTA warfarin with dosing per pharmacy     Essential hypertension  Normotensive to mildly hypertensive since presentation.   - Continue PTA amlodipine 5mg daily   - Continue PTA triamterene-hydrochlorothiazide 18.75-12.5mg BID      Polymyalgia rheumatica  Previously managed with prednisone, but had been doing well per chart review & tapered off steroids. Denies myalgias on presentation.   - continue regular follow up with PCP for ongoing monitoring     Pressure injury on buttock  Noted on PCP clinic exam 1/3/2025.      Anemia  Mild, baseline hemoglobin 12.5 - 13.0, hemoglobin on presentation 11.5.   - CBC in AM     Elevated total bilirubin  Chronic, Tbili 1.6 on presentation. Remainder LFT's WNL, including direct bili 0.3.   - CMP in AM     Stage 3a chronic kidney disease  Baseline creatinine around 1.2, creatinine on presentation 1.10.  - BMP in AM     Clinically Significant Risk Factors Present on Admission             # Drug Induced Coagulation Defect: home medication list includes an anticoagulant medication    # Hypertension: Noted on problem list                    Consultations This Hospital Stay Code Status Full Code    35 MINUTES SPENT BY ME on the date of service doing chart review, history, exam, documentation & further activities per the note.     Gonzalez Cespedes MD  [facility]  ______________________________________________________________________    Physical Exam   Vital Signs: Temp: 98  F (36.7  C) Temp src: Oral BP: 113/52 Pulse: 67   Resp: 20 SpO2: 100 % O2 Device: None (Room air)    Weight: 116 lbs 0 oz       Gen: Debilitated elderly female, alert and oriented x 3, no acute  distressed  HEENT: Atraumatic, normocephalic; sclera non-injected, anicterric; oral mucosa moist, no lesion, no exudate  Lungs: Clear to ausculation, no wheezes, no rhonchi, no rales  Heart: Regular rate, regular rhythm, no gallops, no rubs, no murmurs  GI: Bowel sound normal, no hepatosplenomegaly, no masses, non-tender, non-distended, no guarding, no rebound tenderness  Lymph: No lymphadenopathy, no edema  Skin: No rashes, no chronic venous stasis     Primary Care Physician   Tiff Johnson    Discharge Orders      Reason for your hospital stay    This is an 84 year old female admitted for evaluation of noncardiac chest pain.     Follow-up and recommended labs and tests     Follow up with primary care provider, Tiff Johnson, within 7 days for hospital follow- up.  The following labs/tests are recommended: CBC and CMP.     Activity    Your activity upon discharge: activity as tolerated     Full Code     Diet    Follow this diet upon discharge: Low Saturated Fat Na <2400mg Diet       Significant Results and Procedures     Discharge Medications   Current Discharge Medication List        CONTINUE these medications which have NOT CHANGED    Details   amLODIPine (NORVASC) 5 MG tablet Take 1 tablet (5 mg) by mouth daily  Qty: 90 tablet, Refills: 3    Comments: Profile Rx: patient will contact pharmacy when needed  Associated Diagnoses: Essential hypertension      ascorbic acid (VITAMIN C) 250 MG CHEW Take 1 tablet by mouth daily.  Refills: 0      Calcium-Magnesium-Zinc 333-133-5 MG TABS per tablet Take 1 tablet by mouth daily.      methylcellulose (CITRUCEL) powder Take 1 teaspoonful by mouth daily.      metoprolol tartrate (LOPRESSOR) 50 MG tablet Take 1.5 tablets (75 mg) by mouth 2 times daily  Qty: 270 tablet, Refills: 3    Comments: Profile Rx: patient will contact pharmacy when needed  Associated Diagnoses: Essential hypertension      triamterene-HCTZ (MAXZIDE-25) 37.5-25 MG tablet TAKE 1/2 TABLET BY MOUTH TWICE A  DAY  Qty: 90 tablet, Refills: 3    Associated Diagnoses: Essential hypertension      warfarin ANTICOAGULANT (JANTOVEN ANTICOAGULANT) 2.5 MG tablet Take 2.5 mg every day or As directed by Anticoagulation clinic  Qty: 95 tablet, Refills: 1    Associated Diagnoses: Chronic atrial fibrillation (H); Long term current use of anticoagulants with INR goal of 2.0-3.0      ASPIRIN NOT PRESCRIBED (INTENTIONAL) due to coumadin  Qty: 0, Refills: 0    Associated Diagnoses: Myalgias      lisinopril (ZESTRIL) 5 MG tablet Take 1 tablet (5 mg) by mouth daily  Qty: 90 tablet, Refills: 3    Comments: Profile Rx: patient will contact pharmacy when needed  Associated Diagnoses: Essential hypertension      nitroGLYcerin (NITROSTAT) 0.4 MG sublingual tablet Place 1 tablet (0.4 mg) under the tongue every 5 minutes as needed for chest pain  Qty: 25 tablet, Refills: 1    Associated Diagnoses: Chest pain, unspecified type           Allergies   Allergies   Allergen Reactions    Diltiazem Hcl Er Beads Rash

## 2025-01-09 NOTE — PROGRESS NOTES
PRIMARY DIAGNOSIS: CHEST PAIN  OUTPATIENT/OBSERVATION GOALS TO BE MET BEFORE DISCHARGE:  1. Ruled out acute coronary syndrome (negative or stable Troponin):  Stable troponin Pending stress test  2. Pain Status: Pain free.  3. Appropriate provocative testing performed: No  - Stress Test Procedure: Nuclear, scheduled for tomorrow  - Interpretation of cardiac rhythm per telemetry tech: N/A    4. Cleared by Consultants (if applicable):N/A  5. Return to near baseline physical activity: Yes  Discharge Planner Nurse   Safe discharge environment identified: Yes  Barriers to discharge: No       Entered by: Tory Almaraz RN 01/08/2025 10:33 PM     Please review provider order for any additional goals.   Nurse to notify provider when observation goals have been met and patient is ready for discharge.    Tory Almaraz RN on 1/8/2025 at 10:35 PM

## 2025-01-15 ENCOUNTER — LAB (OUTPATIENT)
Dept: LAB | Facility: CLINIC | Age: 85
End: 2025-01-15
Payer: COMMERCIAL

## 2025-01-15 ENCOUNTER — ANTICOAGULATION THERAPY VISIT (OUTPATIENT)
Dept: ANTICOAGULATION | Facility: CLINIC | Age: 85
End: 2025-01-15

## 2025-01-15 DIAGNOSIS — I48.20 CHRONIC ATRIAL FIBRILLATION (H): ICD-10-CM

## 2025-01-15 DIAGNOSIS — Z79.01 LONG TERM CURRENT USE OF ANTICOAGULANTS WITH INR GOAL OF 2.0-3.0: ICD-10-CM

## 2025-01-15 DIAGNOSIS — I48.20 CHRONIC ATRIAL FIBRILLATION (H): Primary | ICD-10-CM

## 2025-01-15 LAB — INR BLD: 2.3 (ref 0.9–1.1)

## 2025-01-15 PROCEDURE — 85610 PROTHROMBIN TIME: CPT

## 2025-01-15 PROCEDURE — 36416 COLLJ CAPILLARY BLOOD SPEC: CPT

## 2025-01-15 NOTE — PROGRESS NOTES
ANTICOAGULATION MANAGEMENT     Nayely Kay 84 year old female is on warfarin with therapeutic INR result. (Goal INR 2.0-3.0)    Recent labs: (last 7 days)     01/15/25  1424   INR 2.3*       ASSESSMENT     Source(s): Chart Review and Patient/Caregiver Call     Warfarin doses taken: Warfarin taken as instructed  Diet: No new diet changes identified  Medication/supplement changes: None noted  New illness, injury, or hospitalization: Yes: was into the ED for Chest pain on 1/8/25  Signs or symptoms of bleeding or clotting: No  Previous result: Subtherapeutic  Additional findings: None       PLAN     Recommended plan for no diet, medication or health factor changes affecting INR     Dosing Instructions: Continue your current warfarin dose with next INR in 2 weeks       Summary  As of 1/15/2025      Full warfarin instructions:  2.5 mg every day   Next INR check:  1/27/2025               Telephone call with Nayely who verbalizes understanding and agrees to plan    Lab visit scheduled    Education provided: Goal range and lab monitoring: goal range and significance of current result    Plan made per [facility] anticoagulation protocol    France Ashraf RN  1/15/2025  Anticoagulation Clinic  [employer] for routing messages: nataliya [facility] patient phone line: [phone]        _______________________________________________________________________     Anticoagulation Episode Summary       Current INR goal:  2.0-3.0   TTR:  58.6% (1 y)   Target end date:  Indefinite   Send INR reminders to:  [facility]    Indications    Chronic atrial fibrillation (H) [I48.20]  Long term current use of anticoagulants with INR goal of 2.0-3.0 [Z79.01]             Comments:  --             Anticoagulation Care Providers       Provider Role Specialty Phone number    Ulysses Baker MD Referring Family Medicine     Oswaldo Cedillo APRN CNP Referring Family Medicine [phone]    Haritha Whiting, [place]  Referring Family Medicine [phone]    Tiff Yarbrough, [facility] Referring Family Medicine [phone]

## 2025-01-27 ENCOUNTER — ANTICOAGULATION THERAPY VISIT (OUTPATIENT)
Dept: ANTICOAGULATION | Facility: CLINIC | Age: 85
End: 2025-01-27

## 2025-01-27 ENCOUNTER — LAB (OUTPATIENT)
Dept: LAB | Facility: CLINIC | Age: 85
End: 2025-01-27
Payer: COMMERCIAL

## 2025-01-27 DIAGNOSIS — I48.20 CHRONIC ATRIAL FIBRILLATION (H): ICD-10-CM

## 2025-01-27 DIAGNOSIS — Z79.01 LONG TERM CURRENT USE OF ANTICOAGULANTS WITH INR GOAL OF 2.0-3.0: ICD-10-CM

## 2025-01-27 DIAGNOSIS — I48.20 CHRONIC ATRIAL FIBRILLATION (H): Primary | ICD-10-CM

## 2025-01-27 LAB — INR BLD: 2.7 (ref 0.9–1.1)

## 2025-01-27 PROCEDURE — 85610 PROTHROMBIN TIME: CPT

## 2025-01-27 PROCEDURE — 36416 COLLJ CAPILLARY BLOOD SPEC: CPT

## 2025-01-27 NOTE — PROGRESS NOTES
ANTICOAGULATION MANAGEMENT     Nayely Kay 84 year old female is on warfarin with therapeutic INR result. (Goal INR 2.0-3.0)    Recent labs: (last 7 days)     01/27/25  0929   INR 2.7*       ASSESSMENT     Source(s): Chart Review and Patient/Caregiver Call     Warfarin doses taken: Warfarin taken as instructed  Diet: No new diet changes identified  Medication/supplement changes: None noted  New illness, injury, or hospitalization: No  Signs or symptoms of bleeding or clotting: No  Previous result: Therapeutic last visit; previously outside of goal range  Additional findings: None       PLAN     Recommended plan for no diet, medication or health factor changes affecting INR     Dosing Instructions: Continue your current warfarin dose with next INR in 3 weeks       Summary  As of 1/27/2025      Full warfarin instructions:  2.5 mg every day   Next INR check:  2/17/2025               Telephone call with Nayely who verbalizes understanding and agrees to plan and who agrees to plan and repeated back plan correctly    Lab visit scheduled    Education provided: Contact [phone] with any changes, questions or concerns.     Plan made per [facility] anticoagulation protocol    Jeannie Gaspar RN  1/27/2025  Anticoagulation Clinic  [employer] for routing messages: p [facility] patient phone line: [phone]        _______________________________________________________________________     Anticoagulation Episode Summary       Current INR goal:  2.0-3.0   TTR:  58.6% (1 y)   Target end date:  Indefinite   Send INR reminders to:  [facility]    Indications    Chronic atrial fibrillation (H) [I48.20]  Long term current use of anticoagulants with INR goal of 2.0-3.0 [Z79.01]             Comments:  --             Anticoagulation Care Providers       Provider Role Specialty Phone number    Ulysses Baker MD Referring Family Medicine     Oswaldo Cedillo APRN CNP Referring Family Medicine  [phone]    Haritha Whiting PA-C Referring Family Medicine [phone]    Tiff Yarbrough DNP Referring Family Medicine [phone]

## 2025-02-03 ENCOUNTER — TELEPHONE (OUTPATIENT)
Dept: FAMILY MEDICINE | Facility: CLINIC | Age: 85
End: 2025-02-03
Payer: COMMERCIAL

## 2025-02-03 NOTE — TELEPHONE ENCOUNTER
Patient calls the clinic today, she says she knows her last INR was 2.7 on 1-27-25 and today she says she had some extra bleeding and decided to eat a cup of brussel sprouts and brocolli, she is wondering what more she should do. Patient is told writer will need to have anticoagulation nurse call her back today to discuss her question, she is fine with that.     Routing to [facility] with high priority.      SEKOU Jack

## 2025-02-03 NOTE — TELEPHONE ENCOUNTER
"Spoke with Nayely. She reports she noticed a \"drop\" of blood from her hemorrhoids today, but was concerned because when the blood is in the toilet water it seems like a significant amount. She states she has had this before and the bleeding has currently stopped and she has not observed ongoing bleeding, she has not noticed any other signs of bleeding or bruising. Patient reports already eating 1 cup of broccoli and 1 cup of brussel sprouts today and is inquiring if she should hold her warfarin or decrease her dose tonight. Most recent INR 2.7 on 1/27/25. Advised that an updated INR would be needed to safely dose warfarin based on current information given by patient. Patient declines to return for an INR today/tomorrow and states \"it really is not that bad\". Advised since patient already had two servings of greens that are high in Vitamin K, she could stay on her current warfarin dosing. It would be best to check an INR tomorrow so [facility] can safely advise on warfarin dosing. Patient unable to return until Friday, but has been advised to call back if any changes or if bleeding continues, she should be evaluated by a Provider. Patient will call [facility] with any other questions/concerns prior to next INR.     Jeannie Gaspar RN, BSN  [facility]  [phone]    "

## 2025-02-07 ENCOUNTER — ALLIED HEALTH/NURSE VISIT (OUTPATIENT)
Dept: FAMILY MEDICINE | Facility: CLINIC | Age: 85
End: 2025-02-07

## 2025-02-07 VITALS — SYSTOLIC BLOOD PRESSURE: 150 MMHG | DIASTOLIC BLOOD PRESSURE: 74 MMHG

## 2025-02-07 DIAGNOSIS — Z01.30 BLOOD PRESSURE CHECK: Primary | ICD-10-CM

## 2025-02-07 PROCEDURE — 99207 PR NO CHARGE NURSE ONLY: CPT | Performed by: NURSE PRACTITIONER

## 2025-02-07 NOTE — PROGRESS NOTES
Nayely Kay was evaluated at [facility] on February 7, 2025 at which time her blood pressure was:    BP Readings from Last 1 Encounters:   02/07/25 (!) 150/74     No data recorded      Reviewed lifestyle modifications for blood pressure control and reduction: including making healthy food choices, managing weight, getting regular exercise, smoking cessation, reducing alcohol consumption, monitoring blood pressure regularly.     Symptoms: None    BP Goal:< 140/90 mmHg    BP Assessment:  BP too high    Potential Reasons for BP too high: Unknown/Other: Unknown    BP Follow-Up Plan: Referral to PCP    Recommendation to Provider: Nayely was seen today in the pharmacy for a BP check. Her BP was checked twice with the automatic cuff and once manually for her last reading. All three readings were high. Nayely denied missing doses of medications and denied any medical changes in the last month. Recommended to follow up with provider about increasing her BP meds to get that reading back at goal. Patient expressed understanding.       Note completed by: Stormy Soriano [facility]

## 2025-02-11 ENCOUNTER — TELEPHONE (OUTPATIENT)
Dept: FAMILY MEDICINE | Facility: CLINIC | Age: 85
End: 2025-02-11
Payer: COMMERCIAL

## 2025-02-11 NOTE — TELEPHONE ENCOUNTER
Called patient and notified patient that Tiff would like her to follow up in clinic for BP follow up.  Patient states she doesn't see the appt, she takes her meds every day and follows a low salt diet.  Does not want to schedule a follow up with Tiff, will come back in to pharmacy for a BP check.  Informed her that I would let Tiff know.     GRACIE Matthew.     Bailee Kahler on 2/11/2025 at 2:50 PM

## 2025-02-12 ENCOUNTER — ALLIED HEALTH/NURSE VISIT (OUTPATIENT)
Dept: FAMILY MEDICINE | Facility: CLINIC | Age: 85
End: 2025-02-12
Payer: COMMERCIAL

## 2025-02-12 VITALS — SYSTOLIC BLOOD PRESSURE: 154 MMHG | DIASTOLIC BLOOD PRESSURE: 75 MMHG

## 2025-02-12 DIAGNOSIS — Z01.30 BP CHECK: Primary | ICD-10-CM

## 2025-02-12 PROCEDURE — 99207 PR NO CHARGE NURSE ONLY: CPT | Performed by: NURSE PRACTITIONER

## 2025-02-12 NOTE — Clinical Note
Nayely Kay was evaluated in a specialty clinic today at which time her blood pressure was noted to be elevated.  She  has been advised to follow up with her primary provider or with a nurse only visit. We are also routing this message to her primary provider as an FYI.

## 2025-02-12 NOTE — PROGRESS NOTES
Nayely Kay was evaluated at [facility] on February 12, 2025 at which time her blood pressure was:    BP Readings from Last 1 Encounters:   02/12/25 (!) 154/75     No data recorded      Reviewed lifestyle modifications for blood pressure control and reduction: including making healthy food choices, managing weight, getting regular exercise, smoking cessation, reducing alcohol consumption, monitoring blood pressure regularly.     Symptoms: None    BP Goal:< 140/90 mmHg    BP Assessment:  BP too high    Potential Reasons for BP too high: Unknown/Other: ---    BP Follow-Up Plan: Recheck BP in 30 days in the pharmacy.Date of next scheduled BP visit or call:      Recommendation to Provider: Discussed possible increase in her blood pressure medication(s) during previous encounter and this encounter. Provider please evaluate and contact patient with plan of action.     Note completed by: Derrek Whalen RPH

## 2025-02-15 ENCOUNTER — OFFICE VISIT (OUTPATIENT)
Dept: URGENT CARE | Facility: URGENT CARE | Age: 85
End: 2025-02-15
Payer: COMMERCIAL

## 2025-02-15 VITALS
HEART RATE: 65 BPM | TEMPERATURE: 97.6 F | BODY MASS INDEX: 24.87 KG/M2 | SYSTOLIC BLOOD PRESSURE: 137 MMHG | OXYGEN SATURATION: 100 % | WEIGHT: 119 LBS | DIASTOLIC BLOOD PRESSURE: 61 MMHG

## 2025-02-15 DIAGNOSIS — N18.9 CHRONIC KIDNEY DISEASE, UNSPECIFIED CKD STAGE: ICD-10-CM

## 2025-02-15 DIAGNOSIS — J06.9 VIRAL URI: Primary | ICD-10-CM

## 2025-02-15 DIAGNOSIS — R05.1 ACUTE COUGH: ICD-10-CM

## 2025-02-15 LAB
FLUAV AG SPEC QL IA: NEGATIVE
FLUBV AG SPEC QL IA: NEGATIVE

## 2025-02-15 PROCEDURE — 87804 INFLUENZA ASSAY W/OPTIC: CPT

## 2025-02-15 PROCEDURE — 99213 OFFICE O/P EST LOW 20 MIN: CPT

## 2025-02-15 PROCEDURE — 87635 SARS-COV-2 COVID-19 AMP PRB: CPT

## 2025-02-15 NOTE — PATIENT INSTRUCTIONS
"Diagnosis: viral URI_ upper respiratory infection   Today we did:  Covid and influenza    Plan:   Fluids: you need to drink lots of fluids: water, electrolytes, broth    Rest: you need lots and lots of rest to get better, you have permission to sleep all day and stay home from work or school until you feel better   Treat the most bothersome symptoms.... see symptoms below.....   Monitor for:   Fever: >101 F that is not relieved w/ tylenol, worsening fevers   Difficulty breathing: shortness of breath, wheezing, chest pain with breathing   Signs of dehydration: Feeling weak, dizzy or that you might faint  Chest pain   You have been fighting this illness for >14 days and are not getting better           Cold and Flu     Unfortunately, <2% of sinus/throat/cough symptoms are caused by a bacteria   However, You are SICK! This is often miserable! And I feel for you!   We cannot make it go away, but lets work to shorten the duration and severity!   Your most bothersome symptom is often where the virus settled in your body:    Nose, throat, or lungs, it may cause cough, sore throat, congestion, runny nose, headache, earache or shortness of breath.   It can also settle in your stomach and cause nausea, vomiting, and diarrhea.   Sometimes it causes generalized symptoms like \"aching all over,\" feeling tired, loss of energy, or loss of appetite.  ------- This illness usually lasts anywhere from 10-14 days ----------- hang in there.         We Treat URIs by helping relieve symptoms     Symptoms: - what is your most bothersome symptom?   Nasal congestion:           Decongestants:                > Pseudoephedrine (Sudafed) 60mg every 4 hours (not before bedtime)      Children >4yrs old: 15mg every 4hours chew (1mg/kg every 6hrs)       Liquid: Children's Silfedrine: 15 mg/5 mL      children >2 years old Phenylephrine (sudafed PE child)             Antihistamines:    > chlorpheniramine 4mg Q4hrs -or- clemastine 1mg twice a day (no " more than 7 days)      Children >2yrs old: Claritin or zyrtec      >> add ibuprofen or tylenol for added effect   cough:          antitussives :: suppresses cough by topical anesthetic action on the respiratory stretch receptor    tessalon perles / Benzonatate - need prescription      >only in children >10yrs of age          Expectorants  ::increase respiratory tract clearance of mucus by adding hydration/viscosity causing thinning and loosening   Guaifenesin AND Dextromethorphan :: [adds cough Suppressant] inhibits cough reflex by decreasing cough receptors (relieves the irritating  dry cough)   Robitussin DM / Mucinex DM   Guaifenesin 200 to 400 mg // dextromethorphan 10 to 20 mg every 4 hours,   Combo tabs: 1-2 tabs every 12hrs         Children 4yr to <6 years: Dextromethorphan 2.5 to 5 mg with Guaifenesin 50 to 100 mg every  4 hours as needed  sore throat:   gargle saltwater, honey, warm fluids          cough drops or lozenges:    Cepacol Lozenge / Benzocaine     Children >5yrs old : one lozenge every 2 hours   Herbal:    - honey = good for cough suppressant    - zinc = 2-3mg lozenge Q2hrs    - menthol vapor rup on chest before sleep

## 2025-02-15 NOTE — PROGRESS NOTES
URGENT CARE  Assessment & Plan   Assessment:   Nayely Kay is a 84 year old female who's clinical presentation today is consistent with:   1. Viral URI    2. Acute cough  - COVID-19 Virus (Coronavirus) by PCR Nose  - Influenza A & B Antigen - Clinic Collect  3. CKD   Plan:  Patient has had one day of a cough, no other alarming symptoms, suspicious for a viral uri, will monitor, can try fluids and rest; tylenol as needed, chest xray not indicated today; patient does have CKD and is on long term use of anticoagulants, we discussed not overusing tylenol or ibuprofen; continue with home prescription meds; no concerns of PE today   Additionally we discussed if symptoms do not improve after starting today's treatment to follow up in 5-7 days, sooner if symptoms worsen, return precautions given    No alarm signs or symptoms present   Differential Diagnoses for this patient's chief complaint that I considered include:  Bacterial vs viral etiology of URI, covid pharyngitis/tonsillitis, pneumonia, bronchitis, Common cold, allergic rhinitis, seasonal allergies, ABRS, viral sinusitis, cough, pertussis, Mononucleosis, tonsillitis, chronic sinusitis, meningococcal disease     Patient is agreeable to treatment plan and state they will follow-up if symptoms do not improve and/or if symptoms worsen   see patient's AVS 'monitor for' section for specific patient instructions given and discussed regarding what to watch for and when to follow up    [facility] [place]      ______________________________________________________________________      Subjective     HPI: Nayely Kay  is a 84 year old  female who presents today for evaluation the following concerns:   Patient endorses a mild cough today which started one day ago, yesterday    Patient reports mild cough and a small amount of mucus production and nasal secretions   Patient denies feeling overly ill, does endorse mild cold  symptoms, denies any fevers, denies wheezing, denies shortness of breath, denies pleuritic pain or chest pain.  Patient denies any asthma or COPD history.  Patient states she presents today as a precaution..      Review of Systems:  Pertinent review of systems as reflected in HPI, otherwise negative.     Objective    Physical Exam:  Vitals:    02/15/25 0913   BP: 137/61   BP Location: Right arm   Patient Position: Sitting   Cuff Size: Adult Regular   Pulse: 65   Temp: 97.6  F (36.4  C)   TempSrc: Tympanic   SpO2: 100%   Weight: 54 kg (119 lb)      General: Alert and oriented, no acute distress, Vital signs reviewed: afebrile,  normotensive   Psy/mental status: Cooperative, nonanxious  SKIN: Intact, no rashes  EYES: EOMs intact, PERRLA bilaterally   Conjunctiva: Clear bilaterally, no injection or erythema present  EARS: TMs intact, translucent gray in color with normal landmarks present no erythema  or bulging tympanic membrane   Canals are without swelling, however have a mild amount of cerumen, no impaction  NOSE:  mucosa moist               No frontal or maxillary sinus tenderness present bilaterally  MOUTH/THROAT: lips, tongue, & oral mucosa appear normal upon inspection                Posterior oropharynx is erythematous but without exudate, lesions or tonsillar  Edema, no dysphonia, no unilateral tonsillar edema, no uvular deviation,   no signs of peritonsillar abscess  NECK: supple, has full range of motion with no meningeal signs              No lymphadenopathy present  LUNG: normal work of breathing, good respiratory effort without retractions, good air  movement, non labored, inspection reveals normal chest expansion w/  inspiration            Lung sounds are clear to auscultation bilaterally,            No rales/rhonic/crackles wheezing noted           cough noted as dry and infrequent     LABS:   Results for orders placed or performed in visit on 02/15/25   Influenza A & B Antigen - Clinic Collect      Status: Normal    Specimen: Nose; Swab   Result Value Ref Range    Influenza A antigen Negative Negative    Influenza B antigen Negative Negative    Narrative    Test results must be correlated with clinical data. If necessary, results should be confirmed by a molecular assay or viral culture.        ______________________________________________________________________    I explained my diagnostic considerations and recommendations to the patient, who voiced understanding and agreement with the treatment plan.   All questions were answered.   We discussed potential side effects, risks and benefits of any prescribed or recommended therapies, as well as expectations for response to treatments.  Please see AVS for any patient instructions & handouts given.   Patient was advised to contact the Nurse Care Line, their Primary Care provider, Urgent Care, or the Emergency Department if there are new or worsening symptoms, or call 911 for emergencies.

## 2025-02-16 LAB — SARS-COV-2 RNA RESP QL NAA+PROBE: NEGATIVE

## 2025-02-17 ENCOUNTER — TELEPHONE (OUTPATIENT)
Dept: URGENT CARE | Facility: URGENT CARE | Age: 85
End: 2025-02-17

## 2025-02-17 NOTE — TELEPHONE ENCOUNTER
Test Results        Who ordered the test:  Yaritza Balbuena    Type of test: Lab    Date of test:  2/15    Where was the test performed:  [facility]    What are your questions/concerns?:  Pt wants to know results    Okay to leave a detailed message?: Yes at Home number on file [phone] (home)

## 2025-03-26 ENCOUNTER — ANCILLARY ORDERS (OUTPATIENT)
Dept: MAMMOGRAPHY | Facility: CLINIC | Age: 85
End: 2025-03-26
Payer: COMMERCIAL

## 2025-03-26 DIAGNOSIS — Z12.31 VISIT FOR SCREENING MAMMOGRAM: Primary | ICD-10-CM

## 2025-04-09 DIAGNOSIS — I10 ESSENTIAL HYPERTENSION: ICD-10-CM

## 2025-04-09 RX ORDER — METOPROLOL TARTRATE 50 MG
TABLET ORAL
Qty: 270 TABLET | Refills: 1 | Status: SHIPPED | OUTPATIENT
Start: 2025-04-09

## 2025-04-09 NOTE — TELEPHONE ENCOUNTER
Dose verified.   Prescription approved per [facility] Refill Protocol.  Julie Behrendt RN

## 2025-05-02 PROBLEM — Z01.30 BP CHECK: Status: ACTIVE | Noted: 2025-05-02

## 2025-05-16 ENCOUNTER — RESULTS FOLLOW-UP (OUTPATIENT)
Dept: ANTICOAGULATION | Facility: CLINIC | Age: 85
End: 2025-05-16

## 2025-06-04 DIAGNOSIS — I10 ESSENTIAL HYPERTENSION: ICD-10-CM

## 2025-06-04 RX ORDER — AMLODIPINE BESYLATE 5 MG/1
5 TABLET ORAL DAILY
Qty: 30 TABLET | Refills: 0 | Status: SHIPPED | OUTPATIENT
Start: 2025-06-04

## 2025-06-04 RX ORDER — LISINOPRIL 5 MG/1
5 TABLET ORAL DAILY
Qty: 90 TABLET | Refills: 3 | OUTPATIENT
Start: 2025-06-04

## 2025-06-04 NOTE — TELEPHONE ENCOUNTER
Requested Prescriptions   Pending Prescriptions Disp Refills    amLODIPine (NORVASC) 5 MG tablet [Pharmacy Med Name: AMLODIPINE BESYLATE 5MG TABS] 90 tablet 3     Sig: TAKE ONE TABLET BY MOUTH ONCE DAILY       Calcium Channel Blockers Protocol  Failed - 6/4/2025  8:26 AM        Failed - Most recent blood pressure under 140/90 in past 12 months     BP Readings from Last 3 Encounters:   05/02/25 (!) 147/65   02/15/25 137/61   02/12/25 (!) 154/75       No data recorded            Failed - GFR is on file in the past 12 months and most recent GFR is normal        Passed - Medication is active on med list and the sig matches. RN to manually verify dose and sig if red X/fail.     If the protocol passes (green check), you do not need to verify med dose and sig.    A prescription matches if they are the same clinical intention.    For Example: once daily and every morning are the same.    The protocol can not identify upper and lower case letters as matching and will fail.     For Example: Take 1 tablet (50 mg) by mouth daily     TAKE 1 TABLET (50 MG) BY MOUTH DAILY    For all fails (red x), verify dose and sig.    If the refill does match what is on file, the RN can still proceed to approve the refill request.       If they do not match, route to the appropriate provider.             Passed - Medication is indicated for associated diagnosis        Passed - Recent (12 month) or future (90 days) visit with authorizing provider's specialty (provided they have been seen in the past 15 months)     The patient must have completed an in-person or virtual visit within the past 12 months or has a future visit scheduled within the next 90 days with the authorizing provider s specialty.  Urgent care and e-visits do not qualify as an office visit for this protocol.          Passed - Patient is age 18 or older        Passed - No active pregnancy on record        Passed - No positive pregnancy test in past 12 months         Audra  Behrendt RN

## 2025-06-04 NOTE — TELEPHONE ENCOUNTER
Please notify patient 30-day refill of amlodipine sent through to the pharmacy.  She is due for a Medicare wellness exam next month, please schedule.    Thanks,  Tiff Johnson, DNP

## 2025-06-06 ENCOUNTER — RESULTS FOLLOW-UP (OUTPATIENT)
Dept: ANTICOAGULATION | Facility: CLINIC | Age: 85
End: 2025-06-06

## 2025-06-18 ENCOUNTER — ANCILLARY PROCEDURE (OUTPATIENT)
Dept: MAMMOGRAPHY | Facility: CLINIC | Age: 85
End: 2025-06-18
Payer: COMMERCIAL

## 2025-06-18 DIAGNOSIS — Z12.31 VISIT FOR SCREENING MAMMOGRAM: ICD-10-CM

## 2025-06-18 PROCEDURE — 77067 SCR MAMMO BI INCL CAD: CPT | Mod: TC | Performed by: RADIOLOGY

## 2025-06-18 PROCEDURE — 77063 BREAST TOMOSYNTHESIS BI: CPT | Mod: TC | Performed by: RADIOLOGY

## 2025-06-27 ENCOUNTER — RESULTS FOLLOW-UP (OUTPATIENT)
Dept: ANTICOAGULATION | Facility: CLINIC | Age: 85
End: 2025-06-27

## 2025-06-30 DIAGNOSIS — I10 ESSENTIAL HYPERTENSION: ICD-10-CM

## 2025-06-30 RX ORDER — LISINOPRIL 10 MG/1
10 TABLET ORAL DAILY
Qty: 30 TABLET | Refills: 0 | Status: SHIPPED | OUTPATIENT
Start: 2025-06-30

## 2025-07-06 DIAGNOSIS — Z79.01 LONG TERM CURRENT USE OF ANTICOAGULANTS WITH INR GOAL OF 2.0-3.0: ICD-10-CM

## 2025-07-06 DIAGNOSIS — I10 ESSENTIAL HYPERTENSION: ICD-10-CM

## 2025-07-06 DIAGNOSIS — I48.20 CHRONIC ATRIAL FIBRILLATION (H): ICD-10-CM

## 2025-07-07 RX ORDER — WARFARIN SODIUM 2.5 MG/1
TABLET ORAL
Qty: 95 TABLET | Refills: 1 | Status: SHIPPED | OUTPATIENT
Start: 2025-07-07

## 2025-07-07 NOTE — TELEPHONE ENCOUNTER
ANTICOAGULATION MANAGEMENT:  Medication Refill    Anticoagulation Summary  As of 6/27/2025      Warfarin maintenance plan:  2.5 mg (2.5 mg x 1) every day   Next INR check:  7/25/2025   Target end date:  Indefinite    Indications    Chronic atrial fibrillation (H) [I48.20]  Long term current use of anticoagulants with INR goal of 2.0-3.0 [Z79.01]                 Anticoagulation Care Providers       Provider Role Specialty Phone number    Ulysses Baker MD Referring Family Medicine     Oswaldo Cedillo APRN CNP Referring Family Medicine     Haritha Whiting PA-C Referring [facility] [phone]    Tiff Yarbrough DNP Referring [facility] [phone]            Refill Criteria    Visit with referring provider/group: Meets criteria: visit within referring provider group in the last 15 months on 1/3/25    [facility] referral last signed: 06/06/2025; within last year:  Yes    Lab monitoring is up to date (not exceeding 2 weeks overdue): Yes    Nayely meets all criteria for refill. Rx instructions and quantity supplied updated to match patient's current dosing plan. Warfarin 90 day supply with 1 refill granted per [facility] protocol     Luz Elena Barrios RN  Anticoagulation Clinic

## 2025-07-08 RX ORDER — AMLODIPINE BESYLATE 5 MG/1
5 TABLET ORAL DAILY
Qty: 30 TABLET | Refills: 0 | Status: SHIPPED | OUTPATIENT
Start: 2025-07-08

## 2025-07-11 ENCOUNTER — OFFICE VISIT (OUTPATIENT)
Dept: FAMILY MEDICINE | Facility: CLINIC | Age: 85
End: 2025-07-11
Payer: COMMERCIAL

## 2025-07-11 VITALS
HEIGHT: 59 IN | BODY MASS INDEX: 22.3 KG/M2 | WEIGHT: 110.6 LBS | DIASTOLIC BLOOD PRESSURE: 78 MMHG | RESPIRATION RATE: 18 BRPM | OXYGEN SATURATION: 96 % | HEART RATE: 60 BPM | TEMPERATURE: 97.6 F | SYSTOLIC BLOOD PRESSURE: 138 MMHG

## 2025-07-11 DIAGNOSIS — R07.9 CHEST PAIN, UNSPECIFIED TYPE: ICD-10-CM

## 2025-07-11 DIAGNOSIS — N18.31 STAGE 3A CHRONIC KIDNEY DISEASE (H): ICD-10-CM

## 2025-07-11 DIAGNOSIS — I10 ESSENTIAL HYPERTENSION: ICD-10-CM

## 2025-07-11 DIAGNOSIS — I48.20 CHRONIC ATRIAL FIBRILLATION (H): ICD-10-CM

## 2025-07-11 DIAGNOSIS — E78.5 HYPERLIPIDEMIA LDL GOAL <130: ICD-10-CM

## 2025-07-11 DIAGNOSIS — Z79.01 LONG TERM CURRENT USE OF ANTICOAGULANTS WITH INR GOAL OF 2.0-3.0: ICD-10-CM

## 2025-07-11 DIAGNOSIS — Z00.00 ENCOUNTER FOR MEDICARE ANNUAL WELLNESS EXAM: Primary | ICD-10-CM

## 2025-07-11 LAB
ANION GAP SERPL CALCULATED.3IONS-SCNC: 13 MMOL/L (ref 7–15)
BUN SERPL-MCNC: 29.7 MG/DL (ref 8–23)
CALCIUM SERPL-MCNC: 10.1 MG/DL (ref 8.8–10.4)
CHLORIDE SERPL-SCNC: 103 MMOL/L (ref 98–107)
CHOLEST SERPL-MCNC: 168 MG/DL
CREAT SERPL-MCNC: 1.28 MG/DL (ref 0.51–0.95)
CREAT UR-MCNC: 73.3 MG/DL
EGFRCR SERPLBLD CKD-EPI 2021: 41 ML/MIN/1.73M2
FASTING STATUS PATIENT QL REPORTED: ABNORMAL
FASTING STATUS PATIENT QL REPORTED: NORMAL
GLUCOSE SERPL-MCNC: 86 MG/DL (ref 70–99)
HCO3 SERPL-SCNC: 25 MMOL/L (ref 22–29)
HDLC SERPL-MCNC: 61 MG/DL
HOLD SPECIMEN: NORMAL
INR BLD: 1.3 (ref 0.9–1.1)
LDLC SERPL CALC-MCNC: 93 MG/DL
MICROALBUMIN UR-MCNC: 86.3 MG/L
MICROALBUMIN/CREAT UR: 117.74 MG/G CR (ref 0–25)
NONHDLC SERPL-MCNC: 107 MG/DL
POTASSIUM SERPL-SCNC: 3.9 MMOL/L (ref 3.4–5.3)
SODIUM SERPL-SCNC: 141 MMOL/L (ref 135–145)
TRIGL SERPL-MCNC: 72 MG/DL

## 2025-07-11 PROCEDURE — 82570 ASSAY OF URINE CREATININE: CPT | Performed by: NURSE PRACTITIONER

## 2025-07-11 PROCEDURE — G0439 PPPS, SUBSEQ VISIT: HCPCS | Performed by: NURSE PRACTITIONER

## 2025-07-11 PROCEDURE — 80061 LIPID PANEL: CPT | Performed by: NURSE PRACTITIONER

## 2025-07-11 PROCEDURE — 3075F SYST BP GE 130 - 139MM HG: CPT | Performed by: NURSE PRACTITIONER

## 2025-07-11 PROCEDURE — G2211 COMPLEX E/M VISIT ADD ON: HCPCS | Performed by: NURSE PRACTITIONER

## 2025-07-11 PROCEDURE — 99214 OFFICE O/P EST MOD 30 MIN: CPT | Mod: 25 | Performed by: NURSE PRACTITIONER

## 2025-07-11 PROCEDURE — 80048 BASIC METABOLIC PNL TOTAL CA: CPT | Performed by: NURSE PRACTITIONER

## 2025-07-11 PROCEDURE — 1126F AMNT PAIN NOTED NONE PRSNT: CPT | Performed by: NURSE PRACTITIONER

## 2025-07-11 PROCEDURE — 36415 COLL VENOUS BLD VENIPUNCTURE: CPT | Performed by: NURSE PRACTITIONER

## 2025-07-11 PROCEDURE — 82043 UR ALBUMIN QUANTITATIVE: CPT | Performed by: NURSE PRACTITIONER

## 2025-07-11 PROCEDURE — 85610 PROTHROMBIN TIME: CPT | Performed by: NURSE PRACTITIONER

## 2025-07-11 PROCEDURE — 3078F DIAST BP <80 MM HG: CPT | Performed by: NURSE PRACTITIONER

## 2025-07-11 RX ORDER — LISINOPRIL 10 MG/1
10 TABLET ORAL DAILY
Qty: 90 TABLET | Refills: 3 | Status: SHIPPED | OUTPATIENT
Start: 2025-07-11

## 2025-07-11 RX ORDER — TRIAMTERENE AND HYDROCHLOROTHIAZIDE 37.5; 25 MG/1; MG/1
0.5 TABLET ORAL
Qty: 90 TABLET | Refills: 3 | Status: SHIPPED | OUTPATIENT
Start: 2025-07-11

## 2025-07-11 RX ORDER — METOPROLOL TARTRATE 50 MG
75 TABLET ORAL 2 TIMES DAILY
Qty: 270 TABLET | Refills: 3 | Status: SHIPPED | OUTPATIENT
Start: 2025-07-11

## 2025-07-11 RX ORDER — NITROGLYCERIN 0.4 MG/1
0.4 TABLET SUBLINGUAL EVERY 5 MIN PRN
Qty: 25 TABLET | Refills: 1 | Status: SHIPPED | OUTPATIENT
Start: 2025-07-11

## 2025-07-11 RX ORDER — AMLODIPINE BESYLATE 5 MG/1
5 TABLET ORAL DAILY
Qty: 90 TABLET | Refills: 3 | Status: SHIPPED | OUTPATIENT
Start: 2025-07-11

## 2025-07-11 ASSESSMENT — PAIN SCALES - GENERAL: PAINLEVEL_OUTOF10: NO PAIN (0)

## 2025-07-11 NOTE — PATIENT INSTRUCTIONS
No changes in medications ~ refills sent through to the pharmacy     See me back in 1 year         Patient Education   Preventive Care Advice   This is general advice given by our system to help you stay healthy. However, your care team may have specific advice just for you. Please talk to your care team about your preventive care needs.  Nutrition  Eat 5 or more servings of fruits and vegetables each day.  Try wheat bread, brown rice and whole grain pasta (instead of white bread, rice, and pasta).  Get enough calcium and vitamin D. Check the label on foods and aim for 100% of the RDA (recommended daily allowance).  Lifestyle  Exercise at least 150 minutes each week  (30 minutes a day, 5 days a week).  Do muscle strengthening activities 2 days a week. These help control your weight and prevent disease.  No smoking.  Wear sunscreen to prevent skin cancer.  Have a dental exam and cleaning every 6 months.  Yearly exams  See your health care team every year to talk about:  Any changes in your health.  Any medicines your care team has prescribed.  Preventive care, family planning, and ways to prevent chronic diseases.  Shots (vaccines)   HPV shots (up to age 26), if you've never had them before.  Hepatitis B shots (up to age 59), if you've never had them before.  COVID-19 shot: Get this shot when it's due.  Flu shot: Get a flu shot every year.  Tetanus shot: Get a tetanus shot every 10 years.  Pneumococcal, hepatitis A, and RSV shots: Ask your care team if you need these based on your risk.  Shingles shot (for age 50 and up)  General health tests  Diabetes screening:  Starting at age 35, Get screened for diabetes at least every 3 years.  If you are younger than age 35, ask your care team if you should be screened for diabetes.  Cholesterol test: At age 39, start having a cholesterol test every 5 years, or more often if advised.  Bone density scan (DEXA): At age 50, ask your care team if you should have this scan for  osteoporosis (brittle bones).  Hepatitis C: Get tested at least once in your life.  STIs (sexually transmitted infections)  Before age 24: Ask your care team if you should be screened for STIs.  After age 24: Get screened for STIs if you're at risk. You are at risk for STIs (including HIV) if:  You are sexually active with more than one person.  You don't use condoms every time.  You or a partner was diagnosed with a sexually transmitted infection.  If you are at risk for HIV, ask about PrEP medicine to prevent HIV.  Get tested for HIV at least once in your life, whether you are at risk for HIV or not.  Cancer screening tests  Cervical cancer screening: If you have a cervix, begin getting regular cervical cancer screening tests starting at age 21.  Breast cancer scan (mammogram): If you've ever had breasts, begin having regular mammograms starting at age 40. This is a scan to check for breast cancer.  Colon cancer screening: It is important to start screening for colon cancer at age 45.  Have a colonoscopy test every 10 years (or more often if you're at risk) Or, ask your provider about stool tests like a FIT test every year or Cologuard test every 3 years.  To learn more about your testing options, visit:   .  For help making a decision, visit:   https://bit.ly/oy63223.  Prostate cancer screening test: If you have a prostate, ask your care team if a prostate cancer screening test (PSA) at age 55 is right for you.  Lung cancer screening: If you are a current or former smoker ages 50 to 80, ask your care team if ongoing lung cancer screenings are right for you.  For informational purposes only. Not to replace the advice of your health care provider. Copyright   2023 [employer]. All rights reserved. Clinically reviewed by the [facility] Transitions Program. [employer] 822961 - REV 01/24.

## 2025-07-11 NOTE — NURSING NOTE
"Chief Complaint   Patient presents with    Hypertension    Physical       Initial LMP  (LMP Unknown)  Estimated body mass index is 24.87 kg/m  as calculated from the following:    Height as of 1/9/25: 1.473 m (4' 10\").    Weight as of 2/15/25: 54 kg (119 lb).    Patient presents to the clinic using No DME    Is there anyone who you would like to be able to receive your results? No  If yes have patient fill out BOLA      "

## 2025-07-11 NOTE — PROGRESS NOTES
"Preventive Care Visit  [facility]  Tiff Johnson, HERMAN, Family Medicine  Jul 11, 2025  {Provider  Link to [facility] :187917}    Assessment & Plan     Encounter for Medicare annual wellness exam  ***    Essential hypertension  ***  - amLODIPine (NORVASC) 5 MG tablet; Take 1 tablet (5 mg) by mouth daily.  - lisinopril (ZESTRIL) 10 MG tablet; Take 1 tablet (10 mg) by mouth daily.  - metoprolol tartrate (LOPRESSOR) 50 MG tablet; Take 1.5 tablets (75 mg) by mouth 2 times daily.  - triamterene-HCTZ (MAXZIDE-25) 37.5-25 MG tablet; Take 0.5 tablets by mouth 2 times daily.  - Basic metabolic panel  (Ca, Cl, CO2, Creat, Gluc, K, Na, BUN); Future    Chest pain, unspecified type  ***  - nitroGLYcerin (NITROSTAT) 0.4 MG sublingual tablet; Place 1 tablet (0.4 mg) under the tongue every 5 minutes as needed for chest pain.  {FEMALE COUNSELING MESSAGES (Optional):366247::\"Reviewed preventive health counseling, as reflected in patient instructions\"}    {Follow-up (Optional):448996}    Janna Adler is a 85 year old, presenting for the following:  Hypertension and Physical        7/11/2025     7:41 AM   Additional Questions   Roomed by Bree Rubio          HPI       Nitroglycerin pills - last rx 2018 - got old and she never review.       Hypertension Follow-up    Do you check your blood pressure regularly outside of the clinic? No   Are you following a low salt diet? Yes  Are your blood pressures ever more than 140 on the top number (systolic) OR more   than 90 on the bottom number (diastolic), for example 140/90? N/A    BP Readings from Last 2 Encounters:   07/11/25 138/78   05/02/25 (!) 147/65     Hasn't taken pills this morning yet       Advance Care Planning  {The storyboard will display whether the patient has ACP docs on file. Hover over the Code section in the storyboard to access the ACP documents. :503627}  Document on file is a Health Care Directive or POLST.        7/11/2025   General Health "   How would you rate your overall physical health? Good         7/11/2025   Nutrition   Diet: Low salt    Low fat/cholesterol       Multiple values from one day are sorted in reverse-chronological order         7/11/2025   Exercise   Days per week of moderate/strenous exercise 7 days   Average minutes spent exercising at this level 20 min         7/11/2025   Social Factors   Frequency of gathering with friends or relatives Once a week   Worry food won't last until get money to buy more Patient declined   Food not last or not have enough money for food? Patient declined   Do you have housing? (Housing is defined as stable permanent housing and does not include staying outside in a car, in a tent, in an abandoned building, in an overnight shelter, or couch-surfing.) Patient declined         7/10/2024   Activities of Daily Living- Home Safety   Needs help with the following daily activites None of the above   Safety concerns in the home None of the above         7/11/2025   Dental   Dentist two times every year? (!) NO         7/10/2024   Hearing Screening   Hearing concerns? None of the above           7/10/2024   Urinary Incontinence Screening   Bothered by leaking urine in past 6 months No       {Rooming Staff Patient needs a PHQ as part of the AWV.  Use this link to complete and then refresh the note to pull results Link to PHQ2 Assessment :695040}    Today's PHQ-2 Score:       1/3/2025     7:28 AM   PHQ-2 ( 1999 Pf[employer])   Q1: Little interest or pleasure in doing things 0   Q2: Feeling down, depressed or hopeless 0   PHQ-2 Score 0         7/11/2025   Substance Use   Alcohol more than 3/day or more than 7/wk Not Applicable   Do you have a current opioid prescription? No   How severe/bad is pain from 1 to 10? 0/10 (No Pain)   Do you use any other substances recreationally? (!) PRESCRIPTION DRUGS     Social History     Tobacco Use    Smoking status: Never    Smokeless tobacco: Never   Vaping Use    Vaping status:  Never Used   Substance Use Topics    Alcohol use: No    Drug use: No     {Provider  If there are gaps in the social history shown above, please follow the link to update and then refresh the note Link to Social and Substance History :845191}      6/18/2025   LAST FHS-7 RESULTS   1st degree relative breast or ovarian cancer No   Any relative bilateral breast cancer No   Any male have breast cancer No   Any ONE woman have BOTH breast AND ovarian cancer No   Any woman with breast cancer before 50yrs No   2 or more relatives with breast AND/OR ovarian cancer No   2 or more relatives with breast AND/OR bowel cancer No     {If any of the questions to the FHS7 are answered yes, consider referral for genetic counseling.    Additional indications for genetic referral include personal history of breast or ovarian cancer, genetic mutation in 1st degree relative which increases risk of breast cancer including BRCA1, BRCA2, RK, PALB 2, TP53, CHEK2, PTEN, CDH1, STK11 (per ACS) and/or 1st degree relative with history of pancreatic or high-risk prostate cancer (per NCCN):869083}   {Mammogram Decision Support (Optional):767073}      {Provider  REQUIRED FOR AWV Use the storyboard to review patient history, after sections have been marked as reviewed, refresh note to capture documentation:817783}  {Provider   REQUIRED AWV use this link to review and update sexual activity history  after section has been marked as reviewed, refresh note to capture documentation:030581}  Reviewed and updated as needed this visit by Provider                    {HISTORY OPTIONS (Optional):294273}  Current providers sharing in care for this patient include:  Patient Care Team:  Tiff Yarbrough DNP as PCP - General (Family Medicine)  Tiff Yarbrough DNP as Assigned PCP  Gale Antony PA-C as Physician Assistant (Dermatology)  Gale Antony PA-C as Assigned Dermatology Provider    The following health maintenance items are reviewed in  "Epic and correct as of today:  Health Maintenance   Topic Date Due    RSV VACCINE (1 - 1-dose 75+ series) Never done    COLORECTAL CANCER SCREENING  07/13/2022    ZOSTER VACCINE (2 of 2) 11/16/2023    LIPID  03/22/2025    MICROALBUMIN  03/22/2025    COVID-19 VACCINE (8 - 2024-25 season) 04/01/2025    FALL RISK ASSESSMENT  07/10/2025    MEDICARE ANNUAL WELLNESS VISIT  07/10/2025    ANNUAL REVIEW OF HM ORDERS  01/03/2026    INFLUENZA VACCINE (1) 09/01/2025    BMP  01/09/2026    HEMOGLOBIN  01/09/2026    DTAP/TDAP/TD VACCINE (2 - Td or Tdap) 05/04/2027    DEXA  09/17/2028    ADVANCE CARE PLANNING  07/11/2030    PHQ-2 (once per calendar year)  Completed    PNEUMOCOCCAL VACCINE 50+ YEARS  Completed    URINALYSIS  Completed    HPV VACCINE  Aged Out    MENINGITIS VACCINE  Aged Out       {ROS Picklists (Optional):724885}     Objective    Exam  /78   Pulse 60   Temp 97.6  F (36.4  C) (Tympanic)   Resp 18   Ht 1.5 m (4' 11.06\")   Wt 50.2 kg (110 lb 9.6 oz)   LMP  (LMP Unknown)   SpO2 96%   BMI 22.30 kg/m     Estimated body mass index is 22.3 kg/m  as calculated from the following:    Height as of this encounter: 1.5 m (4' 11.06\").    Weight as of this encounter: 50.2 kg (110 lb 9.6 oz).    Physical Exam  GENERAL: alert and no distress  HENT: ear canals and TM's normal, nose and mouth without ulcers or lesions  NECK: no adenopathy, no asymmetry, masses, or scars  RESP: lungs clear to auscultation - no rales, rhonchi or wheezes  CV: regular rate and rhythm, normal S1 S2, no S3 or S4, no murmur, click or rub, no peripheral edema  ABDOMEN: soft, nontender, no hepatosplenomegaly, no masses and bowel sounds normal  MS: no gross musculoskeletal defects noted, no edema  SKIN: no suspicious lesions or rashes  NEURO: Normal strength and tone, mentation intact and speech normal  PSYCH: mentation appears normal, affect normal/bright  {Provider  The Mini-Cog is incomplete, use link to complete and refresh note Link to " Mini-Cog :365397}      7/11/2025   Mini Cog   Mini-Cog Not Completed (choose reason) Patient declines     {A Mini-Cog total score of 0-2 suggests the possibility of dementia, score of 3-5 suggests no dementia:155192}         Signed Electronically by: Tiff Johnson DNP  {Email feedback regarding this note to [email or web address]   :803356}

## 2025-08-28 ENCOUNTER — LAB (OUTPATIENT)
Dept: LAB | Facility: CLINIC | Age: 85
End: 2025-08-28
Payer: COMMERCIAL

## 2025-08-28 ENCOUNTER — ANTICOAGULATION THERAPY VISIT (OUTPATIENT)
Dept: ANTICOAGULATION | Facility: CLINIC | Age: 85
End: 2025-08-28

## 2025-08-28 ENCOUNTER — RESULTS FOLLOW-UP (OUTPATIENT)
Dept: ANTICOAGULATION | Facility: CLINIC | Age: 85
End: 2025-08-28

## 2025-08-28 DIAGNOSIS — Z79.01 LONG TERM CURRENT USE OF ANTICOAGULANTS WITH INR GOAL OF 2.0-3.0: ICD-10-CM

## 2025-08-28 DIAGNOSIS — I48.20 CHRONIC ATRIAL FIBRILLATION (H): Primary | ICD-10-CM

## 2025-08-28 DIAGNOSIS — I48.20 CHRONIC ATRIAL FIBRILLATION (H): ICD-10-CM

## 2025-08-28 LAB — INR BLD: 1.9 (ref 0.9–1.1)

## (undated) RX ORDER — REGADENOSON 0.08 MG/ML
INJECTION, SOLUTION INTRAVENOUS
Status: DISPENSED
Start: 2025-01-09

## (undated) RX ORDER — CEFAZOLIN SODIUM/WATER 2 G/20 ML
SYRINGE (ML) INTRAVENOUS
Status: DISPENSED
Start: 2024-05-24

## (undated) RX ORDER — LIDOCAINE HYDROCHLORIDE 10 MG/ML
INJECTION, SOLUTION EPIDURAL; INFILTRATION; INTRACAUDAL; PERINEURAL
Status: DISPENSED
Start: 2024-06-28